# Patient Record
Sex: FEMALE | Race: OTHER
[De-identification: names, ages, dates, MRNs, and addresses within clinical notes are randomized per-mention and may not be internally consistent; named-entity substitution may affect disease eponyms.]

---

## 2022-04-29 ENCOUNTER — HOSPITAL ENCOUNTER (OUTPATIENT)
Dept: HOSPITAL 47 - EC | Age: 67
Setting detail: OBSERVATION
LOS: 4 days | Discharge: HOME | End: 2022-05-03
Attending: HOSPITALIST | Admitting: HOSPITALIST
Payer: MEDICARE

## 2022-04-29 DIAGNOSIS — G89.29: ICD-10-CM

## 2022-04-29 DIAGNOSIS — Z90.49: ICD-10-CM

## 2022-04-29 DIAGNOSIS — Z95.1: ICD-10-CM

## 2022-04-29 DIAGNOSIS — Z86.73: ICD-10-CM

## 2022-04-29 DIAGNOSIS — I11.9: ICD-10-CM

## 2022-04-29 DIAGNOSIS — I73.9: ICD-10-CM

## 2022-04-29 DIAGNOSIS — Z20.822: ICD-10-CM

## 2022-04-29 DIAGNOSIS — E78.00: ICD-10-CM

## 2022-04-29 DIAGNOSIS — F17.210: ICD-10-CM

## 2022-04-29 DIAGNOSIS — F32.A: ICD-10-CM

## 2022-04-29 DIAGNOSIS — Z83.3: ICD-10-CM

## 2022-04-29 DIAGNOSIS — I08.1: ICD-10-CM

## 2022-04-29 DIAGNOSIS — M19.91: ICD-10-CM

## 2022-04-29 DIAGNOSIS — I48.0: ICD-10-CM

## 2022-04-29 DIAGNOSIS — I25.2: ICD-10-CM

## 2022-04-29 DIAGNOSIS — Z82.49: ICD-10-CM

## 2022-04-29 DIAGNOSIS — E03.9: ICD-10-CM

## 2022-04-29 DIAGNOSIS — F41.9: ICD-10-CM

## 2022-04-29 DIAGNOSIS — G62.9: ICD-10-CM

## 2022-04-29 DIAGNOSIS — Z79.890: ICD-10-CM

## 2022-04-29 DIAGNOSIS — Z79.899: ICD-10-CM

## 2022-04-29 DIAGNOSIS — E78.5: ICD-10-CM

## 2022-04-29 DIAGNOSIS — Z88.5: ICD-10-CM

## 2022-04-29 DIAGNOSIS — M54.50: ICD-10-CM

## 2022-04-29 DIAGNOSIS — I25.82: ICD-10-CM

## 2022-04-29 DIAGNOSIS — I95.9: ICD-10-CM

## 2022-04-29 DIAGNOSIS — Z79.02: ICD-10-CM

## 2022-04-29 DIAGNOSIS — Z91.14: ICD-10-CM

## 2022-04-29 DIAGNOSIS — N39.3: ICD-10-CM

## 2022-04-29 DIAGNOSIS — I25.110: Primary | ICD-10-CM

## 2022-04-29 DIAGNOSIS — I25.5: ICD-10-CM

## 2022-04-29 DIAGNOSIS — Z95.5: ICD-10-CM

## 2022-04-29 LAB
APTT BLD: 21.8 SEC (ref 22–30)
BASOPHILS # BLD AUTO: 0.1 K/UL (ref 0–0.2)
BASOPHILS NFR BLD AUTO: 1 %
EOSINOPHIL # BLD AUTO: 0.2 K/UL (ref 0–0.7)
EOSINOPHIL NFR BLD AUTO: 2 %
ERYTHROCYTE [DISTWIDTH] IN BLOOD BY AUTOMATED COUNT: 4.47 M/UL (ref 3.8–5.4)
ERYTHROCYTE [DISTWIDTH] IN BLOOD: 13.6 % (ref 11.5–15.5)
HCT VFR BLD AUTO: 44.9 % (ref 34–46)
HGB BLD-MCNC: 13.7 GM/DL (ref 11.4–16)
INR PPP: 0.9 (ref ?–1.2)
LYMPHOCYTES # SPEC AUTO: 2 K/UL (ref 1–4.8)
LYMPHOCYTES NFR SPEC AUTO: 21 %
MCH RBC QN AUTO: 30.6 PG (ref 25–35)
MCHC RBC AUTO-ENTMCNC: 30.4 G/DL (ref 31–37)
MCV RBC AUTO: 100.4 FL (ref 80–100)
MONOCYTES # BLD AUTO: 0.5 K/UL (ref 0–1)
MONOCYTES NFR BLD AUTO: 5 %
NEUTROPHILS # BLD AUTO: 6.7 K/UL (ref 1.3–7.7)
NEUTROPHILS NFR BLD AUTO: 69 %
PLATELET # BLD AUTO: 199 K/UL (ref 150–450)
PT BLD: 9.7 SEC (ref 9–12)
WBC # BLD AUTO: 9.6 K/UL (ref 3.8–10.6)

## 2022-04-29 PROCEDURE — 71275 CT ANGIOGRAPHY CHEST: CPT

## 2022-04-29 PROCEDURE — 96376 TX/PRO/DX INJ SAME DRUG ADON: CPT

## 2022-04-29 PROCEDURE — 99285 EMERGENCY DEPT VISIT HI MDM: CPT

## 2022-04-29 PROCEDURE — 93017 CV STRESS TEST TRACING ONLY: CPT

## 2022-04-29 PROCEDURE — 85379 FIBRIN DEGRADATION QUANT: CPT

## 2022-04-29 PROCEDURE — 93005 ELECTROCARDIOGRAM TRACING: CPT

## 2022-04-29 PROCEDURE — 93306 TTE W/DOPPLER COMPLETE: CPT

## 2022-04-29 PROCEDURE — 94760 N-INVAS EAR/PLS OXIMETRY 1: CPT

## 2022-04-29 PROCEDURE — 78452 HT MUSCLE IMAGE SPECT MULT: CPT

## 2022-04-29 PROCEDURE — 96366 THER/PROPH/DIAG IV INF ADDON: CPT

## 2022-04-29 PROCEDURE — 96374 THER/PROPH/DIAG INJ IV PUSH: CPT

## 2022-04-29 PROCEDURE — 96365 THER/PROPH/DIAG IV INF INIT: CPT

## 2022-04-29 PROCEDURE — 80061 LIPID PANEL: CPT

## 2022-04-29 PROCEDURE — 36415 COLL VENOUS BLD VENIPUNCTURE: CPT

## 2022-04-29 PROCEDURE — 85730 THROMBOPLASTIN TIME PARTIAL: CPT

## 2022-04-29 PROCEDURE — 80053 COMPREHEN METABOLIC PANEL: CPT

## 2022-04-29 PROCEDURE — 71046 X-RAY EXAM CHEST 2 VIEWS: CPT

## 2022-04-29 PROCEDURE — 85025 COMPLETE CBC W/AUTO DIFF WBC: CPT

## 2022-04-29 PROCEDURE — 85610 PROTHROMBIN TIME: CPT

## 2022-04-29 PROCEDURE — 84484 ASSAY OF TROPONIN QUANT: CPT

## 2022-04-29 RX ADMIN — NICOTINE SCH: 14 PATCH, EXTENDED RELEASE TRANSDERMAL at 18:26

## 2022-04-29 RX ADMIN — METOPROLOL TARTRATE SCH MG: 25 TABLET, FILM COATED ORAL at 21:28

## 2022-04-29 RX ADMIN — CEFAZOLIN SCH MLS/HR: 330 INJECTION, POWDER, FOR SOLUTION INTRAMUSCULAR; INTRAVENOUS at 18:32

## 2022-04-29 RX ADMIN — HEPARIN SODIUM SCH MLS/HR: 10000 INJECTION, SOLUTION INTRAVENOUS at 15:02

## 2022-04-29 NOTE — ED
Chest Pain HPI





- General


Stated Complaint: Chest pain


Time Seen by Provider: 04/29/22 13:09


Source: patient, RN/MD, EMS, RN notes reviewed





- History of Present Illness


Initial Comments: 





67-year-old female transferred from [facility] for evaluation of chest 

pain.  Patient apparently has had chronic lower back pain and did have lab work 

that was performed by her family doctor she was actually at the office today he 

had results started developing chest pain and pressure.  She was transferred to 

[facility] for initial evaluation and treatment.  She was given aspirin 

and she was given nitroglycerin initially she did have a hypotensive episode 

once apparently emerged department also wants and route to this facility.  

Initial EKG shows some evidence of ischemic changes in the inferior lateral 

leads per report troponin was pending at the time of transfer the troponin was 

received here by fax and seems be within normal limits.  Patient's back pain was

better after IV Tylenol.  Patient does have a history of stents as well as 

cardiac bypass.  Discussion she is feeling improved.  No nausea no vomiting no 

fevers chills sweats cough or phlegm production.  No loss of function to her 

upper or lower extremities.  Please see the complete report in the paperwork 

from [facility].


MD Complaint: chest pain, other





- Related Data


                                Home Medications











 Medication  Instructions  Recorded  Confirmed


 


Atorvastatin Calcium [Lipitor] 80 mg PO DAILY 04/29/22 04/29/22


 


Clopidogrel Bisulfate [Plavix] 75 mg PO DAILY 04/29/22 04/29/22


 


Gabapentin 600 mg PO DAILY 04/29/22 04/29/22


 


Levothyroxine Sodium [Synthroid] 112 mcg PO DAILY 04/29/22 04/29/22


 


Losartan Potassium 50 mg PO DAILY 04/29/22 04/29/22


 


Metoprolol Tartrate [Lopressor] 25 mg PO DAILY 04/29/22 04/29/22


 


Sertraline HCl [Zoloft] 100 mg PO DAILY 04/29/22 04/29/22


 


clonazePAM [KlonoPIN] 1 mg PO HS 04/29/22 04/29/22











                                    Allergies











Allergy/AdvReac Type Severity Reaction Status Date / Time


 


codeine AdvReac  Unknown Verified 04/29/22 15:19














Review of Systems


ROS Statement: 


Those systems with pertinent positive or pertinent negative responses have been 

documented in the HPI.





ROS Other: All systems not noted in ROS Statement are negative.





EKG Findings





- EKG Results:


EKG: interpreted by ERIC, sinus rhythm (Sinus rhythm of 50 bpm WY interval 174 

QRS duration 91 QT//451 left exodeviation anteroseptal changes 

indeterminate age moderate T-wave abnormality.  This is consistent with EKGs 

presented from [facility].)





General Exam





- General Exam Comments


Initial Comments: 





This is a well-developed well-nourished awake alert oriented 3 female


General appearance: alert, in no apparent distress


Head exam: Present: atraumatic, normocephalic, normal inspection


Eye exam: Present: normal appearance, PERRL, EOMI.  Absent: scleral icterus, 

conjunctival injection, periorbital swelling


ENT exam: Present: normal exam, mucous membranes moist


Neck exam: Present: normal inspection, full ROM, other (No stridor JVD or 

bruits).  Absent: tenderness, meningismus, lymphadenopathy


Respiratory exam: Present: normal lung sounds bilaterally.  Absent: respiratory 

distress, wheezes, rales, rhonchi, stridor


Cardiovascular Exam: Present: regular rate, normal rhythm, normal heart sounds. 

 Absent: systolic murmur, diastolic murmur, rubs, gallop, clicks


GI/Abdominal exam: Present: soft, normal bowel sounds.  Absent: distended, 

tenderness, guarding, rebound, rigid, bruit, pulsatile mass


Extremities exam: Present: normal inspection, full ROM, normal capillary refill.

  Absent: tenderness, pedal edema, joint swelling, calf tenderness


Back exam: Present: normal inspection


Neurological exam: Present: alert, oriented X3, CN II-XII intact


Psychiatric exam: Present: normal affect, normal mood


Skin exam: Present: warm, dry, intact, normal color.  Absent: rash





Course


                                   Vital Signs











  04/29/22 04/29/22





  13:11 15:04


 


Temperature 97.7 F 


 


Pulse Rate 55 L 58 L


 


Respiratory 18 18





Rate  


 


Blood Pressure 132/58 124/55


 


O2 Sat by Pulse 95 99





Oximetry  














Chest Pain MDM





- MDM





Review the findings I did review the material from the sending facility.  

Patient is feeling improved the initial troponin was negative I did discuss the 

findings with her and with the admitting physician patient be admitted with 

cardiology consultation





Disposition


Clinical Impression: 


 Chest pain, Unstable angina pectoris





Disposition: ADMITTED AS IP TO THIS HOSP


Condition: Fair


Referrals: 


Bernabe Colon MD [Primary Care Provider] - 1-2 days


Decision Date: 04/29/22


Decision Time: 17:10

## 2022-04-29 NOTE — CT
EXAMINATION TYPE: CT angio chest

 

DATE OF EXAM: 4/29/2022 2:49 PM

 

COMPARISON: None

 

HISTORY: PE suspected.

 

CT DLP: 256.4 mGycm

Automated exposure control for dose reduction was used.

 

CONTRAST: 

CTA scan of the thorax is performed with IV Contrast, patient injected with 100 mL of Isovue 370, pul
monary embolism protocol. .  

 

FINDINGS:

 

LUNGS: The lungs are grossly clear, there is no concerning parenchymal mass or nodule identified.   T
here is no pleural effusion or pneumothorax seen.  The tracheobronchial tree is patent.

 

MEDIASTINUM: There is satisfactory enhancement of the pulmonary artery and its branches, there is no 
CT evidence for pulmonary embolism.  There are no greater than 1 cm hilar or mediastinal lymph nodes.
   No pericardial effusion is seen. 

 

In the thoracic aortic arch there is a focal outpouching consistent with a small saccular aneurysm.

 

There is a 18 mm filling defect left ventricular apex with rim-like calcification. 

 

 

IMPRESSION: 

 

 

1. NO EVIDENCE OF PULMONARY EMBOLISM.

2. NO ACUTE CARDIOPULMONARY DISEASE.

3. SACCULAR ANEURYSM OF THE THORACIC AORTIC ARCH. FURTHER EVALUATION IS WARRANTED.

4. ABNORMALITY OF THE LEFT VENTRICLE APEX. FURTHER EVALUATION IS WARRANTED.

## 2022-04-29 NOTE — XR
EXAMINATION TYPE: XR chest 2V

 

DATE OF EXAM: 4/29/2022

 

COMPARISON: NONE

 

HISTORY: Chest pain

 

TECHNIQUE:

 

FINDINGS: There is no heart failure nor confluent pneumonic infiltrate. There are sternal wires. Cost
ophrenic angles are clear. Heart size is fairly normal. Bony thorax is intact.

 

IMPRESSION: No active cardiopulmonary disease. No change.

## 2022-04-30 LAB
APTT BLD: 43.1 SEC (ref 22–30)
BASOPHILS # BLD AUTO: 0.1 K/UL (ref 0–0.2)
BASOPHILS NFR BLD AUTO: 1 %
CHOLEST SERPL-MCNC: 114 MG/DL (ref 0–200)
EOSINOPHIL # BLD AUTO: 0.2 K/UL (ref 0–0.7)
EOSINOPHIL NFR BLD AUTO: 3 %
ERYTHROCYTE [DISTWIDTH] IN BLOOD BY AUTOMATED COUNT: 4.23 M/UL (ref 3.8–5.4)
ERYTHROCYTE [DISTWIDTH] IN BLOOD: 13.6 % (ref 11.5–15.5)
HCT VFR BLD AUTO: 42.2 % (ref 34–46)
HDLC SERPL-MCNC: 31 MG/DL (ref 40–60)
HGB BLD-MCNC: 12.8 GM/DL (ref 11.4–16)
INR PPP: 0.9 (ref ?–1.2)
LDLC SERPL CALC-MCNC: 52.2 MG/DL (ref 0–131)
LYMPHOCYTES # SPEC AUTO: 2.5 K/UL (ref 1–4.8)
LYMPHOCYTES NFR SPEC AUTO: 31 %
MCH RBC QN AUTO: 30.2 PG (ref 25–35)
MCHC RBC AUTO-ENTMCNC: 30.3 G/DL (ref 31–37)
MCV RBC AUTO: 99.7 FL (ref 80–100)
MONOCYTES # BLD AUTO: 0.3 K/UL (ref 0–1)
MONOCYTES NFR BLD AUTO: 4 %
NEUTROPHILS # BLD AUTO: 5 K/UL (ref 1.3–7.7)
NEUTROPHILS NFR BLD AUTO: 61 %
PLATELET # BLD AUTO: 184 K/UL (ref 150–450)
PT BLD: 10.1 SEC (ref 9–12)
TRIGL SERPL-MCNC: 154 MG/DL (ref 0–149)
VLDLC SERPL CALC-MCNC: 30.8 MG/DL (ref 5–40)
WBC # BLD AUTO: 8.2 K/UL (ref 3.8–10.6)

## 2022-04-30 RX ADMIN — CLOPIDOGREL BISULFATE SCH MG: 75 TABLET ORAL at 08:09

## 2022-04-30 RX ADMIN — CEFAZOLIN SCH MLS/HR: 330 INJECTION, POWDER, FOR SOLUTION INTRAMUSCULAR; INTRAVENOUS at 04:47

## 2022-04-30 RX ADMIN — GABAPENTIN SCH MG: 300 CAPSULE ORAL at 08:09

## 2022-04-30 RX ADMIN — CEFAZOLIN SCH: 330 INJECTION, POWDER, FOR SOLUTION INTRAMUSCULAR; INTRAVENOUS at 14:21

## 2022-04-30 RX ADMIN — SERTRALINE HYDROCHLORIDE SCH MG: 100 TABLET ORAL at 08:10

## 2022-04-30 RX ADMIN — METOPROLOL TARTRATE SCH MG: 25 TABLET, FILM COATED ORAL at 08:09

## 2022-04-30 RX ADMIN — LEVOTHYROXINE SODIUM SCH MCG: 0.11 TABLET ORAL at 05:53

## 2022-04-30 RX ADMIN — METOPROLOL TARTRATE SCH MG: 25 TABLET, FILM COATED ORAL at 20:36

## 2022-04-30 RX ADMIN — ATORVASTATIN CALCIUM SCH MG: 80 TABLET, FILM COATED ORAL at 08:09

## 2022-04-30 RX ADMIN — ACETAMINOPHEN PRN MG: 325 TABLET, FILM COATED ORAL at 11:06

## 2022-04-30 RX ADMIN — LOSARTAN POTASSIUM SCH MG: 50 TABLET, FILM COATED ORAL at 08:10

## 2022-04-30 RX ADMIN — NICOTINE SCH: 14 PATCH, EXTENDED RELEASE TRANSDERMAL at 08:12

## 2022-04-30 RX ADMIN — ASPIRIN 325 MG ORAL TABLET SCH MG: 325 PILL ORAL at 08:09

## 2022-04-30 RX ADMIN — HEPARIN SODIUM SCH MLS/HR: 10000 INJECTION, SOLUTION INTRAVENOUS at 17:15

## 2022-04-30 RX ADMIN — NICOTINE SCH PATCH: 14 PATCH, EXTENDED RELEASE TRANSDERMAL at 08:10

## 2022-04-30 NOTE — P.HPIM
History of Present Illness


H&P Date: 22


Chief Complaint: Chest pain





This is a pleasant 67-year-old patient who follows with Dr. Bernabe Colon.  

Patient was transferred here from [facility].


Chronic stable medical conditions include urinary incontinence, hypothyroid, 

anxiety, hypothyroid, CAD with a bypass in , PAD, osteoarthritis.  Patient 

presents with 2 months of chest pain anteriorly and at the back breath.  Present

off and on.  Describes urge sometimes as if she received a shock versus 

squeezing sensation.  Sometimes radiates to the neck.  Some associated shortness

of breath and perspiration.  Symptoms have been progressively getting worse.  

Presented at rest.  Patient was transferred here for further workup.





Review of systems:


GEN.:  Tired


EYES: None


HEENT: None


NECK: None


RESPIRATORY: As above


CARDIOVASCULAR: As above


GASTROINTESTINAL: None


GENITOURINARY: Incontinence


MUSCULOSKELETAL: Joint pains


LYMPHATICS: None


HEMATOLOGICAL: None  


PSYCHIATRY: None


NEUROLOGICAL: Some trouble with balance





Past medical history to include:


Urinary incontinence, hypothyroid, anxiety, CAD with bypass in , PAD, 

osteoarthritis





Social history:


Lives with her .  Alcohol rarely.  Been smoking for about 40 years 

currently half a pack a day.  .





Family history:


Reviewed, noncontributory to presentation





Physical examination:


VITAL SIGNS: 97.7, 55, 18, 132/58, 95% room air


GENERAL: BMI 24.8, reclining in bed, awake, tired.


EYES: Pupils equal.  Conjunctiva normal.


HEENT: External appearance of nose and ears normal, oral cavity grossly normal.


NECK: JVD not raised; masses not palpable.


HEART: First and second heart sounds are normal;  no edema.  


LUNGS: Respiratory rate normal; decreased breath sound.  


ABDOMEN: Soft,  nontender, liver spleen not palpable, no masses palpable.  


PSYCH: Alert and oriented x3;  mood  and affect normal.  


MUSCULOSKELETAL:No Clubbing/cyanosis;muscles-grossly intact.  OA


NEUROLOGICAL: Cranial nerves grossly intact; no facial asymmetry,   power and 

sensation grossly intact. 


LYMPHATICS: No lymph nodes palpable in the axilla and neck





INVESTIGATIONS, reviewed in the clinical context:


EKG tracing personally reviewed by me-normal sinus rhythm.  Decreased T waves in

V3 to V6.  Some ST segment depression.


Chest x-ray film personally reviewed by me-cardiomegaly.  No venous prominence


Troponin less than 0.012


Labs from [facility]:


Sodium 140 potassium 4.6.  15 creatinine 0.6 liver function test: Negative 

phosphate 3.1 white count 10 hemoglobin 13.9 platelets 2:30


COVID 19/influenza A/influenza B: All not detected





Assessment and plan:





-Unstable angina in a patient with known coronary artery disease was continued 

to smoke up to recently.  With some EKG changes.  Troponin 0.014


Aspirin, Lopressor, IV heparin





-CAD with a prior history of coronary bypass in 


Aspirin, Lopressor





-Peripheral arterial disease


Aspirin,





-Primary osteoarthritis, bilateral


Tylenol as needed





-Hypothyroid


Synthroid 112 g a day





-Essential hypertension


Cozaar 50 mg day.  Lopressor 25 mg twice a day.





-Chronic urinary stress incontinence





-Peripheral neuropathy


Neurontin 600 mg a day





-Chronic nicotine dependence, cigarette smoker


Nicotine patch 14





-IV heparin monitoring


Follow-up PTT





IV heparin.  Aspirin.  Lopressor.  Resume home medications.  Discussed with the 

patient.  Cardiology consultation.  Nicotine patch.


Given the complexity and severity of patient's condition expect the patient to 

be in the hospital at least for 2 overnights





Past Medical History


Past Medical History: Myocardial Infarction (MI)


History of Any Multi-Drug Resistant Organisms: None Reported


Past Surgical History: Appendectomy,  Section, Cholecystectomy, Coronary

Bypass/CABG, Heart Catheterization, Heart Catheterization With Stent


Past Psychological History: No Psychological Hx Reported


Smoking Status: Never smoker


Past Alcohol Use History: None Reported


Past Drug Use History: None Reported





- Past Family History


  ** Father


History Unknown: Yes





  ** Mother


Family Medical History: Diabetes Mellitus, Hypertension





Medications and Allergies


                                Home Medications











 Medication  Instructions  Recorded  Confirmed  Type


 


Atorvastatin Calcium [Lipitor] 80 mg PO DAILY 22 History


 


Clopidogrel Bisulfate [Plavix] 75 mg PO DAILY 22 History


 


Gabapentin 600 mg PO DAILY 22 History


 


Levothyroxine Sodium [Synthroid] 112 mcg PO DAILY 22 History


 


Losartan Potassium 50 mg PO DAILY 22 History


 


Metoprolol Tartrate [Lopressor] 25 mg PO DAILY 22 History


 


Sertraline HCl [Zoloft] 100 mg PO DAILY 22 History


 


clonazePAM [KlonoPIN] 1 mg PO HS 22 History








                                    Allergies











Allergy/AdvReac Type Severity Reaction Status Date / Time


 


codeine AdvReac  Unknown Verified 22 15:19














Physical Exam


Vitals: 


                                   Vital Signs











  Temp Pulse Resp BP Pulse Ox


 


 22 15:04   58 L  18  124/55  99


 


 22 13:11  97.7 F  55 L  18  132/58  95








                                Intake and Output











 22





 06:59 14:59 22:59


 


Other:   


 


  Weight  55.792 kg 














Results


CBC & Chem 7: 


                                 22 03:40





Labs: 


                  Abnormal Lab Results - Last 24 Hours (Table)











  22 Range/Units





  13:24 15:42 


 


MCV   100.4 H  (80.0-100.0)  fL


 


MCHC   30.4 L  (31.0-37.0)  g/dL


 


APTT  21.8 L   (22.0-30.0)  sec


 


D-Dimer  1.10 H   (<0.60)  mg/L FEU

## 2022-04-30 NOTE — CA
Transthoracic Echo Report 

 Name: Jaime Holguin 

 MRN:    [id number] 

 Age:    67     Gender:     F 

 

 :    1955 

 Exam Date:     2022 07:07 

 Exam Location: [facility] 

 Ht (in):     51     Wt (lb):     127 

 Ordering Physician:        Jose Antonio Blackwood MD 

 Attending/Referring Phys: 

 Technician         Sarahi Barr RDCS 

 Procedure CPT: 

 Indications:       Chest Pain 

 

 Cardiac Hx:        Cardiac HX of bypass, and stents. 

 Technical Quality:      Technically difficult study 

 Contrast 1:    Lumason                     Total Dose (mL):      .25 

 Contrast 2:                                Total Dose (mL): 

 

 MEASUREMENTS  (Male / Female) Normal Values 

 2D ECHO 

 LV Diastolic Diameter PLAX        4.5 cm                4.2 - 5.9 / 3.9 - 5.3 cm 

 LV Systolic Diameter PLAX         3.4 cm                 

 IVS Diastolic Thickness           1.4 cm                0.6 - 1.0 / 0.6 - 0.9 cm 

 LVPW Diastolic Thickness          1.3 cm                0.6 - 1.0 / 0.6 - 0.9 cm 

 LV Relative Wall Thickness        0.6                    

 RV Internal Dim ED PLAX           2.8 cm                 

 LA Volume                         31.0 cm              18 - 58 / 22 - 52 cm 

 

 M-MODE 

 Aortic Root Diameter MM           2.8 cm                 

 LA Systolic Diameter MM           3.9 cm                 

 LA Ao Ratio MM                    1.4                    

 MV E Point Septal Separation      1.4 cm                 

 AV Cusp Separation MM             1.8 cm                 

 

 DOPPLER 

 AV Peak Velocity                  131.4 cm/s             

 AV Peak Gradient                  6.9 mmHg               

 AI Peak Velocity                  386.5 cm/s             

 AI Peak Gradient                  59.8 mmHg              

 AI Pressure Half Time             1199.1 ms              

 MV Area PHT                       3.3 cm                

 MR Peak Velocity                  419.5 cm/s             

 MR Peak Gradient                  70.4 mmHg              

 Mitral E Point Velocity           93.2 cm/s              

 Mitral A Point Velocity           90.1 cm/s              

 Mitral E to A Ratio               1.0                    

 MV Deceleration Time              232.9 ms               

 MV E' Velocity                    6.2 cm/s               

 Mitral E to MV E' Ratio           15.0                   

 TR Peak Velocity                  322.9 cm/s             

 TR Peak Gradient                  41.7 mmHg              

 Right Ventricular Systolic Press  46.5 mmHg              

 PV Peak Velocity                  89.6 cm/s              

 PV Peak Gradient                  3.2 mmHg               

 PI Peak Gradient                  18.9 mmHg              

 

 

 FINDINGS 

 Left Ventricle 

 Moderate LVH. Left ventricular ejection fraction is estimated at 40-45 %. Grade  

 1 diastolic dysfunction. Fixed thrombus in the left ventricle. 

 

 Right Ventricle 

 The right ventricle is normal in size and function. 

 

 Right Atrium 

 The right atrium is normal in size. 

 

 Left Atrium 

 The left atrium is normal in size. 

 

 Mitral Valve 

 Structurally normal mitral valve without significant stenosis or prolapse.   

 There is mild mitral regurgitation. 

 

 Aortic Valve 

 There is mild aortic regurgitation.  Trileaflet aortic valve. Focal thickening  

 of the aortic valve cusps. 

 

 Tricuspid Valve 

 Structurally normal tricuspid valve without significant stenosis.  Pulmonary  

 artery systolic pressure is normal.  Moderate-to-severe tricuspid  

 regurgitation. 

 

 Pulmonic Valve 

 Structurally normal pulmonic valve without significant stenosis.  There is a  

 trace of pulmonic regurgitation. 

 

 Pericardium 

 Normal pericardium without effusion. 

 

 Aorta 

 Normal aortic root dimension. 

 

 CONCLUSIONS 

 Moderate LV systolic dysfunction with an ejection fraction of 40% secondary to  

 prior apical myocardial infarction.  Mild mitral and moderate tricuspid  

 regurgitation.  RV systolic pressure is normal. 

 There is  apical thrombus noted 

 Previewed by:  

 Dr. Shaun Galeana MD 

 (Electronically Signed) 

 Final Date:      2022 12:42

## 2022-04-30 NOTE — P.CRDCN
History of Present Illness


Consult date: 22


History of present illness: 





Patient is a 67-year-old female with a known history of coronary artery disease 

status post CABG, bilateral carotid endarterectomy, hypertension, 

hyperlipidemia, hypothyroidism transferred here from [facility] for chest

pain.  Patient used to follow with Dr. Raul Noel [phone], will obtain 

records including an old EKG to compare to rule out ischemic changes.  Will also

obtain surgical reports.  Patient reports her chest pain radiates to the middle 

of her back and accompanied by shortness of breath.  Chest pain has been ongoing

for some time but is increasingly getting worse and more frequent occurring 

almost every other day.  Patient is sinus rhythm on the monitor troponins are 

negative 3.  Patient is on a heparin drip.  Her CTA was negative for pulmonary 

embolism and showed no acute cardiopulmonary disease, and the patient's thoracic

aortic arch there is a focal outpouching consistent with a small saccular 

aneurysm.  Will continue patient on heparin drip until we are able to rule out 

ischemic changes.  Will obtain an echocardiogram to evaluate LV function and 

measure a asending aorta.  Resume all current home cardiac medications





Review of Systems





REVIEW OF SYSTEMS


At the time of my exam:


CONSTITUTIONAL: Denies fever or chills.


EYES: Negative for vision changes


ENT: Negative for hearing loss


CARDIOVASCULAR: Denies chest pain, shortness of breath, diaphoresis, orthopnea, 

PND or palpitations.


VASCULAR: Denies edema


RESPIRATORY: Denies cough. 


GASTROINTESTINAL: Denies abdominal pain, diarrhea, constipation, nausea or 

vomiting.


MUSCULOSKELETAL: Denies myalgias.


NEUROLOGIC: Denies numbness, tingling, headache or weakness.


ENDOCRINE: Denies fatigue, weight change,  polydipsia or polyurina.


GENITOURINARY: Denies burning, hematuria or urgency with micturation.


HEMATOLOGIC: Denies history of anemia or bleeding. 


DERMATOLOGY: Denies rash or skin sores


PSYCH: Negative for depression or hallucinations. 





Past Medical History


Past Medical History: Myocardial Infarction (MI)


Last Myocardial Infarction Date:: 


History of Any Multi-Drug Resistant Organisms: None Reported


Past Surgical History: Appendectomy,  Section, Cholecystectomy, Coronary

Bypass/CABG, Heart Catheterization, Heart Catheterization With Stent


Additional Past Surgical History / Comment(s): bilat carotid


Past Anesthesia/Blood Transfusion Reactions: No Reported Reaction


Date of Last Stent Placement:: 


Past Psychological History: No Psychological Hx Reported


Smoking Status: Never smoker


Past Alcohol Use History: None Reported


Past Drug Use History: None Reported





- Past Family History


  ** Father


History Unknown: Yes





  ** Mother


Family Medical History: Diabetes Mellitus, Hypertension





Medications and Allergies


                                Home Medications











 Medication  Instructions  Recorded  Confirmed  Type


 


Atorvastatin Calcium [Lipitor] 80 mg PO DAILY 22 History


 


Clopidogrel Bisulfate [Plavix] 75 mg PO DAILY 22 History


 


Gabapentin 600 mg PO DAILY 22 History


 


Levothyroxine Sodium [Synthroid] 112 mcg PO DAILY 22 History


 


Losartan Potassium 50 mg PO DAILY 22 History


 


Metoprolol Tartrate [Lopressor] 25 mg PO DAILY 22 History


 


Sertraline HCl [Zoloft] 100 mg PO DAILY 22 History


 


clonazePAM [KlonoPIN] 1 mg PO HS 22 History








                                    Allergies











Allergy/AdvReac Type Severity Reaction Status Date / Time


 


codeine AdvReac  Unknown Verified 22 15:19














Physical Exam


Vitals: 


                                   Vital Signs











  Temp Pulse Pulse Resp BP BP Pulse Ox


 


 22 12:00  98.6 F   65  16   132/65  92 L


 


 22 08:00  98 F   61  16   105/58  88 L


 


 22 04:00  98.3 F   62  16   135/77 


 


 22 02:00    64  16   


 


 22 00:00  98.1 F   64  16   108/52  94 L


 


 22 20:00  97.9 F   61  18   151/84  93 L


 


 22 15:04   58 L   18  124/55   99


 


 22 13:11  97.7 F  55 L   18  132/58   95








                                Intake and Output











 22





 22:59 06:59 14:59


 


Intake Total 283.852 54.547 120


 


Balance 283.852 54.547 120


 


Intake:   


 


  Intake, IV Titration 43.852 54.547 





  Amount   


 


    Heparin Sod,Pork in 0.45% 43.852 54.547 





    NaCl 25,000 unit In 0.45   





    % NaCl 1 250ml.bag @ 12   





    UNITS/KG/HR 6.695 mls/hr   





    IV .Q24H [facility] Rx#:   





    [id number]   


 


  Oral 240  120


 


Other:   


 


  Voiding Method Toilet Toilet Toilet


 


  # Voids 1 3 


 


  Weight 55.792 kg 57.878 kg 




















General:  The patient is awake and alert, in no distress, and does not appear 

acutely ill. 


Skin:  Skin is warm and dry and no rashes or lesions are noted. 


Eye:  Pupils are equal, round and reactive to light, extra-ocular movements are 

intact; there is normal conjunctiva bilaterally.  


Ears, nose, mouth and throat:  There are moist mucous membranes and no oral 

lesions. 


Neck:  The neck is supple, there is no tenderness  or JVD.  


Cardiovascular:  There is irregular regular rate and rhythm. No murmur, rub or 

gallop is appreciated.


Respiratory:  Lungs are clear to auscultation, respirations are non-labored, 

breath sounds are equal.  


Gastrointestinal:  Soft, non-distended, non-tender abdomen without masses or 

organomegaly noted. There is no rebound or guarding present. Bowel sounds are 

unremarkable. 


Back:  There is no tenderness to palpation in the midline. There is no obvious 

deformity.


Musculoskeletal:  Normal ROM, no tenderness, There is no pedal edema. There is 

no calf tenderness or swelling.  


Extremities: no edema


Vascular: Femoral pulse is normal. Posterior tibial pulses are normal .Dorsalis 

pedis is palpable.


Neurological:  CN II-XII intact. There are no obvious motor or sensory deficits.

 Speech is normal.


Psychiatric:  Cooperative, appropriate mood & affect, normal judgment





Results





                                 22 03:40





                                 Cardiac Enzymes











  22 Range/Units





  13:24 17:41 20:55 


 


Troponin I  0.014  <0.012  0.019  (0.000-0.034)  ng/mL








                                   Coagulation











  22 Range/Units





  13:24 20:55 03:40 


 


PT  9.7   10.1  (9.0-12.0)  sec


 


APTT  21.8 L  41.5 H  43.1 H  (22.0-30.0)  sec














  22 Range/Units





  09:28 


 


PT   (9.0-12.0)  sec


 


APTT  51.7 H  (22.0-30.0)  sec








                                     Lipids











  22 Range/Units





  03:40 


 


Triglycerides  154.00 H  (0..00)  mg/dL


 


Cholesterol  114.00  (0..00)  mg/dL


 


HDL Cholesterol  31.00 L  (40.00-60.00)  mg/dL


 


Cholesterol/HDL Ratio  3.68  Ratio








                                       CBC











  22 Range/Units





  15:42 03:40 


 


WBC  9.6  8.2  (3.8-10.6)  k/uL


 


RBC  4.47  4.23  (3.80-5.40)  m/uL


 


Hgb  13.7  12.8  (11.4-16.0)  gm/dL


 


Hct  44.9  42.2  (34.0-46.0)  %


 


Plt Count  199  184  (150-450)  k/uL








                               Current Medications











Generic Name Dose Route Start Last Admin





  Trade Name Freq  PRN Reason Stop Dose Admin


 


Acetaminophen  650 mg  22 17:30  22 11:06





  Acetaminophen Tab 325 Mg Tab  PO   650 mg





  Q6HR PRN   Administration





  Mild Pain or Fever > 100.5  


 


Aspirin  325 mg  22 09:00  22 08:09





  Aspirin 325 Mg Tab  PO   325 mg





  DAILY KT   Administration


 


Atorvastatin Calcium  80 mg  22 09:00  22 08:09





  Atorvastatin 80 Mg Tab  PO   80 mg





  DAILY KT   Administration


 


Calcium Carbonate/Glycine  1,000 mg  22 17:30 





  Calcium Carbonate 500 Mg Chewable  PO  





  Q4HR PRN  





  Dyspepsia  


 


Clonazepam  1 mg  22 21:00  22 21:28





  Clonazepam 1 Mg Tab  PO   1 mg





  HS KT   Administration


 


Clopidogrel Bisulfate  75 mg  22 09:00  22 08:09





  Clopidogrel 75 Mg Tab  PO   75 mg





  DAILY KT   Administration


 


Gabapentin  600 mg  22 09:00  22 08:09





  Gabapentin 300 Mg Cap  PO   600 mg





  DAILY KT   Administration


 


Heparin Sodium (Porcine)  0 unit  22 13:30 





  Heparin Sodium 1,000 Un/Ml (10ml Vl)  IV  





  PER PROTOCOL PRN  





  Low PTT  





  Protocol  


 


Heparin Sodium/Sodium Chloride  250 mls @ 6.695 mls/hr  22 13:30  22

 04:34





  25,000 unit/ Sodium Chloride  IV   16 units/kg/hr





  .Q24H KT   8.927 mls/hr





    Titration





  Protocol  





  12 UNITS/KG/HR  


 


Sodium Chloride  1,000 mls @ 100 mls/hr  22 17:15  22 04:47





  Saline 0.9%  IV   100 mls/hr





  .Q10H KT   Administration


 


Lactulose  20 gm  22 17:30 





  Lactulose 20 Gm/30 Ml Cup  PO  





  DAILY PRN  





  Constipation  


 


Levothyroxine Sodium  112 mcg  22 06:30  22 05:53





  Levothyroxine 112 Mcg Tab  PO   112 mcg





  0630 KT   Administration


 


Lorazepam  0.5 mg  22 17:30  22 21:28





  Lorazepam 0.5 Mg Tab  PO   0.5 mg





  Q6HR PRN   Administration





  Anxiety  


 


Losartan Potassium  50 mg  22 09:00  22 08:10





  Losartan 50 Mg Tab  PO   50 mg





  DAILY KT   Administration


 


Metoprolol Tartrate  25 mg  22 21:00  22 08:09





  Metoprolol Tartrate 25 Mg Tab  PO   25 mg





  BID KT   Administration


 


Naloxone HCl  0.2 mg  22 17:30 





  Naloxone 0.4 Mg/Ml 1 Ml Vial  IV  





  Q2M PRN  





  Opioid Reversal  


 


Nicotine  1 patch  22 17:30  22 08:12





  Nicotine 14mg/24hr Patch  TRANSDERM   Not Given





  DAILY [facility]  


 


Nitroglycerin  0.4 mg  22 17:10 





  Nitroglycerin Sl Tabs 0.4 Mg Tab  SUBLINGUAL  





  Q5M PRN  





  Chest Pain  


 


Ondansetron HCl  4 mg  22 17:30 





  Ondansetron 4 Mg/2 Ml Vial  IVP  





  Q8HR PRN  





  Nausea And Vomiting  


 


Sertraline HCl  100 mg  22 09:00  22 08:10





  Sertraline 100 Mg Tab  PO   100 mg





  DAILY KT   Administration


 


Zolpidem Tartrate  5 mg  22 17:31 





  Zolpidem 5 Mg Tab  PO  





  HS PRN  





  Insomnia  








                                Intake and Output











 22





 22:59 06:59 14:59


 


Intake Total 283.852 54.547 120


 


Balance 283.852 54.547 120


 


Intake:   


 


  Intake, IV Titration 43.852 54.547 





  Amount   


 


    Heparin Sod,Pork in 0.45% 43.852 54.547 





    NaCl 25,000 unit In 0.45   





    % NaCl 1 250ml.bag @ 12   





    UNITS/KG/HR 6.695 mls/hr   





    IV .Q24H [facility] Rx#:   





    [id number]   


 


  Oral 240  120


 


Other:   


 


  Voiding Method Toilet Toilet Toilet


 


  # Voids 1 3 


 


  Weight 55.792 kg 57.878 kg 








                                        





                                 22 03:40 











Assessment and Plan


Assessment: 





Chest pain, troponins negative rule out ischemic changes


ascending aortic aneurysm


Known history of coronary artery disease status post CABG


Hypertension


Hyperlipidemia





Plan: 





Will obtain an echocardiogram.  


obtain a prior EKG and surgical reports to evaluate and rule out ischemic 

changes


Continue with heparin drip


Continue with all current home cardiac medications


Continue with telemetry monitoring


Further recommendations based on clinical course





The above impression and plan of care have been discussed and directed by the 

signing physician. Sydni Valdez, nurse practitioner, acting as scribe for 

signing physician.

## 2022-05-01 LAB
ALBUMIN SERPL-MCNC: 3.3 G/DL (ref 3.5–5)
ALP SERPL-CCNC: 153 U/L (ref 38–126)
ALT SERPL-CCNC: 38 U/L (ref 4–34)
ANION GAP SERPL CALC-SCNC: 5 MMOL/L
AST SERPL-CCNC: 45 U/L (ref 14–36)
BASOPHILS # BLD AUTO: 0.1 K/UL (ref 0–0.2)
BASOPHILS NFR BLD AUTO: 1 %
BUN SERPL-SCNC: 11 MG/DL (ref 7–17)
CALCIUM SPEC-MCNC: 8.8 MG/DL (ref 8.4–10.2)
CHLORIDE SERPL-SCNC: 109 MMOL/L (ref 98–107)
CO2 SERPL-SCNC: 26 MMOL/L (ref 22–30)
EOSINOPHIL # BLD AUTO: 0.2 K/UL (ref 0–0.7)
EOSINOPHIL NFR BLD AUTO: 2 %
ERYTHROCYTE [DISTWIDTH] IN BLOOD BY AUTOMATED COUNT: 4.58 M/UL (ref 3.8–5.4)
ERYTHROCYTE [DISTWIDTH] IN BLOOD: 13.6 % (ref 11.5–15.5)
GLUCOSE SERPL-MCNC: 109 MG/DL (ref 74–99)
HCT VFR BLD AUTO: 45.6 % (ref 34–46)
HGB BLD-MCNC: 14.2 GM/DL (ref 11.4–16)
LYMPHOCYTES # SPEC AUTO: 1.9 K/UL (ref 1–4.8)
LYMPHOCYTES NFR SPEC AUTO: 20 %
MCH RBC QN AUTO: 31 PG (ref 25–35)
MCHC RBC AUTO-ENTMCNC: 31.1 G/DL (ref 31–37)
MCV RBC AUTO: 99.5 FL (ref 80–100)
MONOCYTES # BLD AUTO: 0.4 K/UL (ref 0–1)
MONOCYTES NFR BLD AUTO: 5 %
NEUTROPHILS # BLD AUTO: 6.7 K/UL (ref 1.3–7.7)
NEUTROPHILS NFR BLD AUTO: 72 %
PLATELET # BLD AUTO: 196 K/UL (ref 150–450)
POTASSIUM SERPL-SCNC: 4.3 MMOL/L (ref 3.5–5.1)
PROT SERPL-MCNC: 5.9 G/DL (ref 6.3–8.2)
SODIUM SERPL-SCNC: 140 MMOL/L (ref 137–145)
WBC # BLD AUTO: 9.3 K/UL (ref 3.8–10.6)

## 2022-05-01 RX ADMIN — ATORVASTATIN CALCIUM SCH MG: 80 TABLET, FILM COATED ORAL at 08:25

## 2022-05-01 RX ADMIN — ASPIRIN 325 MG ORAL TABLET SCH MG: 325 PILL ORAL at 08:24

## 2022-05-01 RX ADMIN — METOPROLOL TARTRATE SCH MG: 25 TABLET, FILM COATED ORAL at 08:25

## 2022-05-01 RX ADMIN — ZOLPIDEM TARTRATE PRN MG: 5 TABLET ORAL at 22:14

## 2022-05-01 RX ADMIN — LEVOTHYROXINE SODIUM SCH MCG: 0.11 TABLET ORAL at 06:15

## 2022-05-01 RX ADMIN — NICOTINE SCH: 14 PATCH, EXTENDED RELEASE TRANSDERMAL at 08:25

## 2022-05-01 RX ADMIN — SERTRALINE HYDROCHLORIDE SCH MG: 100 TABLET ORAL at 08:25

## 2022-05-01 RX ADMIN — ACETAMINOPHEN PRN MG: 325 TABLET, FILM COATED ORAL at 11:21

## 2022-05-01 RX ADMIN — METOPROLOL TARTRATE SCH MG: 25 TABLET, FILM COATED ORAL at 19:40

## 2022-05-01 RX ADMIN — GABAPENTIN SCH MG: 300 CAPSULE ORAL at 08:25

## 2022-05-01 RX ADMIN — CLOPIDOGREL BISULFATE SCH MG: 75 TABLET ORAL at 08:24

## 2022-05-01 RX ADMIN — LOSARTAN POTASSIUM SCH MG: 50 TABLET, FILM COATED ORAL at 08:25

## 2022-05-01 RX ADMIN — HEPARIN SODIUM SCH MLS/HR: 10000 INJECTION, SOLUTION INTRAVENOUS at 17:50

## 2022-05-02 RX ADMIN — LOSARTAN POTASSIUM SCH MG: 50 TABLET, FILM COATED ORAL at 08:54

## 2022-05-02 RX ADMIN — ATORVASTATIN CALCIUM SCH MG: 80 TABLET, FILM COATED ORAL at 08:54

## 2022-05-02 RX ADMIN — HEPARIN SODIUM SCH MLS/HR: 10000 INJECTION, SOLUTION INTRAVENOUS at 19:40

## 2022-05-02 RX ADMIN — GABAPENTIN SCH MG: 300 CAPSULE ORAL at 08:55

## 2022-05-02 RX ADMIN — ZOLPIDEM TARTRATE PRN MG: 5 TABLET ORAL at 22:47

## 2022-05-02 RX ADMIN — ISOSORBIDE MONONITRATE SCH MG: 30 TABLET, EXTENDED RELEASE ORAL at 08:54

## 2022-05-02 RX ADMIN — METOPROLOL TARTRATE SCH MG: 25 TABLET, FILM COATED ORAL at 19:39

## 2022-05-02 RX ADMIN — ACETAMINOPHEN PRN MG: 325 TABLET, FILM COATED ORAL at 17:19

## 2022-05-02 RX ADMIN — ASPIRIN 81 MG CHEWABLE TABLET SCH MG: 81 TABLET CHEWABLE at 08:54

## 2022-05-02 RX ADMIN — CLOPIDOGREL BISULFATE SCH MG: 75 TABLET ORAL at 08:54

## 2022-05-02 RX ADMIN — NICOTINE SCH PATCH: 14 PATCH, EXTENDED RELEASE TRANSDERMAL at 08:54

## 2022-05-02 RX ADMIN — ACETAMINOPHEN PRN MG: 325 TABLET, FILM COATED ORAL at 13:03

## 2022-05-02 RX ADMIN — LEVOTHYROXINE SODIUM SCH MCG: 0.11 TABLET ORAL at 06:37

## 2022-05-02 RX ADMIN — SERTRALINE HYDROCHLORIDE SCH MG: 100 TABLET ORAL at 08:54

## 2022-05-02 RX ADMIN — HEPARIN SODIUM SCH MLS/HR: 10000 INJECTION, SOLUTION INTRAVENOUS at 13:00

## 2022-05-02 RX ADMIN — CLOPIDOGREL BISULFATE SCH MG: 75 TABLET ORAL at 08:56

## 2022-05-02 RX ADMIN — METOPROLOL TARTRATE SCH MG: 25 TABLET, FILM COATED ORAL at 08:54

## 2022-05-03 VITALS — DIASTOLIC BLOOD PRESSURE: 63 MMHG | HEART RATE: 56 BPM | TEMPERATURE: 98.3 F | SYSTOLIC BLOOD PRESSURE: 115 MMHG

## 2022-05-03 VITALS — RESPIRATION RATE: 20 BRPM

## 2022-05-03 LAB
APTT BLD: 23.6 SEC (ref 22–30)
BASOPHILS # BLD AUTO: 0 K/UL (ref 0–0.2)
BASOPHILS NFR BLD AUTO: 0 %
EOSINOPHIL # BLD AUTO: 0.2 K/UL (ref 0–0.7)
EOSINOPHIL NFR BLD AUTO: 2 %
ERYTHROCYTE [DISTWIDTH] IN BLOOD BY AUTOMATED COUNT: 4.75 M/UL (ref 3.8–5.4)
ERYTHROCYTE [DISTWIDTH] IN BLOOD: 13.5 % (ref 11.5–15.5)
HCT VFR BLD AUTO: 46.2 % (ref 34–46)
HGB BLD-MCNC: 14.4 GM/DL (ref 11.4–16)
INR PPP: 0.9 (ref ?–1.2)
LYMPHOCYTES # SPEC AUTO: 1.6 K/UL (ref 1–4.8)
LYMPHOCYTES NFR SPEC AUTO: 17 %
MCH RBC QN AUTO: 30.3 PG (ref 25–35)
MCHC RBC AUTO-ENTMCNC: 31.2 G/DL (ref 31–37)
MCV RBC AUTO: 97.1 FL (ref 80–100)
MONOCYTES # BLD AUTO: 0.4 K/UL (ref 0–1)
MONOCYTES NFR BLD AUTO: 4 %
NEUTROPHILS # BLD AUTO: 7.1 K/UL (ref 1.3–7.7)
NEUTROPHILS NFR BLD AUTO: 76 %
PLATELET # BLD AUTO: 229 K/UL (ref 150–450)
PT BLD: 9.7 SEC (ref 9–12)
WBC # BLD AUTO: 9.3 K/UL (ref 3.8–10.6)

## 2022-05-03 RX ADMIN — NICOTINE SCH: 14 PATCH, EXTENDED RELEASE TRANSDERMAL at 12:28

## 2022-05-03 RX ADMIN — GABAPENTIN SCH MG: 300 CAPSULE ORAL at 12:27

## 2022-05-03 RX ADMIN — ATORVASTATIN CALCIUM SCH MG: 80 TABLET, FILM COATED ORAL at 12:27

## 2022-05-03 RX ADMIN — LEVOTHYROXINE SODIUM SCH: 0.11 TABLET ORAL at 06:05

## 2022-05-03 RX ADMIN — SERTRALINE HYDROCHLORIDE SCH MG: 100 TABLET ORAL at 12:27

## 2022-05-03 RX ADMIN — ASPIRIN 81 MG CHEWABLE TABLET SCH MG: 81 TABLET CHEWABLE at 12:27

## 2022-05-03 RX ADMIN — LOSARTAN POTASSIUM SCH MG: 50 TABLET, FILM COATED ORAL at 12:27

## 2022-05-03 RX ADMIN — ISOSORBIDE MONONITRATE SCH MG: 30 TABLET, EXTENDED RELEASE ORAL at 12:27

## 2022-05-03 RX ADMIN — METOPROLOL TARTRATE SCH MG: 25 TABLET, FILM COATED ORAL at 12:27

## 2022-05-03 NOTE — NM
EXAMINATION TYPE: NM stress lexiscan cardiolite

 

DATE OF EXAM: 5/3/2022

 

COMPARISON: NONE

 

HISTORY: Chest

 

TECHNIQUE:  After the intravenous administration of 9.5 mCi Tc 99m Sestamibi - Cardiolite resting SPE
CT images acquired 45 minutes post injection. 

 

The patient received 0.4mg Lexiscan, 24.8 mCi Tc 99m Sestamibi - Stress images obtained 35 minutes po
st injection 

 

FINDINGS:  

 

Review of stress and rest SPECT images demonstrates area of fixed perfusion defects involving the ape
x of the myocardium. Tiny area of stress-induced reversibility cannot be entirely excluded. There is 
reduced wall motion activity in the region. There is an estimated left ventricular ejection fraction 
of 41 %.

 

 

 

IMPRESSION:

1. Predominantly fixed defects apex the myocardium suggestive of previous infarction. Question a tiny
 area of stress-induced reversible ischemia in the leopoldo-infarct region. Correlate clinically.

2. Ejection fraction 41%.

## 2022-05-03 NOTE — P.PN
Progress Note - Text


Progress Note Date: 04/30/22





Chief Complaint: Chest pain





This is a pleasant 67-year-old patient who follows with Dr. Bernabe Colon.  

Patient was transferred here from [facility].


Chronic stable medical conditions include urinary incontinence, hypothyroid, 

anxiety, hypothyroid, CAD with a bypass in 2010, PAD, osteoarthritis.  Patient 

presents with 2 months of chest pain anteriorly and at the back breath.  Present

off and on.  Describes urge sometimes as if she received a shock versus squee

zing sensation.  Sometimes radiates to the neck.  Some associated shortness of 

breath and perspiration.  Symptoms have been progressively getting worse.  

Presented at rest.  Patient was transferred here for further workup.


Admitted with unstable angina.  IV heparin.


April 30: Tired.  On IV heparin.   followed by cardiology.  Eating about 50%.  

Mild chest discomfort.





Active Medications





Acetaminophen (Acetaminophen Tab 325 Mg Tab)  650 mg PO Q6HR PRN


   PRN Reason: Mild Pain or Fever > 100.5


   Last Admin: 04/30/22 11:06 Dose:  650 mg


   Documented by: 


Aspirin (Aspirin 325 Mg Tab)  325 mg PO DAILY [facility]


   Last Admin: 04/30/22 08:09 Dose:  325 mg


   Documented by: 


Atorvastatin Calcium (Atorvastatin 80 Mg Tab)  80 mg PO DAILY [facility]


   Last Admin: 04/30/22 08:09 Dose:  80 mg


   Documented by: 


Calcium Carbonate/Glycine (Calcium Carbonate 500 Mg Chewable)  1,000 mg PO Q4HR 

PRN


   PRN Reason: Dyspepsia


Clonazepam (Clonazepam 1 Mg Tab)  1 mg PO HS [facility]


   Last Admin: 04/29/22 21:28 Dose:  1 mg


   Documented by: 


Clopidogrel Bisulfate (Clopidogrel 75 Mg Tab)  75 mg PO DAILY [facility]


   Last Admin: 04/30/22 08:09 Dose:  75 mg


   Documented by: 


Gabapentin (Gabapentin 300 Mg Cap)  600 mg PO DAILY [facility]


   Last Admin: 04/30/22 08:09 Dose:  600 mg


   Documented by: 


Heparin Sodium (Porcine) (Heparin Sodium 1,000 Un/Ml (10ml Vl))  0 unit IV PER 

PROTOCOL PRN; Protocol


   PRN Reason: Low PTT


Heparin Sodium/Sodium Chloride (25,000 unit/ Sodium Chloride)  250 mls @ 6.695 

mls/hr IV .Q24H [facility]; Protocol


   Last Titration: 04/30/22 04:34 Dose:  16 units/kg/hr, 8.927 mls/hr


   Documented by: 


Lactulose (Lactulose 20 Gm/30 Ml Cup)  20 gm PO DAILY PRN


   PRN Reason: Constipation


Levothyroxine Sodium (Levothyroxine 112 Mcg Tab)  112 mcg PO 0630 [facility]


   Last Admin: 04/30/22 05:53 Dose:  112 mcg


   Documented by: 


Lorazepam (Lorazepam 0.5 Mg Tab)  0.5 mg PO Q6HR PRN


   PRN Reason: Anxiety


   Last Admin: 04/29/22 21:28 Dose:  0.5 mg


   Documented by: 


Losartan Potassium (Losartan 50 Mg Tab)  50 mg PO DAILY [facility]


   Last Admin: 04/30/22 08:10 Dose:  50 mg


   Documented by: 


Metoprolol Tartrate (Metoprolol Tartrate 25 Mg Tab)  25 mg PO BID [facility]


   Last Admin: 04/30/22 08:09 Dose:  25 mg


   Documented by: 


Naloxone HCl (Naloxone 0.4 Mg/Ml 1 Ml Vial)  0.2 mg IV Q2M PRN


   PRN Reason: Opioid Reversal


Nicotine (Nicotine 14mg/24hr Patch)  1 patch TRANSDERM DAILY [facility]


   Last Admin: 04/30/22 08:12 Dose:  Not Given


   Documented by: 


Nitroglycerin (Nitroglycerin Sl Tabs 0.4 Mg Tab)  0.4 mg SUBLINGUAL Q5M PRN


   PRN Reason: Chest Pain


Ondansetron HCl (Ondansetron 4 Mg/2 Ml Vial)  4 mg IVP Q8HR PRN


   PRN Reason: Nausea And Vomiting


Sertraline HCl (Sertraline 100 Mg Tab)  100 mg PO DAILY [facility]


   Last Admin: 04/30/22 08:10 Dose:  100 mg


   Documented by: 


Zolpidem Tartrate (Zolpidem 5 Mg Tab)  5 mg PO HS PRN


   PRN Reason: Insomnia











Past medical history to include:


Urinary incontinence, hypothyroid, anxiety, CAD with bypass in 2010, PAD, 

osteoarthritis





Social history:


Lives with her .  Alcohol rarely.  Been smoking for about 40 years 

currently half a pack a day.  .





Family history:


Reviewed, noncontributory to presentation





Physical examination:


VITAL SIGNS: 98.6, 65, 16, 132/65, 92% room air


GENERAL:  reclining in bed, awake, tired.


EYES: Pupils equal.  Conjunctiva normal.


HEENT: External appearance of nose and ears normal, oral cavity grossly normal.


NECK: JVD not raised; masses not palpable.


HEART: First and second heart sounds are normal;  no edema.  


LUNGS: Respiratory rate normal; decreased breath sound.  


ABDOMEN: Soft,  nontender, liver spleen not palpable, no masses palpable.  


PSYCH: Alert and oriented x3;  mood  and affect normal.  


MUSCULOSKELETAL:No Clubbing/cyanosis;muscles-grossly intact.  OA








INVESTIGATIONS, reviewed in the clinical context:


April 30: White count 8.2 hemoglobin 12.8 LDL 52


EKG tracing personally reviewed by me-normal sinus rhythm.  Decreased T waves in

V3 to V6.  Some ST segment depression.


Chest x-ray film personally reviewed by me-cardiomegaly.  No venous prominence


Troponin less than 0.012, 0.019


Labs from [facility]:


Sodium 140 potassium 4.6.  15 creatinine 0.6 liver function test: Negative 

phosphate 3.1 white count 10 hemoglobin 13.9 platelets 2:30


COVID 19/influenza A/influenza B: All not detected





Assessment and plan:





-Unstable angina in a patient with known coronary artery disease was continued 

to smoke up to recently.  With some EKG changes.  Troponin 0.014


Aspirin, Lopressor, IV heparin





-CAD with a prior history of coronary bypass in 2010


Aspirin, Lopressor





-Peripheral arterial disease


Aspirin,





-Primary osteoarthritis, bilateral


Tylenol as needed





-Hypothyroid


Synthroid 112 g a day





-Essential hypertension


Cozaar 50 mg day.  Lopressor 25 mg twice a day.





-Chronic urinary stress incontinence





-Peripheral neuropathy


Neurontin 600 mg a day





-Chronic nicotine dependence, cigarette smoker


Nicotine patch 14





-IV heparin monitoring


Follow-up PTT





IV heparin.  Aspirin.  Lopressor.  .  Discussed with the patient.  Follow with 

cardiology
Progress Note - Text


Progress Note Date: 05/01/22





Chief Complaint: Chest pain





This is a pleasant 67-year-old patient who follows with Dr. Bernabe Colon.  

Patient was transferred here from [facility].


Chronic stable medical conditions include urinary incontinence, hypothyroid, 

anxiety, hypothyroid, CAD with a bypass in 2010, PAD, osteoarthritis.  Patient 

presents with 2 months of chest pain anteriorly and at the back breath.  Present

off and on.  Describes urge sometimes as if she received a shock versus squee

zing sensation.  Sometimes radiates to the neck.  Some associated shortness of 

breath and perspiration.  Symptoms have been progressively getting worse.  

Presented at rest.  Patient was transferred here for further workup.


Admitted with unstable angina.  IV heparin.


April 30: Tired.  On IV heparin.   followed by cardiology.  Eating about 50%.  

Mild chest discomfort.


May 1: No chest pain.  Laying in bed.  Tired.  IV heparin.  Follow with 

cardiology.





Active Medications





Acetaminophen (Acetaminophen Tab 325 Mg Tab)  650 mg PO Q6HR PRN


   PRN Reason: Mild Pain or Fever > 100.5


   Last Admin: 04/30/22 11:06 Dose:  650 mg


   Documented by: 


Aspirin (Aspirin 325 Mg Tab)  325 mg PO DAILY [facility]


   Last Admin: 05/01/22 08:24 Dose:  325 mg


   Documented by: 


Atorvastatin Calcium (Atorvastatin 80 Mg Tab)  80 mg PO DAILY [facility]


   Last Admin: 05/01/22 08:25 Dose:  80 mg


   Documented by: 


Calcium Carbonate/Glycine (Calcium Carbonate 500 Mg Chewable)  1,000 mg PO Q4HR 

PRN


   PRN Reason: Dyspepsia


Clonazepam (Clonazepam 1 Mg Tab)  1 mg PO HS [facility]


   Last Admin: 04/30/22 20:36 Dose:  1 mg


   Documented by: 


Clopidogrel Bisulfate (Clopidogrel 75 Mg Tab)  75 mg PO DAILY [facility]


   Last Admin: 05/01/22 08:24 Dose:  75 mg


   Documented by: 


Gabapentin (Gabapentin 300 Mg Cap)  600 mg PO DAILY [facility]


   Last Admin: 05/01/22 08:25 Dose:  600 mg


   Documented by: 


Heparin Sodium (Porcine) (Heparin Sodium 1,000 Un/Ml (10ml Vl))  0 unit IV PER 

PROTOCOL PRN; Protocol


   PRN Reason: Low PTT


Heparin Sodium/Sodium Chloride (25,000 unit/ Sodium Chloride)  250 mls @ 6.695 

mls/hr IV .Q24H [facility]; Protocol


   Last Admin: 04/30/22 17:15 Dose:  16 units/kg/hr, 8.927 mls/hr


   Documented by: 


Lactulose (Lactulose 20 Gm/30 Ml Cup)  20 gm PO DAILY PRN


   PRN Reason: Constipation


Levothyroxine Sodium (Levothyroxine 112 Mcg Tab)  112 mcg PO 0630 [facility]


   Last Admin: 05/01/22 06:15 Dose:  112 mcg


   Documented by: 


Lorazepam (Lorazepam 0.5 Mg Tab)  0.5 mg PO Q6HR PRN


   PRN Reason: Anxiety


   Last Admin: 04/29/22 21:28 Dose:  0.5 mg


   Documented by: 


Losartan Potassium (Losartan 50 Mg Tab)  50 mg PO DAILY [facility]


   Last Admin: 05/01/22 08:25 Dose:  50 mg


   Documented by: 


Metoprolol Tartrate (Metoprolol Tartrate 25 Mg Tab)  25 mg PO BID [facility]


   Last Admin: 05/01/22 08:25 Dose:  25 mg


   Documented by: 


Naloxone HCl (Naloxone 0.4 Mg/Ml 1 Ml Vial)  0.2 mg IV Q2M PRN


   PRN Reason: Opioid Reversal


Nicotine (Nicotine 14mg/24hr Patch)  1 patch TRANSDERM DAILY [facility]


   Last Admin: 05/01/22 08:25 Dose:  Not Given


   Documented by: 


Nitroglycerin (Nitroglycerin Sl Tabs 0.4 Mg Tab)  0.4 mg SUBLINGUAL Q5M PRN


   PRN Reason: Chest Pain


Ondansetron HCl (Ondansetron 4 Mg/2 Ml Vial)  4 mg IVP Q8HR PRN


   PRN Reason: Nausea And Vomiting


Sertraline HCl (Sertraline 100 Mg Tab)  100 mg PO DAILY [facility]


   Last Admin: 05/01/22 08:25 Dose:  100 mg


   Documented by: 


Zolpidem Tartrate (Zolpidem 5 Mg Tab)  5 mg PO HS PRN


   PRN Reason: Insomnia

















Past medical history to include:


Urinary incontinence, hypothyroid, anxiety, CAD with bypass in 2010, PAD, 

osteoarthritis





Social history:


Lives with her .  Alcohol rarely.  Been smoking for about 40 years 

currently half a pack a day.  .





Family history:


Reviewed, noncontributory to presentation





Physical examination:


VITAL SIGNS: 98.3, 69, 18, 115-64, 94% room air


GENERAL:  reclining in bed, awake, tired.


EYES: Pupils equal.  Conjunctiva normal.


HEENT: External appearance of nose and ears normal, oral cavity grossly normal.


NECK: JVD not raised; masses not palpable.


HEART: First and second heart sounds are normal;  no edema.  


LUNGS: Respiratory rate normal; decreased breath sound.  


ABDOMEN: Soft,  nontender, liver spleen not palpable, no masses palpable.  


PSYCH: Alert and oriented x3;  mood  and affect normal.  


MUSCULOSKELETAL:No Clubbing/cyanosis;muscles-grossly intact.  OA








INVESTIGATIONS, reviewed in the clinical context:


April 30: White count 8.2 hemoglobin 12.8 LDL 52


EKG tracing personally reviewed by me-normal sinus rhythm.  Decreased T waves in

V3 to V6.  Some ST segment depression.


Chest x-ray film personally reviewed by me-cardiomegaly.  No venous prominence


Troponin less than 0.012, 0.019


Labs from [facility]:


Sodium 140 potassium 4.6.  15 creatinine 0.6 liver function test: Negative 

phosphate 3.1 white count 10 hemoglobin 13.9 platelets 2:30


COVID 19/influenza A/influenza B: All not detected





Assessment and plan:





-Unstable angina in a patient with known coronary artery disease was continued 

to smoke up to recently.  With some EKG changes.  Troponin 0.014


Aspirin, Lopressor, IV heparin





-CAD with a prior history of coronary bypass in 2010


Aspirin, Lopressor





-Peripheral arterial disease


Aspirin,





-Primary osteoarthritis, bilateral


Tylenol as needed





-Hypothyroid


Synthroid 112 g a day





-Essential hypertension


Cozaar 50 mg day.  Lopressor 25 mg twice a day.





-Chronic urinary stress incontinence





-Peripheral neuropathy


Neurontin 600 mg a day





-Chronic nicotine dependence, cigarette smoker


Nicotine patch 14





-IV heparin monitoring


Follow-up PTT





IV heparin.  Aspirin.  Lopressor.  . Await further input from cardiology.  His 

cousin patient.
Progress Note - Text


Progress Note Date: 05/02/22





Chief Complaint: Chest pain





This is a pleasant 67-year-old patient who follows with Dr. Bernabe Colon.  

Patient was transferred here from [facility].


Chronic stable medical conditions include urinary incontinence, hypothyroid, 

anxiety, hypothyroid, CAD with a bypass in 2010, PAD, osteoarthritis.  Patient 

presents with 2 months of chest pain anteriorly and at the back breath.  Present

off and on.  Describes urge sometimes as if she received a shock versus squee

zing sensation.  Sometimes radiates to the neck.  Some associated shortness of 

breath and perspiration.  Symptoms have been progressively getting worse.  

Presented at rest.  Patient was transferred here for further workup.


Admitted with unstable angina.  IV heparin.


April 30: Tired.  On IV heparin.   followed by cardiology.  Eating about 50%.  

Mild chest discomfort.


May 1: No chest pain.  Laying in bed.  Tired.  IV heparin.  Follow with 

cardiology.


May 2: Occasional chest discomfort.  For stress test tomorrow.  On IV heparin.  

Discussed with patient.





Active Medications





Acetaminophen (Acetaminophen Tab 325 Mg Tab)  650 mg PO Q6HR PRN


   PRN Reason: Mild Pain or Fever > 100.5


   Last Admin: 05/01/22 11:21 Dose:  650 mg


   Documented by: 


Aminophylline (Aminophylline 500 Mg/20 Ml Vial)  100 mg IV ONCE PRN


   PRN Reason: Patient Response


   Stop: 05/03/22 14:49


Aspirin (Aspirin 81 Mg)  81 mg PO DAILY [facility]


   Last Admin: 05/02/22 08:54 Dose:  81 mg


   Documented by: 


Atorvastatin Calcium (Atorvastatin 80 Mg Tab)  80 mg PO DAILY [facility]


   Last Admin: 05/02/22 08:54 Dose:  80 mg


   Documented by: 


Caffeine Citrate (Caffeine Citrate 60 Mg/3 Ml Vial)  60 mg IV ONCE PRN


   PRN Reason: Patient Response


   Stop: 05/03/22 16:00


Calcium Carbonate/Glycine (Calcium Carbonate 500 Mg Chewable)  1,000 mg PO Q4HR 

PRN


   PRN Reason: Dyspepsia


Clonazepam (Clonazepam 1 Mg Tab)  1 mg PO HS [facility]


   Last Admin: 05/01/22 19:40 Dose:  1 mg


   Documented by: 


Clopidogrel Bisulfate (Clopidogrel 75 Mg Tab)  75 mg PO DAILY [facility]


   Last Admin: 05/02/22 08:56 Dose:  75 mg


   Documented by: 


Gabapentin (Gabapentin 300 Mg Cap)  600 mg PO DAILY [facility]


   Last Admin: 05/02/22 08:55 Dose:  600 mg


   Documented by: 


Heparin Sodium (Porcine) (Heparin Sodium 1,000 Un/Ml (10ml Vl))  0 unit IV PER 

PROTOCOL PRN; Protocol


   PRN Reason: Low PTT


Heparin Sodium/Sodium Chloride (25,000 unit/ Sodium Chloride)  250 mls @ 6.945 

mls/hr IV .Q24H [facility]; Protocol


Isosorbide Mononitrate (Isosorbide Mononitrate Er 30 Mg Tab.Er.24h)  30 mg PO DA

MARIA ELENA [facility]


   Last Admin: 05/02/22 08:54 Dose:  30 mg


   Documented by: 


Lactulose (Lactulose 20 Gm/30 Ml Cup)  20 gm PO DAILY PRN


   PRN Reason: Constipation


Levothyroxine Sodium (Levothyroxine 112 Mcg Tab)  112 mcg PO 0630 [facility]


   Last Admin: 05/02/22 06:37 Dose:  112 mcg


   Documented by: 


Lorazepam (Lorazepam 0.5 Mg Tab)  0.5 mg PO Q6HR PRN


   PRN Reason: Anxiety


   Last Admin: 04/29/22 21:28 Dose:  0.5 mg


   Documented by: 


Losartan Potassium (Losartan 50 Mg Tab)  50 mg PO DAILY [facility]


   Last Admin: 05/02/22 08:54 Dose:  50 mg


   Documented by: 


Metoprolol Tartrate (Metoprolol Tartrate 25 Mg Tab)  25 mg PO BID [facility]


   Last Admin: 05/02/22 08:54 Dose:  25 mg


   Documented by: 


Naloxone HCl (Naloxone 0.4 Mg/Ml 1 Ml Vial)  0.2 mg IV Q2M PRN


   PRN Reason: Opioid Reversal


Nicotine (Nicotine 14mg/24hr Patch)  1 patch TRANSDERM DAILY [facility]


   Last Admin: 05/02/22 08:54 Dose:  1 patch


   Documented by: 


Nitroglycerin (Nitroglycerin Sl Tabs 0.4 Mg Tab)  0.4 mg SUBLINGUAL Q5M PRN


   PRN Reason: Chest Pain


Ondansetron HCl (Ondansetron 4 Mg/2 Ml Vial)  4 mg IVP Q8HR PRN


   PRN Reason: Nausea And Vomiting


Regadenoson (Regadenoson 0.4 Mg/5 Ml Syringe)  0.4 mg IV ONCE PRN


   PRN Reason: Per Protocol


Sertraline HCl (Sertraline 100 Mg Tab)  100 mg PO DAILY [facility]


   Last Admin: 05/02/22 08:54 Dose:  100 mg


   Documented by: 


Zolpidem Tartrate (Zolpidem 5 Mg Tab)  5 mg PO HS PRN


   PRN Reason: Insomnia


   Last Admin: 05/01/22 22:14 Dose:  5 mg


   Documented by: 

















Past medical history to include:


Urinary incontinence, hypothyroid, anxiety, CAD with bypass in 2010, PAD, 

osteoarthritis





Social history:


Lives with her .  Alcohol rarely.  Been smoking for about 40 years 

currently half a pack a day.  .





Family history:


Reviewed, noncontributory to presentation





Physical examination:


VITAL SIGNS: 98.5, 60, 20, 107/58, 95% room air


GENERAL:  reclining in bed, awake


EYES: Pupils equal.  Conjunctiva normal.


HEENT: External appearance of nose and ears normal, oral cavity grossly normal.


NECK: JVD not raised; masses not palpable.


HEART: First and second heart sounds are normal;  no edema.  


LUNGS: Respiratory rate normal; decreased breath sound.  


ABDOMEN: Soft,  nontender, liver spleen not palpable, no masses palpable.  


PSYCH: Alert and oriented x3;  mood  and affect normal.  


MUSCULOSKELETAL:No Clubbing/cyanosis;muscles-grossly intact.  OA








INVESTIGATIONS, reviewed in the clinical context:


April 30: White count 8.2 hemoglobin 12.8 LDL 52


EKG tracing personally reviewed by me-normal sinus rhythm.  Decreased T waves in

V3 to V6.  Some ST segment depression.


Chest x-ray film personally reviewed by me-cardiomegaly.  No venous prominence


Troponin less than 0.012, 0.019


Labs from [facility]:


Sodium 140 potassium 4.6.  15 creatinine 0.6 liver function test: Negative 

phosphate 3.1 white count 10 hemoglobin 13.9 platelets 2:30


COVID 19/influenza A/influenza B: All not detected





Assessment and plan:





-Unstable angina in a patient with known coronary artery disease was continued 

to smoke up to recently.  With some EKG changes.  Troponin 0.014: Slow to 

respond


Aspirin, Lopressor, IV heparin.  Pending nuclear stress test tomorrow





-CAD with a prior history of coronary bypass in 2010


Aspirin, Lopressor





-Peripheral arterial disease


Aspirin,





-Primary osteoarthritis, bilateral


Tylenol as needed





-Hypothyroid


Synthroid 112 g a day





-Essential hypertension


Cozaar 50 mg day.  Lopressor 25 mg twice a day.





-Chronic urinary stress incontinence





-Peripheral neuropathy


Neurontin 600 mg a day





-Chronic nicotine dependence, cigarette smoker


Nicotine patch 14





-IV heparin monitoring


Follow-up PTT





IV heparin.  Aspirin.  Lopressor.  Discussed with patient.  Nuclear stress test 

tomorrow.
Subjective


Patient is a 67-year-old female with a known history of coronary artery disease 

status post 2 vessel CABG in 2010 and prior PCIs as well Most recent in ostial 

to proximal circumflex 02/2021, chronic total occluded of the RCA, ischemic 

cardiomyopathy with known EF 45% in Echo February 2018 per records, paroxysmal 

atrial fibrillation, bilateral carotid endarterectomy, bilateral claudication of

lower extremities, hypertension, hyperlipidemia, hypothyroidism, depression, 

history of right ventricular thrombus, noncompliance with medications, history 

of TIA.  Patient used to follow with Dr. Raul Noel. 





Transferred here from [facility] for chest pain and shortness of breath. 

Chest pain has been ongoing for some time but is increasingly getting worse and 

more frequent occurring almost every other day.  Patient is sinus rhythm on the 

monitor troponins are negative 3.  Her CTA was negative for pulmonary embolism 

and showed no acute cardiopulmonary disease, and the patient's thoracic aortic 

arch there is a focal outpouching consistent with a small saccular aneurysm.  





5/2/2022


Patient seen and examined at bedside, continues to have occasional chest 

discomfort.  Denies any shortness of breath. Vital signs are stable. 


Echocardiogram revealed an EF of 4045%, fixed thrombus in the left ventricle, 

apical thrombus noted, mild to moderate tricuspid regurgitation.





She is maintained on IV Heparin, aspirin 80 mg daily, atorvastatin 80 mg daily, 

Plavix 75 mg daily, losartan 50 mg daily, metoprolol tartrate 25 mg twice a day





Records obtained from her Cardiologist:


Cardiac catheterization 02/22/2021 revealing 80% in-stent restenosis and 80% 

ostial left circumflex stenosis, chronic total occlusion of RCA, Patient 

underwent PCI to the ostial proximal circumflex, balloon angioplasty of the in-

stent restenosis of the left circumflex. 


EKG in 2021 revealed sinus rhythm with T wave inversions in inferior leads, V4-

V6. Similar to EKG this admission 





5/3/2022


Patient seen and examined at bedside this morning.  She is now further chest 

pain.  Denies any shortness of breath.  Vital signs are stable.  Plan for 

Lexiscan stress test today.


She is currently maintained on aspirin 80 mg daily, atorvastatin 80 mg daily, 

Plavix 75 mg daily, Imdur 30 mg daily, losartan 50 mg daily, metoprolol tartrate

25 mg twice a day. 





VITALS: Blood pressure 113/54, heart 61, afebrile, oxygen saturations 94% on 

room air


GENERAL: Well-appearing, well-nourished and in no acute distress.


NECK: Supple without JVD or thyromegaly.


LUNGS: Breath sounds clear to auscultation bilaterally. Respiration equal and 

unlabored.  No wheezes, rales or rhonchi.


HEART: Regular rate and rhythm systolic murmu noted. No rubs or gallops. S1 and 

S2 heard.


EXTREMITIES: Normal range of motion, no edema.  No clubbing or cyanosis. 

Peripheral pulses intact.





ASSESSMENT


Chest pain, acute coronary syndrome has been ruled out 


Coronary artery disease status post 2 vessel CABG in 2010 and prior PCIs as well

Most recent in ostial to proximal circumflex 02/2021, chronic total occluded of 

the RCA


Ischemic cardiomyopathy with known EF 45% in Echo February 2018 per records


History of paroxysmal atrial fibrillation, currently not on anticoagulation 


History of right ventricular thrombus, treated with anticoagulation, currently 

not on anticoagulation 


Bilateral carotid endarterectomy


Bilateral claudication of lower extremities


Hypertension


Hyperlipidemia


Hypothyroidism


Depression


History of TIA





PLAN


Lexiscan stress test results reviewed.  Which revealed "predominately fixed 

defects involving the apex of the myocardium.  Tiny area of stress-induced 

reversibility cannot be entirely excluded."


Lexiscan reviewed by Dr. Garcia and stress induced reversibility is not very 

significant. We recommend patient increase her activity on the unit. If no 

further chest pain, from a cardiology perspective, patient can be discharged and

follow up outpatient with Dr. Galeana or Dr. Noel, her preference. 


Continue aspirin, statin, imdur beta blocker, Plavix, Losartan





Nurse Practitioner note has been reviewed, I agree with a documented findings 

and plan of care.  Patient was seen and examined.














Objective





- Vital Signs


Vital signs: 


                                   Vital Signs











Temp  99 F   05/03/22 11:43


 


Pulse  61   05/03/22 11:43


 


Resp  20   05/03/22 11:43


 


BP  113/54   05/03/22 11:43


 


Pulse Ox  94 L  05/03/22 11:43








                                 Intake & Output











 05/02/22 05/03/22 05/03/22





 18:59 06:59 18:59


 


Intake Total 467.16 300.074 0


 


Output Total  250 


 


Balance 467.16 50.074 0


 


Weight   57.88 kg


 


Intake:   


 


  Intake, IV Titration 107.16 60.074 





  Amount   


 


    Heparin Sod,Pork in 0.45% 107.16 60.074 





    NaCl 25,000 unit In 0.45   





    % NaCl 1 250ml.bag @ 12   





    UNITS/KG/HR 6.945 mls/hr   





    IV .Q24H [facility] Rx#:   





    [id number]   


 


  Oral 360 240 0


 


Output:   


 


  Urine  250 


 


Other:   


 


  Voiding Method  Toilet 


 


  # Voids 2 1 














- Labs


CBC & Chem 7: 


                                 05/01/22 07:28





                                 05/01/22 07:28


Labs: 


                  Abnormal Lab Results - Last 24 Hours (Table)











  05/02/22 05/03/22 Range/Units





  18:38 01:32 


 


APTT  40.0 H  57.1 H  (22.0-30.0)  sec
Subjective


Patient is a 67-year-old female with a known history of coronary artery disease 

status post 3 vessel CABG in 2010 and prior PCIs as well Most recent in ostial 

to proximal circumflex 02/2021, chronic total occluded of the RCA, ischemic 

cardiomyopathy with known EF 45% in Echo February 2018 per records, paroxysmal 

atrial fibrillation, bilateral carotid endarterectomy, bilateral claudication of

lower extremities, hypertension, hyperlipidemia, hypothyroidism, depression, 

history of right ventricular thrombus, noncompliance with medications, history 

of TIA.  Patient used to follow with Dr. Raul Noel. 





Transferred here from [facility] for chest pain and shortness of breath. 

Chest pain has been ongoing for some time but is increasingly getting worse and 

more frequent occurring almost every other day.  Patient is sinus rhythm on the 

monitor troponins are negative 3.  Her CTA was negative for pulmonary embolism 

and showed no acute cardiopulmonary disease, and the patient's thoracic aortic 

arch there is a focal outpouching consistent with a small saccular aneurysm.  





5/2/2022


Patient seen and examined at bedside, continues to have occasional chest 

discomfort.  Denies any shortness of breath. Vital signs are stable. 


Echocardiogram revealed an EF of 4045%, fixed thrombus in the left ventricle, 

apical thrombus noted, mild to moderate tricuspid regurgitation.





She is maintained on IV Heparin, aspirin 80 mg daily, atorvastatin 80 mg daily, 

Plavix 75 mg daily, losartan 50 mg daily, metoprolol tartrate 25 mg twice a day





Records obtained from her Cardiologist:


Cardiac catheterization 02/22/2021 revealing 80% in-stent restenosis and 80% 

ostial left circumflex stenosis, chronic total occlusion of RCA, Patient 

underwent PCI to the ostial proximal circumflex, balloon angioplasty of the in-

stent restenosis of the left circumflex. 


EKG in 2021 revealed sinus rhythm with T wave inversions in inferior leads, V4-

V6. Similar to EKG this admission 





VITALS reviewed 


GENERAL: Well-appearing, well-nourished and in no acute distress.


NECK: Supple without JVD or thyromegaly.


LUNGS: Breath sounds clear to auscultation bilaterally. Respiration equal and 

unlabored.  No wheezes, rales or rhonchi.


HEART: Regular rate and rhythm systolic murmu noted. No rubs or gallops. S1 and 

S2 heard.


EXTREMITIES: Normal range of motion, no edema.  No clubbing or cyanosis. 

Peripheral pulses intact.





ASSESSMENT


Chest pain, acute coronary syndrome has been ruled out 


Coronary artery disease status post 3 vessel CABG in 2010 and prior PCIs as well

Most recent in ostial to proximal circumflex 02/2021, chronic total occluded of 

the RCA


Ischemic cardiomyopathy with known EF 45% in Echo February 2018 per records


History of paroxysmal atrial fibrillation, currently not on anticoagulation 


History of right ventricular thrombus, treated with anticoagulation, currently 

not on anticoagulation 


Bilateral carotid endarterectomy


Bilateral claudication of lower extremities


Hypertension


Hyperlipidemia


Hypothyroidism


Depression


History of TIA





PLAN


Continue IV heparin drip 


Continue aspirin, statin, beta blocker, Plavix, Losartan


EKG reviewed rrom prior cardiologist with similar findings. Troponin negative x 

3. We will perform Lexiscan stress test tomorrow. 


Further recommendations based on clinical course





Nurse Practitioner note has been reviewed, I agree with a documented findings 

and plan of care.  Patient was seen and examined.














Objective





- Vital Signs


Vital signs: 


                                   Vital Signs











Temp  98.5 F   05/02/22 11:43


 


Pulse  60   05/02/22 11:43


 


Resp  20   05/02/22 11:43


 


BP  107/58   05/02/22 11:43


 


Pulse Ox  95   05/02/22 11:43








                                 Intake & Output











 05/01/22 05/02/22 05/02/22





 18:59 06:59 18:59


 


Intake Total 819.455 240 120


 


Balance 819.455 240 120


 


Intake:   


 


  Intake, IV Titration 219.455  





  Amount   


 


    Heparin Sod,Pork in 0.45% 219.455  





    NaCl 25,000 unit In 0.45   





    % NaCl 1 250ml.bag @ 12   





    UNITS/KG/HR 6.695 mls/hr   





    IV .Q24H [facility] Rx#:   





    [id number]   


 


  Oral 600 240 120


 


Other:   


 


  Voiding Method Toilet Toilet 





 Diaper  


 


  # Voids 1 1 


 


  # Bowel Movements 1  














- Labs


CBC & Chem 7: 


                                 05/01/22 07:28





                                 05/01/22 07:28


Labs: 


                  Abnormal Lab Results - Last 24 Hours (Table)











  05/02/22 Range/Units





  07:45 


 


APTT  47.1 H  (22.0-30.0)  sec
Subjective


Progress Note Date: 05/01/22





Patient seen today resting comfortably in bed.  She states she has not had any 

recurrence of chest pain or increased shortness of breath.  We are still trying 

to obtain records of patient's previous cardiac history from Alida Miller or 

her prior cardiologist.  This includes a recent EKG to compare current EKG with 

to rule out ischemic changes.  Patient had an echocardiogram which revealed 

moderate LV dysfunction with an ejection fraction of 40%, prior apical MI, 

apical thrombosis, and mild to moderate tricuspid regurgitation.  Patient 

remains on a heparin drip at this time.  Cholesterol is 144 triglycerides 154 

HDL 31 LDL 52





Objective





- Vital Signs


Vital signs: 


                                   Vital Signs











Temp  98.3 F   05/01/22 08:00


 


Pulse  69   05/01/22 08:00


 


Resp  18   05/01/22 08:00


 


BP  115/64   05/01/22 08:00


 


Pulse Ox  94 L  05/01/22 08:00








                                 Intake & Output











 04/30/22 05/01/22 05/01/22





 18:59 06:59 18:59


 


Intake Total 593.224 480 360


 


Balance 593.224 480 360


 


Intake:   


 


  Intake, IV Titration 113.224  





  Amount   


 


    Heparin Sod,Pork in 0.45% 113.224  





    NaCl 25,000 unit In 0.45   





    % NaCl 1 250ml.bag @ 12   





    UNITS/KG/HR 6.695 mls/hr   





    IV .Q24H [facility] Rx#:   





    [id number]   


 


  Oral 480 480 360


 


Other:   


 


  Voiding Method Toilet Toilet Toilet





   Diaper


 


  # Voids 1 1 1














- Exam





PHYSICAL EXAM: 


VITAL SIGNS: Reviewed.


GENERAL: Well-developed in no acute distress. 


HEENT: Head is normocephalic. Pupils are equal, round. Sclerae anicteric. Mucous

membranes of the mouth are moist. 


NECK: Supple. No JVD or thyromegaly


RESPIRATORY: Respirations even and unlabored. Lungs diminished to auscultation 

bilaterally.


CARDIO: Regular rate and rhythm.  S1 and S2 heard. No murmur or gallops.


EXTREMITIES: Normal range of motion.  No clubbing or cyanosis.  Peripheral 

pulses intact.  Negative for bilateral lower extremity edema


NEURO: Orientated to person, time, mood is appropriate





- Labs


CBC & Chem 7: 


                                 05/01/22 07:28





                                 05/01/22 07:28


Labs: 


                  Abnormal Lab Results - Last 24 Hours (Table)











  05/01/22 05/01/22 Range/Units





  07:28 07:28 


 


APTT  52.1 H   (22.0-30.0)  sec


 


Chloride   109 H  ()  mmol/L


 


Glucose   109 H  (74-99)  mg/dL


 


AST   45 H  (14-36)  U/L


 


ALT   38 H  (4-34)  U/L


 


Alkaline Phosphatase   153 H  ()  U/L


 


Total Protein   5.9 L  (6.3-8.2)  g/dL


 


Albumin   3.3 L  (3.5-5.0)  g/dL














Assessment and Plan


Assessment: 





Chest pain, troponins negative rule out ischemic changes


ascending aortic aneurysm


Known history of coronary artery disease status post CABG


Hypertension


Hyperlipidemia





Plan: 








obtain a prior EKG and surgical reports to evaluate and rule out ischemic 

changes


Continue with heparin drip


Continue with all current home cardiac medications


Continue with telemetry monitoring


Further recommendations based on clinical course





The above impression and plan of care have been discussed and directed by the 

signing physician. Sydni Valdez, nurse practitioner, acting as scribe for 

signing physician.
PROVIDER:[TOKEN:[[id number]]]

## 2022-05-03 NOTE — P.DS
Providers


Date of admission: 


05/02/22 08:25





Expected date of discharge: 05/03/22


Attending physician: 


Jose Antonio Blackwood





Consults: 





                                        





04/29/22 17:10


Consult Physician Urgent 


   Consulting Provider: Shaun Galeana


   Consult Reason/Comments: Chest pain history of CAD


   Do you want consulting provider notified?: Yes











Primary care physician: 


Bernabe Colon





[facility] Course: 





Chief Complaint: Chest pain





This is a pleasant 67-year-old patient who follows with Dr. Bernabe Colon.  

Patient was transferred here from [facility].


Chronic stable medical conditions include urinary incontinence, hypothyroid, 

anxiety, hypothyroid, CAD with a bypass in 2010, PAD, osteoarthritis.  Patient 

presents with 2 months of chest pain anteriorly and at the back breath.  Present

off and on.  Describes urge sometimes as if she received a shock versus 

squeezing sensation.  Sometimes radiates to the neck.  Some associated shortness

of breath and perspiration.  Symptoms have been progressively getting worse.  

Presented at rest.  Patient was transferred here for further workup.Coronary 

artery disease status post 2 vessel CABG in 2010 and prior PCIs as well Most 

recent in ostial to proximal circumflex 02/2021, chronic total occluded of the 

RCA


Ischemic cardiomyopathy with known EF 45% in Echo February 2018 per records


Admitted with unstable angina.  IV heparin.


May 3: Nuclear stress test showed fixed defect.  Small reversible area.  

Reviewed by cardiology.  Dose of Lopressor was increased Imdur added.  Patient 

up and about in the hallway.  No further cardiac symptoms.  Follow up with Dr. SILVIA Issa.  Spoke to Anugs from cardiology.  Had discussed about anticoagulation 

on this patient.  She had taken it before.  At this point no anticoagulation.


Discussion and discharge planning more than 35 minutes











Past medical history to include:


Urinary incontinence, hypothyroid, anxiety, CAD with bypass in 2010, PAD, 

osteoarthritis





Social history:


Lives with her .  Alcohol rarely.  Been smoking for about 40 years 

currently half a pack a day.  .





Family history:


Reviewed, noncontributory to presentation





Physical examination:


VITAL SIGNS: 99, 61, 20, 113/54, 94% room air


GENERAL:  reclining in bed, comfortable


EYES: Pupils equal.  Conjunctiva normal.


HEENT: External appearance of nose and ears normal, oral cavity grossly normal.


NECK: JVD not raised; masses not palpable.


HEART: First and second heart sounds are normal;  no edema.  


LUNGS: Respiratory rate normal; decreased breath sound.  


ABDOMEN: Soft,  nontender, liver spleen not palpable, no masses palpable.  


PSYCH: Alert and oriented x3;  mood  and affect normal.  


MUSCULOSKELETAL:No Clubbing/cyanosis;muscles-grossly intact.  OA








INVESTIGATIONS, reviewed in the clinical context:


May 3: White count 9.3 hemoglobin 14.4 platelets 229


Nuclear stress test: AVF of fixed d effects.  Small area of possible 

reversibility.


2-D echo: EF 40-45% moderate LVH.  Moderate to severe TR.  Apical thrombus noted


April 30: White count 8.2 hemoglobin 12.8 LDL 52


EKG tracing personally reviewed by me-normal sinus rhythm.  Decreased T waves in

V3 to V6.  Some ST segment depression.


Chest x-ray film personally reviewed by me-cardiomegaly.  No venous prominence


Troponin less than 0.012, 0.019


Labs from [facility]:


Sodium 140 potassium 4.6.  15 creatinine 0.6 liver function test: Negative 

phosphate 3.1 white count 10 hemoglobin 13.9 platelets 2:30


COVID 19/influenza A/influenza B: All not detected





Assessment and plan:





-Unstable angina in a patient with known coronary artery disease was continued 

to smoke up to recently.  With some EKG changes.  Troponin 0.014:


Aspirin, Lopressor, IV heparin.  Nuclear stress test showed predominantly of 

fixed defect.  Lopressor increased.  Imdur added.





-CAD with a prior history of coronary bypass in 2010, prior PCIs as well Most 

recent in ostial to proximal circumflex 02/2021, chronic total occluded of the 

RCA


Aspirin, Lopressor





-Left ventricle apical thrombus


Patient has taken anticoagulation the past.  addressed by cardiology.  Hold off 

any anticoagulation currently.





-Ischemic cardiomyopathy EF 40-45%.


Lopressor.  Add Aldactone 12.5 mg daily





-Moderate to severe TR.


Follow with cardiology





-Peripheral arterial disease


Aspirin, Lipitor.





-Primary osteoarthritis, bilateral


Tylenol as needed





-Hypothyroid


Synthroid 112 g a day





-Hypertensive heart disease





-Essential hypertension


Cozaar 50 mg day.  Lopressor 25 mg twice a day.





-Chronic urinary stress incontinence





-Peripheral neuropathy


Neurontin 600 mg a day





-Chronic nicotine dependence, cigarette smoker


Nicotine patch 14





-IV heparin monitoring: Discontinued


Follow-up PTT





Disposition:


Home

















Patient Condition at Discharge: Fair





Plan - Discharge Summary


Discharge Rx Participant: Yes


New Discharge Prescriptions: 


New


   Aspirin 81 mg PO DAILY 90 Days #90 tab


   Isosorbide Mononitrate ER [Imdur] 30 mg PO DAILY 90 Days #90 tab


   Nicotine 14Mg/24Hr Patch [Habitrol] 1 patch TRANSDERM DAILY #14 patch


   Nitroglycerin Sl Tabs [Nitrostat] 0.4 mg SUBLINGUAL Q5M PRN #30 tab


     PRN Reason: Chest Pain





Continue


   Losartan Potassium 50 mg PO DAILY


   clonazePAM [KlonoPIN] 1 mg PO HS


   Clopidogrel Bisulfate [Plavix] 75 mg PO DAILY


   Sertraline HCl [Zoloft] 100 mg PO DAILY


   Levothyroxine Sodium [Synthroid] 112 mcg PO DAILY


   Gabapentin 600 mg PO DAILY


   Atorvastatin Calcium [Lipitor] 80 mg PO DAILY





Changed


   Metoprolol Tartrate [Lopressor] 25 mg PO BID #60 tab


Discharge Medication List





Atorvastatin Calcium [Lipitor] 80 mg PO DAILY 04/29/22 [History]


Clopidogrel Bisulfate [Plavix] 75 mg PO DAILY 04/29/22 [History]


Gabapentin 600 mg PO DAILY 04/29/22 [History]


Levothyroxine Sodium [Synthroid] 112 mcg PO DAILY 04/29/22 [History]


Losartan Potassium 50 mg PO DAILY 04/29/22 [History]


Sertraline HCl [Zoloft] 100 mg PO DAILY 04/29/22 [History]


clonazePAM [KlonoPIN] 1 mg PO HS 04/29/22 [History]


Aspirin 81 mg PO DAILY 90 Days #90 tab 05/03/22 [Rx]


Isosorbide Mononitrate ER [Imdur] 30 mg PO DAILY 90 Days #90 tab 05/03/22 [Rx]


Metoprolol Tartrate [Lopressor] 25 mg PO BID #60 tab 05/03/22 [Rx]


Nicotine 14Mg/24Hr Patch [Habitrol] 1 patch TRANSDERM DAILY #14 patch 05/03/22 

[Rx]


Nitroglycerin Sl Tabs [Nitrostat] 0.4 mg SUBLINGUAL Q5M PRN #30 tab 05/03/22 

[Rx]








Follow up Appointment(s)/Referral(s): 


Bernabe Colon MD [Primary Care Provider] - 1-2 days


Shaun Galeana MD [STAFF PHYSICIAN] - 1 Week


Patient Instructions/Handouts:  Isosorbide Mononitrate (By mouth)


Activity/Diet/Wound Care/Special Instructions: 


Cardiology Instructions:


Please take Aspirin 81mg daily, you can get this medication over the counter


New medication: Isosorbide Mononitrate (Imdur) 30mg daily 


Continue all your current home medications as listed


Please follow up with a cardiologist in 1-2 weeks. You may follow up with Dr. Galeana in [place] OR your previous cardiologist Dr. Noel

## 2022-05-04 NOTE — CA
Lexiscan Nuclear Stress Test Report 

 

 Name:    Jaime Holguin 

 

 MRN:    [id number] 

 

 Exam Date: 2022 09:14 

 

 Exam Location:      [place]  

                     Stress 

 

 Ht (in):     59     Wt (lb):     127    BSA:    1.52 

 

 Ordering Phys:       Payton Brewer 

 

 Referring Phys:      Jaison, 

 

 Technologist:        Manish Denise 

 

 Age:    67    Gender:    F 

 

 :    1955 

 Procedure CPT: 

 

 Indications:              Reflex order-Stress test 

 

 ICD-10 Codes: 

 

 Patient History:          Hx of CP, RAINA, palpitations, numbness in  

                           face/neck, HTN, elevated cholesterol,  

                           Family Hx, Tobacco use 0.5 PPD x 45 years,  

                           cardiac cath with stenting, CABG x 6 

 

 Medications:              Losartan, Clonazepam, Sertraline HCL,  

                           Metoprolol, Levothyroxine, Gabapentin,  

                           Atorvastatin 

 

 Meds past 24 hrs: 

 

 Pretest Chest Pain: 

 

 STRESS TEST      Lexiscan 

 

 Protocol 

 

 

 

 

 Exercise Duration (min:sec):         02:00 

 Max ST Depressions (mm): 

 Angina Score: 

 Leavitt Score: 

 Resting HR (bpm):      60 

 

 Peak HR (bpm):         84 

 

 Resting BP (mmHg):       181    /   83 

 

 Peak BP (mmHg):       181   /   83 

 

 MPHR:    153     Target HR:      130 

 

 % MPHR:     55 

 METS:  1.0 

 

 Total Dose: 

 Peak Dose: 

 Atropine: 

 Double Product:       28119 

 

 BP Response: 

 

 Stress Termination: 

 

 Stress Symptoms: 

 Diarrhea, Extreme fatigue, Verbal lethargy, felt better after  

 100mg  aminophylline admin. 

 

 Stress Summary: 

 

 

  ECG ANALYSIS 

 

 Resting ECG: 

 

 Stress ECG: 

 

 CONCLUSIONS 

 Baseline heart rate 56 beats a minute, Baseline blood pressure  

 121/83 mmHg 

 Baseline 12-lead EKG shows sinus rhythm normal OR narrow QRS but  

 with deep T-wave inversions, symmetric in V4 V5 and V6 and in  

 the inferior leads with a slight coved appearance of the ST  

 segments 

 Patient received Lexiscan infusion without any significant  

 change in heart rate.  There was a reduction in blood pressure  

 101 of 50 mmHg 

 No symptoms are noted 

 There were no new EKG changes no arrhythmias noted 

 New proportional be reported separately 

 

 Dr. Rupert Shabazz MD 

 (Electronically Signed) 

 Final Date:      04 May 2022 10:58

## 2022-09-09 ENCOUNTER — HOSPITAL ENCOUNTER (INPATIENT)
Dept: HOSPITAL 47 - EC | Age: 67
LOS: 15 days | Discharge: HOME HEALTH | DRG: 280 | End: 2022-09-24
Attending: HOSPITALIST | Admitting: HOSPITALIST
Payer: MEDICARE

## 2022-09-09 VITALS — BODY MASS INDEX: 22.6 KG/M2

## 2022-09-09 DIAGNOSIS — T40.2X5A: ICD-10-CM

## 2022-09-09 DIAGNOSIS — I25.110: ICD-10-CM

## 2022-09-09 DIAGNOSIS — N83.209: ICD-10-CM

## 2022-09-09 DIAGNOSIS — R94.01: ICD-10-CM

## 2022-09-09 DIAGNOSIS — I25.2: ICD-10-CM

## 2022-09-09 DIAGNOSIS — K57.32: ICD-10-CM

## 2022-09-09 DIAGNOSIS — N39.3: ICD-10-CM

## 2022-09-09 DIAGNOSIS — K52.9: ICD-10-CM

## 2022-09-09 DIAGNOSIS — Z28.21: ICD-10-CM

## 2022-09-09 DIAGNOSIS — M19.91: ICD-10-CM

## 2022-09-09 DIAGNOSIS — Z82.49: ICD-10-CM

## 2022-09-09 DIAGNOSIS — I50.22: ICD-10-CM

## 2022-09-09 DIAGNOSIS — Z88.5: ICD-10-CM

## 2022-09-09 DIAGNOSIS — Z79.899: ICD-10-CM

## 2022-09-09 DIAGNOSIS — Z83.3: ICD-10-CM

## 2022-09-09 DIAGNOSIS — J44.9: ICD-10-CM

## 2022-09-09 DIAGNOSIS — R41.0: ICD-10-CM

## 2022-09-09 DIAGNOSIS — Z95.5: ICD-10-CM

## 2022-09-09 DIAGNOSIS — I69.312: ICD-10-CM

## 2022-09-09 DIAGNOSIS — F41.9: ICD-10-CM

## 2022-09-09 DIAGNOSIS — I07.1: ICD-10-CM

## 2022-09-09 DIAGNOSIS — I67.83: ICD-10-CM

## 2022-09-09 DIAGNOSIS — K56.7: ICD-10-CM

## 2022-09-09 DIAGNOSIS — I16.1: ICD-10-CM

## 2022-09-09 DIAGNOSIS — G93.89: ICD-10-CM

## 2022-09-09 DIAGNOSIS — G89.29: ICD-10-CM

## 2022-09-09 DIAGNOSIS — F03.90: ICD-10-CM

## 2022-09-09 DIAGNOSIS — Z71.3: ICD-10-CM

## 2022-09-09 DIAGNOSIS — Z79.890: ICD-10-CM

## 2022-09-09 DIAGNOSIS — I47.1: ICD-10-CM

## 2022-09-09 DIAGNOSIS — R62.7: ICD-10-CM

## 2022-09-09 DIAGNOSIS — E78.5: ICD-10-CM

## 2022-09-09 DIAGNOSIS — K64.4: ICD-10-CM

## 2022-09-09 DIAGNOSIS — I71.9: ICD-10-CM

## 2022-09-09 DIAGNOSIS — Z78.1: ICD-10-CM

## 2022-09-09 DIAGNOSIS — E86.0: ICD-10-CM

## 2022-09-09 DIAGNOSIS — G93.41: ICD-10-CM

## 2022-09-09 DIAGNOSIS — Z79.82: ICD-10-CM

## 2022-09-09 DIAGNOSIS — Z79.02: ICD-10-CM

## 2022-09-09 DIAGNOSIS — R64: ICD-10-CM

## 2022-09-09 DIAGNOSIS — I70.8: ICD-10-CM

## 2022-09-09 DIAGNOSIS — F17.210: ICD-10-CM

## 2022-09-09 DIAGNOSIS — I11.0: ICD-10-CM

## 2022-09-09 DIAGNOSIS — I95.9: ICD-10-CM

## 2022-09-09 DIAGNOSIS — F32.A: ICD-10-CM

## 2022-09-09 DIAGNOSIS — Z95.1: ICD-10-CM

## 2022-09-09 DIAGNOSIS — I44.7: ICD-10-CM

## 2022-09-09 DIAGNOSIS — E83.59: ICD-10-CM

## 2022-09-09 DIAGNOSIS — E53.8: ICD-10-CM

## 2022-09-09 DIAGNOSIS — Z28.310: ICD-10-CM

## 2022-09-09 DIAGNOSIS — H53.462: ICD-10-CM

## 2022-09-09 DIAGNOSIS — I65.29: ICD-10-CM

## 2022-09-09 DIAGNOSIS — I23.7: ICD-10-CM

## 2022-09-09 DIAGNOSIS — I70.1: ICD-10-CM

## 2022-09-09 DIAGNOSIS — I25.5: ICD-10-CM

## 2022-09-09 DIAGNOSIS — E03.9: ICD-10-CM

## 2022-09-09 DIAGNOSIS — R29.719: ICD-10-CM

## 2022-09-09 DIAGNOSIS — I73.9: ICD-10-CM

## 2022-09-09 DIAGNOSIS — G62.9: ICD-10-CM

## 2022-09-09 DIAGNOSIS — I21.4: Primary | ICD-10-CM

## 2022-09-09 LAB
ALBUMIN SERPL-MCNC: 4.1 G/DL (ref 3.5–5)
ALP SERPL-CCNC: 141 U/L (ref 38–126)
ALT SERPL-CCNC: 14 U/L (ref 4–34)
ANION GAP SERPL CALC-SCNC: 8 MMOL/L
APTT BLD: 22.5 SEC (ref 22–30)
AST SERPL-CCNC: 23 U/L (ref 14–36)
BASOPHILS # BLD AUTO: 0 K/UL (ref 0–0.2)
BASOPHILS NFR BLD AUTO: 0 %
BUN SERPL-SCNC: 17 MG/DL (ref 7–17)
CALCIUM SPEC-MCNC: 9.3 MG/DL (ref 8.4–10.2)
CHLORIDE SERPL-SCNC: 105 MMOL/L (ref 98–107)
CO2 SERPL-SCNC: 26 MMOL/L (ref 22–30)
EOSINOPHIL # BLD AUTO: 0.2 K/UL (ref 0–0.7)
EOSINOPHIL NFR BLD AUTO: 3 %
ERYTHROCYTE [DISTWIDTH] IN BLOOD BY AUTOMATED COUNT: 4.63 M/UL (ref 3.8–5.4)
ERYTHROCYTE [DISTWIDTH] IN BLOOD: 13.9 % (ref 11.5–15.5)
GLUCOSE SERPL-MCNC: 109 MG/DL (ref 74–99)
HCT VFR BLD AUTO: 44.7 % (ref 34–46)
HGB BLD-MCNC: 14 GM/DL (ref 11.4–16)
INR PPP: 0.9 (ref ?–1.2)
LYMPHOCYTES # SPEC AUTO: 1.8 K/UL (ref 1–4.8)
LYMPHOCYTES NFR SPEC AUTO: 22 %
MAGNESIUM SPEC-SCNC: 1.7 MG/DL (ref 1.6–2.3)
MCH RBC QN AUTO: 30.2 PG (ref 25–35)
MCHC RBC AUTO-ENTMCNC: 31.3 G/DL (ref 31–37)
MCV RBC AUTO: 96.6 FL (ref 80–100)
MONOCYTES # BLD AUTO: 0.3 K/UL (ref 0–1)
MONOCYTES NFR BLD AUTO: 4 %
NEUTROPHILS # BLD AUTO: 5.7 K/UL (ref 1.3–7.7)
NEUTROPHILS NFR BLD AUTO: 70 %
PH UR: 6 [PH] (ref 5–8)
PLATELET # BLD AUTO: 214 K/UL (ref 150–450)
POTASSIUM SERPL-SCNC: 4.4 MMOL/L (ref 3.5–5.1)
PROT SERPL-MCNC: 6.4 G/DL (ref 6.3–8.2)
PROT UR QL: (no result)
PT BLD: 9.8 SEC (ref 9–12)
RBC UR QL: 2 /HPF (ref 0–5)
SODIUM SERPL-SCNC: 139 MMOL/L (ref 137–145)
SP GR UR: 1.05 (ref 1–1.03)
SQUAMOUS UR QL AUTO: 2 /HPF (ref 0–4)
T4 FREE SERPL-MCNC: 1.67 NG/DL (ref 0.78–2.19)
UROBILINOGEN UR QL STRIP: <2 MG/DL (ref ?–2)
WBC # BLD AUTO: 8.2 K/UL (ref 3.8–10.6)
WBC # UR AUTO: 1 /HPF (ref 0–5)

## 2022-09-09 PROCEDURE — 70496 CT ANGIOGRAPHY HEAD: CPT

## 2022-09-09 PROCEDURE — 84443 ASSAY THYROID STIM HORMONE: CPT

## 2022-09-09 PROCEDURE — 84425 ASSAY OF VITAMIN B-1: CPT

## 2022-09-09 PROCEDURE — 87040 BLOOD CULTURE FOR BACTERIA: CPT

## 2022-09-09 PROCEDURE — 82378 CARCINOEMBRYONIC ANTIGEN: CPT

## 2022-09-09 PROCEDURE — 82607 VITAMIN B-12: CPT

## 2022-09-09 PROCEDURE — 74177 CT ABD & PELVIS W/CONTRAST: CPT

## 2022-09-09 PROCEDURE — 82105 ALPHA-FETOPROTEIN SERUM: CPT

## 2022-09-09 PROCEDURE — 83605 ASSAY OF LACTIC ACID: CPT

## 2022-09-09 PROCEDURE — 83735 ASSAY OF MAGNESIUM: CPT

## 2022-09-09 PROCEDURE — 86304 IMMUNOASSAY TUMOR CA 125: CPT

## 2022-09-09 PROCEDURE — 81503 ONCO (OVAR) FIVE PROTEINS: CPT

## 2022-09-09 PROCEDURE — 95816 EEG AWAKE AND DROWSY: CPT

## 2022-09-09 PROCEDURE — 80061 LIPID PANEL: CPT

## 2022-09-09 PROCEDURE — 99285 EMERGENCY DEPT VISIT HI MDM: CPT

## 2022-09-09 PROCEDURE — 93005 ELECTROCARDIOGRAM TRACING: CPT

## 2022-09-09 PROCEDURE — 86255 FLUORESCENT ANTIBODY SCREEN: CPT

## 2022-09-09 PROCEDURE — 94760 N-INVAS EAR/PLS OXIMETRY 1: CPT

## 2022-09-09 PROCEDURE — 80053 COMPREHEN METABOLIC PANEL: CPT

## 2022-09-09 PROCEDURE — 70498 CT ANGIOGRAPHY NECK: CPT

## 2022-09-09 PROCEDURE — 85025 COMPLETE CBC W/AUTO DIFF WBC: CPT

## 2022-09-09 PROCEDURE — 70553 MRI BRAIN STEM W/O & W/DYE: CPT

## 2022-09-09 PROCEDURE — 70450 CT HEAD/BRAIN W/O DYE: CPT

## 2022-09-09 PROCEDURE — 85610 PROTHROMBIN TIME: CPT

## 2022-09-09 PROCEDURE — 82272 OCCULT BLD FECES 1-3 TESTS: CPT

## 2022-09-09 PROCEDURE — 85379 FIBRIN DEGRADATION QUANT: CPT

## 2022-09-09 PROCEDURE — 82746 ASSAY OF FOLIC ACID SERUM: CPT

## 2022-09-09 PROCEDURE — 81001 URINALYSIS AUTO W/SCOPE: CPT

## 2022-09-09 PROCEDURE — 84439 ASSAY OF FREE THYROXINE: CPT

## 2022-09-09 PROCEDURE — 84484 ASSAY OF TROPONIN QUANT: CPT

## 2022-09-09 PROCEDURE — 71275 CT ANGIOGRAPHY CHEST: CPT

## 2022-09-09 PROCEDURE — 95713 VEEG 2-12 HR CONT MNTR: CPT

## 2022-09-09 PROCEDURE — 93306 TTE W/DOPPLER COMPLETE: CPT

## 2022-09-09 PROCEDURE — 85730 THROMBOPLASTIN TIME PARTIAL: CPT

## 2022-09-09 PROCEDURE — 71046 X-RAY EXAM CHEST 2 VIEWS: CPT

## 2022-09-09 PROCEDURE — 82140 ASSAY OF AMMONIA: CPT

## 2022-09-09 PROCEDURE — 80048 BASIC METABOLIC PNL TOTAL CA: CPT

## 2022-09-09 PROCEDURE — 36415 COLL VENOUS BLD VENIPUNCTURE: CPT

## 2022-09-09 PROCEDURE — 71045 X-RAY EXAM CHEST 1 VIEW: CPT

## 2022-09-09 RX ADMIN — CEFAZOLIN SCH MLS/HR: 330 INJECTION, POWDER, FOR SOLUTION INTRAMUSCULAR; INTRAVENOUS at 21:13

## 2022-09-09 RX ADMIN — HYDROMORPHONE HYDROCHLORIDE PRN MG: 1 INJECTION, SOLUTION INTRAMUSCULAR; INTRAVENOUS; SUBCUTANEOUS at 21:13

## 2022-09-09 NOTE — XR
EXAMINATION TYPE: XR chest 2V

 

DATE OF EXAM: 9/9/2022

 

COMPARISON: 4/29/2022

 

HISTORY: Shortness of breath

 

TECHNIQUE:  Frontal and lateral views of the chest are obtained.

 

FINDINGS:

 

Scattered senescent parenchymal changes noted. Hyperinflation compatible with COPD. 

 

No evidence for infiltrate. No evidence for atelectasis.

 

Heart size is stable.

 

Mediastinal structures are stable and grossly unremarkable.

 

No evidence for hilar prominence.

 

Degenerative changes dorsal spine. 

 

IMPRESSION:

1. No evidence for acute pulmonary disease.

## 2022-09-09 NOTE — CT
EXAMINATION TYPE: CT abdomen pelvis w con

 

DATE OF EXAM: 9/9/2022

 

COMPARISON:

 

HISTORY: Lower abdominal pain

 

CT DLP: 513.9 mGycm

Automated exposure control for dose reduction was used.

 

CONTRAST: 

CT scan of the abdomen pelvis is performed with IV Contrast, patient injected with 100 ml mL of Isovu
e 300.

 

FINDINGS- 

LUNG BASES-severe cardiomegaly. Subsegmental changes involving the lung bases. Calcification along th
e apex of the heart. 

 

LIVER/GB-postcholecystectomy changes with mild intrahepatic biliary ductal dilation..

 

PANCREAS-  No gross abnormality is seen. 

SPLEEN-  No gross abnormality is seen. 

 

ADRENALS-  No gross abnormality is seen.

 

KIDNEYS/BLADDER- no hydronephrosis nephrolithiasis or renal mass.

   

BOWEL-there is mild colonic wall thickening diffusely which could be associated with a mild colitis. 
A few prominent small bowel loops is seen proximally which could be on the basis of ileus.. Diverticu
losis of the colon.

  

LYMPH NODES-  No greater than 1cm abdominal or pelvic lymph nodes areappreciated. 

 

OSSEOUS STRUCTURES-  No significant abnormality is seen. 

 

OTHER-  atherosclerotic change of the aorta and iliac vasculature. Severe stenosis proximal right jeremiah
ac artery. There is a cystic lesion in the left adnexa measuring 3.2 cm and there are pelvic varices 
noted on the left. IVC filter noted. There is an epicardial lead with postsurgical changes involving 
the mediastinum.

 

IMPRESSION- 

1. There is mild wall thickening of the colon diffusely correlate for mild colitis. There is also jordana
dence of diverticulosis of the sigmoid colon. Wall thickening is noted which can be associated with a
 mucosal lesion correlate with direct visualization as clinically warranted.

2. There is a 3.2 cm left adnexal cystic mass likely tubo-ovarian. Recommend correlation with serum t
umor markers and cystic neoplasms of the ovary would be in the differential diagnosis within this dem
ographic.

3. Severe right iliac artery stenosis

4. There are few prominent small bowel loops. An ileus is favored over an enteritis or partial obstru
ction correlate clinically.

5. Calcification along the apex of the heart can be associated myocardial calcinosis and previous inf
arction. Myocardial aneurysm in the differential diagnosis. Correlate clinically.

## 2022-09-09 NOTE — ED
General Adult HPI





- General


Chief complaint: Weakness


Stated complaint: stomach problems


Time Seen by Provider: 22 12:14


Source: patient, RN notes reviewed, old records reviewed


Mode of arrival: ambulatory


Limitations: no limitations





- History of Present Illness


Initial comments: 





67-year-old female with chief complaint weakness and abdominal pain.  Patient 

was sent in by primary care physician Dr. Colon for evaluation.  Patient has 

had decreased appetite, has had significant weight loss.  Blood pressure was low

in the office and heart rate was elevated.  She's had a one-month history of 

lower abdominal pain associated with her fatigue and weight loss.  She also has 

history of thyroid disorder and was seen a primary care physician for thyroid 

studies.  Patient denies fever.  Denies focal numbness or weakness.  Her pain is

in her lower abdomen and this is been constant.





- Related Data


                                Home Medications











 Medication  Instructions  Recorded  Confirmed


 


Atorvastatin Calcium [Lipitor] 80 mg PO DAILY 22


 


Clopidogrel Bisulfate [Plavix] 75 mg PO DAILY 22


 


Gabapentin 600 mg PO DAILY PRN 22


 


Levothyroxine Sodium [Synthroid] 112 mcg PO DAILY 22


 


Losartan Potassium 50 mg PO DAILY 22


 


Sertraline HCl [Zoloft] 100 mg PO DAILY 22


 


clonazePAM [KlonoPIN] 1 mg PO HS 22


 


Nitroglycerin Sl Tabs [Nitrostat] 0.4 mg SL Q5M PRN 22








                                  Previous Rx's











 Medication  Instructions  Recorded


 


Aspirin 81 mg PO DAILY 90 Days #90 tab 22


 


Isosorbide Mononitrate ER [Imdur] 30 mg PO DAILY 90 Days #90 tab 22


 


Metoprolol Tartrate [Lopressor] 25 mg PO BID #60 tab 22











                                    Allergies











Allergy/AdvReac Type Severity Reaction Status Date / Time


 


codeine AdvReac  Unknown Verified 22 14:32














Review of Systems


ROS Statement: 


Those systems with pertinent positive or pertinent negative responses have been 

documented in the HPI.





ROS Other: All systems not noted in ROS Statement are negative.





Past Medical History


Past Medical History: Myocardial Infarction (MI), Thyroid Disorder


Last Myocardial Infarction Date:: 


History of Any Multi-Drug Resistant Organisms: None Reported


Past Surgical History: Appendectomy,  Section, Cholecystectomy, Coronary

 Bypass/CABG, Heart Catheterization, Heart Catheterization With Stent


Additional Past Surgical History / Comment(s): bilat carotid


Past Anesthesia/Blood Transfusion Reactions: No Reported Reaction


Date of Last Stent Placement:: 


Past Psychological History: No Psychological Hx Reported


Smoking Status: Never smoker


Past Alcohol Use History: None Reported


Past Drug Use History: None Reported





- Past Family History


  ** Father


History Unknown: Yes





  ** Mother


Family Medical History: Diabetes Mellitus, Hypertension





General Exam


Limitations: no limitations


General appearance: alert, in no apparent distress


Head exam: Present: atraumatic, normocephalic


Eye exam: Present: normal appearance, PERRL


ENT exam: Present: mucous membranes dry


Neck exam: Present: normal inspection.  Absent: tenderness, meningismus


Respiratory exam: Present: normal lung sounds bilaterally.  Absent: respiratory 

distress


Cardiovascular Exam: Present: regular rate, normal rhythm


GI/Abdominal exam: Present: soft, distended, tenderness.  Absent: guarding, 

rebound


Extremities exam: Present: normal inspection, normal capillary refill


Neurological exam: Present: alert, CN II-XII intact.  Absent: motor sensory 

deficit


Psychiatric exam: Present: normal affect, normal mood


Skin exam: Present: warm, dry, intact.  Absent: cyanosis, diaphoretic





Course


                                   Vital Signs











  22





  10:52 14:47


 


Temperature 98.1 F 97.8 F


 


Pulse Rate 58 L 52 L


 


Respiratory 20 16





Rate  


 


Blood Pressure 89/43 109/50


 


O2 Sat by Pulse 97 97





Oximetry  














- Reevaluation(s)


Reevaluation #1: 





22 14:52


I did discuss case with Dr. Colon is second time regarding the patient's 

workup and imaging findings, requested the patient be evaluated on an inpatient 

basis due to poor compliance as an outpatient.





EKG Findings





- EKG Comments:


EKG Findings:: EKG: Sinus rhythm incomplete right bundle-branch block rate 79, 

CA interval 170, QRS duration 95, , ST segment depression and T-wave 

inversion in aVL, T-wave inversion in the lateral precordial leads.  No ST 

segment elevation.  Similar T-wave pattern compared to prior in April of this 

year.





Medical Decision Making





- Medical Decision Making





67-year-old female with generalized weakness, fatigue, lower abdominal pain 

symptoms have been present for about one month.  She's had a significant weight 

loss over this time.  She is very tender on exam in the lower abdomen.  Workup 

is initiated including CBC, CMP, thyroid studies, EKG and cardiac enzymes.  I 

did perform CT imaging abdomen which shows diffuse colitis and enteritis as well

 as an ovarian mass on the left measuring 3.2 cm.  Patient has normal CBC, 

normal CMP, and normal T4.  Urinalysis pending.  Patient has marginal blood 

pressure does appear dehydrated and somewhat cachectic.  She'll be admitted for 

IV fluids, pain control, consultation with gynecology.  Case discussed with Dr. Turk who will admit.





- Lab Data


Result diagrams: 


                                 22 12:36





                                 22 12:36


                                   Lab Results











  22 Range/Units





  12:36 12:36 12:36 


 


WBC  8.2    (3.8-10.6)  k/uL


 


RBC  4.63    (3.80-5.40)  m/uL


 


Hgb  14.0    (11.4-16.0)  gm/dL


 


Hct  44.7    (34.0-46.0)  %


 


MCV  96.6    (80.0-100.0)  fL


 


MCH  30.2    (25.0-35.0)  pg


 


MCHC  31.3    (31.0-37.0)  g/dL


 


RDW  13.9    (11.5-15.5)  %


 


Plt Count  214    (150-450)  k/uL


 


MPV  8.2    


 


Neutrophils %  70    %


 


Lymphocytes %  22    %


 


Monocytes %  4    %


 


Eosinophils %  3    %


 


Basophils %  0    %


 


Neutrophils #  5.7    (1.3-7.7)  k/uL


 


Lymphocytes #  1.8    (1.0-4.8)  k/uL


 


Monocytes #  0.3    (0-1.0)  k/uL


 


Eosinophils #  0.2    (0-0.7)  k/uL


 


Basophils #  0.0    (0-0.2)  k/uL


 


PT   9.8   (9.0-12.0)  sec


 


INR   0.9   (<1.2)  


 


APTT   22.5   (22.0-30.0)  sec


 


Sodium    139  (137-145)  mmol/L


 


Potassium    4.4  (3.5-5.1)  mmol/L


 


Chloride    105  ()  mmol/L


 


Carbon Dioxide    26  (22-30)  mmol/L


 


Anion Gap    8  mmol/L


 


BUN    17  (7-17)  mg/dL


 


Creatinine    0.74  (0.52-1.04)  mg/dL


 


Est GFR (CKD-EPI)AfAm    >90  (>60 ml/min/1.73 sqM)  


 


Est GFR (CKD-EPI)NonAf    85  (>60 ml/min/1.73 sqM)  


 


Glucose    109 H  (74-99)  mg/dL


 


Plasma Lactic Acid Willy     (0.7-2.0)  mmol/L


 


Calcium    9.3  (8.4-10.2)  mg/dL


 


Magnesium    1.7  (1.6-2.3)  mg/dL


 


Total Bilirubin    0.5  (0.2-1.3)  mg/dL


 


AST    23  (14-36)  U/L


 


ALT    14  (4-34)  U/L


 


Alkaline Phosphatase    141 H  ()  U/L


 


Troponin I     (0.000-0.034)  ng/mL


 


Total Protein    6.4  (6.3-8.2)  g/dL


 


Albumin    4.1  (3.5-5.0)  g/dL


 


TSH    0.039 L  (0.465-4.680)  mIU/L


 


Free T4    1.67  (0.78-2.19)  ng/dL














  22 Range/Units





  12:36 12:36 


 


WBC    (3.8-10.6)  k/uL


 


RBC    (3.80-5.40)  m/uL


 


Hgb    (11.4-16.0)  gm/dL


 


Hct    (34.0-46.0)  %


 


MCV    (80.0-100.0)  fL


 


MCH    (25.0-35.0)  pg


 


MCHC    (31.0-37.0)  g/dL


 


RDW    (11.5-15.5)  %


 


Plt Count    (150-450)  k/uL


 


MPV    


 


Neutrophils %    %


 


Lymphocytes %    %


 


Monocytes %    %


 


Eosinophils %    %


 


Basophils %    %


 


Neutrophils #    (1.3-7.7)  k/uL


 


Lymphocytes #    (1.0-4.8)  k/uL


 


Monocytes #    (0-1.0)  k/uL


 


Eosinophils #    (0-0.7)  k/uL


 


Basophils #    (0-0.2)  k/uL


 


PT    (9.0-12.0)  sec


 


INR    (<1.2)  


 


APTT    (22.0-30.0)  sec


 


Sodium    (137-145)  mmol/L


 


Potassium    (3.5-5.1)  mmol/L


 


Chloride    ()  mmol/L


 


Carbon Dioxide    (22-30)  mmol/L


 


Anion Gap    mmol/L


 


BUN    (7-17)  mg/dL


 


Creatinine    (0.52-1.04)  mg/dL


 


Est GFR (CKD-EPI)AfAm    (>60 ml/min/1.73 sqM)  


 


Est GFR (CKD-EPI)NonAf    (>60 ml/min/1.73 sqM)  


 


Glucose    (74-99)  mg/dL


 


Plasma Lactic Acid Willy  0.7   (0.7-2.0)  mmol/L


 


Calcium    (8.4-10.2)  mg/dL


 


Magnesium    (1.6-2.3)  mg/dL


 


Total Bilirubin    (0.2-1.3)  mg/dL


 


AST    (14-36)  U/L


 


ALT    (4-34)  U/L


 


Alkaline Phosphatase    ()  U/L


 


Troponin I   0.012  (0.000-0.034)  ng/mL


 


Total Protein    (6.3-8.2)  g/dL


 


Albumin    (3.5-5.0)  g/dL


 


TSH    (0.465-4.680)  mIU/L


 


Free T4    (0.78-2.19)  ng/dL














Disposition


Clinical Impression: 


 Ovarian mass, Dehydration, Weight loss





Disposition: ADMITTED AS IP TO THIS HOSP


Condition: Stable


Is patient prescribed a controlled substance at d/c from ED?: No


Referrals: 


Bernabe Colon MD [Primary Care Provider] - 1-2 days


Time of Disposition: 14:55

## 2022-09-10 LAB
AFP-TM SERPL-MCNC: 2.2 NG/ML (ref 0–7.9)
ANION GAP SERPL CALC-SCNC: 7.8 MMOL/L (ref 10–18)
APTT BLD: 27.1 SEC (ref 22–30)
BASOPHILS # BLD AUTO: 0.1 K/UL (ref 0–0.2)
BASOPHILS NFR BLD AUTO: 1 %
BUN SERPL-SCNC: 13.3 MG/DL (ref 9–27)
BUN/CREAT SERPL: 21.14 RATIO (ref 12–20)
CALCIUM SPEC-MCNC: 8.9 MG/DL (ref 8.7–10.3)
CEA SERPL-MCNC: 6.5 NG/ML (ref 0–4.9)
CHLORIDE SERPL-SCNC: 106 MMOL/L (ref 96–109)
CO2 SERPL-SCNC: 28.4 MMOL/L (ref 20–27.5)
EOSINOPHIL # BLD AUTO: 0.1 K/UL (ref 0–0.7)
EOSINOPHIL NFR BLD AUTO: 1 %
ERYTHROCYTE [DISTWIDTH] IN BLOOD BY AUTOMATED COUNT: 4.17 M/UL (ref 3.8–5.4)
ERYTHROCYTE [DISTWIDTH] IN BLOOD: 13.8 % (ref 11.5–15.5)
GLUCOSE BLD-MCNC: 86 MG/DL (ref 70–110)
GLUCOSE SERPL-MCNC: 89 MG/DL (ref 70–110)
HCT VFR BLD AUTO: 41.1 % (ref 34–46)
HGB BLD-MCNC: 12.4 GM/DL (ref 11.4–16)
INR PPP: 0.9 (ref ?–1.2)
LYMPHOCYTES # SPEC AUTO: 2.1 K/UL (ref 1–4.8)
LYMPHOCYTES NFR SPEC AUTO: 28 %
MCH RBC QN AUTO: 29.7 PG (ref 25–35)
MCHC RBC AUTO-ENTMCNC: 30.2 G/DL (ref 31–37)
MCV RBC AUTO: 98.6 FL (ref 80–100)
MONOCYTES # BLD AUTO: 0.4 K/UL (ref 0–1)
MONOCYTES NFR BLD AUTO: 5 %
NEUTROPHILS # BLD AUTO: 4.7 K/UL (ref 1.3–7.7)
NEUTROPHILS NFR BLD AUTO: 64 %
PLATELET # BLD AUTO: 155 K/UL (ref 150–450)
POTASSIUM SERPL-SCNC: 4.3 MMOL/L (ref 3.5–5.5)
PT BLD: 10.2 SEC (ref 9–12)
SODIUM SERPL-SCNC: 142 MMOL/L (ref 135–145)
WBC # BLD AUTO: 7.4 K/UL (ref 3.8–10.6)

## 2022-09-10 RX ADMIN — ATORVASTATIN CALCIUM SCH: 80 TABLET, FILM COATED ORAL at 12:22

## 2022-09-10 RX ADMIN — METRONIDAZOLE SCH MLS/HR: 500 INJECTION, SOLUTION INTRAVENOUS at 15:38

## 2022-09-10 RX ADMIN — METRONIDAZOLE SCH MLS/HR: 500 INJECTION, SOLUTION INTRAVENOUS at 23:18

## 2022-09-10 RX ADMIN — SERTRALINE HYDROCHLORIDE SCH MG: 100 TABLET ORAL at 09:17

## 2022-09-10 RX ADMIN — LEVOTHYROXINE SODIUM SCH MCG: 0.11 TABLET ORAL at 06:07

## 2022-09-10 RX ADMIN — ATORVASTATIN CALCIUM SCH MG: 80 TABLET, FILM COATED ORAL at 09:17

## 2022-09-10 RX ADMIN — METOPROLOL TARTRATE SCH MG: 25 TABLET, FILM COATED ORAL at 20:11

## 2022-09-10 RX ADMIN — HYDROMORPHONE HYDROCHLORIDE PRN MG: 1 INJECTION, SOLUTION INTRAMUSCULAR; INTRAVENOUS; SUBCUTANEOUS at 00:42

## 2022-09-10 RX ADMIN — MAGNESIUM SULFATE IN DEXTROSE SCH MLS/HR: 10 INJECTION, SOLUTION INTRAVENOUS at 00:34

## 2022-09-10 RX ADMIN — HYDROMORPHONE HYDROCHLORIDE PRN MG: 1 INJECTION, SOLUTION INTRAMUSCULAR; INTRAVENOUS; SUBCUTANEOUS at 09:19

## 2022-09-10 RX ADMIN — GABAPENTIN PRN MG: 300 CAPSULE ORAL at 20:08

## 2022-09-10 RX ADMIN — LOSARTAN POTASSIUM SCH MG: 50 TABLET, FILM COATED ORAL at 09:17

## 2022-09-10 RX ADMIN — CEFAZOLIN SCH: 330 INJECTION, POWDER, FOR SOLUTION INTRAMUSCULAR; INTRAVENOUS at 04:49

## 2022-09-10 RX ADMIN — MAGNESIUM SULFATE IN DEXTROSE SCH MLS/HR: 10 INJECTION, SOLUTION INTRAVENOUS at 01:46

## 2022-09-10 RX ADMIN — ASPIRIN 81 MG CHEWABLE TABLET SCH MG: 81 TABLET CHEWABLE at 23:09

## 2022-09-10 RX ADMIN — HYDROMORPHONE HYDROCHLORIDE PRN MG: 1 INJECTION, SOLUTION INTRAMUSCULAR; INTRAVENOUS; SUBCUTANEOUS at 20:09

## 2022-09-10 RX ADMIN — CEFAZOLIN SCH MLS/HR: 330 INJECTION, POWDER, FOR SOLUTION INTRAMUSCULAR; INTRAVENOUS at 13:10

## 2022-09-10 RX ADMIN — LOSARTAN POTASSIUM SCH: 50 TABLET, FILM COATED ORAL at 12:23

## 2022-09-10 RX ADMIN — HYDROMORPHONE HYDROCHLORIDE PRN MG: 1 INJECTION, SOLUTION INTRAMUSCULAR; INTRAVENOUS; SUBCUTANEOUS at 06:20

## 2022-09-10 RX ADMIN — METOPROLOL TARTRATE SCH: 25 TABLET, FILM COATED ORAL at 12:23

## 2022-09-10 RX ADMIN — ISOSORBIDE MONONITRATE SCH: 30 TABLET, EXTENDED RELEASE ORAL at 12:22

## 2022-09-10 RX ADMIN — FAMOTIDINE SCH MG: 20 TABLET, FILM COATED ORAL at 23:09

## 2022-09-10 RX ADMIN — HEPARIN SODIUM SCH UNIT: 5000 INJECTION INTRAVENOUS; SUBCUTANEOUS at 09:17

## 2022-09-10 RX ADMIN — HEPARIN SODIUM SCH UNIT: 5000 INJECTION INTRAVENOUS; SUBCUTANEOUS at 20:11

## 2022-09-10 RX ADMIN — ISOSORBIDE MONONITRATE SCH MG: 30 TABLET, EXTENDED RELEASE ORAL at 09:17

## 2022-09-10 RX ADMIN — METOPROLOL TARTRATE SCH MG: 25 TABLET, FILM COATED ORAL at 09:17

## 2022-09-10 RX ADMIN — CEFAZOLIN SCH MLS/HR: 330 INJECTION, POWDER, FOR SOLUTION INTRAMUSCULAR; INTRAVENOUS at 09:16

## 2022-09-10 NOTE — P.OBCN
History of Present Illness


Consult date: 09/10/22


Reason for consult: ovarian cyst


Chief complaint: Increasing abdominal pain, weight loss, weakness for the past 

2-3 months.


History of present illness: 





This is a 67-year-old female  4 para 4002 last menstrual period 20 years 

ago who presented through the emergency room per her primary care physician with

a history of increasing weight loss, abdominal pain, and nausea over the past 2-

3 months.  She has lost 7-8 pounds, and on admission was said to not be able to 

keep food down.  Computed tomography scan of the pelvis and abdomen were done, 

revealing a 3.2 cm left adnexal cyst.  There is no evidence of ascites.  There 

is a colonic wall thickening, evidence of diverticulosis and mild ileus.  GYN 

consultation was requested.





Past medical history is significant for long-standing cardiac disease, 

myocardial infarction, thyroid disease, osteoarthritis.





Past surgical history cardiac stents, CABG, bilateral carotid surgery, 

cholecystectomy, appendectomy, previous C-sections.





Family history significant for diabetes and hypertension.  Patient denies a 

family history of breast, ovarian, uterus, or colon cancer.





Current medications include Lipitor, clonidine, into her, thyroid meds, Cozaar, 

Zoloft.





ALLERGIES include codeine, reaction uncertain.





Social history patient is , she makes her home and [place], she does

not work outside the home.  She has a long-standing history of tobacco use.





On exam patient is 4 foot 11 inches, 106 pounds, blood pressure 100/62, pulse 

96, afebrile with a temperature of 98.4.  HEENT exam reveals poor dentition, no 

obvious thyromegaly, no cervical lymphadenopathy.  Breasts are bilaterally 

atrophic to inspection, no obvious masses skin changes or nipple discharge.  

Chest is clear in all fields anteriorly and posteriorly.  Cardiac exam reveals 

regular rate and rhythm with no murmur.  Abdomen is soft, mildly distended, with

mild rebound in the right lower quadrant and guarding.  Left lower quadrant 

negative.  No CVA tenderness.  Extremities reveal no edema, reasonably good and 

equal peripheral pulses.





On pelvic exam the vagina is age appropriate, there is no vaginal bleeding 

discharge or odor.  Cervix is small to palpation.  Uterus is small and mobile, 

nontender.  There is tenderness in the right lower quadrant with again mild 

rebound and guarding noted.  The left adnexa is -2 examination.  Rectal exam 

reveals good rectal tone, external hemorrhoids, soft brown stool.  Stool 

sampling is sent to the lab for testing.





Labs include white count 8.2, platelets 214,000, hemoglobin 14.0, ALTs 49, AST 

23, thyroid function studies within normal limits, blood sugar 109, urine 

analysis is negative.





Impression: 67-year-old female with increasing abdominal pain and imaging 

evidence of diverticulosis, ileus, and colitis.  Incidental finding of 3.2 cm 

left adnexal cyst.





Plan: Again I believe this is an incidental finding, is asymptomatic.  OVA-1

testing has been requested.  Will follow patient as appropriate.  Thank you for 

the consultation.





Review of Systems


Constitutional: Reports as per HPI





Past Medical History


Past Medical History: Myocardial Infarction (MI), Thyroid Disorder


Last Myocardial Infarction Date:: 


History of Any Multi-Drug Resistant Organisms: None Reported


Past Surgical History: Appendectomy,  Section, Cholecystectomy, Coronary

Bypass/CABG, Heart Catheterization, Heart Catheterization With Stent


Additional Past Surgical History / Comment(s): bilat carotid


Past Anesthesia/Blood Transfusion Reactions: No Reported Reaction


Date of Last Stent Placement:: 


Past Psychological History: No Psychological Hx Reported


Smoking Status: Never smoker


Past Alcohol Use History: None Reported


Past Drug Use History: None Reported





- Past Family History


  ** Father


History Unknown: Yes





  ** Mother


Family Medical History: Diabetes Mellitus, Hypertension





Medications and Allergies


                                Home Medications











 Medication  Instructions  Recorded  Confirmed  Type


 


Atorvastatin Calcium [Lipitor] 80 mg PO DAILY 22 History


 


Clopidogrel Bisulfate [Plavix] 75 mg PO DAILY 22 History


 


Gabapentin 600 mg PO DAILY PRN 22 History


 


Levothyroxine Sodium [Synthroid] 112 mcg PO DAILY 22 History


 


Losartan Potassium 50 mg PO DAILY 22 History


 


Sertraline HCl [Zoloft] 100 mg PO DAILY 22 History


 


clonazePAM [KlonoPIN] 1 mg PO HS 22 History


 


Aspirin 81 mg PO DAILY 90 Days #90 tab 22 Rx


 


Isosorbide Mononitrate ER [Imdur] 30 mg PO DAILY 90 Days #90 tab 22 0

22 Rx


 


Metoprolol Tartrate [Lopressor] 25 mg PO BID #60 tab 22 Rx


 


Nitroglycerin Sl Tabs [Nitrostat] 0.4 mg SL Q5M PRN 22 History








                                    Allergies











Allergy/AdvReac Type Severity Reaction Status Date / Time


 


codeine AdvReac  Unknown Verified 22 14:32














Exam


                                   Vital Signs











  Temp Pulse Pulse Resp BP BP Pulse Ox


 


 09/10/22 06:09       100/62 


 


 09/10/22 04:13  98.4 F   96  16   94/47  97


 


 22 20:21  98.1 F   69  14   148/70  93 L


 


 22 20:00  97 F L  60   20  116/62   96


 


 22 19:00  97.8 F  54 L   20  116/62   96


 


 22 18:00  97.8 F  54 L   20  116/62   95


 


 22 16:00  97.8 F  56 L   18  115/58   95


 


 22 14:47  97.8 F  52 L   16  109/50   97


 


 22 10:52  98.1 F  58 L   20  89/43   97








                                Intake and Output











 09/09/22 09/10/22 09/10/22





 22:59 06:59 14:59


 


Other:   


 


  Voiding Method Toilet  


 


  # Voids 1  


 


  Weight 48.081 kg  














See dictation under HPI please





Results


Result Diagrams: 


                                 22 12:36





                                 22 12:36


                  Abnormal Lab Results - Last 24 Hours (Table)











  22 Range/Units





  12:36 13:47 


 


Glucose  109 H   (74-99)  mg/dL


 


Alkaline Phosphatase  141 H   ()  U/L


 


TSH  0.039 L   (0.465-4.680)  mIU/L


 


Ur Specific Gravity   1.049 H  (1.001-1.035)  


 


Urine Protein   1+ H  (Negative)  


 


Urine Mucus   Occasional H  (None)  /hpf














Assessment and Plan


Assessment: 





67-year-old female with weight loss, increasing abdominal tenderness and pain.  

CT evidence of colitis, diverticulosis, ileus.  3.2 cm adnexal cyst noted on 

imaging.  No family history of ovarian cancer.  Left adnexa negative on 

examination.


Plan: 





Over 1 testing has been ordered.  FIT testing of the stool has also been sent to

 the lab.  I suspect that the 3.2 cm left adnexal cyst is simply an incidental 

finding.  We'll follow as appropriate.  Thank you for the consultation


Time with Patient: Less than 30

## 2022-09-10 NOTE — P.HPIM
History of Present Illness


H&P Date: 22


Chief Complaint: Abdominal pain





Patient is a 67-year-old female with a known history of coronary artery disease 

status post CABG, history of MI, history of stent placement and bilateral 

carotid surgery and hypothyroidism


Was sent to ER by her primary care physician.  Patient has been having abdominal

pain mainly in the lower abdomen associate with nausea for the past 3 to 4 

months.  Patient could not keep down food and also weight loss about 7 non-B 

during the last couple 1 to 2 months.  Patient is also having generalized 

weakness and could not keep her balance.  Denied any diarrhea.  No complaints of

chest pain or shortness of breath.  Patient has been feeling very weak.


Patient was discharged from the hospital on 5/3/2022.,  Admitted due to unstable

angina and underwent nuclear stress test showed predominantly a fixed defect.  

Imdur was added at that time.


Chest x-ray showed no evidence for acute pulmonary disease.





CT of the abdomen pelvis showed there is mild wall thickening of the colon 

diffusely correlate for mild colitis.  There is also evidence of diverticulosis 

of the sigmoid colon.  Wall thickening is noted which can be associated with a 

mucosal lesion correlate with direct visualization as clinically warranted.


There is a 3.2 cm left adnexal cystic mass likely tubo-ovarian.


Severe right iliac artery stenosis.


There is prominent small bowel loops ileus is favored.


Calcification around the apex of the heart can be associated with myocardial c

alcified calcinosis and previous infarction.





Laboratory data showed WBC 8.2, hemoglobin 14.0 and platelets 214


Sodium 139 potassium 4.4 chloride 105 bicarb is 26 BUN 17 and creatinine 0.74 

and blood sugar is 109 AST 23 alk phos 141 and ALT 49 bilirubin level is 0.5 

magnesium 1.7


TSH 0.039 and free T4 levels 1.67.  Albumin 4.1


Urinalysis negative for infection.








Review of Systems





Constitutional: Patient denies any fever or chills . no Generalized weakness.


Abdomen: Patient denied any nausea or vomiting . +abd. pain


Cardiovascular: Patient denies any chest pain or short of breath no 

palpitations.


Respiratory: patient denied any cough is from production.  No shortness of 

breath


Neurologic: Patient denied any numbness or tingling headache.


Musculoskeletal: Patient denies any complaints of joint swelling or deformity.


Skin: Negative


Psychiatric: Negative


Endocrine: No heat or cold intolerance.  No recent weight gain.


Genitourinary: No dysuria or hematuria.


All other 14 point ROS negative except the above





Past Medical History


Past Medical History: Myocardial Infarction (MI), Thyroid Disorder


Last Myocardial Infarction Date:: 


History of Any Multi-Drug Resistant Organisms: None Reported


Past Surgical History: Appendectomy,  Section, Cholecystectomy, Coronary

Bypass/CABG, Heart Catheterization, Heart Catheterization With Stent


Additional Past Surgical History / Comment(s): bilat carotid


Past Anesthesia/Blood Transfusion Reactions: No Reported Reaction


Date of Last Stent Placement:: 


Past Psychological History: No Psychological Hx Reported


Smoking Status: Never smoker


Past Alcohol Use History: None Reported


Past Drug Use History: None Reported





- Past Family History


  ** Father


History Unknown: Yes





  ** Mother


Family Medical History: Diabetes Mellitus, Hypertension





Medications and Allergies


                                Home Medications











 Medication  Instructions  Recorded  Confirmed  Type


 


Atorvastatin Calcium [Lipitor] 80 mg PO DAILY 22 History


 


Clopidogrel Bisulfate [Plavix] 75 mg PO DAILY 22 History


 


Gabapentin 600 mg PO DAILY PRN 22 History


 


Levothyroxine Sodium [Synthroid] 112 mcg PO DAILY 22 History


 


Losartan Potassium 50 mg PO DAILY 22 History


 


Sertraline HCl [Zoloft] 100 mg PO DAILY 22 History


 


clonazePAM [KlonoPIN] 1 mg PO HS 22 History


 


Aspirin 81 mg PO DAILY 90 Days #90 tab 22 Rx


 


Isosorbide Mononitrate ER [Imdur] 30 mg PO DAILY 90 Days #90 tab 22 Rx


 


Metoprolol Tartrate [Lopressor] 25 mg PO BID #60 tab 22 Rx


 


Nitroglycerin Sl Tabs [Nitrostat] 0.4 mg SL Q5M PRN 22 History








                                    Allergies











Allergy/AdvReac Type Severity Reaction Status Date / Time


 


codeine AdvReac  Unknown Verified 22 14:32














Physical Exam


Vitals: 


                                   Vital Signs











  Temp Pulse Pulse Resp BP BP Pulse Ox


 


 22 20:21  98.1 F   69  14   148/70  93 L


 


 22 14:47  97.8 F  52 L   16  109/50   97


 


 22 10:52  98.1 F  58 L   20  89/43   97








                                Intake and Output











 09/09/22 09/09/22 09/10/22





 14:59 22:59 06:59


 


Other:   


 


  Voiding Method  Toilet 


 


  # Voids  1 


 


  Weight 48.081 kg 48.081 kg 

















PHYSICAL EXAMINATION: 


Patient is lying in the bed comfortably, no acute distress, awake alert and 

oriented.. 


HEENT: Normocephalic. Neck is supple. Pupils reactive. Nostrils clear. Oral 

cavity is moist. 


Neck reveals no JVD, carotid bruits, or thyromegaly. 


CHEST EXAMINATION: Trachea is central. Symmetrical expansion. Lung fields clear 

to auscultation and percussion. 


CARDIAC: Normal S1, S2 with no gallops. No murmurs 


ABDOMEN: Soft. Bowel sounds present.  Nontender.  No organomegaly. No abdominal 

bruits. 


Extremities: reveal no edema.  No clubbing or cyanosis


Neurologically awake, alert, oriented x3 with well-coordinated movements.  No fo

flako deficits noted


Skin: No rash or skin lesions. 


Psychiatric: Coperative.  Nonsuicidal,


Musculoskeletal: No joint swelling or deformity.  Normal range of motion.





Results


CBC & Chem 7: 


                                 09/10/22 22:50





                                 09/10/22 06:15


Labs: 


                  Abnormal Lab Results - Last 24 Hours (Table)











  22 Range/Units





  12:36 13:47 


 


Glucose  109 H   (74-99)  mg/dL


 


Alkaline Phosphatase  141 H   ()  U/L


 


TSH  0.039 L   (0.465-4.680)  mIU/L


 


Ur Specific Gravity   1.049 H  (1.001-1.035)  


 


Urine Protein   1+ H  (Negative)  


 


Urine Mucus   Occasional H  (None)  /hpf














Thrombosis Risk Factor Assmnt





- DVT/VTE Prophylaxis


DVT/VTE Prophylaxis: Pharmacologic Prophylaxis ordered





- Choose All That Apply


Any of the Below Risk Factors Present?: No


Other Risk Factors: Yes


Each Risk Factor Represents 2 Points: Age 61-74 years


Other congenital or acquired thrombophilia - If yes, enter type in comment: No


Thrombosis Risk Factor Assessment Total Risk Factor Score: 2


Thrombosis Risk Factor Assessment Level: Low Risk





Assessment and Plan


Assessment: 








Lower abdominal pain, generalized weakness and weight loss along with left 

adnexal cystic mass likely tubo-ovarian.


Mild wall thickening of the colon diffusely correlate for mild colitis.


Severe right iliac artery stenosis


Coronary artery disease history of CABG in  and prior PCI's most recent in 

2021 and chronic total occluded RCA.


Ischemic cardiomyopathy ejection fraction 40 to 45%


History of left ventricle apical thrombus was on anticoagulation previously.


Moderate to severe TR


Peripheral vascular disease


Osteoarthritis primary bilateral


Hypothyroidism


Hypertensive heart disease


Chronic urinary stress incontinence


Peripheral neuropathy


Chronic nicotine addiction currently everyday smoker


DVT prophylaxis with heparin subcu





Plan:


Patient will be continued on IV hydration and symptomatic management for nausea.

  OB/GYN service was consulted for evaluation of possible tubo-ovarian mass.  

Ordered  and CEA.  Continue with symptomatic management and continue with

 home medications.  Patient may need IR guided biopsy and OB/GYN evaluation.  

Follow-up closely.





Time with Patient: Greater than 30

## 2022-09-11 LAB
APTT BLD: 60.2 SEC (ref 22–30)
BASOPHILS # BLD AUTO: 0 K/UL (ref 0–0.2)
BASOPHILS NFR BLD AUTO: 0 %
EOSINOPHIL # BLD AUTO: 0.1 K/UL (ref 0–0.7)
EOSINOPHIL NFR BLD AUTO: 1 %
ERYTHROCYTE [DISTWIDTH] IN BLOOD BY AUTOMATED COUNT: 4.16 M/UL (ref 3.8–5.4)
ERYTHROCYTE [DISTWIDTH] IN BLOOD: 13.7 % (ref 11.5–15.5)
HCT VFR BLD AUTO: 41 % (ref 34–46)
HGB BLD-MCNC: 12.6 GM/DL (ref 11.4–16)
INR PPP: 0.9 (ref ?–1.2)
LYMPHOCYTES # SPEC AUTO: 1.7 K/UL (ref 1–4.8)
LYMPHOCYTES NFR SPEC AUTO: 21 %
MCH RBC QN AUTO: 30.4 PG (ref 25–35)
MCHC RBC AUTO-ENTMCNC: 30.8 G/DL (ref 31–37)
MCV RBC AUTO: 98.6 FL (ref 80–100)
MONOCYTES # BLD AUTO: 0.4 K/UL (ref 0–1)
MONOCYTES NFR BLD AUTO: 5 %
NEUTROPHILS # BLD AUTO: 5.8 K/UL (ref 1.3–7.7)
NEUTROPHILS NFR BLD AUTO: 71 %
PLATELET # BLD AUTO: 147 K/UL (ref 150–450)
PT BLD: 10.3 SEC (ref 9–12)
WBC # BLD AUTO: 8.1 K/UL (ref 3.8–10.6)

## 2022-09-11 RX ADMIN — SERTRALINE HYDROCHLORIDE SCH MG: 100 TABLET ORAL at 10:13

## 2022-09-11 RX ADMIN — METOPROLOL TARTRATE SCH MG: 25 TABLET, FILM COATED ORAL at 21:02

## 2022-09-11 RX ADMIN — METRONIDAZOLE SCH MLS/HR: 500 INJECTION, SOLUTION INTRAVENOUS at 23:51

## 2022-09-11 RX ADMIN — METRONIDAZOLE SCH MLS/HR: 500 INJECTION, SOLUTION INTRAVENOUS at 16:39

## 2022-09-11 RX ADMIN — FAMOTIDINE SCH MG: 20 TABLET, FILM COATED ORAL at 10:13

## 2022-09-11 RX ADMIN — CEFAZOLIN SCH MLS/HR: 330 INJECTION, POWDER, FOR SOLUTION INTRAMUSCULAR; INTRAVENOUS at 21:01

## 2022-09-11 RX ADMIN — METOPROLOL TARTRATE SCH MG: 25 TABLET, FILM COATED ORAL at 10:13

## 2022-09-11 RX ADMIN — METRONIDAZOLE SCH MLS/HR: 500 INJECTION, SOLUTION INTRAVENOUS at 10:13

## 2022-09-11 RX ADMIN — HYDROMORPHONE HYDROCHLORIDE PRN MG: 1 INJECTION, SOLUTION INTRAMUSCULAR; INTRAVENOUS; SUBCUTANEOUS at 21:04

## 2022-09-11 RX ADMIN — CLOPIDOGREL BISULFATE SCH MG: 75 TABLET ORAL at 10:13

## 2022-09-11 RX ADMIN — LEVOTHYROXINE SODIUM SCH MCG: 0.11 TABLET ORAL at 06:27

## 2022-09-11 RX ADMIN — HEPARIN SODIUM SCH: 10000 INJECTION, SOLUTION INTRAVENOUS at 21:02

## 2022-09-11 RX ADMIN — HYDROMORPHONE HYDROCHLORIDE PRN MG: 1 INJECTION, SOLUTION INTRAMUSCULAR; INTRAVENOUS; SUBCUTANEOUS at 03:30

## 2022-09-11 RX ADMIN — HEPARIN SODIUM SCH MLS/HR: 10000 INJECTION, SOLUTION INTRAVENOUS at 00:26

## 2022-09-11 RX ADMIN — CEFAZOLIN SCH MLS/HR: 330 INJECTION, POWDER, FOR SOLUTION INTRAMUSCULAR; INTRAVENOUS at 03:36

## 2022-09-11 RX ADMIN — ASPIRIN 81 MG CHEWABLE TABLET SCH MG: 81 TABLET CHEWABLE at 10:13

## 2022-09-11 RX ADMIN — ATORVASTATIN CALCIUM SCH MG: 80 TABLET, FILM COATED ORAL at 10:13

## 2022-09-11 RX ADMIN — HYDROMORPHONE HYDROCHLORIDE PRN MG: 1 INJECTION, SOLUTION INTRAMUSCULAR; INTRAVENOUS; SUBCUTANEOUS at 12:03

## 2022-09-11 RX ADMIN — FAMOTIDINE SCH MG: 20 TABLET, FILM COATED ORAL at 21:02

## 2022-09-11 NOTE — P.PN
Subjective


Progress Note Date: 09/10/22





Patient is a 67-year-old female with a known history of coronary artery disease 

status post CABG, history of MI, history of stent placement and bilateral 

carotid surgery and hypothyroidism


Was sent to ER by her primary care physician.  Patient has been having abdominal

pain mainly in the lower abdomen associate with nausea for the past 3 to 4 

months.  Patient could not keep down food and also weight loss about 7 non-B 

during the last couple 1 to 2 months.  Patient is also having generalized 

weakness and could not keep her balance.  Denied any diarrhea.  No complaints of

chest pain or shortness of breath.  Patient has been feeling very weak.


Patient was discharged from the hospital on 5/3/2022.,  Admitted due to unstable

angina and underwent nuclear stress test showed predominantly a fixed defect.  

Imdur was added at that time.


Chest x-ray showed no evidence for acute pulmonary disease.





CT of the abdomen pelvis showed there is mild wall thickening of the colon 

diffusely correlate for mild colitis.  There is also evidence of diverticulosis 

of the sigmoid colon.  Wall thickening is noted which can be associated with a 

mucosal lesion correlate with direct visualization as clinically warranted.


There is a 3.2 cm left adnexal cystic mass likely tubo-ovarian.


Severe right iliac artery stenosis.


There is prominent small bowel loops ileus is favored.


Calcification around the apex of the heart can be associated with myocardial 

calcified calcinosis and previous infarction.





Laboratory data showed WBC 8.2, hemoglobin 14.0 and platelets 214


Sodium 139 potassium 4.4 chloride 105 bicarb is 26 BUN 17 and creatinine 0.74 

and blood sugar is 109 AST 23 alk phos 141 and ALT 49 bilirubin level is 0.5 

magnesium 1.7


TSH 0.039 and free T4 levels 1.67.  Albumin 4.1


Urinalysis negative for infection.





9/10/2022


Patient is currently lying in bed.  Still feels very weak.  No complaints of 

chest pain.  No worsening shortness of breath.  Still nauseated.  Decreased oral

intake.  No cough or sputum production.


No headache or dizziness or lightheadedness.


Lab data this morning showed sodium 142 potassium 4.3 chloride 106 bicarb is 

28.4 BUN 13.3, creatinine 0.6.


 6.8, AFP 2.2 and CEA 6.5.


Urine is negative for infection.  Stool for occult blood is negative.





Patient was hypotensive this morning required fluid bolus and blood pressure did

improve.


Patient was also started on antibiotics for possible colitis.








Objective





- Vital Signs


Vital signs: 


                                   Vital Signs











Temp  100.0 F H  09/10/22 11:51


 


Pulse  81   09/10/22 12:11


 


Resp  15   09/10/22 11:54


 


BP  111/55   09/10/22 12:11


 


Pulse Ox  94 L  09/10/22 11:59


 


FiO2      








                                 Intake & Output











 09/10/22 09/10/22 09/11/22





 06:59 18:59 06:59


 


Intake Total  2024 100


 


Balance  2024 100


 


Weight 48.081 kg 48.081 kg 


 


Intake:   


 


  Intake, IV Titration  2024 





  Amount   


 


    Sodium Chloride 0.9% 1,  825 





    000 ml @ 75 mls/hr IV .   





    [id number] [facility] Rx#:[id number]   


 


    Sodium Chloride 0.9% 1,  999 





    000 ml @ 999 mls/hr IV .   





    Q1H1M [facility] Rx#:[id number]   


 


    metroNIDAZOLE-NS   200 





    mg In Saline 1 100ml.bag   





    @ 100 mls/hr IVPB Q8HR   





    [facility] Rx#:[id number]   


 


  Oral   100


 


Other:   


 


  Voiding Method Toilet  


 


  # Voids 1  














- Exam





PHYSICAL EXAMINATION: 


Patient is lying in the bed comfortably, no acute distress, awake alert and 

oriented..  Lethargic and weak.


HEENT: Normocephalic. Neck is supple. Pupils reactive. Nostrils clear. Oral 

cavity is moist. 


Neck reveals no JVD, carotid bruits, or thyromegaly. 


CHEST EXAMINATION: Trachea is central. Symmetrical expansion. Lung fields clear 

to auscultation and percussion.  Bibasilar diminished sounds.


CARDIAC: Normal S1, S2 with no gallops. No murmurs 


ABDOMEN: Soft. Bowel sounds present.  Nontender.  No organomegaly. No abdominal 

bruits. 


Extremities: reveal no edema.  No clubbing or cyanosis


Neurologically awake, alert, oriented x3 with well-coordinated movements.  No 

focal deficits noted


Skin: No rash or skin lesions. 


Psychiatric: Coperative.  Nonsuicidal,


Musculoskeletal: No joint swelling or deformity.  Normal range of motion.








- Labs


CBC & Chem 7: 


                                 09/10/22 22:50





                                 09/10/22 06:15


Labs: 


                  Abnormal Lab Results - Last 24 Hours (Table)











  09/10/22 09/10/22 09/10/22 Range/Units





  06:15 06:15 19:17 


 


Carbon Dioxide  28.4 H    (20.0-27.5)  mmol/L


 


Anion Gap  7.80 L    (10.00-18.00)  mmol/L


 


BUN/Creatinine Ratio  21.14 H    (12.00-20.00)  Ratio


 


Troponin I    0.526 H*  (0.000-0.034)  ng/mL


 


Carcinoembryonic Ag   6.5 H   (0.0-4.9)  ng/mL














Assessment and Plan


Assessment: 








Lower abdominal pain, generalized weakness and weight loss along with left 

adnexal cystic mass likely tubo-ovarian.Likely adnexal cyst, was seen by OB/GYN


Mild wall thickening of the colon diffusely correlate for mild colitis.


Severe right iliac artery stenosis


Coronary artery disease history of CABG in 2010 and prior PCI's most recent in 

02/2021 and chronic total occluded RCA.


Ischemic cardiomyopathy ejection fraction 40 to 45%


History of left ventricle apical thrombus was on anticoagulation previously.


Moderate to severe TR


Peripheral vascular disease


Osteoarthritis primary bilateral


Hypothyroidism


Hypertensive heart disease


Chronic urinary stress incontinence


Peripheral neuropathy


Chronic nicotine addiction currently everyday smoker


DVT prophylaxis with heparin subcu





Plan:


Patient will be continued on IV hydration and symptomatic management for nausea.

 Patient will be started on antibiotics for colitis.


Patient was seen by OB/GYN and thought 3.2 cm left adnexal cyst is an incidental

finding.  .  Ordered  and CEA.  Continue with symptomatic management and 

continue with home medications. Prognosis is guarded.  Follow-up closely.





Time with Patient: Greater than 30

## 2022-09-11 NOTE — P.CRDCN
History of Present Illness


History of present illness: 





HISTORY OF PRESENTING ILLNESS


Patient is pleasant 67-year-old female with history of carotid artery stenosis 

status post bilateral carotid endarterectomy, hypothyroidism, CAD status post 

CABG as well as PCI, hypertension, hyperlipidemia, hypothyroidism, recent 50 

pound weight loss in the last 3 months.  Patient has not had much of an appetite

and has not been eating and drinking that much.  Additionally has been states 

she is very weak and if it were not for him "she would sleep 24 hours ".  

Apparently this has been going on for the last few weeks and even few months.  

Denies any fevers, chills.  Multiple symptoms including abdominal pain, back 

pain, chest pain, lightheadedness, weak.  No recent change in medications.  She 

did have a CT abdomen and pelvis which showed mild thickening of the colon, 3.2 

cm left adnexal mass, severe right iliac artery stenosis, calcification or on 

the apex of the heart.  Patient had previously followed Dr. Noel however has 

not in a while.  Currently she is lethargic and answers some questions and then 

falls back asleep.





Patient had presented in May 2022 with findings of a Lexiscan stress test with 

predominantly fixed apical defect consistent with prior myocardial infarction 

with minimal questionable leopoldo-infarct ischemia and an EF 41%.  Echocardiogram 

had showed EF 40-45% with a fixed thrombus in the left ventricle, moderate to 

severe tricuspid regurgitation and apical scarring.  EKG on presentation shows 

sinus rhythm, left axis deviation, incomplete left bundle branch block, ST 

depressions in the inferior and lateral leads.  This does appear somewhat 

similar to prior EKG from May 2022.  White blood cell count 8.2, hemoglobin 14.

0, BUN 17, creatinine 0.7, troponin 0.012, 0.5, 0.3.   6.8, 

carcinoembryonic antigen 6.5, AFP 2.2.





REVIEW OF SYSTEMS


At the time of my exam:


CONSTITUTIONAL: Denies fever or chills.


CARDIOVASCULAR: +chest pain, +chronic shortness of breath, no orthopnea, PND or 

palpitations.


RESPIRATORY: Denies cough. 


GASTROINTESTINAL: Denies abdominal pain, diarrhea, constipation, nausea or 

vomiting.


MUSCULOSKELETAL: Denies myalgias.


NEUROLOGIC: Denies numbness, tingling or weakness.


ENDOCRINE: Denies fatigue, weight change,  polydipsia or polyurina.


GENITOURINARY: Denies burning, hematuria or urgency with micturation.


HEMATOLOGIC: Denies history of anemia or bleeding. 





PHYSICAL EXAMINATION


Vital signs reviewed.


CONSTITUTIONAL: No apparent distress, ill appearing, lethargic


HEENT: Head is normocephalic. Pupils are equal, round. Sclerae anicteric. Mucous

membranes of the mouth are moist.  No JVD. No carotid bruit.


CHEST EXAMINATION: Lungs are clear to auscultation. No chest wall tenderness is 

noted on palpation or with deep breathing. 


HEART EXAMINATION: Regular rate and rhythm. S1, S2 heard. No murmurs, gallops or

rub.


ABDOMEN: Soft, nontender. Positive bowel sounds.


EXTREMITIES: 2+ peripheral pulses, no lower extremity edema and no calf 

tenderness.


NEUROLOGIC EXAMINATION: Patient is somnolent but arousable





ASSESSMENT


1.  Non-STEMI


2.  CAD with history of CABG and bypass


3.  Recent 50 pound weight loss


4.  Ovarian mass, rule out cancer


5.  Chest pain may be cardiac in nature


6.  Abdominal pain, back pain, multiple other symptoms


7.  Somnolence, failure to thrive of unclear etiology


8.  Decreased appetite


9.  Chronic systolic heart failure EF 40-45%


10.  Ischemic cardiomyopathy with prior apical scar


11.  Chronic apical thrombus appears calcified on CT


12.  PAD





PLAN


Patient with multiple complaints including failure to thrive and decreased 

energy and recent weight loss.  Rule out underlying malignancy with ovarian 

mass.  Additionally however she has been complaining of chest pain and has 

elevated troponins.  Previous stress test showed no inducible ischemia however 

appears symptoms may have changed.  Continue further workup of weight loss and 

rule out cancer.  The patient having more consistent chest pain may need more 

urgent heart catheterization.  Patient likely will need heart catheterization 

however pending further workup and patient's progress.  Repeat echo to evaluate 

left ventricular function.











Past Medical History


Past Medical History: Myocardial Infarction (MI), Thyroid Disorder


Last Myocardial Infarction Date:: 


History of Any Multi-Drug Resistant Organisms: None Reported


Past Surgical History: Appendectomy,  Section, Cholecystectomy, Coronary

Bypass/CABG, Heart Catheterization, Heart Catheterization With Stent


Additional Past Surgical History / Comment(s): bilat carotid


Past Anesthesia/Blood Transfusion Reactions: No Reported Reaction


Date of Last Stent Placement:: 


Past Psychological History: No Psychological Hx Reported


Smoking Status: Never smoker


Past Alcohol Use History: None Reported


Past Drug Use History: None Reported





- Past Family History


  ** Father


History Unknown: Yes





  ** Mother


Family Medical History: Diabetes Mellitus, Hypertension





Medications and Allergies


                                Home Medications











 Medication  Instructions  Recorded  Confirmed  Type


 


Atorvastatin Calcium [Lipitor] 80 mg PO DAILY 22 History


 


Clopidogrel Bisulfate [Plavix] 75 mg PO DAILY 22 History


 


Gabapentin 600 mg PO DAILY PRN 22 History


 


Levothyroxine Sodium [Synthroid] 112 mcg PO DAILY 22 History


 


Losartan Potassium 50 mg PO DAILY 22 History


 


Sertraline HCl [Zoloft] 100 mg PO DAILY 22 History


 


clonazePAM [KlonoPIN] 1 mg PO HS 22 History


 


Aspirin 81 mg PO DAILY 90 Days #90 tab 22 Rx


 


Isosorbide Mononitrate ER [Imdur] 30 mg PO DAILY 90 Days #90 tab 22 Rx


 


Metoprolol Tartrate [Lopressor] 25 mg PO BID #60 tab 22 Rx


 


Nitroglycerin Sl Tabs [Nitrostat] 0.4 mg SL Q5M PRN 22 History








                                    Allergies











Allergy/AdvReac Type Severity Reaction Status Date / Time


 


codeine AdvReac  Unknown Verified 22 14:32














Physical Exam


Vitals: 


                                   Vital Signs











  Temp Pulse Resp BP BP Pulse Ox


 


 22 12:00  98.4 F  79  18  151/67   94 L


 


 22 08:00  98.6 F  79  18   155/77  98


 


 22 03:45  98.9 F  69  14   98/45  98


 


 09/10/22 23:10  98.4 F  72  14   148/69  100


 


 09/10/22 19:40  98.9 F  77  15   131/68  98








                                Intake and Output











 22





 06:59 14:59 22:59


 


Intake Total 925  


 


Output Total 0 400 


 


Balance 925 -400 


 


Intake:   


 


  Intake, IV Titration 925  





  Amount   


 


    Sodium Chloride 0.9% 1, 825  





    000 ml @ 75 mls/hr IV .   





    [id number] [facility] Rx#:[id number]   


 


    Sodium Chloride 0.9% 1, 0  





    000 ml @ 999 mls/hr IV .   





    Q1H1M [facility] Rx#:[id number]   


 


    metroNIDAZOLE-NS  100  





    mg In Saline 1 100ml.bag   





    @ 100 mls/hr IVPB Q8HR   





    [facility] Rx#:[id number]   


 


  Oral 0  


 


Output:   


 


  Urine 0 400 


 


Other:   


 


  Voiding Method Toilet Toilet 














Results





                                 22 07:50





                                 09/10/22 06:15


                                 Cardiac Enzymes











  09/10/22 09/11/22 Range/Units





  19:17 11:25 


 


Troponin I  0.526 H*  0.311 H*  (0.000-0.034)  ng/mL








                                   Coagulation











  09/10/22 09/11/22 Range/Units





  22:50 07:50 


 


PT  10.2  10.3  (9.0-12.0)  sec


 


APTT  27.1  60.2 H  (22.0-30.0)  sec








                                       CBC











  09/10/22 09/11/22 Range/Units





  22:50 07:50 


 


WBC  7.4  8.1  (3.8-10.6)  k/uL


 


RBC  4.17  4.16  (3.80-5.40)  m/uL


 


Hgb  12.4  12.6  (11.4-16.0)  gm/dL


 


Hct  41.1  41.0  (34.0-46.0)  %


 


Plt Count  155  147 L  (150-450)  k/uL








                               Current Medications











Generic Name Dose Route Start Last Admin





  Trade Name Freq  PRN Reason Stop Dose Admin


 


Aspirin  81 mg  09/10/22 22:00  22 10:13





  Aspirin 81 Mg  PO   81 mg





  DAILY KT   Administration


 


Atorvastatin Calcium  80 mg  09/10/22 09:00  22 10:13





  Atorvastatin 80 Mg Tab  PO   80 mg





  DAILY KT   Administration


 


Clonazepam  1 mg  09/10/22 21:00  09/10/22 20:11





  Clonazepam 1 Mg Tab  PO   1 mg





  HS KT   Administration


 


Clopidogrel Bisulfate  75 mg  22 09:00  22 10:13





  Clopidogrel 75 Mg Tab  PO   75 mg





  DAILY KT   Administration


 


Famotidine  20 mg  09/10/22 22:00  22 10:13





  Famotidine 20 Mg Tab  PO   20 mg





  BID KT   Administration


 


Gabapentin  600 mg  09/10/22 00:11  09/10/22 20:08





  Gabapentin 300 Mg Cap  PO   600 mg





  DAILY PRN   Administration





  Pain  


 


Heparin Sodium (Porcine)  0 unit  09/10/22 21:59 





  Heparin Sodium 1,000 Un/Ml (10ml Vl)  IV  





  PER PROTOCOL PRN  





  Low PTT  





  Protocol  


 


Hydromorphone HCl  0.5 mg  22 14:48  22 12:03





  Hydromorphone 0.5 Mg/0.5 Ml Syringe  IVP   0.5 mg





  Q3HR PRN   Administration





  Moderate Pain (Scale 4 to 6)  


 


Sodium Chloride  1,000 mls @ 75 mls/hr  22 14:45  22 03:36





  Saline 0.9%  IV   75 mls/hr





  .[id number] KT   Administration


 


Ceftriaxone Sodium 1 gm/  50 mls @ 100 mls/hr  09/10/22 12:45  22 12:03





  Sodium Chloride  IVPB   100 mls/hr





  Q24HR KT   Administration





  Protocol  


 


Metronidazole 500 mg/ IV  100 mls @ 100 mls/hr  09/10/22 16:00  22 10:13





  Solution  IVPB   100 mls/hr





  Q8HR KT   Administration





  Protocol  


 


Heparin Sodium/Sodium Chloride  250 mls @ 5.77 mls/hr  09/10/22 22:00  22 

00:26





  25,000 unit/ Sodium Chloride  IV   12 units/kg/hr





  .Q24H KT   5.77 mls/hr





    Administration





  Protocol  





  12 UNITS/KG/HR  


 


Levothyroxine Sodium  112 mcg  09/10/22 06:30  22 06:27





  Levothyroxine 112 Mcg Tab  PO   112 mcg





  DAILY@0630 KT   Administration


 


Metoprolol Tartrate  12.5 mg  09/10/22 21:00  22 10:13





  Metoprolol Tartrate 12.5 Mg Tab  PO   12.5 mg





  BID KT   Administration


 


Naloxone HCl  0.2 mg  22 14:48 





  Naloxone 0.4 Mg/Ml 1 Ml Vial  IV  





  Q2M PRN  





  Opioid Reversal  


 


Nitroglycerin  0.4 mg  09/10/22 18:37  09/10/22 18:43





  Nitroglycerin Sl Tabs 0.4 Mg Tab  SUBLINGUAL   0.4 mg





  Q5M PRN   Administration





  Chest Pain  


 


Ondansetron HCl  4 mg  22 14:52 





  Ondansetron 4 Mg Tab  PO  





  Q8HR PRN  





  Nausea And Vomiting  


 


Sertraline HCl  100 mg  09/10/22 09:00  22 10:13





  Sertraline 100 Mg Tab  PO   100 mg





  DAILY KT   Administration








                                Intake and Output











 22





 06:59 14:59 22:59


 


Intake Total 925  


 


Output Total 0 400 


 


Balance 925 -400 


 


Intake:   


 


  Intake, IV Titration 925  





  Amount   


 


    Sodium Chloride 0.9% 1, 825  





    000 ml @ 75 mls/hr IV .   





    [id number] [facility] Rx#:[id number]   


 


    Sodium Chloride 0.9% 1, 0  





    000 ml @ 999 mls/hr IV .   





    Q1H1M [facility] Rx#:[id number]   


 


    metroNIDAZOLE-NS  100  





    mg In Saline 1 100ml.bag   





    @ 100 mls/hr IVPB Q8HR   





    [facility] Rx#:[id number]   


 


  Oral 0  


 


Output:   


 


  Urine 0 400 


 


Other:   


 


  Voiding Method Toilet Toilet 








                                        





                                 22 07:50 





                                 09/10/22 06:15 REASON FOR CONSULTATION/CC:    Chief Complaint   Patient presents with    Sleep Apnea     bpap        Consult at request of   EDNA Ramos CNP for shortness of breath     PCP: EDNA Ramos CNP    HISTORY OF PRESENT ILLNESS: Chago Huntley is a 48y.o. year old female with a history of smoking  who presents          History  Of sleep apnea with PSG completed September 2020 with AHI of 12.9 with nocturnal hypoxemia to 79%, central apnea 2.0. Titration was completed December 2020 with adequate control with BiPAP of 16/9 with hypoxemia controlled without oxygen. BiPAP of 16/10 recommended based on PSG. Tobacco abuse. Bipolar. Sleep  She has lost her insurance. She was post bring in her device to adjust while in the room. She left this device at home titration demonstrated need for BiPAP. Only has access to a CPAP. Social History     Tobacco Use   Smoking Status Current Every Day Smoker    Packs/day: 0.50    Years: 31.00    Pack years: 15.50    Types: Cigarettes   Smokeless Tobacco Never Used   Tobacco Comment    trying to cut back           Objective:   PHYSICAL EXAM:  Blood pressure 130/80, pulse 80, temperature 96.9 °F (36.1 °C), temperature source Infrared, resp.  rate 14, height 5' 3\" (1.6 m), weight 178 lb (80.7 kg), SpO2 95 %, not currently breastfeeding.'         Current Outpatient Medications   Medication Sig Dispense Refill    famotidine (PEPCID) 20 MG tablet Take 1 tablet by mouth 2 times daily 180 tablet 1    omeprazole (PRILOSEC) 20 MG delayed release capsule Take 1 capsule by mouth Daily 90 capsule 1    amLODIPine (NORVASC) 10 MG tablet Take 1 tablet by mouth daily 90 tablet 1    potassium chloride (KLOR-CON M) 20 MEQ extended release tablet Take 1 tablet by mouth daily 90 tablet 1    atorvastatin (LIPITOR) 40 MG tablet Take 1 tablet by mouth nightly 90 tablet 1  albuterol sulfate  (90 Base) MCG/ACT inhaler INHALE 2 PUFFS BY MOUTH 4 TIMES DAILY AS NEEDED FOR WHEEZING 18 g 3    aspirin 81 MG chewable tablet Take 1 tablet by mouth daily 30 tablet 2    hydroCHLOROthiazide (HYDRODIURIL) 25 MG tablet Take 1 tablet by mouth once daily 90 tablet 1    atenolol (TENORMIN) 25 MG tablet Take 1 tablet by mouth daily 30 tablet 3    umeclidinium-vilanterol (ANORO ELLIPTA) 62.5-25 MCG/INH AEPB inhaler Inhale 1 puff into the lungs daily 14 each 0    losartan (COZAAR) 100 MG tablet Take 1 tablet by mouth daily 90 tablet 1    albuterol (PROVENTIL) (2.5 MG/3ML) 0.083% nebulizer solution Take 3 mLs by nebulization every 6 hours as needed for Wheezing 120 each 2     No current facility-administered medications for this visit. Data Reviewed:         Assessment:     ·  Tobacco abuse  · RB ILD  · Emphysema  · Possible Asthma overlap      Plan:      Problem List Items Addressed This Visit     BRADEN (obstructive sleep apnea)     Patient was supposed to bring her CPAP in for adjustment. Unfortunately, she did not bring this. Therefore, a point will be rescheduled for 2 days from now. Assessing download, she was able to get reasonable control on CPAP 8 and at CPAP 9. Therefore, if the machine is able to only do CPAP, will place on CPAP 9. Otherwise, will place on auto CPAP 8-13. This note was transcribed using [id number] Work[employer]. Please disregard any translational errors.     [facility] [phone]

## 2022-09-12 LAB
ANION GAP SERPL CALC-SCNC: 6 MMOL/L
BUN SERPL-SCNC: 9 MG/DL (ref 7–17)
CALCIUM SPEC-MCNC: 8.4 MG/DL (ref 8.4–10.2)
CHLORIDE SERPL-SCNC: 108 MMOL/L (ref 98–107)
CO2 SERPL-SCNC: 23 MMOL/L (ref 22–30)
GLUCOSE BLD-MCNC: 112 MG/DL (ref 70–110)
GLUCOSE SERPL-MCNC: 82 MG/DL (ref 74–99)
POTASSIUM SERPL-SCNC: 4 MMOL/L (ref 3.5–5.1)
SODIUM SERPL-SCNC: 137 MMOL/L (ref 137–145)

## 2022-09-12 RX ADMIN — HEPARIN SODIUM SCH MLS/HR: 10000 INJECTION, SOLUTION INTRAVENOUS at 13:14

## 2022-09-12 RX ADMIN — ATORVASTATIN CALCIUM SCH MG: 80 TABLET, FILM COATED ORAL at 09:25

## 2022-09-12 RX ADMIN — LEVOTHYROXINE SODIUM SCH MCG: 0.11 TABLET ORAL at 05:38

## 2022-09-12 RX ADMIN — METRONIDAZOLE SCH MLS/HR: 500 INJECTION, SOLUTION INTRAVENOUS at 09:25

## 2022-09-12 RX ADMIN — GABAPENTIN PRN MG: 300 CAPSULE ORAL at 23:26

## 2022-09-12 RX ADMIN — CEFAZOLIN SCH MLS/HR: 330 INJECTION, POWDER, FOR SOLUTION INTRAMUSCULAR; INTRAVENOUS at 13:10

## 2022-09-12 RX ADMIN — HEPARIN SODIUM SCH MLS/HR: 10000 INJECTION, SOLUTION INTRAVENOUS at 13:11

## 2022-09-12 RX ADMIN — HYDROMORPHONE HYDROCHLORIDE PRN MG: 1 INJECTION, SOLUTION INTRAMUSCULAR; INTRAVENOUS; SUBCUTANEOUS at 09:25

## 2022-09-12 RX ADMIN — METRONIDAZOLE SCH MLS/HR: 500 INJECTION, SOLUTION INTRAVENOUS at 23:21

## 2022-09-12 RX ADMIN — METOPROLOL TARTRATE SCH MG: 25 TABLET, FILM COATED ORAL at 09:25

## 2022-09-12 RX ADMIN — SERTRALINE HYDROCHLORIDE SCH MG: 100 TABLET ORAL at 09:25

## 2022-09-12 RX ADMIN — METRONIDAZOLE SCH MLS/HR: 500 INJECTION, SOLUTION INTRAVENOUS at 17:28

## 2022-09-12 RX ADMIN — ASPIRIN 81 MG CHEWABLE TABLET SCH MG: 81 TABLET CHEWABLE at 09:24

## 2022-09-12 RX ADMIN — FAMOTIDINE SCH MG: 20 TABLET, FILM COATED ORAL at 19:51

## 2022-09-12 RX ADMIN — FAMOTIDINE SCH MG: 20 TABLET, FILM COATED ORAL at 09:24

## 2022-09-12 RX ADMIN — CLOPIDOGREL BISULFATE SCH MG: 75 TABLET ORAL at 09:25

## 2022-09-12 RX ADMIN — METOPROLOL TARTRATE SCH MG: 25 TABLET, FILM COATED ORAL at 19:51

## 2022-09-12 NOTE — P.PN
Subjective


Patient is pleasant 67-year-old female with history of carotid artery stenosis 

status post bilateral carotid endarterectomy, hypothyroidism, CAD status post 

CABG as well as PCI, hypertension, hyperlipidemia, hypothyroidism, recent 50 

pound weight loss in the last 3 months. Patient used to follow with Dr. Raul Noel [phone], however has not followed in a while. Patient presents with 

multiple symptoms. Patient has not had much of an appetite and has not been 

eating and drinking that much.  Additionally has been states she is very weak 

and if it were not for him "she would sleep 24 hours ". Apparently this has been

going on for the last few weeks and even few months. Multiple symptoms including

abdominal pain, back pain, chest pain, lightheadedness, weak.





Patient had presented in May 2022 with findings of a Lexiscan stress test with 

predominantly fixed apical defect consistent with prior myocardial infarction 

with minimal questionable leopoldo-infarct ischemia and an EF 41%.  Echocardiogram 

had showed EF 40-45% with a fixed thrombus in the left ventricle, moderate to 

severe tricuspid regurgitation and apical scarring.  EKG on presentation shows 

sinus rhythm, left axis deviation, incomplete left bundle branch block, ST 

depressions in the inferior and lateral leads.  This does appear somewhat 

similar to prior EKG from May 2022. 








 CT abdomen and pelvis which showed mild thickening of the colon, 3.2 cm left 

adnexal mass, severe right iliac artery stenosis, calcification or on the apex 

of the heart. 





9/12/2022


Patient seen and examined at bedside, no acute distress. She endorses chest 

discomfort with taking a deep breath, Denies any shortness of breath.  Blood 

pressure 150/79, heart 74, temp 99.  100% on 4 L nasal cannula.





She's currently maintained on IV heparin drip, aspirin 81 mg daily, atorvastatin

80 mg daily, Plavix 70 mg daily, metoprolol tartrate 12.5mg BID





PHYSICAL EXAMINATION


Vital signs reviewed.


CONSTITUTIONAL: No apparent distress, ill appearing, lethargic


HEENT: Head is normocephalic. Pupils are equal, round. Sclerae anicteric. Mucous

membranes of the mouth are moist.  No JVD. No carotid bruit.


CHEST EXAMINATION: Lungs are clear to auscultation. No chest wall tenderness is 

noted on palpation or with deep breathing. 


HEART EXAMINATION: Regular rate and rhythm. S1, S2 heard. No murmurs, gallops or

rub.


ABDOMEN: Soft, nontender. Positive bowel sounds.


EXTREMITIES: 2+ peripheral pulses, no lower extremity edema and no calf 

tenderness.


NEUROLOGIC EXAMINATION: Patient is somnolent but arousable





ASSESSMENT


Elevated troponin, unclear significance at this time


Chest pain, on exam appears pleuritic in nature 


CAD with history of CABG and bypass


Recent 50 pound weight loss


Ovarian mass, rule out cancer


Abdominal pain, back pain, multiple other symptoms


Somnolence, failure to thrive of unclear etiology


Decreased appetite


Chronic systolic heart failure EF 40-45%


Ischemic cardiomyopathy with prior apical scar


Chronic apical thrombus appears calcified on CT


PAD





PLAN


Patient with multiple complaints including failure to thrive and decreased 

energy and recent weight loss.  Rule out underlying malignancy with ovarian 

mass.  


Previous stress test showed no inducible ischemia. 


Continue further workup of weight loss and rule out cancer.


Increase metoprolol tartrate 25mg BID


Continue IV heparin, aspirin, statin


Check D-dimer and if significantly elevated will check CTA to rule out PE


No plan for cardiac catheterization at this time


Further recommendations based on clinical course





Nurse practitioner note has been reviewed by physician. Signing provider agrees 

with the documented findings, assessment, and plan of care. 














Objective





- Vital Signs


Vital signs: 


                                   Vital Signs











Temp  99.0 F   09/12/22 03:50


 


Pulse  74   09/12/22 03:50


 


Resp  18   09/12/22 03:50


 


BP  150/79   09/12/22 03:50


 


Pulse Ox  98   09/12/22 04:10


 


FiO2      








                                 Intake & Output











 09/11/22 09/12/22 09/12/22





 18:59 06:59 18:59


 


Intake Total 1005 510 


 


Output Total 400  


 


Balance 605 510 


 


Intake:   


 


  Intake, IV Titration 825 510 





  Amount   


 


    Heparin Sod,Pork in 0.45%  35 





    NaCl 25,000 unit In 0.45   





    % NaCl 1 250ml.bag @ 12   





    UNITS/KG/HR 5.77 mls/hr   





    IV .Q24H [facility] Rx#:   





    [id number]   


 


    Sodium Chloride 0.9% 1, 675 375 





    000 ml @ 75 mls/hr IV .   





    [id number] [facility] Rx#:[id number]   


 


    cefTRIAXone 1 gm In 50  





    Sodium Chloride 0.9% 50   





    ml @ 100 mls/hr IVPB   





    Q24HR [facility] Rx#:[id number]   


 


    metroNIDAZOLE-NS  100 100 





    mg In Saline 1 100ml.bag   





    @ 100 mls/hr IVPB Q8HR   





    [facility]#:[id number]   


 


  Oral 180 0 


 


Output:   


 


  Urine 400  


 


Other:   


 


  Voiding Method Toilet  


 


  # Voids 3 1 














- Labs


CBC & Chem 7: 


                                 09/11/22 07:50





                                 09/12/22 06:27


Labs: 


                  Abnormal Lab Results - Last 24 Hours (Table)











  09/11/22 09/11/22 09/11/22 Range/Units





  07:50 07:50 11:25 


 


MCHC  30.8 L    (31.0-37.0)  g/dL


 


Plt Count  147 L    (150-450)  k/uL


 


APTT   60.2 H   (22.0-30.0)  sec


 


Troponin I    0.311 H*  (0.000-0.034)  ng/mL














  09/12/22 Range/Units





  06:27 


 


MCHC   (31.0-37.0)  g/dL


 


Plt Count   (150-450)  k/uL


 


APTT  41.1 H  (22.0-30.0)  sec


 


Troponin I   (0.000-0.034)  ng/mL

## 2022-09-12 NOTE — CT
EXAMINATION TYPE: CT angio chest

CT DLP: 293.3 mGycm, Automated exposure control for dose reduction was used.

 

DATE OF EXAM: 9/12/2022 12:55 PM

 

COMPARISON: CT chest 4/29/2022.

 

CLINICAL INDICATION:Female, 67 years old with history of Rule out Pulmonary embolism, elevated d-dime
r; r/o PE

 

TECHNIQUE/CONTRAST: 

CTA scan of the thorax is performed with IV Contrast, patient injected with 70 mL of Isovue 370, pulm
onary embolism protocol.  MIP images are created and reviewed.  

 

FINDINGS: 

 

Pulmonary Artery: There is no evidence for a filling defect within the pulmonary vasculature to sugge
st acute pulmonary embolism.  The pulmonary artery is of normal size. 

Vasculature: There is a penetrating atherosclerotic ulcer of the aortic arch extending to the left la
teral aspect of the distal aortic arch just past the left subclavian artery. Grossly similar in morph
ology morphology to prior imaging on 4/29/2022. Reflux of contrast seen into the IVC.

Lungs/Pleura: Low lung volumes are present. No evidence of focal consolidation or pneumothorax. There
 is thickening of the interlobular septa. There is trace bilateral pleural effusions. Scattered strea
ky atelectasis or scarring is seen throughout the lung bases.

Airway: Large airways are patent.

Heart: The heart is mildly enlarged for size. There is moderate coronary artery atherosclerosis.

Mediastinum: No gross evidence of adenopathy.

Musculoskeletal: No acute osseous abnormalities, sternotomy wires are present. There is multilevel di
sc degeneration changes seen throughout the visualized spine.

Soft Tissues: Unremarkable.

Lower neck: No significant findings.

Upper Abdomen: The gallbladder surgically absent. IVC filter is partially visualized.

 

IMPRESSION:

1. No evidence of pulmonary embolism. IVC filter in place. 

2. Cardiomegaly, pulmonary vascular congestion and trace bilateral pleural effusions consistent with 
congestive heart failure changes.

3. Penetrating atherosclerotic ulcer of the distal aortic arch with a similar morphology to prior on 
4/29/2022.

## 2022-09-12 NOTE — P.PN
Subjective


Progress Note Date: 09/11/22





Patient is a 67-year-old female with a known history of coronary artery disease 

status post CABG, history of MI, history of stent placement and bilateral 

carotid surgery and hypothyroidism


Was sent to ER by her primary care physician.  Patient has been having abdominal

pain mainly in the lower abdomen associate with nausea for the past 3 to 4 

months.  Patient could not keep down food and also weight loss about 7 non-B 

during the last couple 1 to 2 months.  Patient is also having generalized 

weakness and could not keep her balance.  Denied any diarrhea.  No complaints of

chest pain or shortness of breath.  Patient has been feeling very weak.


Patient was discharged from the hospital on 5/3/2022.,  Admitted due to unstable

angina and underwent nuclear stress test showed predominantly a fixed defect.  

Imdur was added at that time.


Chest x-ray showed no evidence for acute pulmonary disease.





CT of the abdomen pelvis showed there is mild wall thickening of the colon 

diffusely correlate for mild colitis.  There is also evidence of diverticulosis 

of the sigmoid colon.  Wall thickening is noted which can be associated with a 

mucosal lesion correlate with direct visualization as clinically warranted.


There is a 3.2 cm left adnexal cystic mass likely tubo-ovarian.


Severe right iliac artery stenosis.


There is prominent small bowel loops ileus is favored.


Calcification around the apex of the heart can be associated with myocardial 

calcified calcinosis and previous infarction.





Laboratory data showed WBC 8.2, hemoglobin 14.0 and platelets 214


Sodium 139 potassium 4.4 chloride 105 bicarb is 26 BUN 17 and creatinine 0.74 

and blood sugar is 109 AST 23 alk phos 141 and ALT 49 bilirubin level is 0.5 

magnesium 1.7


TSH 0.039 and free T4 levels 1.67.  Albumin 4.1


Urinalysis negative for infection.





9/10/2022


Patient is currently lying in bed.  Still feels very weak.  No complaints of 

chest pain.  No worsening shortness of breath.  Still nauseated.  Decreased oral

intake.  No cough or sputum production.


No headache or dizziness or lightheadedness.


Lab data this morning showed sodium 142 potassium 4.3 chloride 106 bicarb is 

28.4 BUN 13.3, creatinine 0.6.


 6.8, AFP 2.2 and CEA 6.5.


Urine is negative for infection.  Stool for occult blood is negative.





Patient was hypotensive this morning required fluid bolus and blood pressure did

improve.


Patient was also started on antibiotics for possible colitis.





09/11/2022


Patient is currently resting in bed.  Awake alert and oriented.  Denied any 

complaints of chest pain or worsening shortness of breath.  No complaints of 

abdominal pain today.  Patient says that she has feels very weak.


Yesterday evening patient had chest pain associated with shortness of breath.  

And is also complaining of epigastric pain.  Troponin level is elevated at 0.526

and 0.311.  Patient was started on heparin drip and was transferred to telemetry

unit.


Patient has been afebrile.  No diarrhea.  Does have nausea.  No episodes of 

vomiting.  No cough or sputum production


Laboratory data showed WBC 8.1 hemoglobin 12.6 and platelets 147





Current medications reviewed.





Objective





- Vital Signs


Vital signs: 


                                   Vital Signs











Temp  98.7 F   09/11/22 16:00


 


Pulse  85   09/11/22 16:00


 


Resp  18   09/11/22 16:00


 


BP  124/58   09/11/22 16:00


 


Pulse Ox  98   09/11/22 16:00


 


FiO2      








                                 Intake & Output











 09/11/22 09/11/22 09/12/22





 06:59 18:59 06:59


 


Intake Total 1025 1005 


 


Output Total 0 400 


 


Balance 1025 605 


 


Intake:   


 


  Intake, IV Titration 925 825 





  Amount   


 


    Sodium Chloride 0.9% 1, 825 675 





    000 ml @ 75 mls/hr IV .   





    [id number] [facility] Rx#:[id number]   


 


    Sodium Chloride 0.9% 1, 0  





    000 ml @ 999 mls/hr IV .   





    Q1H1M ONE Rx#:[id number]   


 


    cefTRIAXone 1 gm In  50 





    Sodium Chloride 0.9% 50   





    ml @ 100 mls/hr IVPB   





    Q24HR [facility] Rx#:[id number]   


 


    metroNIDAZOLE-NS  100 100 





    mg In Saline 1 100ml.bag   





    @ 100 mls/hr IVPB Q8HR   





    [facility] Rx#:[id number]   


 


  Oral 100 180 


 


Output:   


 


  Urine 0 400 


 


Other:   


 


  Voiding Method Toilet Toilet 


 


  # Voids  3 














- Exam





PHYSICAL EXAMINATION: 


Patient is lying in the bed comfortably, no acute distress, awake alert and 

oriented..  Lethargic and weak.


HEENT: Normocephalic. Neck is supple. Pupils reactive. Nostrils clear. Oral 

cavity is moist. 


Neck reveals no JVD, carotid bruits, or thyromegaly. 


CHEST EXAMINATION: Trachea is central. Symmetrical expansion. Lung fields clear 

to auscultation and percussion.  Bibasilar diminished sounds.


CARDIAC: Normal S1, S2 with no gallops. No murmurs 


ABDOMEN: Soft. Bowel sounds present.  Nontender.  No organomegaly. No abdominal 

bruits. 


Extremities: reveal no edema.  No clubbing or cyanosis


Neurologically awake, alert, oriented x3 with well-coordinated movements.  No 

focal deficits noted


Skin: No rash or skin lesions. 


Psychiatric: Coperative.  Nonsuicidal,


Musculoskeletal: No joint swelling or deformity.  Normal range of motion.








- Labs


CBC & Chem 7: 


                                 09/11/22 07:50





                                 09/12/22 06:27


Labs: 


                  Abnormal Lab Results - Last 24 Hours (Table)











  09/10/22 09/11/22 09/11/22 Range/Units





  22:50 07:50 07:50 


 


MCHC  30.2 L  30.8 L   (31.0-37.0)  g/dL


 


Plt Count   147 L   (150-450)  k/uL


 


APTT    60.2 H  (22.0-30.0)  sec


 


Troponin I     (0.000-0.034)  ng/mL














  09/11/22 Range/Units





  11:25 


 


MCHC   (31.0-37.0)  g/dL


 


Plt Count   (150-450)  k/uL


 


APTT   (22.0-30.0)  sec


 


Troponin I  0.311 H*  (0.000-0.034)  ng/mL














Assessment and Plan


Assessment: 





Acute non-ST elevated MI


Lower abdominal pain, generalized weakness and weight loss along with left 

adnexal cystic mass likely tubo-ovarian.Likely adnexal cyst, was seen by OB/GYN


Mild wall thickening of the colon diffusely correlate for mild colitis.


Severe right iliac artery stenosis


Coronary artery disease history of CABG in 2010 and prior PCI's most recent in 

02/2021 and chronic total occluded RCA.


Ischemic cardiomyopathy ejection fraction 40 to 45%


History of left ventricle apical thrombus was on anticoagulation previously.


Moderate to severe TR


Peripheral vascular disease


Osteoarthritis primary bilateral


Hypothyroidism


Hypertensive heart disease


Chronic urinary stress incontinence


Peripheral neuropathy


Chronic nicotine addiction currently everyday smoker


DVT prophylaxis with heparin subcu





Plan:


Patient was Started on heparin drip.  Transferred to telemetry unit.  Continue 

with aspirin, Plavix and statins.  Cardiology is on board.TTE.  Cardiac 

catheterization pending further workup of malignancy and patient's progress.


Patient will be continued on IV hydration and symptomatic management for nausea.

 Patient will be started on antibiotics for colitis.


Patient was seen by OB/GYN and thought 3.2 cm left adnexal cyst is an incidental

finding.  .  Ordered  and CEA.  


Patient was started on Continue with symptomatic management and continue with 

home medications. Prognosis is guarded.  Follow-up closely.





Time with Patient: Greater than 30

## 2022-09-12 NOTE — P.PN
Progress Note - Text


Progress Note Date: 09/12/22


Hospital course:


Patient is a 67-year-old female with a known history of coronary artery disease 

status post CABG, history of MI, history of stent placement and bilateral 

carotid surgery and hypothyroidism


Was sent to ER by her primary care physician.  Patient has been having abdominal

pain mainly in the lower abdomen associate with nausea for the past 3 to 4 

months.  Patient could not keep down food and also weight loss about 7 non-B 

during the last couple 1 to 2 months.  Patient is also having generalized 

weakness and could not keep her balance.  Denied any diarrhea.  No complaints of

chest pain or shortness of breath.  Patient has been feeling very weak.


Patient was discharged from the hospital on 5/3/2022.,  Admitted due to unstable

angina and underwent nuclear stress test showed predominantly a fixed defect.  

Imdur was added at that time.


Chest x-ray showed no evidence for acute pulmonary disease.





CT of the abdomen pelvis showed there is mild wall thickening of the colon 

diffusely correlate for mild colitis.  There is also evidence of diverticulosis 

of the sigmoid colon.  Wall thickening is noted which can be associated with a 

mucosal lesion correlate with direct visualization as clinically warranted.


There is a 3.2 cm left adnexal cystic mass likely tubo-ovarian.


Severe right iliac artery stenosis.


There is prominent small bowel loops ileus is favored.


Calcification around the apex of the heart can be associated with myocardial 

calcified calcinosis and previous infarction.





Laboratory data showed WBC 8.2, hemoglobin 14.0 and platelets 214


Sodium 139 potassium 4.4 chloride 105 bicarb is 26 BUN 17 and creatinine 0.74 

and blood sugar is 109 AST 23 alk phos 141 and ALT 49 bilirubin level is 0.5 

magnesium 1.7


TSH 0.039 and free T4 levels 1.67.  Albumin 4.1


Urinalysis negative for infection.





9/10/2022


Patient is currently lying in bed.  Still feels very weak.  No complaints of 

chest pain.  No worsening shortness of breath.  Still nauseated.  Decreased oral

intake.  No cough or sputum production.


No headache or dizziness or lightheadedness.


Lab data this morning showed sodium 142 potassium 4.3 chloride 106 bicarb is 

28.4 BUN 13.3, creatinine 0.6.


 6.8, AFP 2.2 and CEA 6.5.


Urine is negative for infection.  Stool for occult blood is negative.





Patient was hypotensive this morning required fluid bolus and blood pressure did

improve.


Patient was also started on antibiotics for possible colitis.





09/11/2022


Patient is currently resting in bed.  Awake alert and oriented.  Denied any 

complaints of chest pain or worsening shortness of breath.  No complaints of 

abdominal pain today.  Patient says that she has feels very weak.


Yesterday evening patient had chest pain associated with shortness of breath.  

And is also complaining of epigastric pain.  Troponin level is elevated at 0.526

and 0.311.  Patient was started on heparin drip and was transferred to telemetry

unit.


Patient has been afebrile.  No diarrhea.  Does have nausea.  No episodes of 

vomiting.  No cough or sputum production


Laboratory data showed WBC 8.1 hemoglobin 12.6 and platelets 147





September 12: I assumed care of patient today


Still having abdominal pain.  Decreased oral intake.  Had a BM yesterday.  

Rather diet.  Changing the diet to full liquids.  On IV Flagyl and ceftriaxone.





Active Medications





Aspirin (Aspirin 81 Mg)  81 mg PO DAILY [facility]


   Last Admin: 09/12/22 09:24 Dose:  81 mg


   


Atorvastatin Calcium (Atorvastatin 80 Mg Tab)  80 mg PO DAILY [facility]


   Last Admin: 09/12/22 09:25 Dose:  80 mg


   


Clonazepam (Clonazepam 1 Mg Tab)  1 mg PO HS [facility]


   Last Admin: 09/11/22 21:02 Dose:  1 mg


   


Clopidogrel Bisulfate (Clopidogrel 75 Mg Tab)  75 mg PO DAILY [facility]


   Last Admin: 09/12/22 09:25 Dose:  75 mg


   


Famotidine (Famotidine 20 Mg Tab)  20 mg PO BID [facility]


   Last Admin: 09/12/22 09:24 Dose:  20 mg


   


Gabapentin (Gabapentin 300 Mg Cap)  600 mg PO DAILY PRN


   PRN Reason: Pain


   Last Admin: 09/10/22 20:08 Dose:  600 mg


   


Heparin Sodium (Porcine) (Heparin Sodium 1,000 Un/Ml (10ml Vl))  0 unit IV PER 

PROTOCOL PRN; Protocol


   PRN Reason: Low PTT


Hydromorphone HCl (Hydromorphone 0.5 Mg/0.5 Ml Syringe)  0.5 mg IVP Q3HR PRN


   PRN Reason: Moderate Pain (Scale 4 to 6)


   Last Admin: 09/12/22 09:25 Dose:  0.5 mg


   


Ceftriaxone Sodium 1 gm/ (Sodium Chloride)  50 mls @ 100 mls/hr IVPB Q24HR [facility]; 

Protocol


   Last Admin: 09/12/22 09:25 Dose:  100 mls/hr


   


Metronidazole 500 mg/ IV (Solution)  100 mls @ 100 mls/hr IVPB Q8HR [facility]; 

Protocol


   Last Admin: 09/12/22 09:25 Dose:  100 mls/hr


   


Heparin Sodium/Sodium Chloride (25,000 unit/ Sodium Chloride)  250 mls @ 5.77 

mls/hr IV .Q24H [facility]; Protocol


   Last Admin: 09/12/22 13:14 Dose:  14 units/kg/hr, 6.731 mls/hr


   


Levothyroxine Sodium (Levothyroxine 112 Mcg Tab)  112 mcg PO DAILY@0630 [facility]


   Last Admin: 09/12/22 05:38 Dose:  112 mcg


   


Metoprolol Tartrate (Metoprolol Tartrate 25 Mg Tab)  25 mg PO BID [facility]


   Last Admin: 09/12/22 09:25 Dose:  25 mg


   


Miscellaneous Information (Rx Info: Iv Contrast Was Given 1 Each Misc)  1 each 

MISCELLANE DAILY PRN


   PRN Reason: Per Protocol


   Stop: 09/14/22 12:00


Naloxone HCl (Naloxone 0.4 Mg/Ml 1 Ml Vial)  0.2 mg IV Q2M PRN


   PRN Reason: Opioid Reversal


Nitroglycerin (Nitroglycerin Sl Tabs 0.4 Mg Tab)  0.4 mg SUBLINGUAL Q5M PRN


   PRN Reason: Chest Pain


   Last Admin: 09/10/22 18:43 Dose:  0.4 mg


   


Ondansetron HCl (Ondansetron 4 Mg Tab)  4 mg PO Q8HR PRN


   PRN Reason: Nausea And Vomiting


Sertraline HCl (Sertraline 100 Mg Tab)  100 mg PO DAILY [facility]


   Last Admin: 09/12/22 09:25 Dose:  100 mg


   





On examination:


VITAL SIGNS: [98.4, 90, 18, 105-64, 95% on 2 L]


GENERAL APPEARANCE: Laying in bed, awake, tired


HEENT: Normal external appearance of nose and ear.  Oral cavity normal


EYES: Pupils equal. Conjunctiva normal. 


NECK: JVD not raised. Mass not palpable. 


RESPIRATORY: Respiratory effort normal. Lungs decreased breath sounds


CARDIOVASCULAR: First and second sounds normal. No edema. 


ABDOMEN: Soft.  Tender, no guarding rigidity Liver and spleen not palpable.  No 

mass palpable. 


PSYCHIATRY: Alert and oriented x3. Mood and affect anxious





INVESTIGATIONS, reviewed in the clinical context:


Chest CTA: Negative PE.  IVC filter in place.  Cardiomegaly.  Trace bilateral 

pleural effusion.  


White count 8.1 hemoglobin 12.6 platelets 147 potassium 4 BUN 9 creatinine 0.56


Troponin I 0.012, 0.5-6, 0.311


CT abdomen and pelvis with contrast: Severe cardiomegaly.  Colonic 

diverticulosis.  3.2 cm left adnexal cyst mass likely tubo-ovarian.  Severe 

right iliac artery stenosis.











Assessment and plan: 





-Non-ST elevation myocardial infarction.


Aspirin beta blocker Plavix.  Being followed by cardiology.





-Acute Colitis.  Slow to respond


IV ceftriaxone, IV Flagyl.  Cutback to full liquids





- left adnexal cystic mass likely tubo-ovarian.Likely adnexal cyst, 


was seen by OB/GYN: [place] to be incidental finding.  Asymptomatic.  Ova testing 

requested.





-Severe right iliac artery stenosis


Aspirin, Plavix





-Coronary artery disease history of CABG in 2010 and prior PCI's most recent in 

02/2021 and chronic total occluded RCA.


Aspirin, Lipitor, Plavix, Lopressor





-Chronic CHF, Ischemic cardiomyopathy ejection fraction 40 to 45%


Add Aldactone 12.5 mg daily.  Lopressor.  Add small dose of ACE inhibitor.





-Chronic left ventricle apical thrombus was on anticoagulation previously.  

Calcified





-Moderate to severe TR





-Peripheral arterial disease, 


Aspirin, Plavix.  Add xarelto





-Osteoarthritis primary bilateral


Use pain medications as needed





-Hypothyroidism


Synthroid 112 g a





-Chronic urinary stress incontinence





-Peripheral neuropathy











Add Aldactone 12.5 mg daily.  Also Prinivil 2.5 mg daily at bedtime.  Cutback to

full liquid diet.  Still having abdominal pain.  Continue IV ceftriaxone and 

Flagyl.  Also consult vascular.  Add xarelto to 2.5 mg twice a day for PND Total

time spent today about 40 minutes with over 25 minutes of discussion.

## 2022-09-13 RX ADMIN — SERTRALINE HYDROCHLORIDE SCH MG: 100 TABLET ORAL at 20:45

## 2022-09-13 RX ADMIN — ONDANSETRON HYDROCHLORIDE PRN MG: 4 TABLET, FILM COATED ORAL at 21:07

## 2022-09-13 RX ADMIN — LEVOTHYROXINE SODIUM SCH MCG: 0.11 TABLET ORAL at 05:58

## 2022-09-13 RX ADMIN — FAMOTIDINE SCH MG: 20 TABLET, FILM COATED ORAL at 09:33

## 2022-09-13 RX ADMIN — NICOTINE SCH PATCH: 14 PATCH, EXTENDED RELEASE TRANSDERMAL at 17:23

## 2022-09-13 RX ADMIN — METOPROLOL TARTRATE SCH MG: 25 TABLET, FILM COATED ORAL at 09:33

## 2022-09-13 RX ADMIN — METRONIDAZOLE SCH MLS/HR: 500 INJECTION, SOLUTION INTRAVENOUS at 23:09

## 2022-09-13 RX ADMIN — METOPROLOL TARTRATE SCH MG: 25 TABLET, FILM COATED ORAL at 20:45

## 2022-09-13 RX ADMIN — ATORVASTATIN CALCIUM SCH MG: 80 TABLET, FILM COATED ORAL at 09:33

## 2022-09-13 RX ADMIN — ASPIRIN 81 MG CHEWABLE TABLET SCH MG: 81 TABLET CHEWABLE at 09:33

## 2022-09-13 RX ADMIN — FAMOTIDINE SCH MG: 20 TABLET, FILM COATED ORAL at 20:45

## 2022-09-13 RX ADMIN — METRONIDAZOLE SCH MLS/HR: 500 INJECTION, SOLUTION INTRAVENOUS at 17:22

## 2022-09-13 RX ADMIN — METRONIDAZOLE SCH MLS/HR: 500 INJECTION, SOLUTION INTRAVENOUS at 09:35

## 2022-09-13 RX ADMIN — CLOPIDOGREL BISULFATE SCH MG: 75 TABLET ORAL at 09:33

## 2022-09-13 NOTE — P.GSCN
History of Present Illness


Consult date: 22


Reason for Consult: 





Peripheral arterial disease and right iliac artery stenosis


Requesting physician: Jose Antonio Blackwood


History of present illness: 





This a pleasant 67-year-old female who presented to the emergency department 

with complaints of abdominal pain, weakness, and she's appetite.  She has a past

medical history of thyroid disorder, coronary artery disease status post heart 

catheterization with stent.  Upon admission she had a CT of the abdomen that 

showed alcohol thickening of the colon correlate for colitis, diverticulosis.  

3.2 cm left adnexal cystic mass, right iliac artery stenosis, calcification 

along the apex of the heart.  Multiple consultants were added for ovarian cyst, 

cardiology for history of coronary artery disease and gastroenterology for 

possible colitis.  Vascular surgery was consulted for findings of right iliac 

artery stenosis.  Patient denies any history of peripheral arterial disease.  

She denies any pain in her lower extremities.  She states that prior to her 

becoming recently L she was able to walk without pain, she may not walk far due 

to weakness and shortness of breath but not due to her lower extremity.  She 

currently denies any abdominal pain, states her appetite is increasing and ate a

little bit today.  Again she currently denies any pain to her lower extremities,

denies any chest pain or shortness of breath.  And denies abdominal pain at this

time.





Review of Systems





A 14 point review systems was completed all pertinent positives and negatives as

stated in the HPI.





Past Medical History


Past Medical History: Myocardial Infarction (MI), Thyroid Disorder


Last Myocardial Infarction Date:: 


History of Any Multi-Drug Resistant Organisms: None Reported


Past Surgical History: Appendectomy,  Section, Cholecystectomy, Coronary

Bypass/CABG, Heart Catheterization, Heart Catheterization With Stent


Additional Past Surgical History / Comment(s): bilat carotid


Past Anesthesia/Blood Transfusion Reactions: No Reported Reaction


Date of Last Stent Placement:: 


Past Psychological History: No Psychological Hx Reported


Smoking Status: Never smoker


Past Alcohol Use History: None Reported


Past Drug Use History: None Reported





- Past Family History


  ** Father


History Unknown: Yes





  ** Mother


Family Medical History: Diabetes Mellitus, Hypertension





Medications and Allergies


                                Home Medications











 Medication  Instructions  Recorded  Confirmed  Type


 


Atorvastatin Calcium [Lipitor] 80 mg PO DAILY 22 History


 


Clopidogrel Bisulfate [Plavix] 75 mg PO DAILY 22 History


 


Gabapentin 600 mg PO DAILY PRN 22 History


 


Levothyroxine Sodium [Synthroid] 112 mcg PO DAILY 22 History


 


Losartan Potassium 50 mg PO DAILY 22 History


 


Sertraline HCl [Zoloft] 100 mg PO DAILY 22 History


 


clonazePAM [KlonoPIN] 1 mg PO HS 22 History


 


Aspirin 81 mg PO DAILY 90 Days #90 tab 22 Rx


 


Isosorbide Mononitrate ER [Imdur] 30 mg PO DAILY 90 Days #90 tab 22 Rx


 


Metoprolol Tartrate [Lopressor] 25 mg PO BID #60 tab 22 Rx


 


Nitroglycerin Sl Tabs [Nitrostat] 0.4 mg SL Q5M PRN 22 History








                                    Allergies











Allergy/AdvReac Type Severity Reaction Status Date / Time


 


codeine AdvReac  Unknown Verified 22 14:32














Surgical - Exam


                                   Vital Signs











Temp Pulse Resp BP Pulse Ox


 


 98.1 F   58 L  20   89/43   97 


 


 22 10:52  22 10:52  22 10:52  22 10:52  22 10:52














General appearance: The patient is alert, oriented, appears in no acute 

distress.


HET: Head is normocephalic and atraumatic.  Pupils are equal and reactive.  


Neck: Supple without lymphadenopathy.  Trachea midline.  


Heart: S1 S2.  Regular rate and rhythm.


Lungs: Clear to auscultation bilaterally.


Abdomen: Soft, nontender, nondistended.


Extremities: Normal skin color and turgor.  No cyanosis, rash, ulceration, 

clubbing, or edema.  Patient with bilateral palpable PT pulses, and bilateral 

bounding dorsalis pedis pulses.


Neurological: Alert, appears to have no focal deficits.





Results





- Labs





                                 22 07:50





                                 22 06:27


                  Abnormal Lab Results - Last 24 Hours (Table)











  22 Range/Units





  06:27 11:29 19:19 


 


APTT    39.4 H  (22.0-30.0)  sec


 


D-Dimer  0.72 H    (<0.60)  mg/L FEU


 


POC Glucose (mg/dL)   112 H   ()  mg/dL














- Imaging


CT scan - abdomen: report reviewed


CT scan - chest: report reviewed





Assessment and Plan


Assessment: 





1.  Weakness


2.  Right renal artery stenosis without any evidence of decreased blood flow to 

bilateral lower extremities


3.  Abdominal pain


4.  History of coronary artery disease 


Plan: 





Patient has bounding palpable dorsalis pedis pulses bilaterally.  CT abdomen and

 pelvis images reviewed by Dr. Corey, patient definitely has some stenosis in 

the right renal artery as well as calcification of aorta however this is not 

affecting the blood flow to the lower extremities.  There is no surgical int

ervention recommended at this time.  Would recommend outpatient follow-up with 

vascular surgery within the next 4-6 weeks.  Continue medical management.  

Patient is cleared for discharge from vascular surgery.  Thank you for this 

consultation, we will sign off at this time.





The impression and plan of care has been dictated as directed.








I performed a history and examination of this patient,  discussed the same with 

the dictator.  I agree with the dictator's note ,documented as a scribe.  Any 

additional findings or plans will be noted.

## 2022-09-13 NOTE — P.CONS
History of Present Illness





- Reason for Consult


Consult date: 22


colitis


Requesting physician: Jose Antonio Blackwood





- Chief Complaint


Abdominal pain





- History of Present Illness





This a pleasant 67-year-old female who presented to the emergency department 

with complaints of abdominal pain, weakness, and she's appetite.  She has a past

medical history of thyroid disorder, coronary artery disease status post heart 

catheterization with stent.  Upon admission she had a CT of the abdomen that 

showed mild wall thickening of the colon correlate for colitis, diverticulosis. 

3.2 cm left adnexal cystic mass, right iliac artery stenosis, calcification 

along the apex of the heart.  Multiple consultants were added for ovarian cyst, 

cardiology for history of coronary artery disease and elevated troponin, and 

vascular surgery for right iliac artery stenosis.  Gastroenterology was 

consulted for possible colitis.  Patient denies any history of peripheral 

arterial disease.  She denies any pain in her lower extremities.  She states 

that prior to her becoming recently L she was able to walk without pain, she may

not walk far due to weakness and shortness of breath but not due to her lower 

extremity.  She currently denies any abdominal pain, states her appetite is 

increasing and ate a little bit today.  Again she currently denies any pain to h

er lower extremities, denies any chest pain or shortness of breath.  And denies 

abdominal pain at this time.  She does state that she has chronic diarrhea at 

least going on for the last 1 year duration and occurs most often after eating. 

She she may go 2-3 times a day, denies any blood in her stool.  She denies any 

associated abdominal cramping, nausea or vomiting.  No previous history of 

colonoscopy.  States she was on antibiotics approximately one month ago.





WBC 8.1 hemoglobin 12.6 hematocrit 41 platelet count 147,000 INR 0.9 sodium 137 

potassium 4.0 BUN 9 creatinine 0.56 troponin 0.311








Review of Systems





REVIEW OF SYSTEMS:


CARDIOPULMONARY: No chest pain or shortness of breath.  


Gastrointestinal: No abdominal pain.  No nausea or vomiting.  No hematemesis, c

offee-ground emesis.  No rectal bleeding, or melena.  Chronic diarrhea 

associated with eating.  Decreased appetite.


GENITOURINARY:  No dysuria or hematuria. 


MUSCULOSKELETAL: Reports normal range of motion.,  Joint pain.


SKIN: No rashes.  No jaundice.


ENDOCRINE: No chills, fevers.  No excessive weight gain or loss.  No polydipsia 

or polyuria.


PSYCHIATRIC: Unremarkable. 


NEUROLOGY: No change in mental status.  Denies dizziness, headache.


ENT: Vision unremarkable. 


CONSTITUTIONAL: Reportedly tender 12 pounds weight loss.  No fever, chills, 

night sweats. 





Past Medical History


Past Medical History: Myocardial Infarction (MI), Thyroid Disorder


Last Myocardial Infarction Date:: 


History of Any Multi-Drug Resistant Organisms: None Reported


Past Surgical History: Appendectomy,  Section, Cholecystectomy, Coronary

Bypass/CABG, Heart Catheterization, Heart Catheterization With Stent


Additional Past Surgical History / Comment(s): bilat carotid


Past Anesthesia/Blood Transfusion Reactions: No Reported Reaction


Date of Last Stent Placement:: 


Past Psychological History: No Psychological Hx Reported


Smoking Status: Never smoker


Past Alcohol Use History: None Reported


Past Drug Use History: None Reported





- Past Family History


  ** Father


History Unknown: Yes





  ** Mother


Family Medical History: Diabetes Mellitus, Hypertension





Medications and Allergies


                                Home Medications











 Medication  Instructions  Recorded  Confirmed  Type


 


Atorvastatin Calcium [Lipitor] 80 mg PO DAILY 22 History


 


Clopidogrel Bisulfate [Plavix] 75 mg PO DAILY 22 History


 


Gabapentin 600 mg PO DAILY PRN 22 History


 


Levothyroxine Sodium [Synthroid] 112 mcg PO DAILY 22 History


 


Losartan Potassium 50 mg PO DAILY 22 History


 


Sertraline HCl [Zoloft] 100 mg PO DAILY 22 History


 


clonazePAM [KlonoPIN] 1 mg PO HS 22 History


 


Aspirin 81 mg PO DAILY 90 Days #90 tab 22 Rx


 


Isosorbide Mononitrate ER [Imdur] 30 mg PO DAILY 90 Days #90 tab 22 Rx


 


Metoprolol Tartrate [Lopressor] 25 mg PO BID #60 tab 22 Rx


 


Nitroglycerin Sl Tabs [Nitrostat] 0.4 mg SL Q5M PRN 22 History








                                    Allergies











Allergy/AdvReac Type Severity Reaction Status Date / Time


 


codeine AdvReac  Unknown Verified 22 14:32














Physical Exam


Vitals: 


                                   Vital Signs











  Temp Pulse Resp BP Pulse Ox


 


 22 12:14      93 L


 


 22 12:00   67   121/57  81 L


 


 22 08:00  98.2 F  70  16  173/76  100


 


 22 03:45  98 F  67  16  150/72  98


 


 22 02:00   78  18  


 


 22 00:00  98.2 F  78  18  119/56  96


 


 22 20:00   66  18  


 


 22 19:30  98 F  66  18  161/66  98


 


 22 16:30   74   151/79  98


 


 22 16:11      92 L


 


 22 16:00      85 L








                                Intake and Output











 22





 06:59 14:59 22:59


 


Intake Total 68.207 200 180


 


Balance 68.207 200 180


 


Intake:   


 


  Intake, IV Titration 68.207 20 





  Amount   


 


    Heparin Sod,Pork in 0.45% 68.207  





    NaCl 25,000 unit In 0.45   





    % NaCl 1 250ml.bag @ 12   





    UNITS/KG/HR 5.77 mls/hr   





    IV .Q24H KT Rx#:   





    [id number]   


 


    Sodium Chloride 0.9% 1,  20 





    000 ml @ 75 mls/hr IV .   





    W84H30H KT Rx#:[id number]   


 


  Oral  180 180


 


Other:   


 


  Voiding Method Toilet Toilet 


 


  # Voids 1  


 


  # Bowel Movements 1  


 


  Weight  48.081 kg 














General appearance: The patient is alert, oriented, appears in no acute dist

ress.


HET: Head is normocephalic and atraumatic.  Conjunctiva pink.  Sclera anicteric.


Neck: Supple without lymphadenopathy.  Trachea midline.


Heart: S1 S2.  Regular rate and rhythm.


Lungs: Clear to auscultation.


Abdomen: Soft, nontender, nondistended with  bowel sounds.  No guarding or 

rigidity.


Skin:  No rashes. No jaundice.


Extremities: Normal skin color and turgor.  No pedal edema.


Neurological: No focal deficits.  





Results


CBC & Chem 7: 


                                 22 07:50





                                 22 06:27


Labs: 


                  Abnormal Lab Results - Last 24 Hours (Table)











  22 Range/Units





  19:19 


 


APTT  39.4 H  (22.0-30.0)  sec











Comments: 





CT abdomen and pelvis with contrast: Mild wall thickening of the colon diffusely

 correlate for mild colitis.  Also evidence of diverticulosis of the sigmoid 

colon.  Wall thickening noted which can be associated with a mucosal lesion 

correlate with direct visualization as clinically warranted.  There is a 3.2 cm 

left adnexal cystic mass likely tubo-ovarian.  Recommend correlation with serum 

markers and cystic neoplasms of the ovary would be in the differential diagnosis

 within the demographic.  Severe right iliac artery stenosis.  Few prominent 

small bowel loops.  An ileus is favored over an enteritis or partial obstruction

 correlate clinically.  Calcification along the apex of the heart can't be 

associated myocardial calcinosis and previous infarction.  Myocardial aneurysm 

in the differential diagnosis.  Correlate clinically.





CT angiogram chest no evidence of pulmonary embolism.  IVC filter in place.  

Cardiomegaly, pulmonary vascular congestion and trace bilateral pleural effus

ions consistent with congestive heart failure changes.  Penetrating 

arthrosclerotic ulcer of the distal aortic arch with a similar morphology to 

prior on 2022.


CT scan - abdomen: report reviewed





Assessment and Plan


(1) Diarrhea


Narrative/Plan: 


C7-year-old female who presented to emergency department with weakness, 

decreased appetite and weight loss and reportedly abdominal pain.  She had a CT 

of the abdomen and pelvis that showed diffuse mild colonic wall thickening 

correlate for mild colitis.  Evidence of diverticulosis.  Gastroenterology was 

consulted for the above.  Patient's also had a decreased appetite however she 

states improving.  She states she's had chronic diarrhea for over one year 

duration at least, associated mostly with eating.  Anywhere from 1-3 times a 

day.  No previous colonoscopy.  Denies any blood in her stool or black stool.  

Denies any abdominal pain or cramping.  No nausea or vomiting.  Mom recent 

antibiotic use within the last 1 month's duration.  Will order stool studies and

 C. diff.  If no improvement may need to consider colonoscopy which can 

certainly be done as an outpatient.


Current Visit: Yes   Status: Acute   Code(s): R19.7 - DIARRHEA, UNSPECIFIED   

SNOMED Code(s): 42276894


   





(2) Decreased appetite


Current Visit: Yes   Status: Acute   Code(s): R63.0 - ANOREXIA   SNOMED Code(s):

 64791141


   





(3) Weight loss


Current Visit: Yes   Status: Acute   Code(s): R63.4 - ABNORMAL WEIGHT LOSS   

SNOMED Code(s): 87579016


   


Plan: 





1.  Continue symptomatic supportive care


2.  Continue medical management


3.  Stool studies ordered


4.  If no improvement would recommend colonoscopy, this however can be done in 

the outpatient setting


5.  Diet as tolerated


Thank you for this consultation, we will continue to follow.





Dr. BARRY Galeana


I agree with the dictator's note, documented as a scribe by Esperanza CHINO.

## 2022-09-13 NOTE — P.PN
Subjective


Patient is pleasant 67-year-old female with history of carotid artery stenosis 

status post bilateral carotid endarterectomy, hypothyroidism, CAD status post 

CABG as well as PCI, hypertension, hyperlipidemia, hypothyroidism, recent 50 

pound weight loss in the last 3 months. Patient used to follow with Dr. Raul Noel [phone], however has not followed in a while. Patient presents with 

multiple symptoms. Patient has not had much of an appetite and has not been 

eating and drinking that much.  Additionally has been states she is very weak 

and if it were not for him "she would sleep 24 hours ". Apparently this has been

going on for the last few weeks and even few months. Multiple symptoms including

abdominal pain, back pain, chest pain, lightheadedness, weak.





Patient had presented in May 2022 with findings of a Lexiscan stress test with 

predominantly fixed apical defect consistent with prior myocardial infarction 

with minimal questionable leopoldo-infarct ischemia and an EF 41%.  Echocardiogram 

had showed EF 40-45% with a fixed thrombus in the left ventricle, moderate to 

severe tricuspid regurgitation and apical scarring.  EKG on presentation shows 

sinus rhythm, left axis deviation, incomplete left bundle branch block, ST 

depressions in the inferior and lateral leads.  This does appear somewhat 

similar to prior EKG from May 2022. 








 CT abdomen and pelvis which showed mild thickening of the colon, 3.2 cm left 

adnexal mass, severe right iliac artery stenosis, calcification or on the apex 

of the heart. 





9/13/2022


Patient seen and examined at bedside, no acute distress. She is confused, 

overnight patient hypoxic on room air and had increased confusion. She is alert 

and not oriented to place or time.  Denies any chest pain or shortness of 

breath.  CTA negative for pulmonary embolism. Vital signs are stable. 





She's currently maintained on IV heparin drip, aspirin 81 mg daily, atorvastatin

80 mg daily, Plavix 70 mg daily, metoprolol tartrate 25 mg BID





PHYSICAL EXAMINATION


Vital signs reviewed.


CONSTITUTIONAL: No apparent distress, ill appearing, lethargic


HEENT: Head is normocephalic. Pupils are equal, round. Sclerae anicteric. Mucous

membranes of the mouth are moist.  No JVD. 


CHEST EXAMINATION: Lungs are clear to auscultation. No chest wall tenderness is 

noted on palpation or with deep breathing. 


HEART EXAMINATION: Regular rate and rhythm. S1, S2 heard. No murmurs, gallops or

rub.


ABDOMEN: Soft, nontender. Positive bowel sounds.


EXTREMITIES: 2+ peripheral pulses, no lower extremity edema and no calf tenderne

ss.


NEUROLOGIC EXAMINATION: Patient is alert, oriented to person. 





ASSESSMENT


Elevated troponin, unclear significance at this time


Chest pain, on exam appears pleuritic in nature 


CAD with history of CABG and bypass


Recent 50 pound weight loss


Ovarian mass, rule out cancer


Abdominal pain, back pain, multiple other symptoms


Somnolence, failure to thrive of unclear etiology


Decreased appetite


Chronic systolic heart failure EF 40-45%


Ischemic cardiomyopathy with prior apical scar


Chronic apical thrombus appears calcified on CT


PAD





PLAN


Patient with multiple complaints including failure to thrive and decreased 

energy and recent weight loss.  Rule out underlying malignancy with ovarian 

mass.  Patient with confusion overnight, patient not a candidate for cardiac 

catheterization at this time. 


Previous stress test showed no inducible ischemia. 


Continue further workup of weight loss and rule out cancer.


IV heparin has been on for over 48 hours, will discontinue


Continue metoprolol tartrate 25mg BID


Continue aspirin, statin


Further recommendations based on clinical course





Nurse practitioner note has been reviewed by physician. Signing provider agrees 

with the documented findings, assessment, and plan of care. 














Objective





- Vital Signs


Vital signs: 


                                   Vital Signs











Temp  98 F   09/13/22 03:45


 


Pulse  67   09/13/22 03:45


 


Resp  16   09/13/22 03:45


 


BP  150/72   09/13/22 03:45


 


Pulse Ox  98   09/13/22 03:45


 


FiO2      








                                 Intake & Output











 09/12/22 09/13/22 09/13/22





 18:59 06:59 18:59


 


Intake Total 212.337 68.207 


 


Output Total 350  


 


Balance -137.663 68.207 


 


Intake:   


 


  Intake, IV Titration 212.337 68.207 





  Amount   


 


    Heparin Sod,Pork in 0.45% 212.337 68.207 





    NaCl 25,000 unit In 0.45   





    % NaCl 1 250ml.bag @ 12   





    UNITS/KG/HR 5.77 mls/hr   





    IV .Q24H KT Rx#:   





    [id number]   


 


  Oral 0  


 


Output:   


 


  Urine 350  


 


Other:   


 


  Voiding Method Toilet Toilet 


 


  # Voids  1 


 


  # Bowel Movements  1 














- Labs


CBC & Chem 7: 


                                 09/11/22 07:50





                                 09/12/22 06:27


Labs: 


                  Abnormal Lab Results - Last 24 Hours (Table)











  09/12/22 09/12/22 Range/Units





  11:29 19:19 


 


APTT   39.4 H  (22.0-30.0)  sec


 


POC Glucose (mg/dL)  112 H   ()  mg/dL

## 2022-09-13 NOTE — XR
EXAMINATION TYPE: XR chest 2V

 

DATE OF EXAM: 9/13/2022

 

COMPARISON: CTA chest from 1 day earlier

 

HISTORY: Hypoxia.

 

TECHNIQUE:  Frontal and lateral views of the chest are obtained.

 

FINDINGS:  There are small small bilateral pleural effusions redemonstrated. Persistent mild cardiome
litzy. Overlying sternal wires again seen. There is mild interstitial edema with left lateral lung per
iphery redemonstrated. Overlying epicardial pace wires redemonstrated. Osseous structures are intact.


 

IMPRESSION:  Continued CHF exacerbation as there is mild cardiomegaly with small bilateral pleural ef
fusions and mild interstitial edema again seen.

## 2022-09-13 NOTE — P.PN
Progress Note - Text


Progress Note Date: 09/13/22





Hospital course:


Patient is a 67-year-old female with a known history of coronary artery disease 

status post CABG, history of MI, history of stent placement and bilateral 

carotid surgery and hypothyroidism


Was sent to ER by her primary care physician.  Patient has been having abdominal

pain mainly in the lower abdomen associate with nausea for the past 3 to 4 

months.  Patient could not keep down food and also weight loss about 7 non-B 

during the last couple 1 to 2 months.  Patient is also having generalized 

weakness and could not keep her balance.  Denied any diarrhea.  No complaints of

chest pain or shortness of breath.  Patient has been feeling very weak.


Patient was discharged from the hospital on 5/3/2022.,  Admitted due to unstable

angina and underwent nuclear stress test showed predominantly a fixed defect.  

Imdur was added at that time.


Chest x-ray showed no evidence for acute pulmonary disease.





CT of the abdomen pelvis showed there is mild wall thickening of the colon 

diffusely correlate for mild colitis.  There is also evidence of diverticulosis 

of the sigmoid colon.  Wall thickening is noted which can be associated with a 

mucosal lesion correlate with direct visualization as clinically warranted.


There is a 3.2 cm left adnexal cystic mass likely tubo-ovarian.


Severe right iliac artery stenosis.


There is prominent small bowel loops ileus is favored.


Calcification around the apex of the heart can be associated with myocardial 

calcified calcinosis and previous infarction.





Laboratory data showed WBC 8.2, hemoglobin 14.0 and platelets 214


Sodium 139 potassium 4.4 chloride 105 bicarb is 26 BUN 17 and creatinine 0.74 

and blood sugar is 109 AST 23 alk phos 141 and ALT 49 bilirubin level is 0.5 

magnesium 1.7


TSH 0.039 and free T4 levels 1.67.  Albumin 4.1


Urinalysis negative for infection.





9/10/2022


Patient is currently lying in bed.  Still feels very weak.  No complaints of 

chest pain.  No worsening shortness of breath.  Still nauseated.  Decreased oral

intake.  No cough or sputum production.


No headache or dizziness or lightheadedness.


Lab data this morning showed sodium 142 potassium 4.3 chloride 106 bicarb is 

28.4 BUN 13.3, creatinine 0.6.


 6.8, AFP 2.2 and CEA 6.5.


Urine is negative for infection.  Stool for occult blood is negative.





Patient was hypotensive this morning required fluid bolus and blood pressure did

improve.


Patient was also started on antibiotics for possible colitis.





09/11/2022


Patient is currently resting in bed.  Awake alert and oriented.  Denied any 

complaints of chest pain or worsening shortness of breath.  No complaints of 

abdominal pain today.  Patient says that she has feels very weak.


Yesterday evening patient had chest pain associated with shortness of breath.  

And is also complaining of epigastric pain.  Troponin level is elevated at 0.526

and 0.311.  Patient was started on heparin drip and was transferred to telemetry

unit.


Patient has been afebrile.  No diarrhea.  Does have nausea.  No episodes of 

vomiting.  No cough or sputum production


Laboratory data showed WBC 8.1 hemoglobin 12.6 and platelets 147





September 12: I assumed care of patient today


Still having abdominal pain.  Decreased oral intake.  Had a BM yesterday.  

Rather diet.  Changing the diet to full liquids.  On IV Flagyl and ceftriaxone.


September 13: Patient was delirious last night likely after Dilaudid.-for pain. 

Doing much better this morning.  No abdominal pain is better.  Had liquid BM 

today.  Encourage oral intake.  Continue his Flagyl and ceftriaxone.  We'll get 

a GI consultation.





Active Medications





Aspirin (Aspirin 81 Mg)  81 mg PO DAILY [facility]


   Last Admin: 09/13/22 09:33 Dose:  81 mg


   


Atorvastatin Calcium (Atorvastatin 80 Mg Tab)  80 mg PO DAILY [facility]


   Last Admin: 09/13/22 09:33 Dose:  80 mg


   


Clonazepam (Clonazepam 1 Mg Tab)  1 mg PO HS [facility]


   Last Admin: 09/12/22 19:51 Dose:  1 mg


   


Clopidogrel Bisulfate (Clopidogrel 75 Mg Tab)  75 mg PO DAILY [facility]


   Last Admin: 09/13/22 09:33 Dose:  75 mg


   


Famotidine (Famotidine 20 Mg Tab)  20 mg PO BID [facility]


   Last Admin: 09/13/22 09:33 Dose:  20 mg


   


Gabapentin (Gabapentin 300 Mg Cap)  600 mg PO DAILY PRN


   PRN Reason: Pain


   Last Admin: 09/12/22 23:26 Dose:  600 mg


   


Ceftriaxone Sodium 1 gm/ (Sodium Chloride)  50 mls @ 100 mls/hr IVPB Q24HR [facility]; 

Protocol


   Last Admin: 09/13/22 09:35 Dose:  100 mls/hr


   


Metronidazole 500 mg/ IV (Solution)  100 mls @ 100 mls/hr IVPB Q8HR [facility]; 

Protocol


   Last Admin: 09/13/22 09:35 Dose:  100 mls/hr


   


Levothyroxine Sodium (Levothyroxine 112 Mcg Tab)  112 mcg PO DAILY@0630 [facility]


   Last Admin: 09/13/22 05:58 Dose:  112 mcg


   


Lisinopril (Lisinopril 2.5 Mg Tab)  2.5 mg PO [facility]


   Last Admin: 09/12/22 19:51 Dose:  2.5 mg


   


Metoprolol Tartrate (Metoprolol Tartrate 25 Mg Tab)  25 mg PO BID [facility]


   Last Admin: 09/13/22 09:33 Dose:  25 mg


   


Miscellaneous Information (Rx Info: Iv Contrast Was Given 1 Each Misc)  1 each 

MISCELLANE DAILY PRN


   PRN Reason: Per Protocol


   Stop: 09/14/22 12:00


Naloxone HCl (Naloxone 0.4 Mg/Ml 1 Ml Vial)  0.2 mg IV Q2M PRN


   PRN Reason: Opioid Reversal


Nitroglycerin (Nitroglycerin Sl Tabs 0.4 Mg Tab)  0.4 mg SUBLINGUAL Q5M PRN


   PRN Reason: Chest Pain


   Last Admin: 09/10/22 18:43 Dose:  0.4 mg


   


Ondansetron HCl (Ondansetron 4 Mg Tab)  4 mg PO Q8HR PRN


   PRN Reason: Nausea And Vomiting


Sertraline HCl (Sertraline 100 Mg Tab)  100 mg PO [facility]








   





On examination:


VITAL SIGNS: 98.2, 70, 16, 121/57, 93% on 3 L


GENERAL APPEARANCE: Laying in bed, awake, tired


HEENT: Normal external appearance of nose and ear.  Oral cavity normal


EYES: Pupils equal. Conjunctiva normal. 


NECK: JVD not raised. Mass not palpable. 


RESPIRATORY: Respiratory effort normal. Lungs decreased breath sounds


CARDIOVASCULAR: First and second sounds normal. No edema. 


ABDOMEN: Soft.  Tender, no guarding rigidity Liver and spleen not palpable.  No 

mass palpable. 


PSYCHIATRY: Alert and oriented x3. Mood and affect anxious





INVESTIGATIONS, reviewed in the clinical context:


Chest CTA: Negative PE.  IVC filter in place.  Cardiomegaly.  Trace bilateral 

pleural effusion.  


White count 8.1 hemoglobin 12.6 platelets 147 potassium 4 BUN 9 creatinine 0.56


Troponin I 0.012, 0.5-6, 0.311


CT abdomen and pelvis with contrast: Severe cardiomegaly.  Colonic 

diverticulosis.  3.2 cm left adnexal cyst mass likely tubo-ovarian.  Severe 

right iliac artery stenosis.











Assessment and plan: 





-Non-ST elevation myocardial infarction.


Aspirin beta blocker Plavix.  Being followed by cardiology.





-Acute Colitis.  Better


IV ceftriaxone, IV Flagyl.   full liquids





- left adnexal cystic mass likely tubo-ovarian.Likely adnexal cyst, 


was seen by OB/GYN: [place] to be incidental finding.  Asymptomatic.  Ova testing 

requested.





-Severe right iliac artery stenosis


Aspirin, Plavix





-Coronary artery disease history of CABG in 2010 and prior PCI's most recent in 

02/2021 and chronic total occluded RCA.


Aspirin, Lipitor, Plavix, Lopressor





-Chronic CHF, Ischemic cardiomyopathy ejection fraction 40 to 45%


 Aldactone 12.5 mg daily.  Lopressor.  Add small dose of ACE inhibitor.





-Chronic left ventricle apical thrombus was on anticoagulation previously.  

Calcified





-Moderate to severe TR





-Peripheral arterial disease, 


Aspirin, Plavix.  Add xarelto





-Osteoarthritis primary bilateral


Use pain medications as needed





-Hypothyroidism


Synthroid 112 g a





-Chronic urinary stress incontinence





-Peripheral neuropathy











Patient was in acute delirium from Dilaudid.  Discontinued.  Improvement in 

abdominal pain.  Full liquids to continue.  Up in a chair.  Incentive 

spirometry.  Repeat chest x-ray

## 2022-09-14 LAB
ANION GAP SERPL CALC-SCNC: 6 MMOL/L
BASOPHILS # BLD AUTO: 0 K/UL (ref 0–0.2)
BASOPHILS NFR BLD AUTO: 0 %
BUN SERPL-SCNC: 6 MG/DL (ref 7–17)
CALCIUM SPEC-MCNC: 8.9 MG/DL (ref 8.4–10.2)
CHLORIDE SERPL-SCNC: 100 MMOL/L (ref 98–107)
CO2 SERPL-SCNC: 34 MMOL/L (ref 22–30)
EOSINOPHIL # BLD AUTO: 0.3 K/UL (ref 0–0.7)
EOSINOPHIL NFR BLD AUTO: 3 %
ERYTHROCYTE [DISTWIDTH] IN BLOOD BY AUTOMATED COUNT: 4.49 M/UL (ref 3.8–5.4)
ERYTHROCYTE [DISTWIDTH] IN BLOOD: 13.7 % (ref 11.5–15.5)
GLUCOSE SERPL-MCNC: 96 MG/DL (ref 74–99)
HCT VFR BLD AUTO: 42.5 % (ref 34–46)
HGB BLD-MCNC: 13.5 GM/DL (ref 11.4–16)
LYMPHOCYTES # SPEC AUTO: 0.8 K/UL (ref 1–4.8)
LYMPHOCYTES NFR SPEC AUTO: 10 %
MCH RBC QN AUTO: 30.2 PG (ref 25–35)
MCHC RBC AUTO-ENTMCNC: 31.9 G/DL (ref 31–37)
MCV RBC AUTO: 94.7 FL (ref 80–100)
MONOCYTES # BLD AUTO: 0.5 K/UL (ref 0–1)
MONOCYTES NFR BLD AUTO: 6 %
NEUTROPHILS # BLD AUTO: 6.9 K/UL (ref 1.3–7.7)
NEUTROPHILS NFR BLD AUTO: 80 %
PLATELET # BLD AUTO: 170 K/UL (ref 150–450)
POTASSIUM SERPL-SCNC: 3.4 MMOL/L (ref 3.5–5.1)
SODIUM SERPL-SCNC: 140 MMOL/L (ref 137–145)
WBC # BLD AUTO: 8.6 K/UL (ref 3.8–10.6)

## 2022-09-14 RX ADMIN — ACETAMINOPHEN PRN MG: 325 TABLET, FILM COATED ORAL at 20:32

## 2022-09-14 RX ADMIN — METRONIDAZOLE SCH MLS/HR: 500 INJECTION, SOLUTION INTRAVENOUS at 17:16

## 2022-09-14 RX ADMIN — METOPROLOL TARTRATE SCH MG: 25 TABLET, FILM COATED ORAL at 20:33

## 2022-09-14 RX ADMIN — SERTRALINE HYDROCHLORIDE SCH MG: 100 TABLET ORAL at 20:32

## 2022-09-14 RX ADMIN — ATORVASTATIN CALCIUM SCH MG: 80 TABLET, FILM COATED ORAL at 09:50

## 2022-09-14 RX ADMIN — LEVOTHYROXINE SODIUM SCH MCG: 0.11 TABLET ORAL at 06:14

## 2022-09-14 RX ADMIN — AMOXICILLIN AND CLAVULANATE POTASSIUM SCH EACH: 875; 125 TABLET, FILM COATED ORAL at 20:32

## 2022-09-14 RX ADMIN — NITROGLYCERIN SCH INCH: 20 OINTMENT TOPICAL at 21:55

## 2022-09-14 RX ADMIN — METRONIDAZOLE SCH MLS/HR: 500 INJECTION, SOLUTION INTRAVENOUS at 09:50

## 2022-09-14 RX ADMIN — FAMOTIDINE SCH MG: 20 TABLET, FILM COATED ORAL at 20:33

## 2022-09-14 RX ADMIN — CLOPIDOGREL BISULFATE SCH MG: 75 TABLET ORAL at 09:50

## 2022-09-14 RX ADMIN — METOPROLOL TARTRATE SCH MG: 25 TABLET, FILM COATED ORAL at 09:50

## 2022-09-14 RX ADMIN — NICOTINE SCH PATCH: 14 PATCH, EXTENDED RELEASE TRANSDERMAL at 09:51

## 2022-09-14 RX ADMIN — ACETAMINOPHEN PRN MG: 325 TABLET, FILM COATED ORAL at 11:57

## 2022-09-14 RX ADMIN — FAMOTIDINE SCH MG: 20 TABLET, FILM COATED ORAL at 09:50

## 2022-09-14 RX ADMIN — ASPIRIN 81 MG CHEWABLE TABLET SCH MG: 81 TABLET CHEWABLE at 09:50

## 2022-09-14 NOTE — P.PN
Subjective


Patient is pleasant 67-year-old female with history of carotid artery stenosis 

status post bilateral carotid endarterectomy, hypothyroidism, CAD status post 

CABG as well as PCI, hypertension, hyperlipidemia, hypothyroidism, recent 50 

pound weight loss in the last 3 months. Patient used to follow with Dr. Raul Noel [phone], however has not followed in a while. Patient presents with 

multiple symptoms. Patient has not had much of an appetite and has not been 

eating and drinking that much.  Additionally has been states she is very weak 

and if it were not for him "she would sleep 24 hours ". Apparently this has been

going on for the last few weeks and even few months. Multiple symptoms including

abdominal pain, back pain, chest pain, lightheadedness, weak.





Patient had presented in May 2022 with findings of a Lexiscan stress test with 

predominantly fixed apical defect consistent with prior myocardial infarction 

with minimal questionable leopoldo-infarct ischemia and an EF 41%.  Echocardiogram 

had showed EF 40-45% with a fixed thrombus in the left ventricle, moderate to 

severe tricuspid regurgitation and apical scarring.  EKG on presentation shows 

sinus rhythm, left axis deviation, incomplete left bundle branch block, ST 

depressions in the inferior and lateral leads.  This does appear somewhat 

similar to prior EKG from May 2022. 








 CT abdomen and pelvis which showed mild thickening of the colon, 3.2 cm left 

adnexal mass, severe right iliac artery stenosis, calcification or on the apex 

of the heart. 





9/14/2022


Patient seen and examined at bedside, no acute distress. She is lying in bed 

comfortably. No complaints. She continues to be slightly confusedd. Denies any 

chest pain or shortness of breath.  CTA negative for pulmonary embolism. Vital 

signs are stable. She is hypoxic on room air at 89% and requiring 2L NC 





She's currently maintained on aspirin 81 mg daily, atorvastatin 80 mg daily, 

Plavix 75 mg daily, metoprolol tartrate 25 mg BID





PHYSICAL EXAMINATION


Vital signs reviewed.


CONSTITUTIONAL: No apparent distress, ill appearing, lethargic


HEENT: Head is normocephalic. Pupils are equal, round. Sclerae anicteric. Mucous

membranes of the mouth are moist.  No JVD. 


CHEST EXAMINATION: Lungs are clear to auscultation. No chest wall tenderness is 

noted on palpation or with deep breathing. 


HEART EXAMINATION: Regular rate and rhythm. S1, S2 heard. No murmurs, gallops or

rub.


ABDOMEN: Soft, nontender. Positive bowel sounds.


EXTREMITIES: 2+ peripheral pulses, no lower extremity edema and no calf 

tenderness.


NEUROLOGIC EXAMINATION: Patient is alert, oriented to person. 





ASSESSMENT


Elevated troponin, unclear significance at this time


Chest pain, on exam appears pleuritic in nature 


CAD with history of CABG and bypass


Recent 50 pound weight loss


Ovarian mass, rule out cancer


Abdominal pain, back pain, multiple other symptoms


Somnolence, failure to thrive of unclear etiology


Decreased appetite


Chronic systolic heart failure EF 40-45%


Ischemic cardiomyopathy with prior apical scar


Chronic apical thrombus appears calcified on CT


PAD





PLAN


Patient with multiple complaints including failure to thrive and decreased 

energy and recent weight loss.  Rule out underlying malignancy with ovarian 

mass.  Patient with some confusion, and patient not a candidate for cardiac 

catheterization at this time. 


Recent stress test showed no inducible ischemia. 


Continue further workup of weight loss and rule out cancer.


Continue dual antiplatelet therapy with aspirin and plavix 


Continue metoprolol tartrate 25mg BID


Continue aspirin, statin


From a cardiology perspective, no further inpatient workup at this time. Rec

ommend  medical therapy. Follow up outpatient with her cardiologist Dr. Noel or

Dr. Galeana outpatient based on patient's preference


We will follow the patient as needed.  Please reconsult if needed.





Nurse practitioner note has been reviewed by physician. Signing provider agrees 

with the documented findings, assessment, and plan of care. 














Objective





- Vital Signs


Vital signs: 


                                   Vital Signs











Temp  98.4 F   09/14/22 09:20


 


Pulse  72   09/14/22 09:20


 


Resp  18   09/14/22 09:20


 


BP  138/65   09/14/22 09:20


 


Pulse Ox  97   09/14/22 09:20


 


FiO2      








                                 Intake & Output











 09/13/22 09/14/22 09/14/22





 18:59 06:59 18:59


 


Intake Total 698  


 


Balance 698  


 


Weight 48.081 kg  


 


Intake:   


 


  Intake, IV Titration 220  





  Amount   


 


    Sodium Chloride 0.9% 1, 20  





    000 ml @ 75 mls/hr IV .   





    [id number] [facility] Rx#:[id number]   


 


    cefTRIAXone 1 gm In 100  





    Sodium Chloride 0.9% 50   





    ml @ 100 mls/hr IVPB   





    Q24HR [facility] Rx#:[id number]   


 


    metroNIDAZOLE-NS  100  





    mg In Saline 1 100ml.bag   





    @ 100 mls/hr IVPB Q8HR   





    [facility] Rx#:[id number]   


 


  Oral 478  


 


Other:   


 


  Voiding Method Toilet Toilet Toilet


 


  # Voids 1 3 


 


  # Bowel Movements  1 














- Labs


CBC & Chem 7: 


                                 09/14/22 07:49





                                 09/14/22 07:49


Labs: 


                  Abnormal Lab Results - Last 24 Hours (Table)











  09/14/22 09/14/22 Range/Units





  07:49 07:49 


 


Lymphocytes #  0.8 L   (1.0-4.8)  k/uL


 


Potassium   3.4 L  (3.5-5.1)  mmol/L


 


Carbon Dioxide   34 H  (22-30)  mmol/L


 


BUN   6 L  (7-17)  mg/dL


 


Creatinine   0.44 L  (0.52-1.04)  mg/dL

## 2022-09-14 NOTE — P.PN
Subjective


Progress Note Date: 09/14/22


Principal diagnosis: 





Colitis








This a pleasant 67-year-old female who presented to the emergency department 

with complaints of abdominal pain, weakness, and she's appetite.  She has a past

medical history of thyroid disorder, coronary artery disease status post heart 

catheterization with stent.  Upon admission she had a CT of the abdomen that 

showed mild wall thickening of the colon correlate for colitis, diverticulosis. 

3.2 cm left adnexal cystic mass, right iliac artery stenosis, calcification 

along the apex of the heart.  Multiple consultants were added for ovarian cyst, 

cardiology for history of coronary artery disease and elevated troponin, and 

vascular surgery for right iliac artery stenosis.  Gastroenterology was 

consulted for possible colitis.  Patient denies any history of peripheral 

arterial disease.  She denies any pain in her lower extremities.  She states 

that prior to her becoming recently L she was able to walk without pain, she may

not walk far due to weakness and shortness of breath but not due to her lower 

extremity.  She currently denies any abdominal pain, states her appetite is 

increasing and ate a little bit today.  Again she currently denies any pain to 

her lower extremities, denies any chest pain or shortness of breath.  And denies

abdominal pain at this time.  She does state that she has chronic diarrhea at 

least going on for the last 1 year duration and occurs most often after eating. 

She she may go 2-3 times a day, denies any blood in her stool.  She denies any 

associated abdominal cramping, nausea or vomiting.  No previous history of 

colonoscopy.  States she was on antibiotics approximately one month ago.





09/14/2022: Patient is seen and examined today as a follow-up.  She is denying 

any abdominal pain, nausea or vomiting.  She has not had any further diarrhea 

yesterday or today.  She states that she is just very tired.  Nursing states 

that she is pretty much refusing to eat.  She'll take a few bites and that is 

it.  Patient states she is just not hungry.





Objective





- Vital Signs


Vital signs: 


                                   Vital Signs











Temp  98.2 F   09/13/22 23:26


 


Pulse  67   09/14/22 04:00


 


Resp  16   09/14/22 04:00


 


BP  160/70   09/14/22 04:00


 


Pulse Ox  93 L  09/14/22 07:23


 


FiO2      








                                 Intake & Output











 09/13/22 09/14/22 09/14/22





 18:59 06:59 18:59


 


Intake Total 698  


 


Balance 698  


 


Weight 48.081 kg  


 


Intake:   


 


  Intake, IV Titration 220  





  Amount   


 


    Sodium Chloride 0.9% 1, 20  





    000 ml @ 75 mls/hr IV .   





    [id number] [facility] Rx#:[id number]   


 


    cefTRIAXone 1 gm In 100  





    Sodium Chloride 0.9% 50   





    ml @ 100 mls/hr IVPB   





    Q24HR [facility] Rx#:[id number]   


 


    metroNIDAZOLE-NS  100  





    mg In Saline 1 100ml.bag   





    @ 100 mls/hr IVPB Q8HR   





    [facility] Rx#:[id number]   


 


  Oral 478  


 


Other:   


 


  Voiding Method Toilet Toilet 


 


  # Voids 1 3 


 


  # Bowel Movements  1 














- Exam





General appearance: The patient is alert, oriented, appears in no acute 

distress.


HET: Head is normocephalic and atraumatic.  Conjunctiva pink.  Sclera anicteric.


Neck: Supple without lymphadenopathy.  


Abdomen: Soft,  mild lower abdominal tenderness, nondistended with  bowel 

sounds.  No guarding or rigidity.


Extremities: Normal skin color and turgor.  No pedal edema


Skin: No rashes, no jaundice


Neurological: No focal deficits.  Alert and oriented -3.





- Labs


CBC & Chem 7: 


                                 09/14/22 07:49





                                 09/14/22 07:49


Labs: 


                  Abnormal Lab Results - Last 24 Hours (Table)











  09/14/22 09/14/22 Range/Units





  07:49 07:49 


 


Lymphocytes #  0.8 L   (1.0-4.8)  k/uL


 


Potassium   3.4 L  (3.5-5.1)  mmol/L


 


Carbon Dioxide   34 H  (22-30)  mmol/L


 


BUN   6 L  (7-17)  mg/dL


 


Creatinine   0.44 L  (0.52-1.04)  mg/dL














Assessment and Plan


(1) Diarrhea


Narrative/Plan: 


C7-year-old female who presented to emergency department with weakness, 

decreased appetite and weight loss and reportedly abdominal pain.  She had a CT 

of the abdomen and pelvis that showed diffuse mild colonic wall thickening 

correlate for mild colitis.  Evidence of diverticulosis.  Gastroenterology was 

consulted for the above.  Patient's also had a decreased appetite however she 

states improving.  She states she's had chronic diarrhea for over one year 

duration at least, associated mostly with eating.  Anywhere from 1-3 times a 

day.  No previous colonoscopy.  Denies any blood in her stool or black stool.  

Denies any abdominal pain or cramping.  No nausea or vomiting.  Mom recent 

antibiotic use within the last 1 month's duration.  Will order stool studies and

C. diff.  If no improvement may need to consider colonoscopy which can certainly

be done as an outpatient.





Diarrhea improved


Current Visit: Yes   Status: Acute   Code(s): R19.7 - DIARRHEA, UNSPECIFIED   

SNOMED Code(s): 87927721


   





(2) Decreased appetite


Narrative/Plan: 


Encourage feedings.  Ensure ordered.


Current Visit: Yes   Status: Acute   Code(s): R63.0 - ANOREXIA   SNOMED Code(s):

54941679


   





(3) Weight loss


Current Visit: Yes   Status: Acute   Code(s): R63.4 - ABNORMAL WEIGHT LOSS   

SNOMED Code(s): 82376268


   


Plan: 





1.  Continue symptomatic supportive care


2.  Continue medical management


3.  Stool studies ordered


4.  Diarrhea improved, recommend outpatient colonoscopy as needed.


5.  Encourage feedings.  Encourage Ensure.


6.  Encourage ambulation


Thank you for this consultation, we will continue to follow.





Dr. BARRY Galeana


I agree with the dictator's note, documented as a scribe by Esperanza CHINO.

## 2022-09-14 NOTE — CDI
Documentation Clarification Form



Date: 09/14/2022 11:56:26 AM

From: Lisha Montgomery CCS, CCDS

Phone: [phone]

MRN: [id number]

Admit Date: 09/09/2022 02:48:00 PM

Patient Name: Jaime Holguin

Visit Number: [id number]

Discharge Date:  





ATTENTION: The Clinical Documentation Specialists (CDI) and [facility] Coding Staff 
appreciate your assistance in clarifying documentation. Please respond to the 
clarification below the line at the bottom and electronically sign. The CDI & 
[facility] Coding staff will review the response and follow-up if needed. Please note: 
Queries are made part of the Legal Health Record. If you have any questions, 
please contact the author of this message via ITS.



Dr. Jose Antonio Blackwood:



Acute Non-STEMI is documented in the 9/11, 9/12 & 9/13 Attending Physician 
Progress Notes and also in the 9/11 Cardiology Consult and may lack sufficient 
clinical evidence/support in the medical record.

Per the 9/11 Attending Progress Note: Yesterday evening patient had chest pain 
associated with SOB & epigastric pain, troponin elevated, started Heparin drip, 
transferred to telemetry unit. 

Per the 9/12, 9/13 and 9/14 Cardiology Progress Notes: Elevated troponin, 
unclear significance at this time, chest pain appears pleuritic in nature. 



Additional clarification is requested.



History/Risk Factors per the 9/9 H/P: CAD status post CABG, MI with history of 
stent placement and bilateral carotid surgery, Hypothyroidism.



Clinical Indicators: Presented to the ED on 9/9 with weakness, sent by PCP for 
evaluation of weakness and abdominal pain, decreased appetite and significant 
weight loss, elevated BP in the office. 

Admit with Ovarian mass, Dehydration, Weight loss



9/9 VS: T 98.1, P 58, R 20, BP 89/43, PO 97 RA, BMI: 21.4

9/9 LAB: Glucose 109, Alkaline Phos 141, TSH 0.039.

Troponins: 9/9: 0.012.  9/10: 0.526.  9/11: 0.311. 

9/9 UA: Clear, Specific gravity 1.049, Protein 1+

9/9 CXR: No evidence for acute pulmonary disease. 

9/9 CT Abdomen/Pelvis: Mild wall thickening of the colon, correlate for mild 
colitis, also diverticulosis of the sigmoid colon. Mucosal lesion. Left adnexal 
cystic mass likely tubo-ovarian. Severe right iliac artery stenosis. Ileus is 
favored over enteritis or partial obstruction. Calcification along the apex of 
the heart can be associated myocardial calcinosis & previous infarction. 
Myocardial aneurysm in the differential. 

9/9 EKG: R 79 sinus rhythm, Left axis deviation, pulmonary disease, Inc RBBB, 
Anteroseptal MI, probably recent. 

9/10 EKG: R 79 nsr, Possible left atrial enlargement, left axis deviation, 
Inferior infarct, age undetermined, Anterolateral infarct, age undetermined. 



Treatment 9/9: Consult to GYN, O2 prn, IV Fentanyl, IV Na Chl 500 mls @ 999 
mls/hr q31M, IV Na Chl 1,000 mls @ 75 mls/hr q13H, IV Dilaudid 0.5 mg q3H/prn.

9/10: IV Mag Sulfate/Dextrose 100 mls @ 100 mls/hr q1H, Heparin 5,000 sq, IV Na 
Chl 1,000 mls @ 999 mls/hr q1H, IV Rocephin 50 mls @ 100 mls/hr q24H, IV Flagyl 
100 mls @ 100 mls/hr q8H, Nitro 0.4 mg SL q5M/prn, IV Heparin drip.



Please clarify if Non-STEMI is a valid diagnosis?



[  ]  Yes, Non-STEMI is present as evidence by (additional clinical support): 
____________



[  ]  No, Non-STEMI is ruled out



[  ]  Other (please specify diagnosis) ______________________



[  ]  Unable to determine



(Template Last Revised: March 2021)



Yes, non-ST elevation MI present.

___________________________________________________________________________

MTDD

## 2022-09-14 NOTE — P.CONS
History of Present Illness





- Chief Complaint


Medical debility





- History of Present Illness





I had the opportunity to see patient for inpatient rehab consultation with 

regard to medical debility.  Patient admitted to [facility]  with 

decreased appetite and weight loss, elevated heart rate and blood pressure, 

lower abdominal pain, of one-month duration.  There is a Dr. Turk.  Seen by 

Dr. Foster for left adnexal mass.  2 by cardiology who notes non-STEMI.  Seen by 

Dr. Maya for right renal artery stenosis appeared seen by Dr. BARRY Galeana for 

colitis.  Diagnostic tests chest x-rays followed for CHF, cardiomegaly and 

pleural effusions.  Chest CTA negative for PE, IVC filter in place, 

cardiomegaly, effusions and aortic arch ulcer.  CT of abdomen and pelvis 

demonstrates mild colitis, diverticulitis and sigmoid, 3.2 cm mass left adnexal,

severe right iliac stenosis, enteritis and myocardial calcinosis.  His started 

therapies.  PT reports minimal assistance bed mobility, transfer, gait 6 feet wi

th roller walker.  Decreased endurance and moves slow.  OT reports independent 

with feeding, supervision for upper dressing, minimal assistance for grooming, 

bathing and functional debility/transfers and moderate assistance for lower 

dressing and toileting.





Previous functional history as elicited from patient and corroborated by 

: 67-year-old right-handed female who is  lives in one form with 

.   generally does the driving.  They share the cooking.  Patient 

generally does the laundry.  She is on disability related to heart issue.  She 

describes independent basis self-care including tub bath and gait without 

device.  PCP Dr. Colon.  Smokes half pack per day and denies alcohol.





Review of Systems





Review of systems:


ENT: Denies sneezes or discharge.


Eyes: Denies discharge or photophobia.


Cardiac: Denies chest pain or palpitation.


Pulmonary: At least mild shortness of breath.


Breast: Denies discharge or lumps.


Gastrointestinal: Some abdominal discomfort.


Genitourinary: Denies discharge or frequency.


Musculoskeletal: Denies muscle or bone aches.


Neurologic: Generalized weakness.


Endocrine: Denies shakes or sweats.


Oncology: Denies cancers.


Dermatologic: Denies rash, itching, pruritus.


ALLERGY/immunology: Denies sneezes, rashes.








Past Medical History


Past Medical History: Myocardial Infarction (MI), Thyroid Disorder


Last Myocardial Infarction Date:: 


History of Any Multi-Drug Resistant Organisms: None Reported


Past Surgical History: Appendectomy,  Section, Cholecystectomy, Coronary

Bypass/CABG, Heart Catheterization, Heart Catheterization With Stent


Additional Past Surgical History / Comment(s): bilat carotid


Past Anesthesia/Blood Transfusion Reactions: No Reported Reaction


Date of Last Stent Placement:: 


Past Psychological History: No Psychological Hx Reported


Smoking Status: Never smoker


Past Alcohol Use History: None Reported


Past Drug Use History: None Reported





- Past Family History


  ** Father


History Unknown: Yes





  ** Mother


Family Medical History: Diabetes Mellitus, Hypertension





Medications and Allergies


                                Home Medications











 Medication  Instructions  Recorded  Confirmed  Type


 


Atorvastatin Calcium [Lipitor] 80 mg PO DAILY 22 History


 


Clopidogrel Bisulfate [Plavix] 75 mg PO DAILY 22 History


 


Gabapentin 600 mg PO DAILY PRN 22 History


 


Levothyroxine Sodium [Synthroid] 112 mcg PO DAILY 22 History


 


Losartan Potassium 50 mg PO DAILY 22 History


 


Sertraline HCl [Zoloft] 100 mg PO HS 22 History


 


clonazePAM [KlonoPIN] 1 mg PO HS 22 History


 


Aspirin 81 mg PO DAILY 90 Days #90 tab 22 Rx


 


Isosorbide Mononitrate ER [Imdur] 30 mg PO DAILY 90 Days #90 tab 22 Rx


 


Metoprolol Tartrate [Lopressor] 25 mg PO BID #60 tab 22 Rx


 


Nitroglycerin Sl Tabs [Nitrostat] 0.4 mg SL Q5M PRN 22 History








                                    Allergies











Allergy/AdvReac Type Severity Reaction Status Date / Time


 


codeine AdvReac  Unknown Verified 22 14:32














Physical Exam


Vitals: 


                                   Vital Signs











  Temp Pulse Resp BP BP Pulse Ox


 


 22 11:30   58 L  18  147/63   94 L


 


 22 09:20  98.4 F  72  18   138/65  97


 


 22 07:23       93 L


 


 22 04:00   67  16  160/70   94 L


 


 22 02:00   64  18   


 


 22 23:26  98.2 F  64  18  156/69   95


 


 22 20:00  98 F  72  18  168/75   96


 


 22 19:26       99








                                Intake and Output











 22





 06:59 14:59 22:59


 


Intake Total  25 


 


Balance  25 


 


Intake:   


 


  Oral  25 


 


Other:   


 


  Voiding Method Toilet Toilet 


 


  # Voids 3 1 


 


  # Bowel Movements 1 1 














Skin: Mildly atrophic, intact.


General: Medium build and comfortable appearance.


Head: Normocephalic, atraumatic.


Eyes: Symmetric.  Pupils equal round.


Ears: Symmetric.  Hearing within normal limits.


Mouth: Clear.


Neck: Supple.  Carotid without bruit.


Cardiac: Regular rate and rhythm.


Lungs: Clear anteriorly and posteriorly.


Abdomen: Soft active nontender.


Extremities: Normal tone.


Neurological: Mental status: Alert, cooperative, pleasant.


Cranial nerves: Symmetric facial tone and trapezius.


Motor: Active movement all 4 limbs and greater than antigravity.


Sensation: Intact throughout.


DTRs: Symmetric and equal throughout.


Mobility: Patient reports that she has gotten out of bed and goes a bathroom on 

own.





Results


CBC & Chem 7: 


                                 22 07:49





                                 22 07:49


Labs: 


                  Abnormal Lab Results - Last 24 Hours (Table)











  22 Range/Units





  07:49 07:49 


 


Lymphocytes #  0.8 L   (1.0-4.8)  k/uL


 


Potassium   3.4 L  (3.5-5.1)  mmol/L


 


Carbon Dioxide   34 H  (22-30)  mmol/L


 


BUN   6 L  (7-17)  mg/dL


 


Creatinine   0.44 L  (0.52-1.04)  mg/dL














Assessment and Plan


(1) Decreased appetite


Current Visit: Yes   Status: Acute   Code(s): R63.0 - ANOREXIA   SNOMED Code(s):

 12562326


   





(2) Dehydration


Current Visit: Yes   Status: Acute   Code(s): E86.0 - DEHYDRATION   SNOMED Code(

s): 75065751


   





(3) Diarrhea


Current Visit: Yes   Status: Acute   Code(s): R19.7 - DIARRHEA, UNSPECIFIED   

SNOMED Code(s): 96734563


   





(4) NSTEMI (non-ST elevated myocardial infarction)


Current Visit: Yes   Status: Acute   Code(s): I21.4 - NON-ST ELEVATION (NSTEMI) 

MYOCARDIAL INFARCTION   SNOMED Code(s): 40148882


   





(5) Ovarian mass


Current Visit: Yes   Status: Acute   Code(s): N83.8 - OTH NONINFLAMMATORY DISORD

 OF OVARY, FALLOP AND BROAD LIGMT   SNOMED Code(s): 201349121


   


Plan: 





Comments and plan: At this time safety concerns are noted.  Patient demonstrated

 ability tolerate and benefit from therapies.  Have discussed possible inpatient

 rehab with patient and she seems agreeable as does her .  Await 

completion of medical/surgical workup and treatment.  Only problem is patient 

doesn't have classic IPR diagnosis pending patient's insurance.

## 2022-09-14 NOTE — P.PN
Progress Note - Text


Progress Note Date: 09/14/22





Hospital course:


Patient is a 67-year-old female with a known history of coronary artery disease 

status post CABG, history of MI, history of stent placement and bilateral 

carotid surgery and hypothyroidism


Was sent to ER by her primary care physician.  Patient has been having abdominal

pain mainly in the lower abdomen associate with nausea for the past 3 to 4 

months.  Patient could not keep down food and also weight loss about 7 non-B 

during the last couple 1 to 2 months.  Patient is also having generalized 

weakness and could not keep her balance.  Denied any diarrhea.  No complaints of

chest pain or shortness of breath.  Patient has been feeling very weak.


Patient was discharged from the hospital on 5/3/2022.,  Admitted due to unstable

angina and underwent nuclear stress test showed predominantly a fixed defect.  

Imdur was added at that time.


Chest x-ray showed no evidence for acute pulmonary disease.





CT of the abdomen pelvis showed there is mild wall thickening of the colon 

diffusely correlate for mild colitis.  There is also evidence of diverticulosis 

of the sigmoid colon.  Wall thickening is noted which can be associated with a 

mucosal lesion correlate with direct visualization as clinically warranted.


There is a 3.2 cm left adnexal cystic mass likely tubo-ovarian.


Severe right iliac artery stenosis.


There is prominent small bowel loops ileus is favored.


Calcification around the apex of the heart can be associated with myocardial 

calcified calcinosis and previous infarction.





Laboratory data showed WBC 8.2, hemoglobin 14.0 and platelets 214


Sodium 139 potassium 4.4 chloride 105 bicarb is 26 BUN 17 and creatinine 0.74 

and blood sugar is 109 AST 23 alk phos 141 and ALT 49 bilirubin level is 0.5 

magnesium 1.7


TSH 0.039 and free T4 levels 1.67.  Albumin 4.1


Urinalysis negative for infection.





9/10/2022


Patient is currently lying in bed.  Still feels very weak.  No complaints of 

chest pain.  No worsening shortness of breath.  Still nauseated.  Decreased oral

intake.  No cough or sputum production.


No headache or dizziness or lightheadedness.


Lab data this morning showed sodium 142 potassium 4.3 chloride 106 bicarb is 

28.4 BUN 13.3, creatinine 0.6.


 6.8, AFP 2.2 and CEA 6.5.


Urine is negative for infection.  Stool for occult blood is negative.





Patient was hypotensive this morning required fluid bolus and blood pressure did

improve.


Patient was also started on antibiotics for possible colitis.





09/11/2022


Patient is currently resting in bed.  Awake alert and oriented.  Denied any 

complaints of chest pain or worsening shortness of breath.  No complaints of 

abdominal pain today.  Patient says that she has feels very weak.


Yesterday evening patient had chest pain associated with shortness of breath.  

And is also complaining of epigastric pain.  Troponin level is elevated at 0.526

and 0.311.  Patient was started on heparin drip and was transferred to telemetry

unit.


Patient has been afebrile.  No diarrhea.  Does have nausea.  No episodes of 

vomiting.  No cough or sputum production


Laboratory data showed WBC 8.1 hemoglobin 12.6 and platelets 147





September 12: I assumed care of patient today


Still having abdominal pain.  Decreased oral intake.  Had a BM yesterday.  

Rather diet.  Changing the diet to full liquids.  On IV Flagyl and ceftriaxone.


September 13: Patient was delirious last night likely after Dilaudid.-for pain. 

Doing much better this morning.  No abdominal pain is better.  Had liquid BM 

today.  Encourage oral intake.  Continue his Flagyl and ceftriaxone.  We'll get 

a GI consultation.


September 14: Tired.  Does not like the hospital food contents does not want to 

eat.  Patient has passed her daughter to bring in some food.  Has chronic back 

pain.  Abdominal pain is much better.  Outpatient colonoscopy per GI.  Patient 

rather weak but PTOT.  Myself and the  talk to the patient about 

possible rehab.  Patient is keen to go home.   will be to the 

 the patient this afternoon.





Active Medications





Acetaminophen (Acetaminophen Tab 325 Mg Tab)  650 mg PO Q6HR PRN


   PRN Reason: Fever and/ or Pain


   Last Admin: 09/14/22 11:57 Dose:  650 mg


   


Aspirin (Aspirin 81 Mg)  81 mg PO DAILY [facility]


   Last Admin: 09/14/22 09:50 Dose:  81 mg


   


Atorvastatin Calcium (Atorvastatin 80 Mg Tab)  80 mg PO DAILY [facility]


   Last Admin: 09/14/22 09:50 Dose:  80 mg


   


Clonazepam (Clonazepam 1 Mg Tab)  1 mg PO HS [facility]


   Last Admin: 09/13/22 20:46 Dose:  Not Given


   


Clopidogrel Bisulfate (Clopidogrel 75 Mg Tab)  75 mg PO DAILY [facility]


   Last Admin: 09/14/22 09:50 Dose:  75 mg


   


Famotidine (Famotidine 20 Mg Tab)  20 mg PO BID [facility]


   Last Admin: 09/14/22 09:50 Dose:  20 mg


   


Gabapentin (Gabapentin 300 Mg Cap)  600 mg PO DAILY PRN


   PRN Reason: Pain


   Last Admin: 09/12/22 23:26 Dose:  600 mg


   


Ceftriaxone Sodium 1 gm/ (Sodium Chloride)  50 mls @ 100 mls/hr IVPB Q24HR [facility]; 

Protocol


   Last Admin: 09/14/22 11:58 Dose:  100 mls/hr


   


Metronidazole 500 mg/ IV (Solution)  100 mls @ 100 mls/hr IVPB Q8HR [facility]; 

Protocol


   Last Admin: 09/14/22 17:16 Dose:  100 mls/hr


   


Levothyroxine Sodium (Levothyroxine 112 Mcg Tab)  112 mcg PO DAILY@0630 [facility]


   Last Admin: 09/14/22 06:14 Dose:  112 mcg


   


Lisinopril (Lisinopril 2.5 Mg Tab)  2.5 mg PO [facility]


   Last Admin: 09/13/22 20:45 Dose:  2.5 mg


   


Metoprolol Tartrate (Metoprolol Tartrate 25 Mg Tab)  25 mg PO BID [facility]


   Last Admin: 09/14/22 09:50 Dose:  25 mg


   


Naloxone HCl (Naloxone 0.4 Mg/Ml 1 Ml Vial)  0.2 mg IV Q2M PRN


   PRN Reason: Opioid Reversal


Nicotine (Nicotine 14mg/24hr Patch)  1 patch TRANSDERM DAILY [facility]


   Last Admin: 09/14/22 09:51 Dose:  1 patch


   


Nitroglycerin (Nitroglycerin Sl Tabs 0.4 Mg Tab)  0.4 mg SUBLINGUAL Q5M PRN


   PRN Reason: Chest Pain


   Last Admin: 09/10/22 18:43 Dose:  0.4 mg


   


Ondansetron HCl (Ondansetron 4 Mg Tab)  4 mg PO Q8HR PRN


   PRN Reason: Nausea And Vomiting


   Last Admin: 09/13/22 21:07 Dose:  4 mg


   


Sertraline HCl (Sertraline 100 Mg Tab)  100 mg PO [facility]


   Last Admin: 09/13/22 20:45 Dose:  100 mg


   











   





On examination:


VITAL SIGNS: 98.4, 72, 18, 1 38 x 65, 97% on 2 L


GENERAL APPEARANCE: Laying in bed, awake, tired


HEENT: Normal external appearance of nose and ear.  Oral cavity normal


EYES: Pupils equal. Conjunctiva normal. 


NECK: JVD not raised. Mass not palpable. 


RESPIRATORY: Respiratory effort normal. Lungs decreased breath sounds


CARDIOVASCULAR: First and second sounds normal. No edema. 


ABDOMEN: Soft.  Tender, no guarding rigidity Liver and spleen not palpable.  No 

mass palpable. 


PSYCHIATRY: Alert and oriented x3. Mood and affect anxious





INVESTIGATIONS, reviewed in the clinical context:


September 14: White count 8.6 and regular 13.5 potassium 3.4 creatinine 0.44


Chest CTA: Negative PE.  IVC filter in place.  Cardiomegaly.  Trace bilateral 

pleural effusion.  


White count 8.1 hemoglobin 12.6 platelets 147 potassium 4 BUN 9 creatinine 0.56


Troponin I 0.012, 0.5-6, 0.311


CT abdomen and pelvis with contrast: Severe cardiomegaly.  Colonic 

diverticulosis.  3.2 cm left adnexal cyst mass likely tubo-ovarian.  Severe 

right iliac artery stenosis.











Assessment and plan: 





-Non-ST elevation myocardial infarction.


Aspirin beta blocker Plavix.  Being followed by cardiology.  To be managed 

medically.





-Acute Colitis.  Better


IV ceftriaxone, IV Flagyl.   full liquids-advanced to soft diet.  Change to by 

mouth Augmentin.





- left adnexal cystic mass likely tubo-ovarian.Likely adnexal cyst, 


was seen by OB/GYN: Paisley to be incidental finding.  Asymptomatic.  Ova testing 

requested.





-Severe right iliac artery stenosis


Aspirin, Plavix.  Seen by Dr. Corey.  Will follow up outpatient.  4-6 weeks.





-Coronary artery disease history of CABG in 2010 and prior PCI's most recent in 

02/2021 and chronic total occluded RCA.


Aspirin, Lipitor, Plavix, Lopressor





-Chronic CHF, Ischemic cardiomyopathy ejection fraction 40 to 45%


 Aldactone 12.5 mg daily.  Lopressor.  Zestril





-Chronic left ventricle apical thrombus was on anticoagulation previously.  

Calcified





-Moderate to severe TR





-Peripheral arterial disease, 


Aspirin, Plavix.  Add xarelto





-Osteoarthritis primary bilateral


Use pain medications as needed





-Hypothyroidism


Synthroid 112 g a





-Chronic urinary stress incontinence





-Peripheral neuropathy








Discontinue IV ceftriaxone and Flagyl.  Start Augmentin.  Increase estrogen to 5

mg daily at bedtime.  Advance diet to soft diet. daughter. will be bringing some

food from home.  PTOT.  Plan for discharge tomorrow based on decision by the 

patient and the .  Discussed with the patient and .  

Total time spent today about 40 minutes with over 25 minutes of discussion.

## 2022-09-15 RX ADMIN — NITROGLYCERIN SCH: 20 OINTMENT TOPICAL at 02:37

## 2022-09-15 RX ADMIN — SERTRALINE HYDROCHLORIDE SCH MG: 100 TABLET ORAL at 21:27

## 2022-09-15 RX ADMIN — RANOLAZINE SCH MG: 500 TABLET, FILM COATED, EXTENDED RELEASE ORAL at 09:16

## 2022-09-15 RX ADMIN — LEVOTHYROXINE SODIUM SCH MCG: 0.11 TABLET ORAL at 06:11

## 2022-09-15 RX ADMIN — NICOTINE SCH PATCH: 14 PATCH, EXTENDED RELEASE TRANSDERMAL at 08:48

## 2022-09-15 RX ADMIN — AMOXICILLIN AND CLAVULANATE POTASSIUM SCH EACH: 875; 125 TABLET, FILM COATED ORAL at 08:51

## 2022-09-15 RX ADMIN — FAMOTIDINE SCH MG: 20 TABLET, FILM COATED ORAL at 21:27

## 2022-09-15 RX ADMIN — CLOPIDOGREL BISULFATE SCH MG: 75 TABLET ORAL at 09:15

## 2022-09-15 RX ADMIN — RANOLAZINE SCH MG: 500 TABLET, FILM COATED, EXTENDED RELEASE ORAL at 21:27

## 2022-09-15 RX ADMIN — ATORVASTATIN CALCIUM SCH MG: 80 TABLET, FILM COATED ORAL at 08:48

## 2022-09-15 RX ADMIN — GABAPENTIN PRN MG: 300 CAPSULE ORAL at 09:16

## 2022-09-15 RX ADMIN — AMOXICILLIN AND CLAVULANATE POTASSIUM SCH EACH: 875; 125 TABLET, FILM COATED ORAL at 21:27

## 2022-09-15 RX ADMIN — METOPROLOL TARTRATE SCH MG: 25 TABLET, FILM COATED ORAL at 21:27

## 2022-09-15 RX ADMIN — NITROGLYCERIN SCH MLS/HR: 20 INJECTION INTRAVENOUS at 10:18

## 2022-09-15 RX ADMIN — ASPIRIN 81 MG CHEWABLE TABLET SCH MG: 81 TABLET CHEWABLE at 08:50

## 2022-09-15 RX ADMIN — METOPROLOL TARTRATE SCH MG: 25 TABLET, FILM COATED ORAL at 08:51

## 2022-09-15 RX ADMIN — FAMOTIDINE SCH MG: 20 TABLET, FILM COATED ORAL at 08:50

## 2022-09-15 NOTE — P.PN
Subjective


Progress Note Date: 09/15/22


Principal diagnosis: 





Colitis








This a pleasant 67-year-old female who presented to the emergency department 

with complaints of abdominal pain, weakness, and she's appetite.  She has a past

medical history of thyroid disorder, coronary artery disease status post heart 

catheterization with stent.  Upon admission she had a CT of the abdomen that 

showed mild wall thickening of the colon correlate for colitis, diverticulosis. 

3.2 cm left adnexal cystic mass, right iliac artery stenosis, calcification 

along the apex of the heart.  Multiple consultants were added for ovarian cyst, 

cardiology for history of coronary artery disease and elevated troponin, and 

vascular surgery for right iliac artery stenosis.  Gastroenterology was 

consulted for possible colitis.  Patient denies any history of peripheral 

arterial disease.  She denies any pain in her lower extremities.  She states 

that prior to her becoming recently L she was able to walk without pain, she may

not walk far due to weakness and shortness of breath but not due to her lower 

extremity.  She currently denies any abdominal pain, states her appetite is 

increasing and ate a little bit today.  Again she currently denies any pain to 

her lower extremities, denies any chest pain or shortness of breath.  And denies

abdominal pain at this time.  She does state that she has chronic diarrhea at 

least going on for the last 1 year duration and occurs most often after eating. 

She she may go 2-3 times a day, denies any blood in her stool.  She denies any 

associated abdominal cramping, nausea or vomiting.  No previous history of 

colonoscopy.  States she was on antibiotics approximately one month ago.





09/14/2022: Patient is seen and examined today as a follow-up.  She is denying 

any abdominal pain, nausea or vomiting.  She has not had any further diarrhea 

yesterday or today.  She states that she is just very tired.  Nursing states 

that she is pretty much refusing to eat.  She'll take a few bites and that is 

it.  Patient states she is just not hungry.





09/15/2022: Patient seen and evaluated sitting up at the bedside.  Patient is 

being followed by cardiology, she had 2 elevated troponins.  The patient still 

having some decreased oral intake due to not wanting to eat.  No reported blood 

in her stool.  Hemoglobin has been stable last at 13.5.  Patient does not report

any acute changes.





Objective





- Vital Signs


Vital signs: 


                                   Vital Signs











Temp  98.3 F   09/15/22 08:23


 


Pulse  67   09/15/22 08:23


 


Resp  16   09/15/22 08:23


 


BP  184/83   09/15/22 08:23


 


Pulse Ox  91 L  09/15/22 08:23


 


FiO2      








                                 Intake & Output











 09/14/22 09/15/22 09/15/22





 18:59 06:59 18:59


 


Intake Total 25 240 


 


Balance 25 240 


 


Intake:   


 


  Oral 25 240 


 


Other:   


 


  Voiding Method Toilet Toilet Toilet


 


  # Voids 1 1 1


 


  # Bowel Movements 1  














- Exam





General appearance: The patient is alert, oriented, appears in no acute 

distress.


HET: Head is normocephalic and atraumatic.  Conjunctiva pink.  Sclera anicteric.


Neck: Supple without lymphadenopathy.  


Abdomen: Soft,  mild lower abdominal tenderness, nondistended with  bowel 

sounds.  No guarding or rigidity.


Extremities: Normal skin color and turgor.  No pedal edema


Skin: No rashes, no jaundice


Neurological: No focal deficits.  Alert and oriented -3.





- Labs


CBC & Chem 7: 


                                 09/14/22 07:49





                                 09/14/22 07:49


Labs: 


                  Abnormal Lab Results - Last 24 Hours (Table)











  09/14/22 Range/Units





  21:39 


 


Troponin I  0.083 H*  (0.000-0.034)  ng/mL














Assessment and Plan


(1) Diarrhea


Narrative/Plan: 


C7-year-old female who presented to emergency department with weakness, 

decreased appetite and weight loss and reportedly abdominal pain.  She had a CT 

of the abdomen and pelvis that showed diffuse mild colonic wall thickening 

correlate for mild colitis.  Evidence of diverticulosis.  Gastroenterology was 

consulted for the above.  Patient's also had a decreased appetite however she 

states improving.  She states she's had chronic diarrhea for over one year 

duration at least, associated mostly with eating.  Anywhere from 1-3 times a 

day.  No previous colonoscopy.  Denies any blood in her stool or black stool.  

Denies any abdominal pain or cramping.  No nausea or vomiting.  Mom recent 

antibiotic use within the last 1 month's duration.  Will order stool studies and

C. diff.  If no improvement may need to consider colonoscopy which can certainly

be done as an outpatient.





Diarrhea improved


Current Visit: Yes   Status: Acute   Code(s): R19.7 - DIARRHEA, UNSPECIFIED   

SNOMED Code(s): 52686917


   





(2) Decreased appetite


Narrative/Plan: 


Encourage feedings.  Ensure ordered.


Current Visit: Yes   Status: Acute   Code(s): R63.0 - ANOREXIA   SNOMED Code(s):

93984548


   





(3) Weight loss


Current Visit: Yes   Status: Acute   Code(s): R63.4 - ABNORMAL WEIGHT LOSS   

SNOMED Code(s): 77474961


   


Plan: 





1.  Continue symptomatic supportive care


2.  Continue medical management


3.  Stool studies ordered


4.  Diarrhea improved, recommend outpatient colonoscopy as needed.


5.  Encourage eating.  Encourage Ensure.


6.  Encourage ambulation


7.  Continue with recommendations from cardiology


Thank you for allowing us to participate in the care of the patient, the GI 

service will sign off, gastroenterology will not be available at the hospital 

this weekend and through next week.  If further evaluation by gastroenterology 

is required the patient will need transfer as per the primary team's discretion.










Dr. BARRY Galeana


I agree with the dictator's note, documented as a scribe by Esperanza CHINO.

## 2022-09-15 NOTE — P.PN
Progress Note - Text


Progress Note Date: 09/15/22





Hospital course:


Patient is a 67-year-old female with a known history of coronary artery disease 

status post CABG, history of MI, history of stent placement and bilateral 

carotid surgery and hypothyroidism


Was sent to ER by her primary care physician.  Patient has been having abdominal

pain mainly in the lower abdomen associate with nausea for the past 3 to 4 

months.  Patient could not keep down food and also weight loss about 7 non-B 

during the last couple 1 to 2 months.  Patient is also having generalized 

weakness and could not keep her balance.  Denied any diarrhea.  No complaints of

chest pain or shortness of breath.  Patient has been feeling very weak.


Patient was discharged from the hospital on 5/3/2022.,  Admitted due to unstable

angina and underwent nuclear stress test showed predominantly a fixed defect.  

Imdur was added at that time.


Chest x-ray showed no evidence for acute pulmonary disease.





CT of the abdomen pelvis showed there is mild wall thickening of the colon 

diffusely correlate for mild colitis.  There is also evidence of diverticulosis 

of the sigmoid colon.  Wall thickening is noted which can be associated with a 

mucosal lesion correlate with direct visualization as clinically warranted.


There is a 3.2 cm left adnexal cystic mass likely tubo-ovarian.


Severe right iliac artery stenosis.


There is prominent small bowel loops ileus is favored.


Calcification around the apex of the heart can be associated with myocardial 

calcified calcinosis and previous infarction.





Laboratory data showed WBC 8.2, hemoglobin 14.0 and platelets 214


Sodium 139 potassium 4.4 chloride 105 bicarb is 26 BUN 17 and creatinine 0.74 

and blood sugar is 109 AST 23 alk phos 141 and ALT 49 bilirubin level is 0.5 

magnesium 1.7


TSH 0.039 and free T4 levels 1.67.  Albumin 4.1


Urinalysis negative for infection.





9/10/2022


Patient is currently lying in bed.  Still feels very weak.  No complaints of 

chest pain.  No worsening shortness of breath.  Still nauseated.  Decreased oral

intake.  No cough or sputum production.


No headache or dizziness or lightheadedness.


Lab data this morning showed sodium 142 potassium 4.3 chloride 106 bicarb is 

28.4 BUN 13.3, creatinine 0.6.


 6.8, AFP 2.2 and CEA 6.5.


Urine is negative for infection.  Stool for occult blood is negative.





Patient was hypotensive this morning required fluid bolus and blood pressure did

improve.


Patient was also started on antibiotics for possible colitis.





09/11/2022


Patient is currently resting in bed.  Awake alert and oriented.  Denied any 

complaints of chest pain or worsening shortness of breath.  No complaints of 

abdominal pain today.  Patient says that she has feels very weak.


Yesterday evening patient had chest pain associated with shortness of breath.  

And is also complaining of epigastric pain.  Troponin level is elevated at 0.526

and 0.311.  Patient was started on heparin drip and was transferred to telemetry

unit.


Patient has been afebrile.  No diarrhea.  Does have nausea.  No episodes of 

vomiting.  No cough or sputum production


Laboratory data showed WBC 8.1 hemoglobin 12.6 and platelets 147





September 12: I assumed care of patient today


Still having abdominal pain.  Decreased oral intake.  Had a BM yesterday.  

Rather diet.  Changing the diet to full liquids.  On IV Flagyl and ceftriaxone.


September 13: Patient was delirious last night likely after Dilaudid.-for pain. 

Doing much better this morning.  No abdominal pain is better.  Had liquid BM 

today.  Encourage oral intake.  Continue his Flagyl and ceftriaxone.  We'll get 

a GI consultation.


September 14: Tired.  Does not like the hospital food contents does not want to 

eat.  Patient has passed her daughter to bring in some food.  Has chronic back 

pain.  Abdominal pain is much better.  Outpatient colonoscopy per GI.  Patient 

rather weak but PTOT.  Myself and the  talk to the patient about 

possible rehab.  Patient is keen to go home.   will be to the 

 the patient this afternoon.


September 15: Patient had chest pain overnight.  Did tolerate some diet brought 

in by her daughter.  Put on IV nitroglycerin.  Seen by cardiology today.  

Planning cardiac catheterization tomorrow.  Reclining chair.





Active Medications





Acetaminophen (Acetaminophen Tab 325 Mg Tab)  650 mg PO Q6HR PRN


   PRN Reason: Fever and/ or Pain


   Last Admin: 09/14/22 20:32 Dose:  650 mg


   


Amoxicillin/Clavulanate Potassium (Amoxic-Pot Clav 875-125mg 1 Each Tab)  1 each

PO Q12HR KT; Protocol


   Last Admin: 09/15/22 08:51 Dose:  1 each


   


Aspirin (Aspirin 81 Mg)  81 mg PO DAILY [facility]


   Last Admin: 09/15/22 08:50 Dose:  81 mg


   


Atorvastatin Calcium (Atorvastatin 80 Mg Tab)  80 mg PO DAILY [facility]


   Last Admin: 09/15/22 08:48 Dose:  80 mg


   


Clonazepam (Clonazepam 1 Mg Tab)  1 mg PO [facility]


   Last Admin: 09/14/22 20:33 Dose:  1 mg


   


Clopidogrel Bisulfate (Clopidogrel 75 Mg Tab)  75 mg PO DAILY [facility]


   Last Admin: 09/15/22 09:15 Dose:  75 mg


   


Famotidine (Famotidine 20 Mg Tab)  20 mg PO BID [facility]


   Last Admin: 09/15/22 08:50 Dose:  20 mg


   


Gabapentin (Gabapentin 300 Mg Cap)  600 mg PO DAILY PRN


   PRN Reason: Pain


   Last Admin: 09/15/22 09:16 Dose:  600 mg


   


Nitroglycerin/Dextrose 50 mg/ (IV Solution)  250 mls @ 1.5 mls/hr IV .Q24H [facility]; 

Protocol


   Last Admin: 09/15/22 10:18 Dose:  5 mcg/min, 1.5 mls/hr


   


Levothyroxine Sodium (Levothyroxine 112 Mcg Tab)  112 mcg PO DAILY@0630 [facility]


   Last Admin: 09/15/22 06:11 Dose:  112 mcg


   


Lisinopril (Lisinopril 5 Mg Tab)  5 mg PO [facility]


   Last Admin: 09/14/22 20:33 Dose:  5 mg


   


Metoprolol Tartrate (Metoprolol Tartrate 25 Mg Tab)  25 mg PO BID [facility]


   Last Admin: 09/15/22 08:51 Dose:  25 mg


   


Naloxone HCl (Naloxone 0.4 Mg/Ml 1 Ml Vial)  0.2 mg IV Q2M PRN


   PRN Reason: Opioid Reversal


Nicotine (Nicotine 14mg/24hr Patch)  1 patch TRANSDERM DAILY [facility]


   Last Admin: 09/15/22 08:48 Dose:  1 patch


   


Ondansetron HCl (Ondansetron 4 Mg Tab)  4 mg PO Q8HR PRN


   PRN Reason: Nausea And Vomiting


   Last Admin: 09/13/22 21:07 Dose:  4 mg


   


Ranolazine (Ranolazine 500 Mg Tab.Er.12h)  500 mg PO Q12HR [facility]


   Last Admin: 09/15/22 09:16 Dose:  500 mg


   


Sertraline HCl (Sertraline 100 Mg Tab)  100 mg PO [facility]


   Last Admin: 09/14/22 20:32 Dose:  100 mg


   











   





On examination:


VITAL SIGNS: 98.3, 61, 16, 160/76, 93% room air


GENERAL APPEARANCE: Sitting up in a chair, awake, tired


HEENT: Normal external appearance of nose and ear.  Oral cavity normal


EYES: Pupils equal. Conjunctiva normal. 


NECK: JVD not raised. Mass not palpable. 


RESPIRATORY: Respiratory effort normal. Lungs decreased breath sounds


CARDIOVASCULAR: First and second sounds normal. No edema. 


ABDOMEN: Soft.  Tender, no guarding rigidity Liver and spleen not palpable.  No 

mass palpable. 


PSYCHIATRY: Alert and oriented x3. Mood and affect anxious





INVESTIGATIONS, reviewed in the clinical context:


September 15: Troponin 0.083, 0.074


September 14: White count 8.6 and regular 13.5 potassium 3.4 creatinine 0.44


Chest CTA: Negative PE.  IVC filter in place.  Cardiomegaly.  Trace bilateral 

pleural effusion.  


White count 8.1 hemoglobin 12.6 platelets 147 potassium 4 BUN 9 creatinine 0.56


Troponin I 0.012, 0.5-6, 0.311


CT abdomen and pelvis with contrast: Severe cardiomegaly.  Colonic 

diverticulosis.  3.2 cm left adnexal cyst mass likely tubo-ovarian.  Severe 

right iliac artery stenosis.











Assessment and plan: 





-Post infarct angina: New diagnosis


IV nitroglycerin drip.  For cardiac catheterization tomorrow.





-Non-ST elevation myocardial infarction.


Aspirin beta blocker Plavix.  Being followed by cardiology.  To be managed 

medically.





-Acute Colitis.  Better


IV ceftriaxone, IV Flagyl.   full liquids-advanced to soft diet.  Change to by 

mouth Augmentin.





- left adnexal cystic mass likely tubo-ovarian.Likely adnexal cyst, 


was seen by OB/GYN: [place] to be incidental finding.  Asymptomatic.  Ova testing 

requested.





-Severe right iliac artery stenosis


Aspirin, Plavix.  Seen by Dr. Corey.  Will follow up outpatient.  4-6 weeks.





-Coronary artery disease history of CABG in 2010 and prior PCI's most recent in 

02/2021 and chronic total occluded RCA.


Aspirin, Lipitor, Plavix, Lopressor





-Chronic CHF, Ischemic cardiomyopathy ejection fraction 40 to 45%


 Aldactone 12.5 mg daily.  Lopressor.  Zestril





-Chronic left ventricle apical thrombus was on anticoagulation previously.  

Calcified





-Moderate to severe TR





-Peripheral arterial disease, 


Aspirin, Plavix.  Add xarelto





-Osteoarthritis primary bilateral


Use pain medications as needed





-Hypothyroidism


Synthroid 112 g a





-Chronic urinary stress incontinence





-Peripheral neuropathy








IV nitroglycerin drip.  Other medications to continue including aspirin, Plavix,

Lopressor.  Discussed with patient.  For cardiac catheterization tomorrow.

## 2022-09-15 NOTE — P.PN
Subjective


Patient is pleasant 67-year-old female with history of carotid artery stenosis 

status post bilateral carotid endarterectomy, hypothyroidism, CAD status post 

CABG as well as PCI, hypertension, hyperlipidemia, hypothyroidism, recent 50 

pound weight loss in the last 3 months. Patient used to follow with Dr. Raul Noel [phone], however has not followed in a while. Patient presents with 

multiple symptoms. Patient has not had much of an appetite and has not been 

eating and drinking that much.  Additionally has been states she is very weak 

and if it were not for him "she would sleep 24 hours ". Apparently this has been

going on for the last few weeks and even few months. Multiple symptoms including

abdominal pain, back pain, chest pain, lightheadedness, weak.





Patient had presented in May 2022 with findings of a Lexiscan stress test with 

predominantly fixed apical defect consistent with prior myocardial infarction 

with minimal questionable leopoldo-infarct ischemia and an EF 41%.  Echocardiogram 

had showed EF 40-45% with a fixed thrombus in the left ventricle, moderate to 

severe tricuspid regurgitation and apical scarring.  EKG on presentation shows 

sinus rhythm, left axis deviation, incomplete left bundle branch block, ST 

depressions in the inferior and lateral leads.  This does appear somewhat 

similar to prior EKG from May 2022. 








 CT abdomen and pelvis which showed mild thickening of the colon, 3.2 cm left 

adnexal mass, severe right iliac artery stenosis, calcification or on the apex 

of the heart. 





9/15/2022


Patient seen and examined at bedside, no acute distress. She is endorsin some 

midsternal chest discomfort, difficult to describe. It is non-radiating, non-

exertional. She denies shortness of breath. Her mentation has improved. Her BP 

is significantly elevated with SBP 160s-180s. 





She's currently maintained on aspirin 81 mg daily, atorvastatin 80 mg daily, 

Plavix 75 mg daily, metoprolol tartrate 25 mg BID





PHYSICAL EXAMINATION


Vital signs reviewed.


CONSTITUTIONAL: No apparent distress, ill appearing, lethargic


HEENT: Neck Supple.  No JVD. 


CHEST EXAMINATION: Lungs are clear to auscultation. No chest wall tenderness is 

noted on palpation or with deep breathing. 


HEART EXAMINATION: Regular rate and rhythm. S1, S2 heard. No murmurs, gallops or

rub.


ABDOMEN: Soft, nontender. Positive bowel sounds.


EXTREMITIES: 2+ peripheral pulses, no lower extremity edema and no calf 

tenderness.


NEUROLOGIC EXAMINATION: Patient is alert, oriented to person and place  





ASSESSMENT


Elevated troponin, unclear significance at this time


Chest pain, on exam appears pleuritic in nature 


CAD with history of CABG and bypass


Recent 50 pound weight loss


Ovarian mass, rule out cancer


Abdominal pain, back pain, multiple other symptoms


Somnolence, failure to thrive of unclear etiology


Decreased appetite


Chronic systolic heart failure EF 40-45%


Ischemic cardiomyopathy with prior apical scar


Chronic apical thrombus appears calcified on CT


PAD





PLAN


Patient with re-occurring chest discomfort overnight. Her EKG does show ischemic

changes but similar to admission. We discussed cardiac catheterization with the 

patient this morning, she is hesitant to have this done. 


We will maximize medical therapy


Start IV Nitro drip


Start Ranexa 500mg BID 


Continue Lisinopril that was added yesterday 


Recent stress test 05/2022 showed no inducible ischemia. 


Continue dual antiplatelet therapy with aspirin and plavix 


Continue metoprolol tartrate 25mg BID


Continue aspirin, statin


Patient is hesitant about cardiac cath, will make NPO at midnight and further 

discuss with patient, control blood pressure, Nitro drip and monitor patient's 

symptoms


Further recommendations based on clinical course





Nurse practitioner note has been reviewed by physician. Signing provider agrees 

with the documented findings, assessment, and plan of care. 














Objective





- Vital Signs


Vital signs: 


                                   Vital Signs











Temp  98.3 F   09/15/22 08:23


 


Pulse  67   09/15/22 08:23


 


Resp  16   09/15/22 08:23


 


BP  152/73   09/15/22 10:24


 


Pulse Ox  91 L  09/15/22 08:23


 


FiO2      








                                 Intake & Output











 09/14/22 09/15/22 09/15/22





 18:59 06:59 18:59


 


Intake Total 25 240 


 


Balance 25 240 


 


Weight   48.081 kg


 


Intake:   


 


  Oral 25 240 


 


Other:   


 


  Voiding Method Toilet Toilet Toilet


 


  # Voids 1 1 1


 


  # Bowel Movements 1  














- Labs


CBC & Chem 7: 


                                 09/14/22 07:49





                                 09/14/22 07:49


Labs: 


                  Abnormal Lab Results - Last 24 Hours (Table)











  09/14/22 09/15/22 Range/Units





  21:39 07:32 


 


Troponin I  0.083 H*  0.074 H*  (0.000-0.034)  ng/mL

## 2022-09-16 LAB
ALBUMIN SERPL-MCNC: 3.6 G/DL (ref 3.5–5)
ALP SERPL-CCNC: 137 U/L (ref 38–126)
ALT SERPL-CCNC: 20 U/L (ref 4–34)
ANION GAP SERPL CALC-SCNC: 10 MMOL/L
APTT BLD: 22.8 SEC (ref 22–30)
AST SERPL-CCNC: 28 U/L (ref 14–36)
BASOPHILS # BLD AUTO: 0.1 K/UL (ref 0–0.2)
BASOPHILS NFR BLD AUTO: 0 %
BUN SERPL-SCNC: 11 MG/DL (ref 7–17)
CALCIUM SPEC-MCNC: 9.2 MG/DL (ref 8.4–10.2)
CHLORIDE SERPL-SCNC: 100 MMOL/L (ref 98–107)
CHOLEST SERPL-MCNC: 151 MG/DL (ref 0–200)
CO2 SERPL-SCNC: 30 MMOL/L (ref 22–30)
EOSINOPHIL # BLD AUTO: 0.2 K/UL (ref 0–0.7)
EOSINOPHIL NFR BLD AUTO: 1 %
ERYTHROCYTE [DISTWIDTH] IN BLOOD BY AUTOMATED COUNT: 4.81 M/UL (ref 3.8–5.4)
ERYTHROCYTE [DISTWIDTH] IN BLOOD: 14.1 % (ref 11.5–15.5)
GLUCOSE BLD-MCNC: 138 MG/DL (ref 70–110)
GLUCOSE BLD-MCNC: 212 MG/DL (ref 70–110)
GLUCOSE SERPL-MCNC: 191 MG/DL (ref 74–99)
HCT VFR BLD AUTO: 47.3 % (ref 34–46)
HDLC SERPL-MCNC: 35.8 MG/DL (ref 40–60)
HGB BLD-MCNC: 15.1 GM/DL (ref 11.4–16)
INR PPP: 1 (ref ?–1.2)
LDLC SERPL CALC-MCNC: 66 MG/DL (ref 0–131)
LYMPHOCYTES # SPEC AUTO: 1.6 K/UL (ref 1–4.8)
LYMPHOCYTES NFR SPEC AUTO: 9 %
MCH RBC QN AUTO: 31.3 PG (ref 25–35)
MCHC RBC AUTO-ENTMCNC: 31.8 G/DL (ref 31–37)
MCV RBC AUTO: 98.4 FL (ref 80–100)
MONOCYTES # BLD AUTO: 0.6 K/UL (ref 0–1)
MONOCYTES NFR BLD AUTO: 4 %
NEUTROPHILS # BLD AUTO: 15.2 K/UL (ref 1.3–7.7)
NEUTROPHILS NFR BLD AUTO: 85 %
PH UR: 7.5 [PH] (ref 5–8)
PLATELET # BLD AUTO: 297 K/UL (ref 150–450)
POTASSIUM SERPL-SCNC: 4.4 MMOL/L (ref 3.5–5.1)
PROT SERPL-MCNC: 5.7 G/DL (ref 6.3–8.2)
PROT UR QL: (no result)
PT BLD: 11.1 SEC (ref 9–12)
RBC UR QL: 4 /HPF (ref 0–5)
SODIUM SERPL-SCNC: 140 MMOL/L (ref 137–145)
SP GR UR: 1.04 (ref 1–1.03)
SQUAMOUS UR QL AUTO: 3 /HPF (ref 0–4)
TRIGL SERPL-MCNC: 246 MG/DL (ref 0–149)
UROBILINOGEN UR QL STRIP: <2 MG/DL (ref ?–2)
VLDLC SERPL CALC-MCNC: 49.2 MG/DL (ref 5–40)
WBC # BLD AUTO: 17.8 K/UL (ref 3.8–10.6)
WBC # UR AUTO: 3 /HPF (ref 0–5)

## 2022-09-16 PROCEDURE — 0DH673Z INSERTION OF INFUSION DEVICE INTO STOMACH, VIA NATURAL OR ARTIFICIAL OPENING: ICD-10-PCS

## 2022-09-16 RX ADMIN — CLOPIDOGREL BISULFATE SCH: 75 TABLET ORAL at 09:31

## 2022-09-16 RX ADMIN — SERTRALINE HYDROCHLORIDE SCH MG: 100 TABLET ORAL at 22:20

## 2022-09-16 RX ADMIN — ATORVASTATIN CALCIUM SCH: 80 TABLET, FILM COATED ORAL at 09:31

## 2022-09-16 RX ADMIN — RANOLAZINE SCH MG: 500 TABLET, FILM COATED, EXTENDED RELEASE ORAL at 22:20

## 2022-09-16 RX ADMIN — LEVOTHYROXINE SODIUM SCH: 0.11 TABLET ORAL at 06:40

## 2022-09-16 RX ADMIN — NICOTINE SCH PATCH: 14 PATCH, EXTENDED RELEASE TRANSDERMAL at 09:14

## 2022-09-16 RX ADMIN — NITROGLYCERIN SCH: 20 INJECTION INTRAVENOUS at 09:30

## 2022-09-16 RX ADMIN — METOPROLOL TARTRATE SCH MG: 25 TABLET, FILM COATED ORAL at 21:56

## 2022-09-16 RX ADMIN — ASPIRIN 81 MG CHEWABLE TABLET SCH: 81 TABLET CHEWABLE at 09:31

## 2022-09-16 RX ADMIN — FAMOTIDINE SCH: 20 TABLET, FILM COATED ORAL at 09:31

## 2022-09-16 RX ADMIN — RANOLAZINE SCH: 500 TABLET, FILM COATED, EXTENDED RELEASE ORAL at 09:31

## 2022-09-16 RX ADMIN — LACOSAMIDE SCH MLS/HR: 10 INJECTION INTRAVENOUS at 20:53

## 2022-09-16 RX ADMIN — FAMOTIDINE SCH MG: 20 TABLET, FILM COATED ORAL at 21:56

## 2022-09-16 RX ADMIN — DILTIAZEM HYDROCHLORIDE SCH MLS/HR: 5 INJECTION INTRAVENOUS at 07:43

## 2022-09-16 RX ADMIN — METOPROLOL TARTRATE SCH: 25 TABLET, FILM COATED ORAL at 09:31

## 2022-09-16 NOTE — P.CNNES
History of Present Illness


Consult date: 22


Requesting physician: Jose Antonio Blackwood


Reason for Consult: Code stroke


History of Present Illness: 


This is a 67-year-old woman with a medical history of stroke (occipital) with 

residual peripheral visual deficit, coronary artery disease status post stent 

with CABG, hypothyroidism, tobacco use who presented emergency department on 

2022 because of abdominal pain.  History was obtained from patient's 

family members (daughter and patient's  who are at bedside) and nurse.  

Per the family members as she came in because it abdominal pain and then later 

she started having chest pain.  She was found to have non-ST segment the 

myocardial infarction and was scheduled for cardiac cath for activity 

(2022).  Patient was on nitroglycerin drip.  According to the nurse at 

5:00 in the morning today on the floor she was not responsive and was obtunded. 

On examination was felt that the patient had left-sided weakness by nurse but is

documented on medical record that appears some right-sided weakness..  As a r

esult stroke code was activated and her NIH stroke scale was 19.  She was of 

very poorly responsive.


As a result CT of the head was ordered and is a reported as old right occipital 

lobe infarct.  No acute intracranial abnormality.  CT angiography of the head 

and neck was reported as negative CT angiography of the brain.  Negative CT 

angiography of the neck were dominant left vertebral and diminutive right 

vertebral.  Large pulmonary artery suggestive of pulmonary hypertension.  Mild 

aneurysm of the aortic arch.


No IV TPA since unknown last normal state and the risk outweighed the benefit.


Per the daughter the patient does not have any history of seizure.  She does 

smoke cigarettes.  No illicit drug use.  Denies alcohol use.





Some other workup during this hospitalization consisted of:


On initial presentation her white blood cells 8.2 and the most recent one is 

17.8.  With history since of glucose is 191.


TSH is 0.039 and free T4 is 1.67.








Review of Systems


Review of system is limited by the per positive and negative as per HPI.





Past Medical History


Past Medical History: Myocardial Infarction (MI), Thyroid Disorder


Last Myocardial Infarction Date:: 


History of Any Multi-Drug Resistant Organisms: None Reported


Past Surgical History: Appendectomy,  Section, Cholecystectomy, Coronary

Bypass/CABG, Heart Catheterization, Heart Catheterization With Stent


Additional Past Surgical History / Comment(s): bilat carotid


Past Anesthesia/Blood Transfusion Reactions: No Reported Reaction


Date of Last Stent Placement:: 


Past Psychological History: No Psychological Hx Reported


Smoking Status: Never smoker


Past Alcohol Use History: None Reported


Past Drug Use History: None Reported





- Past Family History


  ** Father


History Unknown: Yes





  ** Mother


Family Medical History: Diabetes Mellitus, Hypertension





Medications and Allergies


                                Home Medications











 Medication  Instructions  Recorded  Confirmed  Type


 


Atorvastatin Calcium [Lipitor] 80 mg PO DAILY 22 History


 


Clopidogrel Bisulfate [Plavix] 75 mg PO DAILY 22 History


 


Gabapentin 600 mg PO DAILY PRN 22 History


 


Levothyroxine Sodium [Synthroid] 112 mcg PO DAILY 22 History


 


Losartan Potassium 50 mg PO DAILY 22 History


 


Sertraline HCl [Zoloft] 100 mg PO HS 22 History


 


clonazePAM [KlonoPIN] 1 mg PO HS 22 History


 


Aspirin 81 mg PO DAILY 90 Days #90 tab 22 Rx


 


Isosorbide Mononitrate ER [Imdur] 30 mg PO DAILY 90 Days #90 tab 22 Rx


 


Metoprolol Tartrate [Lopressor] 25 mg PO BID #60 tab 22 Rx


 


Nitroglycerin Sl Tabs [Nitrostat] 0.4 mg SL Q5M PRN 22 History








                                    Allergies











Allergy/AdvReac Type Severity Reaction Status Date / Time


 


codeine AdvReac  Unknown Verified 22 14:32














Physical Examination





- Vital Signs


Vital Signs: 


                                   Vital Signs











  Temp Pulse Pulse Pulse Resp BP BP


 


 22 07:15   133 H    12  143/76 


 


 22 07:10  97.8 F  132 H    12  143/76 


 


 22 07:00       


 


 22 06:55   135 H    14  152/86 


 


 22 06:40   138 H    12  152/85 


 


 22 06:36     141 H    163/91


 


 22 06:25   140 H    14  158/85 


 


 22 06:10   140 H    13  159/95 


 


 22 05:55   141 H    12  163/97 


 


 22 05:40   155 H    11 L  160/95 


 


 22 05:35  97.8 F  150 H    11 L  160/95 


 


 22 05:25  97.7 F  165 H    12  165/105 


 


 22 05:20  97.9 F    149 H  14   167/100


 


 22 04:35  97.9 F    157 H  14   203/132


 


 22 04:00  98.2 F    98  16  


 


 22 01:02  97.8 F    78  18   172/75


 


 09/15/22 20:35  99.1 F   78   18   178/79


 


 09/15/22 19:26  98.5 F    77  16   182/88


 


 09/15/22 17:05  97.9 F    75  18   130/68


 


 09/15/22 14:21      16  


 


 09/15/22 12:19      16  


 


 09/15/22 12:17  98.3 F    61  16   160/76


 


 09/15/22 10:24        152/73














  BP Pulse Ox


 


 22 07:15   94 L


 


 22 07:10   94 L


 


 22 07:00   92 L


 


 22 06:55   94 L


 


 22 06:40   94 L


 


 22 06:36   94 L


 


 22 06:25   93 L


 


 22 06:10   92 L


 


 22 05:55   94 L


 


 22 05:40   94 L


 


 22 05:35   94 L


 


 22 05:25   93 L


 


 22 05:20   93 L


 


 22 04:35   93 L


 


 22 04:00  134/76  98


 


 22 01:02   96


 


 09/15/22 20:35   94 L


 


 09/15/22 19:26   93 L


 


 09/15/22 17:05   93 L


 


 09/15/22 14:21  


 


 09/15/22 12:19  


 


 09/15/22 12:17   93 L


 


 09/15/22 10:24  








                                Intake and Output











 09/15/22 09/16/22 09/16/22





 22:59 06:59 14:59


 


Other:   


 


  Voiding Method Bedside Commode Bedside Commode 


 


  # Voids 1  











GENERAL: The patient is lying in bed and does not appear in acute distress.


CHEST: The heart rate is regular rate rhythm.   No murmurs to auscultation.  


LUNG: Clear to auscultation bilaterally no wheezing noted throughout.  Not 

labored breathing.


ABDOMEN/GI: Bowel sounds present in all 4 quadrants. No tenderness to palpation 

throughout.





NEUROLOGICAL:


Limited because of her cooperation.


Higher mental function: The patient is stupor.  She would briefly open her eyes 

to voice.  Is not verbally responsive or following commands.  


Cranial nerves: The pupils are round, equal and reactive to light.  She has 

horizontal roving of eye but no fixed gaze deviation.  Slight flattening of the 

left (but daughter not new).   Otherwise could not assess rest cranial nerves.  

 


Motor: The strength is hard to assess but is withdrawling to painful stimuli 

throughout.  Normal tone and bulk.  


Cerebellum: Could not assess.


Sensation: Sensation is unable to assess light touch but intact to painful s

timuli.  


Reflexes (right/left): 2+ uppers while lowers are 1+.


Plantars are mute bilaterally. 








Results





- Laboratory Findings


CBC and BMP: 


                                 22 05:49





                                 22 05:49


Abnormal Lab Findings: 


                                  Abnormal Labs











  09/09/22 09/09/22 09/10/22





  12:36 13:47 06:15


 


WBC   


 


Hct   


 


MCHC   


 


Plt Count   


 


Neutrophils #   


 


Lymphocytes #   


 


APTT   


 


D-Dimer   


 


Potassium   


 


Chloride   


 


Carbon Dioxide    28.4 H


 


Anion Gap    7.80 L


 


BUN   


 


Creatinine   


 


BUN/Creatinine Ratio    21.14 H


 


Glucose  109 H  


 


POC Glucose (mg/dL)   


 


Alkaline Phosphatase  141 H  


 


Troponin I   


 


Total Protein   


 


Carcinoembryonic Ag   


 


TSH  0.039 L  


 


Ur Specific Gravity   1.049 H 


 


Urine Protein   1+ H 


 


Urine Mucus   Occasional H 














  09/10/22 09/10/22 09/10/22





  06:15 19:17 22:50


 


WBC   


 


Hct   


 


MCHC    30.2 L


 


Plt Count   


 


Neutrophils #   


 


Lymphocytes #   


 


APTT   


 


D-Dimer   


 


Potassium   


 


Chloride   


 


Carbon Dioxide   


 


Anion Gap   


 


BUN   


 


Creatinine   


 


BUN/Creatinine Ratio   


 


Glucose   


 


POC Glucose (mg/dL)   


 


Alkaline Phosphatase   


 


Troponin I   0.526 H* 


 


Total Protein   


 


Carcinoembryonic Ag  6.5 H  


 


TSH   


 


Ur Specific Gravity   


 


Urine Protein   


 


Urine Mucus   














  22





  07:50 07:50 11:25


 


WBC   


 


Hct   


 


MCHC  30.8 L  


 


Plt Count  147 L  


 


Neutrophils #   


 


Lymphocytes #   


 


APTT   60.2 H 


 


D-Dimer   


 


Potassium   


 


Chloride   


 


Carbon Dioxide   


 


Anion Gap   


 


BUN   


 


Creatinine   


 


BUN/Creatinine Ratio   


 


Glucose   


 


POC Glucose (mg/dL)   


 


Alkaline Phosphatase   


 


Troponin I    0.311 H*


 


Total Protein   


 


Carcinoembryonic Ag   


 


TSH   


 


Ur Specific Gravity   


 


Urine Protein   


 


Urine Mucus   














  22





  06:27 06:27 06:27


 


WBC   


 


Hct   


 


MCHC   


 


Plt Count   


 


Neutrophils #   


 


Lymphocytes #   


 


APTT  41.1 H  


 


D-Dimer    0.72 H


 


Potassium   


 


Chloride   108 H 


 


Carbon Dioxide   


 


Anion Gap   


 


BUN   


 


Creatinine   


 


BUN/Creatinine Ratio   


 


Glucose   


 


POC Glucose (mg/dL)   


 


Alkaline Phosphatase   


 


Troponin I   


 


Total Protein   


 


Carcinoembryonic Ag   


 


TSH   


 


Ur Specific Gravity   


 


Urine Protein   


 


Urine Mucus   














  22





  11:29 19:19 07:49


 


WBC   


 


Hct   


 


MCHC   


 


Plt Count   


 


Neutrophils #   


 


Lymphocytes #    0.8 L


 


APTT   39.4 H 


 


D-Dimer   


 


Potassium   


 


Chloride   


 


Carbon Dioxide   


 


Anion Gap   


 


BUN   


 


Creatinine   


 


BUN/Creatinine Ratio   


 


Glucose   


 


POC Glucose (mg/dL)  112 H  


 


Alkaline Phosphatase   


 


Troponin I   


 


Total Protein   


 


Carcinoembryonic Ag   


 


TSH   


 


Ur Specific Gravity   


 


Urine Protein   


 


Urine Mucus   














  09/14/22 09/14/22 09/15/22





  07:49 21:39 07:32


 


WBC   


 


Hct   


 


MCHC   


 


Plt Count   


 


Neutrophils #   


 


Lymphocytes #   


 


APTT   


 


D-Dimer   


 


Potassium  3.4 L  


 


Chloride   


 


Carbon Dioxide  34 H  


 


Anion Gap   


 


BUN  6 L  


 


Creatinine  0.44 L  


 


BUN/Creatinine Ratio   


 


Glucose   


 


POC Glucose (mg/dL)   


 


Alkaline Phosphatase   


 


Troponin I   0.083 H*  0.074 H*


 


Total Protein   


 


Carcinoembryonic Ag   


 


TSH   


 


Ur Specific Gravity   


 


Urine Protein   


 


Urine Mucus   














  22





  04:44 05:49 05:49


 


WBC    17.8 H


 


Hct    47.3 H


 


MCHC   


 


Plt Count   


 


Neutrophils #    15.2 H


 


Lymphocytes #   


 


APTT   


 


D-Dimer   


 


Potassium   


 


Chloride   


 


Carbon Dioxide   


 


Anion Gap   


 


BUN   


 


Creatinine   


 


BUN/Creatinine Ratio   


 


Glucose   191 H 


 


POC Glucose (mg/dL)  138 H  


 


Alkaline Phosphatase   137 H 


 


Troponin I   


 


Total Protein   5.7 L 


 


Carcinoembryonic Ag   


 


TSH   


 


Ur Specific Gravity   


 


Urine Protein   


 


Urine Mucus   














  22





  05:49 05:49 06:04


 


WBC   


 


Hct   


 


MCHC   


 


Plt Count   


 


Neutrophils #   


 


Lymphocytes #   


 


APTT   


 


D-Dimer   1.68 H 


 


Potassium   


 


Chloride   


 


Carbon Dioxide   


 


Anion Gap   


 


BUN   


 


Creatinine   


 


BUN/Creatinine Ratio   


 


Glucose   


 


POC Glucose (mg/dL)    212 H


 


Alkaline Phosphatase   


 


Troponin I  0.046 H*  


 


Total Protein   


 


Carcinoembryonic Ag   


 


TSH   


 


Ur Specific Gravity   


 


Urine Protein   


 


Urine Mucus   














Assessment and Plan


Assessment: 





Encephalopathy of unknown etiology: Rule out seizure.  Also rule out stroke 

since reported left or right sided weakness per ICU nurse (but on my examination

 withdrawing symmetrically)


Non-STEMI


Tubo-ovarian mass on CT.


History of old right occipital stroke


History of CAD s/p stent and CABG


History of hypothyroidism


Tobacco use





Plan: 


I ordered MRI of the brain w/ and w/o and routine EEG.


Currently the patient is on aspirin 81, Plavix 75 and the Lipitor 80 mg daily.


Ordered ammonia level and lipid panel


The echo was ordered and is pending


PT, OT are consulted


Cardiology team is on board


General surgery team is on board for possible diverticulitis/colitis


Patient has 3.2 cm adnexal cystic mass likely tubo-ovarian and there is consult 

for Dr. Foster.


We'll defer the rest of the medical management to the primary denies CT





Plan was discussed with the patient's family members as well as her nurse.


Thank you for the consultation.





UPDATE:


Preliminary EEG: felt the patient has performed discharges over the frontal 

temporal region.  There is no seizure.  The back of slowing suggestive of 

moderate to severe encephalopathy.


As a result I ordered Ativan 1 mg, loaded the patient with Keppra 1500 mg once 

and started the patient on Keppra 750 bid.





I revaluated the patient around 4:30pm and per the patient's daughter's she 

stated that she somewhat better today compared to earlier in the morning.  She 

is opening her eyes more and moving from side to side.  She continues not to 

verbalize or follow commands.


Daughter notified me that patient has severe depression as well as anxiety.


As a result.  Keppra because of side effect of behavioral changes and start the 

patient on Vimpat 100 mg twice a day





Dale Montero M.D.


Neuro-hospitalist








Time with Patient: Greater than 30

## 2022-09-16 NOTE — P.PN
Progress Note - Text


Progress Note Date: 09/16/22





Hospital course:


Patient is a 67-year-old female with a known history of coronary artery disease 

status post CABG, history of MI, history of stent placement and bilateral 

carotid surgery and hypothyroidism


Was sent to ER by her primary care physician.  Patient has been having abdominal

pain mainly in the lower abdomen associate with nausea for the past 3 to 4 

months.  Patient could not keep down food and also weight loss about 7 non-B 

during the last couple 1 to 2 months.  Patient is also having generalized 

weakness and could not keep her balance.  Denied any diarrhea.  No complaints of

chest pain or shortness of breath.  Patient has been feeling very weak.


Patient was discharged from the hospital on 5/3/2022.,  Admitted due to unstable

angina and underwent nuclear stress test showed predominantly a fixed defect.  

Imdur was added at that time.


Chest x-ray showed no evidence for acute pulmonary disease.





CT of the abdomen pelvis showed there is mild wall thickening of the colon 

diffusely correlate for mild colitis.  There is also evidence of diverticulosis 

of the sigmoid colon.  Wall thickening is noted which can be associated with a 

mucosal lesion correlate with direct visualization as clinically warranted.


There is a 3.2 cm left adnexal cystic mass likely tubo-ovarian.


Severe right iliac artery stenosis.


There is prominent small bowel loops ileus is favored.


Calcification around the apex of the heart can be associated with myocardial 

calcified calcinosis and previous infarction.





Laboratory data showed WBC 8.2, hemoglobin 14.0 and platelets 214


Sodium 139 potassium 4.4 chloride 105 bicarb is 26 BUN 17 and creatinine 0.74 

and blood sugar is 109 AST 23 alk phos 141 and ALT 49 bilirubin level is 0.5 

magnesium 1.7


TSH 0.039 and free T4 levels 1.67.  Albumin 4.1


Urinalysis negative for infection.





9/10/2022


Patient is currently lying in bed.  Still feels very weak.  No complaints of 

chest pain.  No worsening shortness of breath.  Still nauseated.  Decreased oral

intake.  No cough or sputum production.


No headache or dizziness or lightheadedness.


Lab data this morning showed sodium 142 potassium 4.3 chloride 106 bicarb is 

28.4 BUN 13.3, creatinine 0.6.


 6.8, AFP 2.2 and CEA 6.5.


Urine is negative for infection.  Stool for occult blood is negative.





Patient was hypotensive this morning required fluid bolus and blood pressure did

improve.


Patient was also started on antibiotics for possible colitis.





09/11/2022


Patient is currently resting in bed.  Awake alert and oriented.  Denied any 

complaints of chest pain or worsening shortness of breath.  No complaints of 

abdominal pain today.  Patient says that she has feels very weak.


Yesterday evening patient had chest pain associated with shortness of breath.  

And is also complaining of epigastric pain.  Troponin level is elevated at 0.526

and 0.311.  Patient was started on heparin drip and was transferred to telemetry

unit.


Patient has been afebrile.  No diarrhea.  Does have nausea.  No episodes of 

vomiting.  No cough or sputum production


Laboratory data showed WBC 8.1 hemoglobin 12.6 and platelets 147





September 12: I assumed care of patient today


Still having abdominal pain.  Decreased oral intake.  Had a BM yesterday.  

Rather diet.  Changing the diet to full liquids.  On IV Flagyl and ceftriaxone.


September 13: Patient was delirious last night likely after Dilaudid.-for pain. 

Doing much better this morning.  No abdominal pain is better.  Had liquid BM 

today.  Encourage oral intake.  Continue his Flagyl and ceftriaxone.  We'll get 

a GI consultation.


September 14: Tired.  Does not like the hospital food contents does not want to 

eat.  Patient has passed her daughter to bring in some food.  Has chronic back 

pain.  Abdominal pain is much better.  Outpatient colonoscopy per GI.  Patient 

rather weak but PTOT.  Myself and the  talk to the patient about 

possible rehab.  Patient is keen to go home.   will be to the 

 the patient this afternoon.


September 15: Patient had chest pain overnight.  Did tolerate some diet brought 

in by her daughter.  Put on IV nitroglycerin.  Seen by cardiology today.  

Planning cardiac catheterization tomorrow.  Reclining chair.


September 16: Overnight patient was found unresponsive.  A team was called.  His

question about some left arm weakness.  Patient smoked with ICU.  Neurology was 

consulted.  Patient still lethargic.  Pupils slightly dilated.  No focal 

weakness.  Spoke to the patient's daughter and  at the bedside.  

Telemetry was showing atrial tachycardia versus sinus tachycardia.  Nitro drip 

was discontinued.  Started on Cardizem drip.  MRI of the brain done.   

and Dr. Glez to patient have the NG tube for medications.





Active Medications





Acetaminophen (Acetaminophen Tab 325 Mg Tab)  650 mg PO Q6HR PRN


   PRN Reason: Fever and/ or Pain


   Last Admin: 09/14/22 20:32 Dose:  650 mg


   


Aspirin (Aspirin 81 Mg)  81 mg PO DAILY [facility]


   Last Admin: 09/16/22 09:31 Dose:  Not Given


   


Atorvastatin Calcium (Atorvastatin 80 Mg Tab)  80 mg PO DAILY [facility]


   Last Admin: 09/16/22 09:31 Dose:  Not Given


   


Clonazepam (Clonazepam 1 Mg Tab)  1 mg PO HS [facility]


   Last Admin: 09/14/22 20:33 Dose:  1 mg


   


Clopidogrel Bisulfate (Clopidogrel 75 Mg Tab)  75 mg PO DAILY [facility]


   Last Admin: 09/16/22 09:31 Dose:  Not Given


   


Famotidine (Famotidine 20 Mg Tab)  20 mg PO BID [facility]


   Last Admin: 09/16/22 09:31 Dose:  Not Given


   


Nitroglycerin/Dextrose 50 mg/ (IV Solution)  250 mls @ 1.5 mls/hr IV .Q24H [facility]; 

Protocol


   Last Admin: 09/16/22 09:30 Dose:  Not Given


   


Diltiazem HCl 125 mg/ Sodium (Chloride)  125 mls @ 5 mls/hr IV .Q24H [facility]


   Last Admin: 09/16/22 07:43 Dose:  5 mg/hr, 5 mls/hr


   


Lacosamide 100 mg/ Sodium (Chloride)  60 mls @ 100 mls/hr IVPB BID [facility]


   Last Admin: 09/16/22 20:53 Dose:  100 mls/hr


   


Levothyroxine Sodium (Levothyroxine 112 Mcg Tab)  112 mcg PO DAILY@0630 [facility]


   Last Admin: 09/16/22 06:40 Dose:  Not Given


   


Lisinopril (Lisinopril 5 Mg Tab)  5 mg PO HS [facility]


   Last Admin: 09/15/22 21:27 Dose:  5 mg


   


Metoprolol Tartrate (Metoprolol Tartrate 25 Mg Tab)  25 mg PO BID [facility]


   Last Admin: 09/16/22 09:31 Dose:  Not Given


   


Metoprolol Tartrate (Metoprolol Tartrate 5 Mg/5 Ml Vial)  5 mg IVP Q6HR PRN


   PRN Reason: Blood Pressure - High


Naloxone HCl (Naloxone 0.4 Mg/Ml 1 Ml Vial)  0.2 mg IV Q2M PRN


   PRN Reason: Opioid Reversal


Nicotine (Nicotine 14mg/24hr Patch)  1 patch TRANSDERM DAILY [facility]


   Last Admin: 09/16/22 09:14 Dose:  1 patch


   


Ondansetron HCl (Ondansetron 4 Mg Tab)  4 mg PO Q8HR PRN


   PRN Reason: Nausea And Vomiting


   Last Admin: 09/13/22 21:07 Dose:  4 mg


   


Ranolazine (Ranolazine 500 Mg Tab.Er.12h)  500 mg PO Q12HR [facility]


   Last Admin: 09/16/22 09:31 Dose:  Not Given


   


Sertraline HCl (Sertraline 100 Mg Tab)  100 mg PO HS [facility]


   Last Admin: 09/15/22 21:27 Dose:  100 mg


   








   





On examination:


VITAL SIGNS: 97.8, 122, 12, 143 with 73, 94%


GENERAL APPEARANCE: Laying in bed, lethargic


HEENT: Normal external appearance of nose and ear.  Oral cavity normal


EYES: Pupils slightly enlarged.  Right eye some medial convergence. Conjunctiva 

normal. 


NECK: JVD not raised. Mass not palpable. 


RESPIRATORY: Respiratory effort normal. Lungs decreased breath sounds


CARDIOVASCULAR: First and second sounds normal. No edema. 


ABDOMEN: Soft.  Tender, no guarding rigidity Liver and spleen not palpable.  No 

mass palpable. 


PSYCHIATRY: Unable to assess


NEUROLOGICAL: No obvious focal weakness.  Eyes as above





INVESTIGATIONS, reviewed in the clinical context:


MRI brain: May be some leopoldo-infarct ischemic changes right occipital lobe.  

Chronic appearing subcortical white matter changes to the bilateral occipital 

lobes.  And periventricular white matter within the brainstem.


September 16: WBC 17.8 hemoglobin 15.1 potassium 4.4 creatinine 0.64 troponin 

0.046 LDL 66


September 15: Troponin 0.083, 0.074


September 14: White count 8.6 and regular 13.5 potassium 3.4 creatinine 0.44


Chest CTA: Negative PE.  IVC filter in place.  Cardiomegaly.  Trace bilateral 

pleural effusion.  


White count 8.1 hemoglobin 12.6 platelets 147 potassium 4 BUN 9 creatinine 0.56


Troponin I 0.012, 0.5-6, 0.311


CT abdomen and pelvis with contrast: Severe cardiomegaly.  Colonic 

diverticulosis.  3.2 cm left adnexal cyst mass likely tubo-ovarian.  Severe 

right iliac artery stenosis.











Assessment and plan: 





-Acute change in mental status with patient becoming unresponsive.  

Differentials stroke versus seizure activity.


Patient moved to the ICU.  Neurology consulted.  EEG.  MRI brain.





-Post infarct angina:


Patient initially declined cardiac catheterization.   and daughter 

convinced the patient to get the same done.





-Non-ST elevation myocardial infarction.


Aspirin beta blocker Plavix.  Being followed by cardiology.  





-Acute Colitis.  Better


IV ceftriaxone, IV Flagyl.   full liquids-advanced to soft diet.  Change to by 

mouth Augmentin.





- left adnexal cystic mass likely tubo-ovarian.Likely adnexal cyst, 


was seen by OB/GYN: La Russell to be incidental finding.  Asymptomatic.  Ova testing 

requested.





-Severe right iliac artery stenosis


Aspirin, Plavix.  Seen by Dr. Corey.  Will follow up outpatient.  4-6 weeks.





-Coronary artery disease history of CABG in 2010 and prior PCI's most recent in 

02/2021 and chronic total occluded RCA.


Aspirin, Lipitor, Plavix, Lopressor





-Chronic CHF, Ischemic cardiomyopathy ejection fraction 40 to 45%


 Aldactone 12.5 mg daily.  Lopressor.  Zestril





-Chronic left ventricle apical thrombus was on anticoagulation previously.  

Calcified





-Moderate to severe TR





-Peripheral arterial disease, 


Aspirin, Plavix.  





-Osteoarthritis primary bilateral


Use pain medications as needed





-Hypothyroidism


Synthroid 112 g a





-Chronic urinary stress incontinence





-Peripheral neuropathy





-Atrial tachycardia versus sinusitis cardiac.


IV Cardizem drip








IV nitroglycerin drip been discontinued.  IV Cardizem drip.  EEG MRI done.  

Follow with neurology.  NG tube for medications.  Prognosis guarded.  Total time

spent about 40 minutes to the work 25 minutes of discussion.

## 2022-09-16 NOTE — CT
EXAMINATION TYPE: CODE STROKE: CTA head neck

 

DATE OF EXAM: 9/16/2022

 

COMPARISON: None

 

HISTORY: AMS, RT SIDED WEAKNESS

 

CT DLP: 398.5 mGycm

Automated exposure control for dose reduction was used.

 

CONTRAST: 

Performed with IV Contrast, patient injected with 65 mL of Isovue 370.

 

Images obtained from the aortic arch to the vertex of the brain with the IV contrast. There are Three
-D postprocessed images.

 

There are bilateral pleural effusions. There is interstitial pulmonary edema. There is large pulmonar
y arteries. There is aneurysm of the arch of the aorta on the left lateral aspect. Diameter measures 
up to 3.9 cm.

 

There is normal branching pattern of the great vessels of the aortic arch. There is arterial flow in 
both subclavian arteries. There is arterial flow in the common internal and external carotid arteries
 bilaterally.

 

There is arterial flow in both vertebral arteries. There is arterial flow in the vertebral basilar ar
julien system. Right vertebral artery is smaller than the left.

 

There is no evidence of carotid or vertebral artery aneurysm or dissection.

 

There is arterial flow in the anterior middle and posterior cerebral arteries bilaterally. There is n
o mass effect. No evidence of intracranial aneurysm or neovascularity. No evidence of dynamic arteria
l stenosis. There is normal enhancement of the venous sinuses.

 

IMPRESSION:

Negative CT angiogram of the brain.

 

Negative CT angiogram of the neck were dominant left vertebral artery and diminutive right vertebral 
artery.

 

Enlarged pulmonary arteries suggestive of pulmonary hypertension. Mild aneurysm of the aortic arch.

## 2022-09-16 NOTE — P.CNPUL
History of Present Illness


Consult date: 22


Requesting physician: Jose Antonio Blackwood


Reason for consult: other


Chief complaint: Non-ST segment elevation myocardial infarction, CVA.


History of present illness: 





Pulmonary consult dated 2022.





This is a 67-year-old female who was initially admitted back on , 

with abdominal pain and chest pain.  The patient was found to have a non-ST 

segment elevation myocardial infarction, and the plan was for heart 

catheterization today, .  She ended up coming to the ICU on , because she was found to have an elevated blood pressure, increased 

heart rate, and she became poorly responsive, and obtunded.  A code stroke was 

called, and her NIH score was 19.  She was very poorly responsive.  She appeared

to have some right-sided weakness.  I'm currently seeing the patient in the 

intensive care unit.  The patient did have a CT of the brain which showed an old

right occipital infarct.  CT angiogram was negative.  Currently, she is on 2 L 

of nasal cannula.  She's getting saline at 10 mL an hour.  She is on a 

nitroglycerin drip at 10 mcg/m.  She is poorly responsive but she does arouse.  

She is able to protect her airway at this time.  White count 17.8, hemoglobin 

15.1, hematocrit 47.8, and platelet count 297,000.  PT INR and PTT are all 

normal.  D-dimer is 1.68.  Sodium 140, potassium 4.4, chlorides 100, CO2 30, BUN

11, creatinine 0.64.  The patient's troponins were 0.083, 0.074 and 0.046.  

Chest x-ray from  showed changes of CHF.





Review of Systems








REVIEW OF SYSTEMS:  


CONSTITUTIONAL:  [Negative.]


NEUROLOGIC: Acute mental status changes, consultation.  In addition, the patient

 appears to have right-sided weakness.


HEENT:  Roaming eye movements.


CARDIAC:  Tachycardia, and elevated blood pressure.


PULMONARY:  [Negative.]


GI:  [Negative.]


:  [Negative.]


RHEUMATOLOGIC: [ Negative.]


IMMUNOLOGIC: [ Negative.]


ENDOCRINE:  [Negative.  ] 


DERMATOLOGIC: [Negative.]








Past Medical History


Past Medical History: Myocardial Infarction (MI), Thyroid Disorder


Last Myocardial Infarction Date:: 


History of Any Multi-Drug Resistant Organisms: None Reported


Past Surgical History: Appendectomy,  Section, Cholecystectomy, Coronary

 Bypass/CABG, Heart Catheterization, Heart Catheterization With Stent


Additional Past Surgical History / Comment(s): bilat carotid


Past Anesthesia/Blood Transfusion Reactions: No Reported Reaction


Date of Last Stent Placement:: 


Past Psychological History: No Psychological Hx Reported


Smoking Status: Never smoker


Past Alcohol Use History: None Reported


Past Drug Use History: None Reported





- Past Family History


  ** Father


History Unknown: Yes





  ** Mother


Family Medical History: Diabetes Mellitus, Hypertension





Medications and Allergies


                                Home Medications











 Medication  Instructions  Recorded  Confirmed  Type


 


Atorvastatin Calcium [Lipitor] 80 mg PO DAILY 22 History


 


Clopidogrel Bisulfate [Plavix] 75 mg PO DAILY 22 History


 


Gabapentin 600 mg PO DAILY PRN 22 History


 


Levothyroxine Sodium [Synthroid] 112 mcg PO DAILY 22 History


 


Losartan Potassium 50 mg PO DAILY 22 History


 


Sertraline HCl [Zoloft] 100 mg PO HS 22 History


 


clonazePAM [KlonoPIN] 1 mg PO HS 22 History


 


Aspirin 81 mg PO DAILY 90 Days #90 tab 22 Rx


 


Isosorbide Mononitrate ER [Imdur] 30 mg PO DAILY 90 Days #90 tab 22 Rx


 


Metoprolol Tartrate [Lopressor] 25 mg PO BID #60 tab 22 Rx


 


Nitroglycerin Sl Tabs [Nitrostat] 0.4 mg SL Q5M PRN 22 History








                                    Allergies











Allergy/AdvReac Type Severity Reaction Status Date / Time


 


codeine AdvReac  Unknown Verified 22 14:32














Physical Exam


Osteopathic Statement: *.  No significant issues noted on an osteopathic 

structural exam other than those noted in the History and Physical/Consult.


Vitals: 


                                   Vital Signs











  Temp Pulse Pulse Pulse Resp BP BP


 


 22 07:15   133 H    12  143/76 


 


 22 07:10  97.8 F  132 H    12  143/76 


 


 22 07:00       


 


 22 06:55   135 H    14  152/86 


 


 09/16/22 06:40   138 H    12  152/85 


 


 22 06:36     141 H    163/91


 


 22 06:25   140 H    14  158/85 


 


 22 06:10   140 H    13  159/95 


 


 22 05:55   141 H    12  163/97 


 


 22 05:40   155 H    11 L  160/95 


 


 22 05:35  97.8 F  150 H    11 L  160/95 


 


 22 05:25  97.7 F  165 H    12  165/105 


 


 22 05:20  97.9 F    149 H  14   167/100


 


 22 04:35  97.9 F    157 H  14   203/132


 


 22 04:00  98.2 F    98  16  


 


 22 01:02  97.8 F    78  18   172/75


 


 09/15/22 20:35  99.1 F   78   18   178/79


 


 09/15/22 19:26  98.5 F    77  16   182/88


 


 09/15/22 17:05  97.9 F    75  18   130/68


 


 09/15/22 14:21      16  


 


 09/15/22 12:19      16  


 


 09/15/22 12:17  98.3 F    61  16   160/76


 


 09/15/22 10:24        152/73


 


 09/15/22 08:23  98.3 F    67  16  


 


 09/15/22 08:00      16  














  BP Pulse Ox


 


 22 07:15   94 L


 


 22 07:10   94 L


 


 22 07:00   92 L


 


 22 06:55   94 L


 


 22 06:40   94 L


 


 22 06:36   94 L


 


 22 06:25   93 L


 


 22 06:10   92 L


 


 22 05:55   94 L


 


 22 05:40   94 L


 


 22 05:35   94 L


 


 22 05:25   93 L


 


 22 05:20   93 L


 


 22 04:35   93 L


 


 22 04:00  134/76  98


 


 22 01:02   96


 


 09/15/22 20:35   94 L


 


 09/15/22 19:26   93 L


 


 09/15/22 17:05   93 L


 


 09/15/22 14:21  


 


 09/15/22 12:19  


 


 09/15/22 12:17   93 L


 


 09/15/22 10:24  


 


 09/15/22 08:23  184/83  91 L


 


 09/15/22 08:00  








                                Intake and Output











 09/15/22 09/16/22 09/16/22





 22:59 06:59 14:59


 


Other:   


 


  Voiding Method Bedside Commode Bedside Commode 


 


  # Voids 1  














No acute distress, very lethargic/somnolent.  Does open eyes.  Blank stare.





HEENT examination is grossly unremarkable.  





Neck supple.  Full range of motion.  No adenopathy thyromegaly or neck vein 

distention.





Cardiovascular examination reveals regular rhythm rate.  S1-S2 normal.  No S3 or

 S4.  No discernible murmur noted.  Heart rate 125 bpm.





Lungs reveal mostly clear breath sounds.  Mild scattered rhonchi.  No wheezes or

 crackles.  Breath sounds equal bilaterally.  Saturations 94% on 2 L.





Abdomen soft bowel sounds are heard.  No masses or tenderness.





Extremities are intact.  No cyanosis clubbing or edema.





Skin is without rash or lesion.





Neurologic examination reveals right-sided weakness.  Patient very lethargic and

 somnolent.





Results





- Laboratory Findings


CBC and BMP: 


                                 22 05:49





                                 22 05:49


PT/INR, D-dimer











PT  11.1 sec (9.0-12.0)   22  05:49    


 


INR  1.0  (<1.2)   22  05:49    


 


D-Dimer  1.68 mg/L FEU (<0.60)  H  22  05:49    








Abnormal lab findings: 


                                  Abnormal Labs











  09/09/22 09/09/22 09/10/22





  12:36 13:47 06:15


 


WBC   


 


Hct   


 


MCHC   


 


Plt Count   


 


Neutrophils #   


 


Lymphocytes #   


 


APTT   


 


D-Dimer   


 


Potassium   


 


Chloride   


 


Carbon Dioxide    28.4 H


 


Anion Gap    7.80 L


 


BUN   


 


Creatinine   


 


BUN/Creatinine Ratio    21.14 H


 


Glucose  109 H  


 


POC Glucose (mg/dL)   


 


Alkaline Phosphatase  141 H  


 


Troponin I   


 


Total Protein   


 


Carcinoembryonic Ag   


 


TSH  0.039 L  


 


Ur Specific Gravity   1.049 H 


 


Urine Protein   1+ H 


 


Urine Mucus   Occasional H 














  09/10/22 09/10/22 09/10/22





  06:15 19:17 22:50


 


WBC   


 


Hct   


 


MCHC    30.2 L


 


Plt Count   


 


Neutrophils #   


 


Lymphocytes #   


 


APTT   


 


D-Dimer   


 


Potassium   


 


Chloride   


 


Carbon Dioxide   


 


Anion Gap   


 


BUN   


 


Creatinine   


 


BUN/Creatinine Ratio   


 


Glucose   


 


POC Glucose (mg/dL)   


 


Alkaline Phosphatase   


 


Troponin I   0.526 H* 


 


Total Protein   


 


Carcinoembryonic Ag  6.5 H  


 


TSH   


 


Ur Specific Gravity   


 


Urine Protein   


 


Urine Mucus   














  22





  07:50 07:50 11:25


 


WBC   


 


Hct   


 


MCHC  30.8 L  


 


Plt Count  147 L  


 


Neutrophils #   


 


Lymphocytes #   


 


APTT   60.2 H 


 


D-Dimer   


 


Potassium   


 


Chloride   


 


Carbon Dioxide   


 


Anion Gap   


 


BUN   


 


Creatinine   


 


BUN/Creatinine Ratio   


 


Glucose   


 


POC Glucose (mg/dL)   


 


Alkaline Phosphatase   


 


Troponin I    0.311 H*


 


Total Protein   


 


Carcinoembryonic Ag   


 


TSH   


 


Ur Specific Gravity   


 


Urine Protein   


 


Urine Mucus   














  22





  06:27 06:27 06:27


 


WBC   


 


Hct   


 


MCHC   


 


Plt Count   


 


Neutrophils #   


 


Lymphocytes #   


 


APTT  41.1 H  


 


D-Dimer    0.72 H


 


Potassium   


 


Chloride   108 H 


 


Carbon Dioxide   


 


Anion Gap   


 


BUN   


 


Creatinine   


 


BUN/Creatinine Ratio   


 


Glucose   


 


POC Glucose (mg/dL)   


 


Alkaline Phosphatase   


 


Troponin I   


 


Total Protein   


 


Carcinoembryonic Ag   


 


TSH   


 


Ur Specific Gravity   


 


Urine Protein   


 


Urine Mucus   














  22





  11:29 19:19 07:49


 


WBC   


 


Hct   


 


MCHC   


 


Plt Count   


 


Neutrophils #   


 


Lymphocytes #    0.8 L


 


APTT   39.4 H 


 


D-Dimer   


 


Potassium   


 


Chloride   


 


Carbon Dioxide   


 


Anion Gap   


 


BUN   


 


Creatinine   


 


BUN/Creatinine Ratio   


 


Glucose   


 


POC Glucose (mg/dL)  112 H  


 


Alkaline Phosphatase   


 


Troponin I   


 


Total Protein   


 


Carcinoembryonic Ag   


 


TSH   


 


Ur Specific Gravity   


 


Urine Protein   


 


Urine Mucus   














  09/14/22 09/14/22 09/15/22





  07:49 21:39 07:32


 


WBC   


 


Hct   


 


MCHC   


 


Plt Count   


 


Neutrophils #   


 


Lymphocytes #   


 


APTT   


 


D-Dimer   


 


Potassium  3.4 L  


 


Chloride   


 


Carbon Dioxide  34 H  


 


Anion Gap   


 


BUN  6 L  


 


Creatinine  0.44 L  


 


BUN/Creatinine Ratio   


 


Glucose   


 


POC Glucose (mg/dL)   


 


Alkaline Phosphatase   


 


Troponin I   0.083 H*  0.074 H*


 


Total Protein   


 


Carcinoembryonic Ag   


 


TSH   


 


Ur Specific Gravity   


 


Urine Protein   


 


Urine Mucus   














  22





  04:44 05:49 05:49


 


WBC    17.8 H


 


Hct    47.3 H


 


MCHC   


 


Plt Count   


 


Neutrophils #    15.2 H


 


Lymphocytes #   


 


APTT   


 


D-Dimer   


 


Potassium   


 


Chloride   


 


Carbon Dioxide   


 


Anion Gap   


 


BUN   


 


Creatinine   


 


BUN/Creatinine Ratio   


 


Glucose   191 H 


 


POC Glucose (mg/dL)  138 H  


 


Alkaline Phosphatase   137 H 


 


Troponin I   


 


Total Protein   5.7 L 


 


Carcinoembryonic Ag   


 


TSH   


 


Ur Specific Gravity   


 


Urine Protein   


 


Urine Mucus   














  22





  05:49 05:49 06:04


 


WBC   


 


Hct   


 


MCHC   


 


Plt Count   


 


Neutrophils #   


 


Lymphocytes #   


 


APTT   


 


D-Dimer   1.68 H 


 


Potassium   


 


Chloride   


 


Carbon Dioxide   


 


Anion Gap   


 


BUN   


 


Creatinine   


 


BUN/Creatinine Ratio   


 


Glucose   


 


POC Glucose (mg/dL)    212 H


 


Alkaline Phosphatase   


 


Troponin I  0.046 H*  


 


Total Protein   


 


Carcinoembryonic Ag   


 


TSH   


 


Ur Specific Gravity   


 


Urine Protein   


 


Urine Mucus   














- Diagnostic Findings


Chest x-ray: image reviewed





Assessment and Plan


Assessment: 





Rule out acute cerebrovascular accident.





Non-ST segment elevation myocardial infarction.





History of myocardial infarction.





History of hypertension.





History of hyperlipidemia.





History of hypothyroidism.





Prior history of bypass grafting.





CAD with prior stent placement.





Old right occipital infarct on computed tomography scan of the brain.


Plan: 





Plan dated 2022.





The patient was transferred to the intensive care unit, room 252.  She is 

evaluated there.  Currently, she is on 2 L of oxygen.  Labs, x-rays, and 

medications are all reviewed.  The patient's also getting saline at 10 mL an 

hour, and nitroglycerin at 10 mcg/m for blood pressure control.  Medications are

 reviewed.  Prognosis is guarded.  Neurology is consulted.  Additional 

recommendations and suggestions are forthcoming.


Time with Patient: Greater than 30

## 2022-09-16 NOTE — MR
EXAMINATION TYPE: MR brain wo/w con

 

DATE OF EXAM: 9/16/2022

 

COMPARISON: CT brain 9/16/2022

 

HISTORY: altered mental status.  R/O mets. Altered mental status neural deficits

 

CONTRAST:  Performed utilizing 5 mL intravenous Gadavist gadolinium contrast.  

 

TECHNIQUE: Multiplanar, multiecho imaging on a 3.0 Kaitlin magnet is performed through the brain.  Stud
y is performed within 24 hours of arrival to the hospital.

 

The craniovertebral junction is normal.  The pituitary is normal.  

 

Diffusion-weighted imaging is performed.  No abnormal hyperintensity is present to suggest an acute i
ntracranial infarct or acute ischemic change.

 

There is increased signal within the inferior right medial occipital lobe compatible with an old infa
rct. Some very minimal hyperintensity on diffusion-weighted sequences through the cortex adjacent to 
the old infarct present. Some leopoldo-infarct ischemic change could be present which may be acute.

 

Subcortical signal is present through the bilateral occipital lobes, greater on the right. Periventri
cular and centrum semiovale white matter changes are present. Some white matter changes are noted wit
hin the brainstem. Findings can be related to microvascular ischemic change.

 

 

 

Ex vacuo effect adjacent to the infarct is present. Ventricles and sulci are otherwise appropriate fo
r the patient age.

 

 

IMPRESSIONS:

1. There may be some mild leopoldo-infarct ischemic changes right occipital lobe.

2. Chronic-appearing subcortical white matter changes to the bilateral occipital lobes, periventricul
ar white matter, within the brainstem.

3. Suspicious changes to suggest metastasis is not identified.

## 2022-09-16 NOTE — CDI
Documentation Clarification Form



Date: 09/16/2022 08:41:43 AM

From: Lisha Montgomery CCS, CCDS

Phone: [phone]

MRN: [id number]

Admit Date: 09/09/2022 02:48:00 PM

Patient Name: Jaime Holguin

Visit Number: [id number]

Discharge Date:  





ATTENTION: The Clinical Documentation Specialists (CDI) and [facility] Coding Staff 
appreciate your assistance in clarifying documentation. Please respond to the 
clarification below the line at the bottom and electronically sign. The CDI & 
[facility] Coding staff will review the response and follow-up if needed. Please note: 
Queries are made part of the Legal Health Record. If you have any questions, 
please contact the author of this message via ITS.



Dr. Jose Antonio Blackwood:



On 9/15 the A Team was called because the patient developed a change in mental 
status, EKG showed tachycardia with a differential diagnosis of atrial 
tachycardia vs sinus tachycardia and was hypertensive, lethargic and not 
responding (per the 9/16 Cardiology Progress Note). 



Additional clarification regarding the patient's altered mental status. 



History/Risk Factors per the 9/9 H/P: CAD status post CABG, MI with history of 
stent placement and bilateral carotid surgery, Hypothyroidism, Diverticulosis.



Clinical Indicators: Presented to the ED on 9/9 with Weakness and Abdominal 
Pain, decreased appetite, significant weight loss, elevated blood pressure, 
fatigue, appeared dehydrated and somewhat cachectic.

Admit with Ovarian mass, Dehydration and Weight Loss. 



9/9 VS: T 98.1, P 58, R 20, BP 89/43, PO 97 RA, BMI: 21.4

9/16 VS: T 97.8, P 132, R 12, /86, 143/76; PO 92 2Lnc



9/9 LAB: Glucose 109, Alkaline Phosphatase 141, TSH 0.039

9/16 LAB: WBC 17.8, Hct 47.3, Neutrophils 15.2; D Dimer 1.68, Glucose 191, Alk 
Phos 137, Troponin (9/14: 0.083), (9/15: 0/074), 0.046; Total Protein 5.7



9/9 UA: Clear, Specific Gravity 1.049, 1+ Protein

9/9 CXR: No evidence of acute pulmonary disease. 

9/9 CT Abdomen/Pelvis: Correlate for colitis. Diverticulosis of the sigmoid 
colon. Left adnexal cystic mass likely tubo-ovarian. Severe right iliac artery 
stenosis. Ileus is favored over enteritis or partial obstruction. Calcification 
along the apex of the heart can be associated myocardial calcinosis and previous
infarction. 

9/16 CT Brain: Old right occipital lobe infarct. No acute intracranial 
abnormality. 

9/16 CT Angiogram Neck: Suggestive of pulmonary hypertension. Mild aneurysm of 
the aortic arch. 

9/9 EKG: R 79 sinus rhythm, Left axis deviation, S1-S2-S3 pattern consistent 
with pulmonary disease, Incomplete RBBB, Anteroseptal MI, probably recent. 



Treatment 9/9: IV Fentanyl 50 mcg x1, IV Na Chl 500 mls @ 999 mls/hr q31M, IV Na
Chl 1,000 mls @ 75 mls/hr q13H, IV Dilaudid 0.5 mg q3H, IV mag Sulfate/Dextrose 
100 mls @ 100 mls/hr q1H.

9/15: Transferred to ICU, O2 2Lnc, Neuro Assessment, IV Nitro drip subsequently 
changed to IV Cardizem drip per Cardiology, ECHO ordered, D Dimer.

Patient offered Heart Catheterization but is refusing.  



Please clarify the patient's change in mental status, if known:



[  ]  Hypertensive Encephalopathy

[  ]  Metabolic Encephalopathy

[  ]  Toxic Encephalopathy

[  ]  Other, please specify____________

[  ]  Unable to determine



(Template Last Revised: March 2021)



Acute metabolic encephalopathy

___________________________________________________________________________

MTDD

## 2022-09-16 NOTE — P.PN
Subjective


Progress Note Date: 09/16/22


Principal diagnosis: 





Coronary artery disease





This is a 67-year-old female patient with sees a cardiologist out of the town 

with a past medical history significant for coronary artery disease with prior 

revascularization in terms of CABG and stenting with unknown details as well as 

mild cardiomyopathy was EF between 40-45% as well as history of carotid 

atherosclerosis and status post bilateral carotid endarterectomy as well as 

hypertension and dyslipidemia was admitted to the hospital with epigastric 

discomfort/chest discomfort.  She was diagnosed with hypertension emergency and 

she was treated medically and yesterday she was started on nitro drip.





September 16 of 2022


Last night the A team was called to see the patient because she developed change

in mental status.  Code stroke was called as well.  Computed tomography scan of 

the brain was performed and showed no evidence off acute infarct but old 

occipital infarct.  CTA of the neck as well was performed and showed no 

abnormalities.  An EKG was performed and showed tachycardia with a differential 

diagnosis of atrial tachycardia versus sinus tachycardia.  Hemodynamically she 

is stable and as a matter of fact she is hypertensive.  Currently she is on 

nitro drip.  Beside that she continues to have change in mental status and she 

is lethargic and not responding.  I am going to DC the nitro IV and start the 

patient on Cardizem IV for the heart rate and blood pressure.  I will obtain a 

d-dimer to rule out PE.  Beside that going to obtain an echocardiogram.  

Neurology is on the case.  Yesterday because the patient was having chest 

discomfort we discussed with her to proceed with heart catheterization but she 

refused.





Objective





- Vital Signs


Vital signs: 


                                   Vital Signs











Temp  97.8 F   09/16/22 07:10


 


Pulse  133 H  09/16/22 07:15


 


Resp  12   09/16/22 07:15


 


BP  143/76   09/16/22 07:15


 


Pulse Ox  94 L  09/16/22 07:15


 


FiO2      








                                 Intake & Output











 09/15/22 09/16/22 09/16/22





 18:59 06:59 18:59


 


Weight 48.081 kg  


 


Other:   


 


  Voiding Method Toilet Bedside Commode 


 


  # Voids 1  














- Constitutional


General appearance: Present: no acute distress





- Respiratory


Respiratory: bilateral: diminished





- Cardiovascular


Rhythm: regular





- Labs


CBC & Chem 7: 


                                 09/16/22 05:49





                                 09/16/22 05:49


Labs: 


                  Abnormal Lab Results - Last 24 Hours (Table)











  09/15/22 09/16/22 09/16/22 Range/Units





  07:32 04:44 05:49 


 


WBC     (3.8-10.6)  k/uL


 


Hct     (34.0-46.0)  %


 


Neutrophils #     (1.3-7.7)  k/uL


 


D-Dimer     (<0.60)  mg/L FEU


 


Glucose    191 H  (74-99)  mg/dL


 


POC Glucose (mg/dL)   138 H   ()  mg/dL


 


Alkaline Phosphatase    137 H  ()  U/L


 


Troponin I  0.074 H*    (0.000-0.034)  ng/mL


 


Total Protein    5.7 L  (6.3-8.2)  g/dL














  09/16/22 09/16/22 09/16/22 Range/Units





  05:49 05:49 05:49 


 


WBC  17.8 H    (3.8-10.6)  k/uL


 


Hct  47.3 H    (34.0-46.0)  %


 


Neutrophils #  15.2 H    (1.3-7.7)  k/uL


 


D-Dimer    1.68 H  (<0.60)  mg/L FEU


 


Glucose     (74-99)  mg/dL


 


POC Glucose (mg/dL)     ()  mg/dL


 


Alkaline Phosphatase     ()  U/L


 


Troponin I   0.046 H*   (0.000-0.034)  ng/mL


 


Total Protein     (6.3-8.2)  g/dL














  09/16/22 Range/Units





  06:04 


 


WBC   (3.8-10.6)  k/uL


 


Hct   (34.0-46.0)  %


 


Neutrophils #   (1.3-7.7)  k/uL


 


D-Dimer   (<0.60)  mg/L FEU


 


Glucose   (74-99)  mg/dL


 


POC Glucose (mg/dL)  212 H  ()  mg/dL


 


Alkaline Phosphatase   ()  U/L


 


Troponin I   (0.000-0.034)  ng/mL


 


Total Protein   (6.3-8.2)  g/dL














Assessment and Plan


Assessment: 





Assessment


Chest discomfort which appeared to be pleuritic


Hypertension emergency


CAD with prior revascularization


Carotid atherosclerosis was bilateral revascularization


Abdominal discomfort


Decrease appetite and weight loss


Mild cardiomyopathy


Change in mental status





Plan


DC nitroglycerin and start the patient on Cardizem IV


Neurology is on the case for further evaluation and possible repeat CT


Obtain an echocardiogram


Obtain d-dimer


Follow-up with the patient

## 2022-09-16 NOTE — EEG
ELECTROENCEPHALOGRAM REPORT



CLINICAL HISTORY:

This is a 67-year-old woman with altered mental status.  The video EEG is 
obtained to

evaluate for seizure and epileptiform discharges.



RELEVANT MEDICATION:

Klonopin.



EEG TYPE:

A routine 21-channel EEG is performed with video using the 10/20 electrode 
placement

system.



DESCRIPTION:

The background consists of low-to-moderate voltage of 2.5 to 3.5 Hz nonrhythmic 
delta

activity intermixed at times with theta activity.  There is no physiological 
stage 2

sleep architecture.



There is no focal slowing.



There is mild-to-moderate amount of myogenic artifact over the left frontal 
hemisphere.



INTERICTAL AND ICTAL:

Moderate amount of sharp and slow waves over the left frontotemporal region 
without any

evolution to seizure.  There is no seizures noted during this study.



ACTIVATION PROCEDURES:

Photic stimulation and hyperventilation are not performed.



CLINICAL INTERPRETATION:

This is an abnormal routine EEG.  The background slowing is suggestive of 
moderate-to-

severe encephalopathy.  The epileptiform discharges over the left frontotemporal
region

increases risk for seizure.  There is no seizure noted during the study.  
Clinical

correlation is recommended.





MMODL / IJN: [id number] / Job#: [id number]

TERI

## 2022-09-16 NOTE — CT
EXAMINATION TYPE: CT brain wo con for TPA

 

DATE OF EXAM: 9/16/2022

 

COMPARISON: None

 

HISTORY: AMS,

 

CT DLP: 1121.4 mGycm

Automated exposure control for dose reduction was used.

 

Images obtained of the brain with no contrast.

 

There is 4 x 3 cm area of hypodensity in the medial right occipital lobe related to old cortical infa
rct. There is no mass effect nor midline shift. No sign of intracranial hemorrhage. The calvarium is 
intact. Skull base is intact.

 

IMPRESSION:

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 No acute intracranial abnormality.

## 2022-09-16 NOTE — XR
EXAMINATION TYPE: XR chest 1V portable

 

DATE OF EXAM: 9/16/2022

 

COMPARISON: 9/13/2022

 

HISTORY: Check tube placement

 

TECHNIQUE: Single view

 

FINDINGS: There is nasogastric tube and the tip is over the body of the stomach. There is no heart fa
ilure nor confluent pneumonic infiltrate. There are sternal wires. Bony thorax is intact

 

IMPRESSION: Mild cardiomegaly. NG tube is in the stomach. Pulmonary congestion improved compared to p
revious exam

## 2022-09-16 NOTE — CA
Transthoracic Echo Report 

 Name: Jaime Holguin 

 MRN:    [id number] 

 Age:    67     Gender:     F 

 

 :    1955 

 Exam Date:     2022 08:17 

 Exam Location: [facility] 

 Ht (in):     62     Wt (lb):     106 

 Ordering Physician:        Rogesr Garcia MD  

                            ([id number]) 

 Attending/Referring Phys: 

 Technician         Charmaine Oreilly RDCS 

 Procedure CPT: 

 Indications:       PE 

 

 Cardiac Hx: 

 Technical Quality:      Good 

 Contrast 1:                                Total Dose (mL): 

 Contrast 2:                                Total Dose (mL): 

 

 MEASUREMENTS  (Male / Female) Normal Values 

 2D ECHO 

 LV Diastolic Diameter PLAX        4.9 cm                4.2 - 5.9 / 3.9 - 5.3 cm 

 LV Systolic Diameter PLAX         3.9 cm                 

 IVS Diastolic Thickness           1.0 cm                0.6 - 1.0 / 0.6 - 0.9 cm 

 LVPW Diastolic Thickness          1.6 cm                0.6 - 1.0 / 0.6 - 0.9 cm 

 LV Relative Wall Thickness        0.5                    

 RV Internal Dim ED PLAX           2.7 cm                 

 LA Systolic Diameter LX           2.8 cm                3.0 - 4.0 / 2.7 - 3.8 cm 

 

 M-MODE 

 Aortic Root Diameter MM           2.9 cm                 

 LA Systolic Diameter MM           3.3 cm                 

 LA Ao Ratio MM                    1.1                    

 MV E Point Septal Separation      1.1 cm                 

 AV Cusp Separation MM             1.9 cm                 

 

 DOPPLER 

 TR Peak Velocity                  303.4 cm/s             

 TR Peak Gradient                  36.8 mmHg              

 Right Ventricular Systolic Press  41.4 mmHg              

 

 

 FINDINGS 

 Left Ventricle 

 Left ventricular ejection fraction is estimated at 20-25 %. Anterseptel, [place]  

 Hypokinesis 

 

 Right Ventricle 

 Normal right ventricular size and function. Mild pulmonary hypertension. 

 

 Right Atrium 

 No right atrial thrombus or mass seen. 

 

 Left Atrium 

 Normal left atrial size. 

 

 Mitral Valve 

 Structurally normal mitral valve. Mild mitral regurgitation. 

 

 Aortic Valve 

 Trileaflet aortic valve. Moderate Aortic valve sclerosis. 

 

 Tricuspid Valve 

 Structurally normal tricuspid valve. Mild tricuspid regurgitation. 

 

 Pulmonic Valve 

 Structurally normal pulmonic valve. 

 

 Pericardium 

 Normal pericardium. 

 

 Aorta 

 

 

 CONCLUSIONS 

 Dilated left ventricle with severe LV dysfunction 

 Previewed by:  

 Dr. Rupert Shabazz MD 

 (Electronically Signed) 

 Final Date:      2022 12:22

## 2022-09-17 LAB
ALBUMIN SERPL-MCNC: 3.3 G/DL (ref 3.5–5)
ALP SERPL-CCNC: 127 U/L (ref 38–126)
ALT SERPL-CCNC: 15 U/L (ref 4–34)
ANION GAP SERPL CALC-SCNC: 6 MMOL/L
AST SERPL-CCNC: 21 U/L (ref 14–36)
BASOPHILS # BLD AUTO: 0 K/UL (ref 0–0.2)
BASOPHILS NFR BLD AUTO: 0 %
BUN SERPL-SCNC: 16 MG/DL (ref 7–17)
CALCIUM SPEC-MCNC: 8.9 MG/DL (ref 8.4–10.2)
CHLORIDE SERPL-SCNC: 101 MMOL/L (ref 98–107)
CO2 SERPL-SCNC: 35 MMOL/L (ref 22–30)
EOSINOPHIL # BLD AUTO: 0.1 K/UL (ref 0–0.7)
EOSINOPHIL NFR BLD AUTO: 1 %
ERYTHROCYTE [DISTWIDTH] IN BLOOD BY AUTOMATED COUNT: 4.69 M/UL (ref 3.8–5.4)
ERYTHROCYTE [DISTWIDTH] IN BLOOD: 14.2 % (ref 11.5–15.5)
GLUCOSE SERPL-MCNC: 106 MG/DL (ref 74–99)
HCT VFR BLD AUTO: 45.8 % (ref 34–46)
HGB BLD-MCNC: 14.5 GM/DL (ref 11.4–16)
LYMPHOCYTES # SPEC AUTO: 1.2 K/UL (ref 1–4.8)
LYMPHOCYTES NFR SPEC AUTO: 10 %
MCH RBC QN AUTO: 31 PG (ref 25–35)
MCHC RBC AUTO-ENTMCNC: 31.8 G/DL (ref 31–37)
MCV RBC AUTO: 97.5 FL (ref 80–100)
MONOCYTES # BLD AUTO: 0.7 K/UL (ref 0–1)
MONOCYTES NFR BLD AUTO: 6 %
NEUTROPHILS # BLD AUTO: 9.8 K/UL (ref 1.3–7.7)
NEUTROPHILS NFR BLD AUTO: 82 %
PLATELET # BLD AUTO: 216 K/UL (ref 150–450)
POTASSIUM SERPL-SCNC: 4.4 MMOL/L (ref 3.5–5.1)
PROT SERPL-MCNC: 5.5 G/DL (ref 6.3–8.2)
SODIUM SERPL-SCNC: 142 MMOL/L (ref 137–145)
WBC # BLD AUTO: 11.9 K/UL (ref 3.8–10.6)

## 2022-09-17 RX ADMIN — LEVOTHYROXINE SODIUM SCH MCG: 0.11 TABLET ORAL at 11:18

## 2022-09-17 RX ADMIN — RANOLAZINE SCH MG: 500 TABLET, FILM COATED, EXTENDED RELEASE ORAL at 21:45

## 2022-09-17 RX ADMIN — DILTIAZEM HYDROCHLORIDE SCH: 5 INJECTION INTRAVENOUS at 08:26

## 2022-09-17 RX ADMIN — ATORVASTATIN CALCIUM SCH: 80 TABLET, FILM COATED ORAL at 13:40

## 2022-09-17 RX ADMIN — FAMOTIDINE SCH MG: 20 TABLET, FILM COATED ORAL at 21:29

## 2022-09-17 RX ADMIN — NICOTINE SCH PATCH: 14 PATCH, EXTENDED RELEASE TRANSDERMAL at 10:34

## 2022-09-17 RX ADMIN — METOPROLOL TARTRATE SCH MG: 50 TABLET, FILM COATED ORAL at 11:17

## 2022-09-17 RX ADMIN — LACOSAMIDE SCH MLS/HR: 10 INJECTION INTRAVENOUS at 21:50

## 2022-09-17 RX ADMIN — METOPROLOL TARTRATE SCH MG: 50 TABLET, FILM COATED ORAL at 21:29

## 2022-09-17 RX ADMIN — LACOSAMIDE SCH MLS/HR: 10 INJECTION INTRAVENOUS at 11:08

## 2022-09-17 RX ADMIN — SERTRALINE HYDROCHLORIDE SCH MG: 100 TABLET ORAL at 21:45

## 2022-09-17 RX ADMIN — FAMOTIDINE SCH: 20 TABLET, FILM COATED ORAL at 13:40

## 2022-09-17 RX ADMIN — NITROGLYCERIN SCH: 20 INJECTION INTRAVENOUS at 10:08

## 2022-09-17 RX ADMIN — ASPIRIN 81 MG CHEWABLE TABLET SCH MG: 81 TABLET CHEWABLE at 11:18

## 2022-09-17 RX ADMIN — RANOLAZINE SCH: 500 TABLET, FILM COATED, EXTENDED RELEASE ORAL at 13:40

## 2022-09-17 RX ADMIN — CLOPIDOGREL BISULFATE SCH MG: 75 TABLET ORAL at 11:17

## 2022-09-17 NOTE — P.PN
Subjective


Progress Note Date: 09/17/22


Principal diagnosis: 





Mental status changes.





Pulmonary consult dated 09/16/2022.





This is a 67-year-old female who was initially admitted back on September 9, 

with abdominal pain and chest pain.  The patient was found to have a non-ST 

segment elevation myocardial infarction, and the plan was for heart 

catheterization today, September 16.  She ended up coming to the ICU on Septe mber 16, because she was found to have an elevated blood pressure, increased 

heart rate, and she became poorly responsive, and obtunded.  A code stroke was 

called, and her NIH score was 19.  She was very poorly responsive.  She appeared

to have some right-sided weakness.  I'm currently seeing the patient in the 

intensive care unit.  The patient did have a CT of the brain which showed an old

right occipital infarct.  CT angiogram was negative.  Currently, she is on 2 L 

of nasal cannula.  She's getting saline at 10 mL an hour.  She is on a 

nitroglycerin drip at 10 mcg/m.  She is poorly responsive but she does arouse.  

She is able to protect her airway at this time.  White count 17.8, hemoglobin 

15.1, hematocrit 47.8, and platelet count 297,000.  PT INR and PTT are all 

normal.  D-dimer is 1.68.  Sodium 140, potassium 4.4, chlorides 100, CO2 30, BUN

11, creatinine 0.64.  The patient's troponins were 0.083, 0.074 and 0.046.  

Chest x-ray from September 13 showed changes of CHF.





Progress note dated 09/17/2022.





67-year-old female who was seen in consultation yesterday.  She was initially a

dmitted back on September 9, with abdominal pain and chest pain.  She was found 

to have a non-ST segment elevation myocardial infarction.  The patient ended up 

in the intensive care unit on September 16 because of mental status changes.  

Initially, she was thought to possibly have a CVA.  She was seen by neurology 

and there thinking that maybe she had a seizure, which explains her mental 

status changes.  She's on 2 L of oxygen.  She's getting saline at 10 mL an hour.

 The Cardizem drip has been turned off.  His bit more awake and alert.  

Initially, it appeared that she was not moving her right side.  That is not the 

case now.  White count 11.9, hemoglobin 14.5, hematocrit 45.8, and platelet 

count 216,000.  Sodium 142, potassium 4.4, chlorides 101, CO2 35, BUN 16, 

creatinine 0.49.  Albumin is 3.3.  EEG was consistent with slowing, and brain 

MRI was reviewed.





Objective





- Vital Signs


Vital signs: 


                                   Vital Signs











Temp  97 F L  09/17/22 03:00


 


Pulse  66   09/17/22 07:00


 


Resp  16   09/17/22 07:00


 


BP  146/66   09/17/22 07:00


 


Pulse Ox  99   09/17/22 07:00


 


FiO2      








                                 Intake & Output











 09/16/22 09/17/22 09/17/22





 18:59 06:59 18:59


 


Intake Total 190 120 133.583


 


Output Total 730 390 15


 


Balance -540 -270 118.583


 


Intake:   


 


   120 10


 


    .9 KVO 90 120 10


 


    levETIRAcetam IV 1,500 mg 100  





    In Saline 1 100ml.bag @   





    400 mls/hr IVPB ONCE STA   





    Rx#:[id number]   


 


  Intake, IV Titration   123.583





  Amount   


 


    Diltiazem 125 mg In   123.583





    Sodium Chloride 0.9% 100   





    ml @ 5 MG/HR 5 mls/hr IV   





    .Q24H [facility] Rx#:[id number]   


 


Output:   


 


  Urine 730 390 15


 


Other:   


 


  Voiding Method Indwelling Catheter Indwelling Catheter 














- Exam





No acute distress, much more awake today.  Currently on 2 L of oxygen.





HEENT examination is grossly unremarkable.  





Neck supple.  Full range of motion.  No adenopathy thyromegaly or neck vein 

distention.





Cardiovascular examination reveals regular rhythm rate.  S1-S2 normal.  No S3 or

S4.  No discernible murmur noted.  Heart rate 60 bpm.





Lungs reveal mostly clear breath sounds.  Mild scattered rhonchi.  No wheezes or

crackles.  Breath sounds equal bilaterally.  Saturations 100 % on 2 L.





Abdomen soft bowel sounds are heard.  No masses or tenderness.





Extremities are intact.  No cyanosis clubbing or edema.





Skin is without rash or lesion.





Neurologic examination reveals patient to be much more awake and alert.  She 

does move all 4 extremities.





- Labs


CBC & Chem 7: 


                                 09/17/22 06:12





                                 09/17/22 06:12


Labs: 


                  Abnormal Lab Results - Last 24 Hours (Table)











  09/16/22 09/16/22 09/17/22 Range/Units





  11:07 11:07 06:12 


 


WBC    11.9 H  (3.8-10.6)  k/uL


 


Neutrophils #    9.8 H  (1.3-7.7)  k/uL


 


Carbon Dioxide     (22-30)  mmol/L


 


Creatinine     (0.52-1.04)  mg/dL


 


Glucose     (74-99)  mg/dL


 


Alkaline Phosphatase     ()  U/L


 


Ammonia   31 H   (<30)  umol/L


 


Total Protein     (6.3-8.2)  g/dL


 


Albumin     (3.5-5.0)  g/dL


 


Triglycerides  246.00 H    (0..00)  mg/dL


 


VLDL Cholesterol, Calc  49.20 H    (5.00-40.00)  mg/dL


 


HDL Cholesterol  35.80 L    (40.00-60.00)  mg/dL














  09/17/22 Range/Units





  06:12 


 


WBC   (3.8-10.6)  k/uL


 


Neutrophils #   (1.3-7.7)  k/uL


 


Carbon Dioxide  35 H  (22-30)  mmol/L


 


Creatinine  0.49 L  (0.52-1.04)  mg/dL


 


Glucose  106 H  (74-99)  mg/dL


 


Alkaline Phosphatase  127 H  ()  U/L


 


Ammonia   (<30)  umol/L


 


Total Protein  5.5 L  (6.3-8.2)  g/dL


 


Albumin  3.3 L  (3.5-5.0)  g/dL


 


Triglycerides   (0..00)  mg/dL


 


VLDL Cholesterol, Calc   (5.00-40.00)  mg/dL


 


HDL Cholesterol   (40.00-60.00)  mg/dL














Assessment and Plan


Assessment: 





Rule out acute cerebrovascular accident, versus possible seizure and postictal 

state.





Non-ST segment elevation myocardial infarction.





History of myocardial infarction.





History of hypertension.





History of hyperlipidemia.





History of hypothyroidism.





Prior history of bypass grafting.





CAD with prior stent placement.





Old right occipital infarct on computed tomography scan of the brain.


Plan: 





Plan dated 09/16/2022.





The patient was transferred to the intensive care unit, room 252.  She is 

evaluated there.  Currently, she is on 2 L of oxygen.  Labs, x-rays, and 

medications are all reviewed.  The patient's also getting saline at 10 mL an 

hour, and nitroglycerin at 10 mcg/m for blood pressure control.  Medications are

reviewed.  Prognosis is guarded.  Neurology is consulted.  Additional recomme

ndations and suggestions are forthcoming.





Plan dated 09/17/2022.





The patient appears much more awake today.  According to the nurse, neurology 

feels that the patient may have had a seizure.  Currently, the patient is moving

all 4 extremities.  No longer is a patient on nitroglycerin for blood pressure 

control.  Also, Cardizem drip has been turned off as well.  Labs, x-rays, and 

medications are reviewed.  Prognosis is guarded.  We will continue to follow.  

EEG showed diffuse slowing, and also possible epileptiform area.


Time with Patient: Greater than 30

## 2022-09-17 NOTE — P.PN
Progress Note - Text


Progress Note Date: 09/17/22





Hospital course:


Patient is a 67-year-old female with a known history of coronary artery disease 

status post CABG, history of MI, history of stent placement and bilateral 

carotid surgery and hypothyroidism


Was sent to ER by her primary care physician.  Patient has been having abdominal

pain mainly in the lower abdomen associate with nausea for the past 3 to 4 

months.  Patient could not keep down food and also weight loss about 7 non-B 

during the last couple 1 to 2 months.  Patient is also having generalized 

weakness and could not keep her balance.  Denied any diarrhea.  No complaints of

chest pain or shortness of breath.  Patient has been feeling very weak.


Patient was discharged from the hospital on 5/3/2022.,  Admitted due to unstable

angina and underwent nuclear stress test showed predominantly a fixed defect.  

Imdur was added at that time.


Chest x-ray showed no evidence for acute pulmonary disease.





CT of the abdomen pelvis showed there is mild wall thickening of the colon 

diffusely correlate for mild colitis.  There is also evidence of diverticulosis 

of the sigmoid colon.  Wall thickening is noted which can be associated with a 

mucosal lesion correlate with direct visualization as clinically warranted.


There is a 3.2 cm left adnexal cystic mass likely tubo-ovarian.


Severe right iliac artery stenosis.


There is prominent small bowel loops ileus is favored.


Calcification around the apex of the heart can be associated with myocardial 

calcified calcinosis and previous infarction.





Laboratory data showed WBC 8.2, hemoglobin 14.0 and platelets 214


Sodium 139 potassium 4.4 chloride 105 bicarb is 26 BUN 17 and creatinine 0.74 

and blood sugar is 109 AST 23 alk phos 141 and ALT 49 bilirubin level is 0.5 

magnesium 1.7


TSH 0.039 and free T4 levels 1.67.  Albumin 4.1


Urinalysis negative for infection.





9/10/2022


Patient is currently lying in bed.  Still feels very weak.  No complaints of 

chest pain.  No worsening shortness of breath.  Still nauseated.  Decreased oral

intake.  No cough or sputum production.


No headache or dizziness or lightheadedness.


Lab data this morning showed sodium 142 potassium 4.3 chloride 106 bicarb is 

28.4 BUN 13.3, creatinine 0.6.


 6.8, AFP 2.2 and CEA 6.5.


Urine is negative for infection.  Stool for occult blood is negative.





Patient was hypotensive this morning required fluid bolus and blood pressure did

improve.


Patient was also started on antibiotics for possible colitis.





09/11/2022


Patient is currently resting in bed.  Awake alert and oriented.  Denied any 

complaints of chest pain or worsening shortness of breath.  No complaints of 

abdominal pain today.  Patient says that she has feels very weak.


Yesterday evening patient had chest pain associated with shortness of breath.  

And is also complaining of epigastric pain.  Troponin level is elevated at 0.526

and 0.311.  Patient was started on heparin drip and was transferred to telemetry

unit.


Patient has been afebrile.  No diarrhea.  Does have nausea.  No episodes of 

vomiting.  No cough or sputum production


Laboratory data showed WBC 8.1 hemoglobin 12.6 and platelets 147





September 12: I assumed care of patient today


Still having abdominal pain.  Decreased oral intake.  Had a BM yesterday.  

Rather diet.  Changing the diet to full liquids.  On IV Flagyl and ceftriaxone.


September 13: Patient was delirious last night likely after Dilaudid.-for pain. 

Doing much better this morning.  No abdominal pain is better.  Had liquid BM 

today.  Encourage oral intake.  Continue his Flagyl and ceftriaxone.  We'll get 

a GI consultation.


September 14: Tired.  Does not like the hospital food contents does not want to 

eat.  Patient has passed her daughter to bring in some food.  Has chronic back 

pain.  Abdominal pain is much better.  Outpatient colonoscopy per GI.  Patient 

rather weak but PTOT.  Myself and the  talk to the patient about 

possible rehab.  Patient is keen to go home.   will be to the 

 the patient this afternoon.


September 15: Patient had chest pain overnight.  Did tolerate some diet brought 

in by her daughter.  Put on IV nitroglycerin.  Seen by cardiology today.  

Planning cardiac catheterization tomorrow.  Reclining chair.


September 16: Overnight patient was found unresponsive.  A team was called.  His

question about some left arm weakness.  Patient smoked with ICU.  Neurology was 

consulted.  Patient still lethargic.  Pupils slightly dilated.  No focal 

weakness.  Spoke to the patient's daughter and  at the bedside.  

Telemetry was showing atrial tachycardia versus sinus tachycardia.  Nitro drip 

was discontinued.  Started on Cardizem drip.  MRI of the brain done.   

and Dr. Glez to patient have the NG tube for medications.


September 17: ICU: Discussed with Dr. alcazar from neurology this morning.  He has

to patient on IV Vimpat.  Cardizem drip was discontinued yesterday evening.  

Lopressor dose increased.  Sinus rhythm.  Nurse reported patient not able to see

since the episode.  At the bedside patient can only see hand movements.  Not 

able to count fingers.  Can only see outline of people.  Answering questions 

slowly today.





Active Medications





Acetaminophen (Acetaminophen Tab 325 Mg Tab)  650 mg PO Q6HR PRN


   PRN Reason: Fever and/ or Pain


   Last Admin: 09/14/22 20:32 Dose:  650 mg


   


Aspirin (Aspirin 81 Mg)  81 mg PO DAILY [facility]


   Last Admin: 09/17/22 11:18 Dose:  81 mg


   


Atorvastatin Calcium (Atorvastatin 80 Mg Tab)  80 mg PO DAILY [facility]


   Last Admin: 09/17/22 13:40 Dose:  Not Given


   


Clonazepam (Clonazepam 1 Mg Tab)  1 mg PO HS [facility]


   Last Admin: 09/17/22 21:29 Dose:  1 mg


   


Clopidogrel Bisulfate (Clopidogrel 75 Mg Tab)  75 mg PO DAILY [facility]


   Last Admin: 09/17/22 11:17 Dose:  75 mg


   


Famotidine (Famotidine 20 Mg Tab)  20 mg PO BID [facility]


   Last Admin: 09/17/22 21:29 Dose:  20 mg


   


Nitroglycerin/Dextrose 50 mg/ (IV Solution)  250 mls @ 1.5 mls/hr IV .Q24H [facility]; 

Protocol


   Last Admin: 09/17/22 10:08 Dose:  Not Given


   


Diltiazem HCl 125 mg/ Sodium (Chloride)  125 mls @ 5 mls/hr IV .Q24H [facility]


   Last Infusion: 09/17/22 08:26 Dose:  0 mg/hr, 0 mls/hr


   


Lacosamide 100 mg/ Sodium (Chloride)  60 mls @ 100 mls/hr IVPB BID [facility]


   Last Admin: 09/17/22 21:50 Dose:  100 mls/hr


   


Levothyroxine Sodium (Levothyroxine 112 Mcg Tab)  112 mcg PO DAILY@0630 [facility]


   Last Admin: 09/17/22 11:18 Dose:  112 mcg


   


Lisinopril (Lisinopril 5 Mg Tab)  5 mg PO HS [facility]


   Last Admin: 09/17/22 21:29 Dose:  5 mg


   


Metoprolol Tartrate (Metoprolol Tartrate 5 Mg/5 Ml Vial)  5 mg IVP Q6HR PRN


   PRN Reason: Blood Pressure - High


Metoprolol Tartrate (Metoprolol Tartrate 50 Mg Tab)  50 mg PO BID [facility]


   Last Admin: 09/17/22 21:29 Dose:  50 mg


   


Naloxone HCl (Naloxone 0.4 Mg/Ml 1 Ml Vial)  0.2 mg IV Q2M PRN


   PRN Reason: Opioid Reversal


Nicotine (Nicotine 14mg/24hr Patch)  1 patch TRANSDERM DAILY [facility]


   Last Admin: 09/17/22 10:34 Dose:  1 patch


   


Ondansetron HCl (Ondansetron 4 Mg Tab)  4 mg PO Q8HR PRN


   PRN Reason: Nausea And Vomiting


   Last Admin: 09/13/22 21:07 Dose:  4 mg


   


Ranolazine (Ranolazine 500 Mg Tab.Er.12h)  500 mg PO Q12HR [facility]


   Last Admin: 09/17/22 21:45 Dose:  500 mg


   


Sertraline HCl (Sertraline 100 Mg Tab)  100 mg PO HS [facility]


   Last Admin: 09/17/22 21:45 Dose:  100 mg


   











On examination:


VITAL SIGNS: 98, 70, 16, 142/54, 98% on 2 L


GENERAL APPEARANCE: Laying in bed, lethargic but arousable.


HEENT: Normal external appearance of nose and ear.  Oral cavity normal


EYES: Pupils slightly enlarged.  Right eye some medial convergence. Conjunctiva 

normal. 


NECK: JVD not raised. Mass not palpable. 


RESPIRATORY: Respiratory effort normal. Lungs decreased breath sounds


CARDIOVASCULAR: First and second sounds normal. No edema. 


ABDOMEN: Soft.  Tender, no guarding rigidity Liver and spleen not palpable.  No 

mass palpable. 


PSYCHIATRY: Unable to assess


NEUROLOGICAL: Patient's vision is limited to hand movement.





INVESTIGATIONS, reviewed in the clinical context:


MRI brain: May be some leopoldo-infarct ischemic changes right occipital lobe.  

Chronic appearing subcortical white matter changes to the bilateral occipital 

lobes.  And periventricular white matter within the brainstem.


September 16: WBC 17.8 hemoglobin 15.1 potassium 4.4 creatinine 0.64 troponin 

0.046 LDL 66


September 15: Troponin 0.083, 0.074


September 14: White count 8.6 and regular 13.5 potassium 3.4 creatinine 0.44


Chest CTA: Negative PE.  IVC filter in place.  Cardiomegaly.  Trace bilateral 

pleural effusion.  


White count 8.1 hemoglobin 12.6 platelets 147 potassium 4 BUN 9 creatinine 0.56


Troponin I 0.012, 0.5-6, 0.311


CT abdomen and pelvis with contrast: Severe cardiomegaly.  Colonic 

diverticulosis.  3.2 cm left adnexal cyst mass likely tubo-ovarian.  Severe 

right iliac artery stenosis.











Assessment and plan: 





-Acute change in mental status with patient becoming unresponsive.  Patient now 

noticed to have limited vision which is new finding down to hand movement.  MRI 

brain is showing infarct in the occipital lobe.


Follow with neurology.





-Post infarct angina:


Patient initially declined cardiac catheterization.  





-Non-ST elevation myocardial infarction.


Aspirin beta blocker Plavix.  Being followed by cardiology.  





-Acute Colitis.  Better


IV ceftriaxone, IV Flagyl.   full liquids-advanced to soft diet.  Change to by 

mouth Augmentin.





- left adnexal cystic mass likely tubo-ovarian.Likely adnexal cyst, 


was seen by OB/GYN: [place] to be incidental finding.  Asymptomatic.  [facility] testing 

requested.





-Severe right iliac artery stenosis


Aspirin, Plavix.  Seen by Dr. Corey.  Will follow up outpatient.  4-6 weeks.





-Coronary artery disease history of CABG in 2010 and prior PCI's most recent in 

02/2021 and chronic total occluded RCA.


Aspirin, Lipitor, Plavix, Lopressor





-Chronic CHF, Ischemic cardiomyopathy ejection fraction 40 to 45%


 Aldactone 12.5 mg daily.  Lopressor.  Zestril





-Chronic left ventricle apical thrombus was on anticoagulation previously.  

Calcified





-Moderate to severe TR





-Peripheral arterial disease, 


Aspirin, Plavix.  





-Osteoarthritis primary bilateral


Use pain medications as needed





-Hypothyroidism


Synthroid 112 g a





-Chronic urinary stress incontinence





-Peripheral neuropathy





-Atrial tachycardia versus sinusitis cardiac.


IV Cardizem drip








In view of the new clinical findings of decreased vision limited to hand 

movement and finding of occipital lobe infarct.  We will have neurology assess. 

Patient is on IV Vimpat.  Patient been taking her medications.  Today

## 2022-09-17 NOTE — P.PN
Subjective


Progress Note Date: 09/17/22


The patient is seen at bedside and according to the nurse she is more awake and 

she was verbalizing to her briefly and stated she is having difficulty seeing.  

Upon seeing her, she was not verbalizing.








Objective





- Vital Signs


Vital signs: 


                                   Vital Signs











Temp  98.4 F   09/17/22 12:00


 


Pulse  65   09/17/22 12:00


 


Resp  13   09/17/22 12:00


 


BP  137/63   09/17/22 12:00


 


Pulse Ox  94 L  09/17/22 12:00


 


FiO2      








                                 Intake & Output











 09/16/22 09/17/22 09/17/22





 18:59 06:59 18:59


 


Intake Total 190 120 723.583


 


Output Total 730 390 55


 


Balance -540 -270 668.583


 


Intake:   


 


   120 600


 


    .9 KVO 90 120 50


 


    Lacosamide  mg In   50





    Sodium Chloride 0.9% 50   





    ml @ 100 mls/hr IVPB BID   





    [facility] Rx#:[id number]   


 


    Lactated Ringers 500 ml @   500





    999 mls/hr IV .Q31M ONE   





    Rx#:[id number]   


 


    levETIRAcetam IV 1,500 mg 100  





    In Saline 1 100ml.bag @   





    400 mls/hr IVPB ONCE STA   





    Rx#:[id number]   


 


  Intake, IV Titration   123.583





  Amount   


 


    Diltiazem 125 mg In   123.583





    Sodium Chloride 0.9% 100   





    ml @ 5 MG/HR 5 mls/hr IV   





    .Q24H [facility] Rx#:[id number]   


 


Output:   


 


  Urine 730 390 55


 


Other:   


 


  Voiding Method Indwelling Catheter Indwelling Catheter 














- Exam


GENERAL: The patient is lying in bed and is not in acute distress.





NEUROLOGICAL:


Higher mental function: The patient is awake but was not verbalizing for the 

most part then stated "Leave me alone".  Is not following commands.  


Cranial nerves: The pupils are round, equal and reactive to light.  No facial 

weakness.  No dysarthria.  Otherwise could not assess rest of cranial nerves.   


Motor: The strength is unable to assess because of condition.   Normal tone and 

bulk.  


Cerebellum: Unable to assess.


Sensation: Unable to assess.








SOME OF THE WORK-UP DURING THIS HOSPITAL VISIT:


CT of the head was ordered and is a reported as old right occipital lobe 

infarct.  No acute intracranial abnormality.  


CT angiography of the head and neck was reported as negative CT angiography of 

the brain.  Negative CT angiography of the neck were dominant left vertebral and

diminutive right vertebral.  Large pulmonary artery suggestive of pulmonary 

hypertension.  Mild aneurysm of the aortic arch.


TSH is 0.039 and free T4 is 1.67.


Ammonia is 31 (normal <30).


Lipid panel is triglycerides 246, cholesterol is 151, LDL 66 and HDL 35.


MRI Brain is reported as there maybe some mild per-infarct ischemic changes 

right occipital lobe.  Chronic appearing subcortical white matter changes to the

bilateral occipital lobses, periventricular white matter, whithin the brainstem.

 Suspicious changes to suggest metastasis is not identified.  I personally 

reviewed MRI Brain and I do not feel patient has acute or subacute ischemic 

stroke.  Patient does have some patchy increase signal on DWI over right 

occipital and left medial temporal/hippocampus region without ADC correlation.  

On FLAIR, patient has evidence of right occipital stroke and has hyperintensity 

over the bilateral occipital, cerebellum parietal region possible suggestive of 

posterior reversible encephalopathy syndrome.  Also has hyperintesity over 

brainstem.  Again I do not feel patient has acute or subacute ischemic stroke. 


Routine EEG on 09/16/2022: Is abnormal.  The background slowing is suggestive of

moderate to severe encephalopathy.  The epileptiform dischargs over the left 

frontotemporal region increases risk for seizure.  There is no seizure noted 

during this study.


2D echo is reported as dilated left ventricle with severe LV dysfunction (20-

25%).  Normal Left atrium.  








- Labs


CBC & Chem 7: 


                                 09/17/22 06:12





                                 09/17/22 06:12


Labs: 


                  Abnormal Lab Results - Last 24 Hours (Table)











  09/16/22 09/17/22 09/17/22 Range/Units





  11:07 06:12 06:12 


 


WBC   11.9 H   (3.8-10.6)  k/uL


 


Neutrophils #   9.8 H   (1.3-7.7)  k/uL


 


Carbon Dioxide    35 H  (22-30)  mmol/L


 


Creatinine    0.49 L  (0.52-1.04)  mg/dL


 


Glucose    106 H  (74-99)  mg/dL


 


Alkaline Phosphatase    127 H  ()  U/L


 


Total Protein    5.5 L  (6.3-8.2)  g/dL


 


Albumin    3.3 L  (3.5-5.0)  g/dL


 


Triglycerides  246.00 H    (0..00)  mg/dL


 


VLDL Cholesterol, Calc  49.20 H    (5.00-40.00)  mg/dL


 


HDL Cholesterol  35.80 L    (40.00-60.00)  mg/dL








                      Microbiology - Last 24 Hours (Table)











 09/16/22 09:35 Blood Culture - Preliminary





 Blood    No Growth after 24 hours














Assessment and Plan


Assessment: 





Encephalopathy of unknown etiology but possibly seizure (on EEG has discharges 

over the left fronto-temporal).  On MRI Brain I feel suggestive of posterior 

revesible encephalopathy syndrome (PRES).


Has minimal elevated ammonia (31).


Non-STEMI


Tubo-ovarian mass reported on CT.


History of old right occipital stroke


History of CAD s/p stent and CABG


History of hypothyroidism


Severe depression


Anxiety


Tobacco use





Plan: 





* MRI Brain is reported as there maybe some mild per-infarct ischemic changes 

  right occipital lobe.  Chronic appearing subcortical white matter changes to 

  the bilateral occipital lobses, periventricular white matter, whithin the 

  brainstem.  Suspicious changes to suggest metastasis is not identified.  I 

  personally reviewed MRI Brain and I do not feel patient has acute or subacute 

  ischemic stroke.  Patient does have some patchy increase signal on DWI over 

  right occipital and left medial temporal/hippocampus region without ADC 

  correlation.  On FLAIR, patient has evidence of right occipital stroke and has

  hyperintensity over the bilateral occipital, cerebellum parietal region 

  possible suggestive of posterior reversible encephalopathy syndrome.  Also has

  hyperintesity over brainstem.  Again I do not feel patient has acute or 

  subacute ischemic stroke. Will discuss this with radiologist for second 

  opinion.


* Routine EEG on 09/16/2022: Is abnormal.  The background slowing is suggestive 

  of moderate to severe encephalopathy.  The epileptiform dischargs over the 

  left frontotemporal region increases risk for seizure.  There is no seizure 

  noted during this study.


* Currently the patient is on aspirin 81, Plavix 75 and the Lipitor 80 mg daily.


* Continue Vimpat 100mg IV every 12 hours.


* Her condition is improving but will get repeat EEG (likely by Monday).


* PT, OT are consulted


* Cardiology team is on board


* General surgery team is on board for possible diverticulitis/colitis


* Patient has 3.2 cm adnexal cystic mass likely tubo-ovarian and there is 

  consult for Dr. Foster.


* We'll defer the rest of the medical management to the primary denies CT





Plan was discussed with the patient's  nurse.





Dale Montero M.D.


Neuro-hospitalist








Time with Patient: Less than 30

## 2022-09-17 NOTE — P.PN
Subjective


Progress Note Date: 09/17/22


Principal diagnosis: 





Coronary artery disease





This is a 67-year-old female patient with sees a cardiologist out of the town 

with a past medical history significant for coronary artery disease with prior 

revascularization in terms of CABG and stenting with unknown details as well as 

mild cardiomyopathy was EF between 40-45% as well as history of carotid 

atherosclerosis and status post bilateral carotid endarterectomy as well as 

hypertension and dyslipidemia was admitted to the hospital with epigastric 

discomfort/chest discomfort.  She was diagnosed with hypertension emergency and 

she was treated medically and yesterday she was started on nitro drip.





September 16 of 2022


Last night the A team was called to see the patient because she developed change

in mental status.  Code stroke was called as well.  Computed tomography scan of 

the brain was performed and showed no evidence off acute infarct but old 

occipital infarct.  CTA of the neck as well was performed and showed no 

abnormalities.  An EKG was performed and showed tachycardia with a differential 

diagnosis of atrial tachycardia versus sinus tachycardia.  Hemodynamically she 

is stable and as a matter of fact she is hypertensive.  Currently she is on 

nitro drip.  Beside that she continues to have change in mental status and she 

is lethargic and not responding.  I am going to DC the nitro IV and start the 

patient on Cardizem IV for the heart rate and blood pressure.  I will obtain a 

d-dimer to rule out PE.  Beside that going to obtain an echocardiogram.  

Neurology is on the case.  Yesterday because the patient was having chest 

discomfort we discussed with her to proceed with heart catheterization but she 

refused.





September 17 of 2022


The patient was seen this morning.  Clinically she is doing better.  She is 

apparently not as lethargic as yesterday.  She is responding but she still 

confused.  She is favoring the left side.  She is having an issue with the 

patient.  Neurology is on the case.  She is in process of having repeat computed

tomography scan.  She converted to normal sinus mechanism.  I'm going to 

increase the dose of beta blocker to 50 mg by mouth twice a day was metoprolol 

tartrate and continue the rest of the current medical regimen.  





Objective





- Vital Signs


Vital signs: 


                                   Vital Signs











Temp  97 F L  09/17/22 03:00


 


Pulse  66   09/17/22 07:00


 


Resp  16   09/17/22 07:00


 


BP  146/66   09/17/22 07:00


 


Pulse Ox  99   09/17/22 07:00


 


FiO2      








                                 Intake & Output











 09/16/22 09/17/22 09/17/22





 18:59 06:59 18:59


 


Intake Total 190 120 10


 


Output Total 730 390 15


 


Balance -540 -270 -5


 


Intake:   


 


   120 10


 


    .9 KVO 90 120 10


 


    levETIRAcetam IV 1,500 mg 100  





    In Saline 1 100ml.bag @   





    400 mls/hr IVPB ONCE STA   





    Rx#:[id number]   


 


Output:   


 


  Urine 730 390 15


 


Other:   


 


  Voiding Method Indwelling Catheter Indwelling Catheter 














- Constitutional


General appearance: Present: no acute distress





- Respiratory


Respiratory: bilateral: CTA





- Cardiovascular


Rhythm: regular


Heart sounds: normal: S1, S2


Abnormal Heart Sounds: Present: systolic murmur





- Labs


CBC & Chem 7: 


                                 09/17/22 06:12





                                 09/16/22 05:49


Labs: 


                  Abnormal Lab Results - Last 24 Hours (Table)











  09/16/22 09/16/22 09/16/22 Range/Units





  05:49 09:56 11:07 


 


WBC     (3.8-10.6)  k/uL


 


Neutrophils #     (1.3-7.7)  k/uL


 


D-Dimer  1.68 H    (<0.60)  mg/L FEU


 


Ammonia     (<30)  umol/L


 


Triglycerides    246.00 H  (0..00)  mg/dL


 


VLDL Cholesterol, Calc    49.20 H  (5.00-40.00)  mg/dL


 


HDL Cholesterol    35.80 L  (40.00-60.00)  mg/dL


 


Ur Specific Gravity   1.041 H   (1.001-1.035)  


 


Urine Protein   2+ H   (Negative)  


 


Urine Glucose (UA)   Trace H   (Negative)  


 


Urine Mucus   Rare H   (None)  /hpf














  09/16/22 09/17/22 Range/Units





  11:07 06:12 


 


WBC   11.9 H  (3.8-10.6)  k/uL


 


Neutrophils #   9.8 H  (1.3-7.7)  k/uL


 


D-Dimer    (<0.60)  mg/L FEU


 


Ammonia  31 H   (<30)  umol/L


 


Triglycerides    (0..00)  mg/dL


 


VLDL Cholesterol, Calc    (5.00-40.00)  mg/dL


 


HDL Cholesterol    (40.00-60.00)  mg/dL


 


Ur Specific Gravity    (1.001-1.035)  


 


Urine Protein    (Negative)  


 


Urine Glucose (UA)    (Negative)  


 


Urine Mucus    (None)  /hpf














Assessment and Plan


Assessment: 





Assessment


Chest discomfort which has resolved


Hypertension emergency


CAD with prior revascularization


Carotid atherosclerosis was bilateral revascularization


Abdominal discomfort


Decrease appetite and weight loss


Mild cardiomyopathy


Change in mental status





Plan


Increase the dose of metoprolol to keep the patient in normal sinus mechanism


Continue dual antiplatelet therapy along with statin


She is in process of having a repeat computed tomography scan


Neurology services on the case


Follow-up with the patient

## 2022-09-18 LAB
ALBUMIN SERPL-MCNC: 3.1 G/DL (ref 3.5–5)
ALP SERPL-CCNC: 103 U/L (ref 38–126)
ALT SERPL-CCNC: 14 U/L (ref 4–34)
ANION GAP SERPL CALC-SCNC: 7 MMOL/L
AST SERPL-CCNC: 22 U/L (ref 14–36)
BASOPHILS # BLD AUTO: 0.1 K/UL (ref 0–0.2)
BASOPHILS NFR BLD AUTO: 1 %
BUN SERPL-SCNC: 19 MG/DL (ref 7–17)
CALCIUM SPEC-MCNC: 9.3 MG/DL (ref 8.4–10.2)
CHLORIDE SERPL-SCNC: 99 MMOL/L (ref 98–107)
CO2 SERPL-SCNC: 32 MMOL/L (ref 22–30)
EOSINOPHIL # BLD AUTO: 0.2 K/UL (ref 0–0.7)
EOSINOPHIL NFR BLD AUTO: 2 %
ERYTHROCYTE [DISTWIDTH] IN BLOOD BY AUTOMATED COUNT: 4.47 M/UL (ref 3.8–5.4)
ERYTHROCYTE [DISTWIDTH] IN BLOOD: 13.6 % (ref 11.5–15.5)
GLUCOSE SERPL-MCNC: 92 MG/DL (ref 74–99)
HCT VFR BLD AUTO: 42.6 % (ref 34–46)
HGB BLD-MCNC: 13.3 GM/DL (ref 11.4–16)
LYMPHOCYTES # SPEC AUTO: 1.8 K/UL (ref 1–4.8)
LYMPHOCYTES NFR SPEC AUTO: 18 %
MCH RBC QN AUTO: 29.8 PG (ref 25–35)
MCHC RBC AUTO-ENTMCNC: 31.2 G/DL (ref 31–37)
MCV RBC AUTO: 95.4 FL (ref 80–100)
MONOCYTES # BLD AUTO: 0.7 K/UL (ref 0–1)
MONOCYTES NFR BLD AUTO: 7 %
NEUTROPHILS # BLD AUTO: 7.3 K/UL (ref 1.3–7.7)
NEUTROPHILS NFR BLD AUTO: 73 %
PLATELET # BLD AUTO: 205 K/UL (ref 150–450)
POTASSIUM SERPL-SCNC: 4 MMOL/L (ref 3.5–5.1)
PROT SERPL-MCNC: 5.2 G/DL (ref 6.3–8.2)
SODIUM SERPL-SCNC: 138 MMOL/L (ref 137–145)
WBC # BLD AUTO: 10 K/UL (ref 3.8–10.6)

## 2022-09-18 RX ADMIN — LEVOTHYROXINE SODIUM SCH MCG: 0.11 TABLET ORAL at 06:39

## 2022-09-18 RX ADMIN — LACOSAMIDE SCH MLS/HR: 10 INJECTION INTRAVENOUS at 22:23

## 2022-09-18 RX ADMIN — SERTRALINE HYDROCHLORIDE SCH MG: 100 TABLET ORAL at 22:23

## 2022-09-18 RX ADMIN — NITROGLYCERIN SCH: 20 INJECTION INTRAVENOUS at 08:04

## 2022-09-18 RX ADMIN — NICOTINE SCH PATCH: 14 PATCH, EXTENDED RELEASE TRANSDERMAL at 09:29

## 2022-09-18 RX ADMIN — RANOLAZINE SCH MG: 500 TABLET, FILM COATED, EXTENDED RELEASE ORAL at 22:23

## 2022-09-18 RX ADMIN — FAMOTIDINE SCH MG: 20 TABLET, FILM COATED ORAL at 22:23

## 2022-09-18 RX ADMIN — HEPARIN SODIUM SCH UNIT: 5000 INJECTION INTRAVENOUS; SUBCUTANEOUS at 17:32

## 2022-09-18 RX ADMIN — ATORVASTATIN CALCIUM SCH MG: 80 TABLET, FILM COATED ORAL at 09:29

## 2022-09-18 RX ADMIN — METOPROLOL TARTRATE SCH MG: 50 TABLET, FILM COATED ORAL at 22:23

## 2022-09-18 RX ADMIN — RANOLAZINE SCH MG: 500 TABLET, FILM COATED, EXTENDED RELEASE ORAL at 09:29

## 2022-09-18 RX ADMIN — METOPROLOL TARTRATE SCH MG: 50 TABLET, FILM COATED ORAL at 09:29

## 2022-09-18 RX ADMIN — FAMOTIDINE SCH MG: 20 TABLET, FILM COATED ORAL at 09:29

## 2022-09-18 RX ADMIN — LACOSAMIDE SCH MLS/HR: 10 INJECTION INTRAVENOUS at 09:37

## 2022-09-18 RX ADMIN — ASPIRIN 81 MG CHEWABLE TABLET SCH MG: 81 TABLET CHEWABLE at 09:29

## 2022-09-18 RX ADMIN — CLOPIDOGREL BISULFATE SCH MG: 75 TABLET ORAL at 09:29

## 2022-09-18 RX ADMIN — DILTIAZEM HYDROCHLORIDE SCH: 5 INJECTION INTRAVENOUS at 08:04

## 2022-09-18 NOTE — P.PN
Subjective


Progress Note Date: 09/18/22


Principal diagnosis: 





Coronary artery disease





This is a 67-year-old female patient with sees a cardiologist out of the town 

with a past medical history significant for coronary artery disease with prior 

revascularization in terms of CABG and stenting with unknown details as well as 

mild cardiomyopathy was EF between 40-45% as well as history of carotid 

atherosclerosis and status post bilateral carotid endarterectomy as well as 

hypertension and dyslipidemia was admitted to the hospital with epigastric 

discomfort/chest discomfort.  She was diagnosed with hypertension emergency and 

she was treated medically and yesterday she was started on nitro drip.





September 16 of 2022


Last night the A team was called to see the patient because she developed change

in mental status.  Code stroke was called as well.  Computed tomography scan of 

the brain was performed and showed no evidence off acute infarct but old 

occipital infarct.  CTA of the neck as well was performed and showed no 

abnormalities.  An EKG was performed and showed tachycardia with a differential 

diagnosis of atrial tachycardia versus sinus tachycardia.  Hemodynamically she 

is stable and as a matter of fact she is hypertensive.  Currently she is on 

nitro drip.  Beside that she continues to have change in mental status and she 

is lethargic and not responding.  I am going to DC the nitro IV and start the 

patient on Cardizem IV for the heart rate and blood pressure.  I will obtain a 

d-dimer to rule out PE.  Beside that going to obtain an echocardiogram.  

Neurology is on the case.  Yesterday because the patient was having chest 

discomfort we discussed with her to proceed with heart catheterization but she 

refused.





September 17 of 2022


The patient was seen this morning.  Clinically she is doing better.  She is 

apparently not as lethargic as yesterday.  She is responding but she still 

confused.  She is favoring the left side.  She is having an issue with the 

patient.  Neurology is on the case.  She is in process of having repeat computed

tomography scan.  She converted to normal sinus mechanism.  I'm going to 

increase the dose of beta blocker to 50 mg by mouth twice a day was metoprolol 

tartrate and continue the rest of the current medical regimen.  





September 18 of 2022


The patient was seen this morning.  Her mentation has improved significantly.  

Apparently she was seen by the neurology service and the patient potentially had

seizure as an etiology for the change in mental status more than stroke.  

Hemodynamically she is stable.  Clinically she reports no pain in the chest or 

shortness of breath.  The blood pressure is elevated in the morning to increase 

the dose of lisinopril to 10 mg by mouth daily.  Meanwhile continue dual 

antiplatelet therapy along with high intensity statin.  The patient can be 

transferred out of the intensive care unit.





Objective





- Vital Signs


Vital signs: 


                                   Vital Signs











Temp  98.4 F   09/17/22 20:00


 


Pulse  56 L  09/18/22 06:00


 


Resp  6 L  09/18/22 06:00


 


BP  164/66   09/18/22 06:00


 


Pulse Ox  99   09/18/22 06:00


 


FiO2      








                                 Intake & Output











 09/17/22 09/18/22 09/18/22





 18:59 06:59 18:59


 


Intake Total 803.583 160 


 


Output Total 205 205 


 


Balance 598.583 -45 


 


Weight  50.8 kg 


 


Intake:   


 


   160 


 


    .9  110 


 


    Lacosamide  mg In 50 50 





    Sodium Chloride 0.9% 50   





    ml @ 100 mls/hr IVPB BID   





    [facility] Rx#:[id number]   


 


    Lactated Ringers 500 ml @ 500  





    999 mls/hr IV .Q31M ONE   





    Rx#:[id number]   


 


  Intake, IV Titration 123.583  





  Amount   


 


    Diltiazem 125 mg In 123.583  





    Sodium Chloride 0.9% 100   





    ml @ 5 MG/HR 5 mls/hr IV   





    .Q24H [facility] Rx#:[id number]   


 


Output:   


 


  Urine 205 205 


 


Other:   


 


  Voiding Method Indwelling Catheter Indwelling Catheter 


 


  # Bowel Movements 1 1 














- Constitutional


General appearance: Present: no acute distress





- Respiratory


Respiratory: bilateral: CTA





- Cardiovascular


Rhythm: regular





- Labs


CBC & Chem 7: 


                                 09/18/22 05:09





                                 09/18/22 05:09


Labs: 


                  Abnormal Lab Results - Last 24 Hours (Table)











  09/17/22 09/18/22 Range/Units





  06:12 05:09 


 


Carbon Dioxide  35 H  32 H  (22-30)  mmol/L


 


BUN   19 H  (7-17)  mg/dL


 


Creatinine  0.49 L  0.44 L  (0.52-1.04)  mg/dL


 


Glucose  106 H   (74-99)  mg/dL


 


Alkaline Phosphatase  127 H   ()  U/L


 


Total Protein  5.5 L  5.2 L  (6.3-8.2)  g/dL


 


Albumin  3.3 L  3.1 L  (3.5-5.0)  g/dL








                      Microbiology - Last 24 Hours (Table)











 09/16/22 09:35 Blood Culture - Preliminary





 Blood    No Growth after 24 hours














Assessment and Plan


Assessment: 





Assessment


Chest discomfort which has resolved


Hypertension emergency


CAD with prior revascularization


Carotid atherosclerosis was bilateral revascularization


Abdominal discomfort


Decrease appetite and weight loss


Mild cardiomyopathy


Change in mental status which has improved





Plan


Increase the dose of lisinopril for better blood pressure control


Continue dual antiplatelet therapy


Continue high intensity statin


The patient can be transferred out of the intensive care unit

## 2022-09-18 NOTE — P.PN
Subjective


Progress Note Date: 09/18/22


Patient is seen at bedside and per nurse she was notified by her daughters that 

she does have vision issues at baseline but not to extent of what she is 

presenting currently.  Nursing staff feels she is responding somewhat better but

there is delay and gets agitated. 








Objective





- Vital Signs


Vital signs: 


                                   Vital Signs











Temp  98.2 F   09/18/22 08:00


 


Pulse  61   09/18/22 10:00


 


Resp  14   09/18/22 10:00


 


BP  166/68   09/18/22 10:00


 


Pulse Ox  99   09/18/22 10:00


 


FiO2      








                                 Intake & Output











 09/17/22 09/18/22 09/18/22





 18:59 06:59 18:59


 


Intake Total 803.583 160 80


 


Output Total 205 205 245


 


Balance 598.583 -45 -165


 


Weight  50.8 kg 


 


Intake:   


 


   160 80


 


    .9  110 30


 


    Lacosamide  mg In 50 50 50





    Sodium Chloride 0.9% 50   





    ml @ 100 mls/hr IVPB BID   





    [facility] Rx#:[id number]   


 


    Lactated Ringers 500 ml @ 500  





    999 mls/hr IV .Q31M [facility]   





    Rx#:[id number]   


 


  Intake, IV Titration 123.583  





  Amount   


 


    Diltiazem 125 mg In 123.583  





    Sodium Chloride 0.9% 100   





    ml @ 5 MG/HR 5 mls/hr IV   





    .Q24H [facility] Rx#:[id number]   


 


Output:   


 


  Urine 205 205 245


 


Other:   


 


  Voiding Method Indwelling Catheter Indwelling Catheter Indwelling Catheter


 


  # Bowel Movements 1 1 1














- Exam


GENERAL: The patient is lying in bed and is not in acute distress.





NEUROLOGICAL:


Limited because of her condition. 


Higher mental function: The patient is awake.  She correctly stated her name.  

With options she stated she was in the hospital.  She did not respond to year or

month.  There is delay in responding but seems better compared to the last two 

day.   


Cranial nerves: The pupils are round, equal and reactive to light.   Visual 

field is hard to assess because of appearance of her visual deficits but was 

able to name phone and pen upon showing her and appear she is seeing right and 

appears ?right upper bilaterally.  She was able to look to right and left 

without any difficulty.  No facial weakness.  No dysarthria.  Otherwise could 

not assess rest of cranial nerves.   


Motor: The strength is lifting all extremities above gravity without focality.  

Normal tone and bulk.  


Cerebellum: Unable to assess.


Sensation: Unable to assess.








SOME OF THE WORK-UP DURING THIS HOSPITAL VISIT:


CT of the head was ordered and is a reported as old right occipital lobe 

infarct.  No acute intracranial abnormality.  


CT angiography of the head and neck was reported as negative CT angiography of 

the brain.  Negative CT angiography of the neck were dominant left vertebral and

diminutive right vertebral.  Large pulmonary artery suggestive of pulmonary 

hypertension.  Mild aneurysm of the aortic arch.


TSH is 0.039 and free T4 is 1.67.


Ammonia is 31 (normal <30).


Lipid panel is triglycerides 246, cholesterol is 151, LDL 66 and HDL 35.


MRI Brain is reported as there maybe some mild per-infarct ischemic changes 

right occipital lobe.  Chronic appearing subcortical white matter changes to the

bilateral occipital lobses, periventricular white matter, whithin the brainstem.

 Suspicious changes to suggest metastasis is not identified.  I personally 

reviewed MRI Brain Patient does have some patchy increase signal on DWI over 

right occipital and left medial temporal/hippocampus region.  On FLAIR, patient 

has evidence of right occipital stroke and has hyperintensity over the bilateral

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 

reversible encephalopathy syndrome.  Also has hyperintesity over brainstem.  


Routine EEG on 09/16/2022: Is abnormal.  The background slowing is suggestive of

moderate to severe encephalopathy.  The epileptiform dischargs over the left 

frontotemporal region increases risk for seizure.  There is no seizure noted 

during this study.


2D echo is reported as dilated left ventricle with severe LV dysfunction (20-

25%).  Normal Left atrium.  








- Labs


CBC & Chem 7: 


                                 09/18/22 05:09





                                 09/18/22 05:09


Labs: 


                  Abnormal Lab Results - Last 24 Hours (Table)











  09/18/22 Range/Units





  05:09 


 


Carbon Dioxide  32 H  (22-30)  mmol/L


 


BUN  19 H  (7-17)  mg/dL


 


Creatinine  0.44 L  (0.52-1.04)  mg/dL


 


Total Protein  5.2 L  (6.3-8.2)  g/dL


 


Albumin  3.1 L  (3.5-5.0)  g/dL








                      Microbiology - Last 24 Hours (Table)











 09/16/22 09:35 Blood Culture - Preliminary





 Blood    No Growth after 24 hours














Assessment and Plan


Assessment: 





Encephalopathy with visual disturbance appears due to Posterior reversible 

encephalopathy syndrome (PRES).  On MRI Brain reported as possible per-infarct 

ischemic changes right occipital lobe.    I feel MRI is suggestive of posterior 

reversible encephalopathy syndrome (PRES) and cannot rule out in addition 

possible stroke over the right occipital.  on EEG has discharges over the left 

fronto-temporal but no seizure noted.  Unsure exact cause of PRES but possible 

uncontrolled Hypertension as high as 203/132.  


Has minimal elevated ammonia (31).


Non-STEMI


Tubo-ovarian mass reported on CT.


History of old right occipital stroke


History of CAD s/p stent and CABG


History of hypothyroidism


Severe depression


Anxiety


Tobacco use





Plan: 





* MRI Brain is reported as there maybe some mild per-infarct ischemic changes 

  right occipital lobe.  Chronic appearing subcortical white matter changes to 

  the bilateral occipital lobses, periventricular white matter, whithin the 

  brainstem.  Suspicious changes to suggest metastasis is not identified.  I 

  personally reviewed MRI Brain Patient does have some patchy increase signal on

  DWI over right occipital and left medial temporal/hippocampus region.  On 

  FLAIR, patient has evidence of right occipital stroke and has hyperintensity 

  over the bilateral occipital, cerebellum parietal region possible suggestive 

  of posterior reversible encephalopathy syndrome.  Also has hyperintesity over 

  brainstem.  


* Upon looking up MRI Findings on ADC and DWI for PRES I found reports: DWI is 

  usually normal, sometimes hyperintese due to edema (T2 shine-throughout) or 

  true restricted diffusion.  Usually ADC there is usually increased signal due 

  to increased diffusion, but restricted diffusion is present in a quarter of 

  cases.


* Routine EEG on 09/16/2022: Is abnormal.  The background slowing is suggestive 

  of moderate to severe encephalopathy.  The epileptiform dischargs over the 

  left frontotemporal region increases risk for seizure.  There is no seizure 

  noted during this study.


* Currently the patient is on aspirin 81, Plavix 75 and  Lipitor 80 mg daily.


* Continue Vimpat 100mg IV every 12 hours.


* I will get repeat EEG (to be completed tomorrow).


* Can consider repeat MRI Brain down the line if assess if any improvement in 

  FLAIR lesions.


* PT, OT are consulted


* Continue neuro checks.


* Cardiology team is on board


* General surgery team is on board for possible diverticulitis/colitis


* We'll defer the rest of the medical management to the primary team


* For DVT prophylaxis: Started on subq heparin 5000U every 8 hours.





Plan was discussed with the primary attending and patient's  nurse.


Dr. Butler will start neurology service Tomorrow A.M.





CONRAD Flores.D.


Neuro-hospitalist








Time with Patient: Less than 30

## 2022-09-18 NOTE — P.PN
Subjective


Progress Note Date: 09/18/22


Principal diagnosis: 





Mental status changes.





Pulmonary consult dated 09/16/2022.





This is a 67-year-old female who was initially admitted back on September 9, 

with abdominal pain and chest pain.  The patient was found to have a non-ST 

segment elevation myocardial infarction, and the plan was for heart 

catheterization today, September 16.  She ended up coming to the ICU on Septe mber 16, because she was found to have an elevated blood pressure, increased 

heart rate, and she became poorly responsive, and obtunded.  A code stroke was 

called, and her NIH score was 19.  She was very poorly responsive.  She appeared

to have some right-sided weakness.  I'm currently seeing the patient in the 

intensive care unit.  The patient did have a CT of the brain which showed an old

right occipital infarct.  CT angiogram was negative.  Currently, she is on 2 L 

of nasal cannula.  She's getting saline at 10 mL an hour.  She is on a 

nitroglycerin drip at 10 mcg/m.  She is poorly responsive but she does arouse.  

She is able to protect her airway at this time.  White count 17.8, hemoglobin 

15.1, hematocrit 47.8, and platelet count 297,000.  PT INR and PTT are all 

normal.  D-dimer is 1.68.  Sodium 140, potassium 4.4, chlorides 100, CO2 30, BUN

11, creatinine 0.64.  The patient's troponins were 0.083, 0.074 and 0.046.  

Chest x-ray from September 13 showed changes of CHF.





Progress note dated 09/17/2022.





67-year-old female who was seen in consultation yesterday.  She was initially a

dmitted back on September 9, with abdominal pain and chest pain.  She was found 

to have a non-ST segment elevation myocardial infarction.  The patient ended up 

in the intensive care unit on September 16 because of mental status changes.  

Initially, she was thought to possibly have a CVA.  She was seen by neurology 

and there thinking that maybe she had a seizure, which explains her mental 

status changes.  She's on 2 L of oxygen.  She's getting saline at 10 mL an hour.

 The Cardizem drip has been turned off.  His bit more awake and alert.  

Initially, it appeared that she was not moving her right side.  That is not the 

case now.  White count 11.9, hemoglobin 14.5, hematocrit 45.8, and platelet 

count 216,000.  Sodium 142, potassium 4.4, chlorides 101, CO2 35, BUN 16, 

creatinine 0.49.  Albumin is 3.3.  EEG was consistent with slowing, and brain 

MRI was reviewed.





Progress note dated 09/18/2022.





67-year-old female again seen in [facility].  The patient was seen in consultation 2 

days ago.  She was admitted back on September 9 with abdominal pain and chest 

pain.  She was found have a non-ST segment elevation myocardial infarction.  She

came to the intensive care unit on September 16, because of mental status 

changes, and was initially thought to have had a CVA.  She was seen by 

neurology.  They think actually she may have had a seizure.  Currently, she is 

on 2 L of oxygen.  She's getting saline at 10 mL an hour.  Her mental status has

improved tremendously.  White count 10, hemoglobin 13.3, hematocrit 42.6, and 

platelet count 205,000.  Sodium 138, potassium 4, chlorides 99, CO2 32, BUN 19, 

creatinine 0.44.  No chest x-ray today.





Objective





- Vital Signs


Vital signs: 


                                   Vital Signs











Temp  98.4 F   09/17/22 20:00


 


Pulse  58 L  09/18/22 07:00


 


Resp  12   09/18/22 07:00


 


BP  168/72   09/18/22 07:00


 


Pulse Ox  99   09/18/22 07:00


 


FiO2      








                                 Intake & Output











 09/17/22 09/18/22 09/18/22





 18:59 06:59 18:59


 


Intake Total 803.583 160 10


 


Output Total 205 205 


 


Balance 598.583 -45 10


 


Weight  50.8 kg 


 


Intake:   


 


   160 10


 


    .9  110 10


 


    Lacosamide  mg In 50 50 





    Sodium Chloride 0.9% 50   





    ml @ 100 mls/hr IVPB BID   





    [facility] Rx#:[id number]   


 


    Lactated Ringers 500 ml @ 500  





    999 mls/hr IV .Q31M [facility]   





    Rx#:[id number]   


 


  Intake, IV Titration 123.583  





  Amount   


 


    Diltiazem 125 mg In 123.583  





    Sodium Chloride 0.9% 100   





    ml @ 5 MG/HR 5 mls/hr IV   





    .Q24H [facility] Rx#:[id number]   


 


Output:   


 


  Urine 205 205 


 


Other:   


 


  Voiding Method Indwelling Catheter Indwelling Catheter 


 


  # Bowel Movements 1 1 














- Exam





No acute distress, much more awake today.  Currently on 2 L of oxygen.





HEENT examination is grossly unremarkable.  





Neck supple.  Full range of motion.  No adenopathy thyromegaly or neck vein 

distention.





Cardiovascular examination reveals regular rhythm rate.  S1-S2 normal.  No S3 or

S4.  No discernible murmur noted.  Heart rate 58 bpm.





Lungs reveal mostly clear breath sounds.  Mild scattered rhonchi.  No wheezes or

crackles.  Breath sounds equal bilaterally.  Saturations 99 % on 2 L.





Abdomen soft bowel sounds are heard.  No masses or tenderness.





Extremities are intact.  No cyanosis clubbing or edema.





Skin is without rash or lesion.





Neurologic examination reveals patient to be much more awake and alert.  She 

does move all 4 extremities.





- Labs


CBC & Chem 7: 


                                 09/18/22 05:09





                                 09/18/22 05:09


Labs: 


                  Abnormal Lab Results - Last 24 Hours (Table)











  09/18/22 Range/Units





  05:09 


 


Carbon Dioxide  32 H  (22-30)  mmol/L


 


BUN  19 H  (7-17)  mg/dL


 


Creatinine  0.44 L  (0.52-1.04)  mg/dL


 


Total Protein  5.2 L  (6.3-8.2)  g/dL


 


Albumin  3.1 L  (3.5-5.0)  g/dL








                      Microbiology - Last 24 Hours (Table)











 09/16/22 09:35 Blood Culture - Preliminary





 Blood    No Growth after 24 hours














Assessment and Plan


Assessment: 





Rule out acute cerebrovascular accident, versus possible seizure and postictal 

state.





Non-ST segment elevation myocardial infarction.





History of myocardial infarction.





History of hypertension.





History of hyperlipidemia.





History of hypothyroidism.





Prior history of bypass grafting.





CAD with prior stent placement.





Old right occipital infarct on computed tomography scan of the brain.


Plan: 





Plan dated 09/16/2022.





The patient was transferred to the intensive care unit, room 252.  She is 

evaluated there.  Currently, she is on 2 L of oxygen.  Labs, x-rays, and 

medications are all reviewed.  The patient's also getting saline at 10 mL an 

hour, and nitroglycerin at 10 mcg/m for blood pressure control.  Medications are

reviewed.  Prognosis is guarded.  Neurology is consulted.  Additional 

recommendations and suggestions are forthcoming.





Plan dated 09/17/2022.





The patient appears much more awake today.  According to the nurse, neurology 

feels that the patient may have had a seizure.  Currently, the patient is moving

all 4 extremities.  No longer is a patient on nitroglycerin for blood pressure 

control.  Also, Cardizem drip has been turned off as well.  Labs, x-rays, and 

medications are reviewed.  Prognosis is guarded.  We will continue to follow.  

EEG showed diffuse slowing, and also possible epileptiform area.





Plan dated 09/18/2022.





The patient's doing much better.  She remains on 2 L.  Is getting saline at 10 

mL an hour.  Her mental status is improved.  Labs, x-rays, and medications are 

reviewed.  She's been followed by neurology.  Prognosis is guarded.  We will 

continue to follow the patient and make recommendations along the way.  

Unnecessary medications are discontinued.


Time with Patient: Less than 30

## 2022-09-18 NOTE — P.PN
Progress Note - Text


Progress Note Date: 09/18/22





Hospital course:


Patient is a 67-year-old female with a known history of coronary artery disease 

status post CABG, history of MI, history of stent placement and bilateral 

carotid surgery and hypothyroidism


Was sent to ER by her primary care physician.  Patient has been having abdominal

pain mainly in the lower abdomen associate with nausea for the past 3 to 4 

months.  Patient could not keep down food and also weight loss about 7 non-B 

during the last couple 1 to 2 months.  Patient is also having generalized 

weakness and could not keep her balance.  Denied any diarrhea.  No complaints of

chest pain or shortness of breath.  Patient has been feeling very weak.


Patient was discharged from the hospital on 5/3/2022.,  Admitted due to unstable

angina and underwent nuclear stress test showed predominantly a fixed defect.  

Imdur was added at that time.


Chest x-ray showed no evidence for acute pulmonary disease.





CT of the abdomen pelvis showed there is mild wall thickening of the colon 

diffusely correlate for mild colitis.  There is also evidence of diverticulosis 

of the sigmoid colon.  Wall thickening is noted which can be associated with a 

mucosal lesion correlate with direct visualization as clinically warranted.


There is a 3.2 cm left adnexal cystic mass likely tubo-ovarian.


Severe right iliac artery stenosis.


There is prominent small bowel loops ileus is favored.


Calcification around the apex of the heart can be associated with myocardial 

calcified calcinosis and previous infarction.





Laboratory data showed WBC 8.2, hemoglobin 14.0 and platelets 214


Sodium 139 potassium 4.4 chloride 105 bicarb is 26 BUN 17 and creatinine 0.74 

and blood sugar is 109 AST 23 alk phos 141 and ALT 49 bilirubin level is 0.5 

magnesium 1.7


TSH 0.039 and free T4 levels 1.67.  Albumin 4.1


Urinalysis negative for infection.





9/10/2022


Patient is currently lying in bed.  Still feels very weak.  No complaints of 

chest pain.  No worsening shortness of breath.  Still nauseated.  Decreased oral

intake.  No cough or sputum production.


No headache or dizziness or lightheadedness.


Lab data this morning showed sodium 142 potassium 4.3 chloride 106 bicarb is 

28.4 BUN 13.3, creatinine 0.6.


 6.8, AFP 2.2 and CEA 6.5.


Urine is negative for infection.  Stool for occult blood is negative.





Patient was hypotensive this morning required fluid bolus and blood pressure did

improve.


Patient was also started on antibiotics for possible colitis.





09/11/2022


Patient is currently resting in bed.  Awake alert and oriented.  Denied any 

complaints of chest pain or worsening shortness of breath.  No complaints of 

abdominal pain today.  Patient says that she has feels very weak.


Yesterday evening patient had chest pain associated with shortness of breath.  

And is also complaining of epigastric pain.  Troponin level is elevated at 0.526

and 0.311.  Patient was started on heparin drip and was transferred to telemetry

unit.


Patient has been afebrile.  No diarrhea.  Does have nausea.  No episodes of 

vomiting.  No cough or sputum production


Laboratory data showed WBC 8.1 hemoglobin 12.6 and platelets 147





September 12: I assumed care of patient today


Still having abdominal pain.  Decreased oral intake.  Had a BM yesterday.  

Rather diet.  Changing the diet to full liquids.  On IV Flagyl and ceftriaxone.


September 13: Patient was delirious last night likely after Dilaudid.-for pain. 

Doing much better this morning.  No abdominal pain is better.  Had liquid BM 

today.  Encourage oral intake.  Continue his Flagyl and ceftriaxone.  We'll get 

a GI consultation.


September 14: Tired.  Does not like the hospital food contents does not want to 

eat.  Patient has passed her daughter to bring in some food.  Has chronic back 

pain.  Abdominal pain is much better.  Outpatient colonoscopy per GI.  Patient 

rather weak but PTOT.  Myself and the  talk to the patient about 

possible rehab.  Patient is keen to go home.   will be to the 

 the patient this afternoon.


September 15: Patient had chest pain overnight.  Did tolerate some diet brought 

in by her daughter.  Put on IV nitroglycerin.  Seen by cardiology today.  

Planning cardiac catheterization tomorrow.  Reclining chair.


September 16: Overnight patient was found unresponsive.  A team was called.  His

question about some left arm weakness.  Patient smoked with ICU.  Neurology was 

consulted.  Patient still lethargic.  Pupils slightly dilated.  No focal 

weakness.  Spoke to the patient's daughter and  at the bedside.  

Telemetry was showing atrial tachycardia versus sinus tachycardia.  Nitro drip 

was discontinued.  Started on Cardizem drip.  MRI of the brain done.   

and Dr. Glez to patient have the NG tube for medications.


September 17: ICU: Discussed with Dr. alcazar from neurology this morning.  He has

to patient on IV Vimpat.  Cardizem drip was discontinued yesterday evening.  

Lopressor dose increased.  Sinus rhythm.  Nurse reported patient not able to see

since the episode.  At the bedside patient can only see hand movements.  Not 

able to count fingers.  Can only see outline of people.  Answering questions 

slowly today.


September 18: ICU: Patient's former awake.  Vision is still affected.  Can only 

appreciate hand movements.  Discussed with Dr. Alcazar from neurology.  He feels 

that patient does not have a stroke and has posterior reversible encephalopathy 

syndrome.  Vimpat is to continue.  Patient is in sinus rhythm.  Patient is off 

Cardizem drip.  Increase dose of metoprolol.





Active Medications





Acetaminophen (Acetaminophen Tab 325 Mg Tab)  650 mg PO Q6HR PRN


   PRN Reason: Fever and/ or Pain


   Last Admin: 09/14/22 20:32 Dose:  650 mg


   


Aspirin (Aspirin 81 Mg)  81 mg PO DAILY Formerly [facility], [facility]1983


   Last Admin: 09/18/22 09:29 Dose:  81 mg


   


Atorvastatin Calcium (Atorvastatin 80 Mg Tab)  80 mg PO DAILY [facility]ly [facility], [facility]


   Last Admin: 09/18/22 09:29 Dose:  80 mg


   


Clonazepam (Clonazepam 1 Mg Tab)  1 mg PO HS [facility], [facility]


   Last Admin: 09/17/22 21:29 Dose:  1 mg


   


Clopidogrel Bisulfate (Clopidogrel 75 Mg Tab)  75 mg PO DAILY [facility], [facility]


   Last Admin: 09/18/22 09:29 Dose:  75 mg


   


Famotidine (Famotidine 20 Mg Tab)  20 mg PO BID [facility], [facility]


   Last Admin: 09/18/22 09:29 Dose:  20 mg


   


Diltiazem HCl 125 mg/ Sodium (Chloride)  125 mls @ 5 mls/hr IV .Q24H [facility], [facility]


   Last Admin: 09/18/22 08:04 Dose:  Not Given


   


Lacosamide 100 mg/ Sodium (Chloride)  60 mls @ 100 mls/hr IVPB BID [facility]


   Last Admin: 09/18/22 09:37 Dose:  100 mls/hr


   


Levothyroxine Sodium (Levothyroxine 112 Mcg Tab)  112 mcg PO DAILY@0630 [facility], [facility]


   Last Admin: 09/18/22 06:39 Dose:  112 mcg


   


Lisinopril (Lisinopril 10 Mg Tab)  10 mg PO DAILY [facility], [facility]


   Last Admin: 09/18/22 09:29 Dose:  10 mg


   


Metoprolol Tartrate (Metoprolol Tartrate 5 Mg/5 Ml Vial)  5 mg IVP Q6HR PRN


   PRN Reason: Blood Pressure - High


Metoprolol Tartrate (Metoprolol Tartrate 50 Mg Tab)  50 mg PO BID [facility]


   Last Admin: 09/18/22 09:29 Dose:  50 mg


   


Naloxone HCl (Naloxone 0.4 Mg/Ml 1 Ml Vial)  0.2 mg IV Q2M PRN


   PRN Reason: Opioid Reversal


Nicotine (Nicotine 14mg/24hr Patch)  1 patch TRANSDERM DAILY [facility], [facility]


   Last Admin: 09/18/22 09:29 Dose:  1 patch


   


Ondansetron HCl (Ondansetron 4 Mg Tab)  4 mg PO Q8HR PRN


   PRN Reason: Nausea And Vomiting


   Last Admin: 09/13/22 21:07 Dose:  4 mg


   


Ranolazine (Ranolazine 500 Mg Tab.Er.12h)  500 mg PO Q12HR [facility], [facility]


   Last Admin: 09/18/22 09:29 Dose:  500 mg


   


Sertraline HCl (Sertraline 100 Mg Tab)  100 mg PO HS [facility], [facility]


   Last Admin: 09/17/22 21:45 Dose:  100 mg


   














On examination:


VITAL SIGNS: 98.2, 63, 10, 1 69 x 65, 100% on 2 L


GENERAL APPEARANCE: Laying in bed, more awake.


HEENT: Normal external appearance of nose and ear.  Oral cavity normal


EYES: Pupils slightly enlarged.  Right eye some medial convergence. Conjunctiva 

normal. 


NECK: JVD not raised. Mass not palpable. 


RESPIRATORY: Respiratory effort normal. Lungs decreased breath sounds


CARDIOVASCULAR: First and second sounds normal. No edema. 


ABDOMEN: Soft.  Tender, no guarding rigidity Liver and spleen not palpable.  No 

mass palpable. 


PSYCHIATRY: Answering simple questions.


NEUROLOGICAL:  vision is limited to hand movement.





INVESTIGATIONS, reviewed in the clinical context:


September 18: WBC 10 hemoglobin 13.3 potassium 4 creatinine 0.44


MRI brain: May be some leopoldo-infarct ischemic changes right occipital lobe.  

Chronic appearing subcortical white matter changes to the bilateral occipital 

lobes.  And periventricular white matter within the brainstem.


September 16: WBC 17.8 hemoglobin 15.1 potassium 4.4 creatinine 0.64 troponin 

0.046 LDL 66


September 15: Troponin 0.083, 0.074


September 14: White count 8.6 and regular 13.5 potassium 3.4 creatinine 0.44


Chest CTA: Negative PE.  IVC filter in place.  Cardiomegaly.  Trace bilateral 

pleural effusion.  


White count 8.1 hemoglobin 12.6 platelets 147 potassium 4 BUN 9 creatinine 0.56


Troponin I 0.012, 0.5-6, 0.311


CT abdomen and pelvis with contrast: Severe cardiomegaly.  Colonic 

diverticulosis.  3.2 cm left adnexal cyst mass likely tubo-ovarian.  Severe 

right iliac artery stenosis.











Assessment and plan: 





-Acute change in mental status with patient becoming unresponsive.  Patient now 

noticed to have limited vision which is new finding down to hand movement.  MRI 

brain is showing infarct in the occipital lobe.


Discussed with neurology Dr. Alcazar.  He feels patient does not have a stroke.  

His diagnosis is posterior reversible encephalopathy syndrome: Slow to respond





-Post infarct angina:


Patient initially declined cardiac catheterization.  





-Non-ST elevation myocardial infarction.


Aspirin beta blocker Plavix.  Being followed by cardiology.  





-Acute Colitis.  Better


IV ceftriaxone, IV Flagyl.   full liquids-advanced to soft diet.  Change to by 

mouth Augmentin.





- left adnexal cystic mass likely tubo-ovarian.Likely adnexal cyst, 


was seen by OB/GYN: Honeydew to be incidental finding.  Asymptomatic.  Ova testing 

requested.





-Severe right iliac artery stenosis


Aspirin, Plavix.  Seen by Dr. Corey.  Will follow up outpatient.  4-6 weeks.





-Coronary artery disease history of CABG in 2010 and prior PCI's most recent in 

02/2021 and chronic total occluded RCA.


Aspirin, Lipitor, Plavix, Lopressor





-Chronic CHF, Ischemic cardiomyopathy ejection fraction 40 to 45%


 Aldactone 12.5 mg daily.  Lopressor.  Zestril





-Chronic left ventricle apical thrombus was on anticoagulation previously.  

Calcified





-Moderate to severe TR





-Peripheral arterial disease, 


Aspirin, Plavix.  





-Osteoarthritis primary bilateral


Use pain medications as needed





-Hypothyroidism


Synthroid 112 g a





-Chronic urinary stress incontinence





-Peripheral neuropathy





-Atrial tachycardia versus sinus tachycardia: Patient back in sinus rhythm..


IV Cardizem drip discontinued.  Decreased metoprolol tartrate 50 mg twice a day








Discussed with neurology.  Continue current treatment plan.  Continue Vimpat.  

Follow with cardiology.  Metoprolol dose increased.

## 2022-09-19 LAB
ALBUMIN SERPL-MCNC: 3.6 G/DL (ref 3.8–4.9)
ALBUMIN/GLOB SERPL: 1.89 G/DL (ref 1.6–3.17)
ALP SERPL-CCNC: 115 U/L (ref 41–126)
ALT SERPL-CCNC: 14 U/L (ref 8–44)
ANION GAP SERPL CALC-SCNC: 8.8 MMOL/L (ref 10–18)
AST SERPL-CCNC: 19 U/L (ref 13–35)
BASOPHILS # BLD AUTO: 0.03 X 10*3/UL (ref 0–0.1)
BASOPHILS NFR BLD AUTO: 0.3 %
BUN SERPL-SCNC: 12.6 MG/DL (ref 9–27)
BUN/CREAT SERPL: 25.2 RATIO (ref 12–20)
CALCIUM SPEC-MCNC: 9.3 MG/DL (ref 8.7–10.3)
CHLORIDE SERPL-SCNC: 100 MMOL/L (ref 96–109)
CO2 SERPL-SCNC: 34.2 MMOL/L (ref 20–27.5)
EOSINOPHIL # BLD AUTO: 0.14 X 10*3/UL (ref 0.04–0.35)
EOSINOPHIL NFR BLD AUTO: 1.4 %
ERYTHROCYTE [DISTWIDTH] IN BLOOD BY AUTOMATED COUNT: 4.53 X 10*6/UL (ref 4.1–5.2)
ERYTHROCYTE [DISTWIDTH] IN BLOOD: 13.2 % (ref 11.5–14.5)
GLOBULIN SER CALC-MCNC: 1.9 G/DL (ref 1.6–3.3)
GLUCOSE SERPL-MCNC: 89 MG/DL (ref 70–110)
HCT VFR BLD AUTO: 42.3 % (ref 37.2–46.3)
HGB BLD-MCNC: 13.7 G/DL (ref 12–15)
IMM GRANULOCYTES BLD QL AUTO: 0.3 %
LYMPHOCYTES # SPEC AUTO: 2.47 X 10*3/UL (ref 0.9–5)
LYMPHOCYTES NFR SPEC AUTO: 25.5 %
MCH RBC QN AUTO: 30.2 PG (ref 27–32)
MCHC RBC AUTO-ENTMCNC: 32.4 G/DL (ref 32–37)
MCV RBC AUTO: 93.4 FL (ref 80–97)
MONOCYTES # BLD AUTO: 0.7 X 10*3/UL (ref 0.2–1)
MONOCYTES NFR BLD AUTO: 7.2 %
NEUTROPHILS # BLD AUTO: 6.32 X 10*3/UL (ref 1.8–7.7)
NEUTROPHILS NFR BLD AUTO: 65.3 %
NRBC BLD AUTO-RTO: 0 /100 WBCS (ref 0–0)
PLATELET # BLD AUTO: 251 X 10*3/UL (ref 140–440)
POTASSIUM SERPL-SCNC: 4.3 MMOL/L (ref 3.5–5.5)
PROT SERPL-MCNC: 5.5 G/DL (ref 6.2–8.2)
SODIUM SERPL-SCNC: 143 MMOL/L (ref 135–145)
WBC # BLD AUTO: 9.69 X 10*3/UL (ref 4.5–10)

## 2022-09-19 RX ADMIN — FAMOTIDINE SCH MG: 20 TABLET, FILM COATED ORAL at 20:21

## 2022-09-19 RX ADMIN — LEVOTHYROXINE SODIUM SCH MCG: 0.11 TABLET ORAL at 06:27

## 2022-09-19 RX ADMIN — ASPIRIN 81 MG CHEWABLE TABLET SCH MG: 81 TABLET CHEWABLE at 09:01

## 2022-09-19 RX ADMIN — NICOTINE SCH PATCH: 14 PATCH, EXTENDED RELEASE TRANSDERMAL at 09:00

## 2022-09-19 RX ADMIN — SERTRALINE HYDROCHLORIDE SCH MG: 100 TABLET ORAL at 20:21

## 2022-09-19 RX ADMIN — METOPROLOL TARTRATE SCH MG: 50 TABLET, FILM COATED ORAL at 09:03

## 2022-09-19 RX ADMIN — HEPARIN SODIUM SCH UNIT: 5000 INJECTION INTRAVENOUS; SUBCUTANEOUS at 23:58

## 2022-09-19 RX ADMIN — CLOPIDOGREL BISULFATE SCH MG: 75 TABLET ORAL at 09:00

## 2022-09-19 RX ADMIN — LACOSAMIDE SCH MLS/HR: 10 INJECTION INTRAVENOUS at 10:44

## 2022-09-19 RX ADMIN — FAMOTIDINE SCH MG: 20 TABLET, FILM COATED ORAL at 09:00

## 2022-09-19 RX ADMIN — LACOSAMIDE SCH MLS/HR: 10 INJECTION INTRAVENOUS at 20:21

## 2022-09-19 RX ADMIN — DILTIAZEM HYDROCHLORIDE SCH: 5 INJECTION INTRAVENOUS at 08:57

## 2022-09-19 RX ADMIN — RANOLAZINE SCH MG: 500 TABLET, FILM COATED, EXTENDED RELEASE ORAL at 20:21

## 2022-09-19 RX ADMIN — METOPROLOL TARTRATE SCH MG: 50 TABLET, FILM COATED ORAL at 20:21

## 2022-09-19 RX ADMIN — HEPARIN SODIUM SCH UNIT: 5000 INJECTION INTRAVENOUS; SUBCUTANEOUS at 09:00

## 2022-09-19 RX ADMIN — ATORVASTATIN CALCIUM SCH MG: 80 TABLET, FILM COATED ORAL at 09:00

## 2022-09-19 RX ADMIN — RANOLAZINE SCH MG: 500 TABLET, FILM COATED, EXTENDED RELEASE ORAL at 09:03

## 2022-09-19 RX ADMIN — HEPARIN SODIUM SCH UNIT: 5000 INJECTION INTRAVENOUS; SUBCUTANEOUS at 00:10

## 2022-09-19 RX ADMIN — HEPARIN SODIUM SCH UNIT: 5000 INJECTION INTRAVENOUS; SUBCUTANEOUS at 16:22

## 2022-09-19 NOTE — P.PN
Subjective


Progress Note Date: 09/19/22








This is a 67-year-old female who was initially admitted back on September 9, 

with abdominal pain and chest pain.  The patient was found to have a non-ST 

segment elevation myocardial infarction, and the plan was for heart 

catheterization today, September 16.  She ended up coming to the ICU on 

September 16, because she was found to have an elevated blood pressure, 

increased heart rate, and she became poorly responsive, and obtunded.  A code 

stroke was called, and her NIH score was 19.  She was very poorly responsive.  

She appeared to have some right-sided weakness.  I'm currently seeing the 

patient in the intensive care unit.  The patient did have a CT of the brain 

which showed an old right occipital infarct.  CT angiogram was negative.  

Currently, she is on 2 L of nasal cannula.  She's getting saline at 10 mL an 

hour.  She is on a nitroglycerin drip at 10 mcg/m.  She is poorly responsive but

she does arouse.  She is able to protect her airway at this time.  White count 

17.8, hemoglobin 15.1, hematocrit 47.8, and platelet count 297,000.  PT INR and 

PTT are all normal.  D-dimer is 1.68.  Sodium 140, potassium 4.4, chlorides 100,

CO2 30, BUN 11, creatinine 0.64.  The patient's troponins were 0.083, 0.074 and 

0.046.  Chest x-ray from September 13 showed changes of CHF.





Progress note dated 09/17/2022.





67-year-old female who was seen in consultation yesterday.  She was initially 

admitted back on September 9, with abdominal pain and chest pain.  She was found

to have a non-ST segment elevation myocardial infarction.  The patient ended up 

in the intensive care unit on September 16 because of mental status changes.  

Initially, she was thought to possibly have a CVA.  She was seen by neurology 

and there thinking that maybe she had a seizure, which explains her mental 

status changes.  She's on 2 L of oxygen.  She's getting saline at 10 mL an hour.

 The Cardizem drip has been turned off.  His bit more awake and alert.  

Initially, it appeared that she was not moving her right side.  That is not the 

case now.  White count 11.9, hemoglobin 14.5, hematocrit 45.8, and platelet 

count 216,000.  Sodium 142, potassium 4.4, chlorides 101, CO2 35, BUN 16, 

creatinine 0.49.  Albumin is 3.3.  EEG was consistent with slowing, and brain 

MRI was reviewed.





Progress note dated 09/18/2022.





67-year-old female again seen in [facility].  The patient was seen in consultation 2 

days ago.  She was admitted back on September 9 with abdominal pain and chest 

pain.  She was found have a non-ST segment elevation myocardial infarction.  She

came to the intensive care unit on September 16, because of mental status 

changes, and was initially thought to have had a CVA.  She was seen by 

neurology.  They think actually she may have had a seizure.  Currently, she is 

on 2 L of oxygen.  She's getting saline at 10 mL an hour.  Her mental status has

improved tremendously.  White count 10, hemoglobin 13.3, hematocrit 42.6, and 

platelet count 205,000.  Sodium 138, potassium 4, chlorides 99, CO2 32, BUN 19, 

creatinine 0.44.  No chest x-ray today.








On 09/18/2022, I'm seeing the patient for a follow-up.  The patient is post ICU 

admission for acute non-ST segment elevation myocardial infarction and altered 

mentation.  She also developed vision changes and the patient was further workup

by neurology and the patient is suspected to have seizures and the patient is 

currently taking Vimpat.  EEG was abnormal.  MRI of the brain also showed some 

mild leopoldo-infarct ischemic changes in the right occipital lobe.  There were also

some chronic-appearing subcortical white matter changes to the bilateral 

occipital lobes and periventricular white matter disease within the brainstem.  

The patient is currently on Vimpat and the patient is doing well and the vision 

is gradually improving.  She is fairly of any chest pain.  Hemodynamically 

stable.  The blood work from today shows a white cell count of 9.6 with a h

emoglobin of 13.7 and platelet count of 251.  Electrodes are all within normal 

limits.  LFTs are all within normal limits.  She is known to have CAD.  She also

is known to have cardiomyopathy with an ejection fraction of 20-25% and 

anteroseptal and apical hypokinesis.  No significant valvular abnormalities.  

Cardiology is also on the case.  For now, the patient remains on aspirin, 

patient is also on Plavix, patient is on metoprolol 50 mg twice a day, and the 

patient is also on high-dose statins with Lipitor.  The patient is also on 

Synthroid for hypothyroidism and Zoloft on a milligrams by mouth daily for 

chronic anxiety/depression.  He is known to have coronary artery disease.  She 

is undergone previous cardiac bypass surgery.  The patient remains to be on of 

confused and she has episodes of encephalopathy along with visual disturbance.  

One consideration his posterior reversible encephalopathy syndrome and on MRI of

the brain the patient had abnormalities involving the right occipital lobe.  EEG

discharges over the left frontotemporal lobe.  A repeat EEG was done today and 

neurology remains on the case.





Objective





- Vital Signs


Vital signs: 


                                   Vital Signs











Temp  98 F   09/19/22 12:55


 


Pulse  59 L  09/19/22 12:55


 


Resp  18   09/19/22 12:55


 


BP  136/74   09/19/22 12:55


 


Pulse Ox  100   09/19/22 12:55


 


FiO2      








                                 Intake & Output











 09/18/22 09/19/22 09/19/22





 18:59 06:59 18:59


 


Intake Total 80 590 


 


Output Total 390  


 


Balance -310 590 


 


Intake:   


 


  IV 80 50 


 


    .9 KVO 30  


 


    Lacosamide  mg In 50 50 





    Sodium Chloride 0.9% 50   





    ml @ 100 mls/hr IVPB BID   





    [facility] Rx#:[id number]   


 


  Oral  540 


 


Output:   


 


  Urine 390  


 


Other:   


 


  Voiding Method Indwelling Catheter Toilet Toilet


 


  # Voids 2 2 


 


  # Bowel Movements 1  














- Exam








No acute distress, much more awake today.  Currently on 2 L of oxygen.





HEENT examination is grossly unremarkable.  





Neck supple.  Full range of motion.  No adenopathy thyromegaly or neck vein 

distention.





Cardiovascular examination reveals regular rhythm rate.  S1-S2 normal.  No S3 or

S4.  No discernible murmur noted.  Heart rate 58 bpm.





Lungs reveal mostly clear breath sounds.  Mild scattered rhonchi.  No wheezes or

crackles.  Breath sounds equal bilaterally.  Saturations 99 % on 2 L.





Abdomen soft bowel sounds are heard.  No masses or tenderness.





Extremities are intact.  No cyanosis clubbing or edema.





Skin is without rash or lesion.





Neurologic examination reveals patient to be much more awake and alert.  She 

does move all 4 extremities.





- Labs


CBC & Chem 7: 


                                 09/19/22 04:54





                                 09/19/22 04:54


Labs: 


                  Abnormal Lab Results - Last 24 Hours (Table)











  09/19/22 Range/Units





  04:54 


 


Carbon Dioxide  34.2 H  (20.0-27.5)  mmol/L


 


Anion Gap  8.80 L  (10.00-18.00)  mmol/L


 


Creatinine  0.5 L  (0.6-1.5)  mg/dL


 


BUN/Creatinine Ratio  25.20 H  (12.00-20.00)  Ratio


 


Total Protein  5.5 L  (6.2-8.2)  g/dL


 


Albumin  3.6 L  (3.8-4.9)  g/dL








                      Microbiology - Last 24 Hours (Table)











 09/16/22 09:35 Blood Culture - Preliminary





 Blood    No Growth after 72 hours














Assessment and Plan


Plan: 











Rule out acute cerebrovascular accident, versus possible seizure and postictal 

state.  Posterior reversible encephalopathy syndrome is also another possible 

diagnosis of her looking into.  Please refer to the EEG and MRI of the brain in 

detail escalations was given by the neurologist.  The patient is currently on 

Vimpat.  





Coronary artery disease with previous bypass surgery





Severe ischemic cardiomyopathy with an ejection fraction of 20-25% 





 status post Non-ST segment elevation myocardial infarction.





History of myocardial infarction.





History of hypertension.





History of hyperlipidemia.





History of hypothyroidism.





Prior history of bypass grafting.





CAD with prior stent placement.





Old right occipital infarct on computed tomography scan of the brain.





Plan:





Continue optimizing CHF and post MI state and the patient is currently on a 

combination of aspirin, Plavix, metoprolol, ACE inhibitor and Lipitor


Continue Vimpat for seizure activity


Repeat EEG was done today


Continue to follow

## 2022-09-19 NOTE — P.PN
Subjective


Patient is pleasant 67-year-old female with history of carotid artery stenosis 

status post bilateral carotid endarterectomy, hypothyroidism, CAD status post 

CABG as well as PCI, hypertension, hyperlipidemia, hypothyroidism, recent 50 

pound weight loss in the last 3 months. Patient used to follow with Dr. Raul Noel [phone], however has not followed in a while. Patient presents with 

multiple symptoms. Patient has not had much of an appetite and has not been 

eating and drinking that much.  Additionally has been states she is very weak 

and if it were not for him "she would sleep 24 hours ". Apparently this has been

going on for the last few weeks and even few months. Multiple symptoms including

abdominal pain, back pain, chest pain, lightheadedness, weak.





Patient had presented in May 2022 with findings of a Lexiscan stress test with 

predominantly fixed apical defect consistent with prior myocardial infarction 

with minimal questionable leopoldo-infarct ischemia and an EF 41%.  Echocardiogram 

had showed EF 40-45% with a fixed thrombus in the left ventricle, moderate to 

severe tricuspid regurgitation and apical scarring.  EKG on presentation shows 

sinus rhythm, left axis deviation, incomplete left bundle branch block, ST 

depressions in the inferior and lateral leads.  This does appear somewhat 

similar to prior EKG from May 2022. 





CT abdomen and pelvis which showed mild thickening of the colon, 3.2 cm left 

adnexal mass, severe right iliac artery stenosis, calcification or on the apex 

of the heart. 





Echo revealed EF 20-25%, anterseptal, apex hypokinesis, dilated LV.  





On 9/16, patient developed change in mental status and was transferred to ICU. 





9/19


Patient seen and examined at bedside, no acute distress. She was transferred out

of the ICU. She is sitting up in the bed with no complaints. She continues to be

confused. She is alert. oriented to person only. She denies any pain or 

shortness of breath, no chest pain. BP has improved 148/65 HR 62.  





She's currently maintained on aspirin 81 mg daily, atorvastatin 80 mg daily, 

Plavix 75 mg daily, metoprolol tartrate 25 mg BID, lisinopril 10mg daily, Ranexa

500mg BID 





PHYSICAL EXAMINATION


Vital signs reviewed.


CONSTITUTIONAL: No apparent distress, ill appearing, lethargic


HEENT: Neck Supple.  No JVD. 


CHEST EXAMINATION: Lungs are clear to auscultation. No chest wall tenderness is 

noted on palpation or with deep breathing. 


HEART EXAMINATION: Regular rate and rhythm. S1, S2 heard. No murmurs, gallops or

rub.


ABDOMEN: Soft, nontender. Positive bowel sounds.


EXTREMITIES: 2+ peripheral pulses, no lower extremity edema and no calf tende

rness.


NEUROLOGIC EXAMINATION: Patient is alert, oriented to person and place  





ASSESSMENT


Hypertensive emergency


Chest discomfort with elevated troponin, possible NSTEMI, however patient not 

agreement to proceed with cardiac cath initially and was treated medically


Altered mental status


MRI Brains reported  may be some mild per-infarct ischemic changes right 

occipital lobe.


Worsening cardiomyopathy with EF 20-25%, ischemic vs non-ischemic 


CAD with history of CABG and bypass


Recent 50 pound weight loss


Ovarian mass, rule out cancer


Abdominal pain, back pain, multiple other symptoms


Somnolence, failure to thrive of unclear etiology


Decreased appetite


Chronic  heart failure with reduced ejection fraction 


History of Ischemic cardiomyopathy


Chronic apical thrombus appears calcified on CT


PAD





PLAN


We will maximize medical therapy


Continue dual antiplatelet therapy with aspirin and plavix 


Continue metoprolol tartrate 25mg BID, Ranexa and statin


Neurology following 


Further recommendations based on clinical course





Nurse practitioner note has been reviewed by physician. Signing provider agrees 

with the documented findings, assessment, and plan of care. 














Objective





- Vital Signs


Vital signs: 


                                   Vital Signs











Temp  98.4 F   09/19/22 03:20


 


Pulse  62   09/19/22 03:20


 


Resp  15   09/19/22 03:20


 


BP  148/65   09/19/22 03:20


 


Pulse Ox  99   09/19/22 03:20


 


FiO2      








                                 Intake & Output











 09/18/22 09/19/22 09/19/22





 18:59 06:59 18:59


 


Intake Total 80 590 


 


Output Total 390  


 


Balance -310 590 


 


Intake:   


 


  IV 80 50 


 


    .9 KVO 30  


 


    Lacosamide  mg In 50 50 





    Sodium Chloride 0.9% 50   





    ml @ 100 mls/hr IVPB BID   





    [facility] Rx#:[id number]   


 


  Oral  540 


 


Output:   


 


  Urine 390  


 


Other:   


 


  Voiding Method Indwelling Catheter Toilet 


 


  # Voids 2 2 


 


  # Bowel Movements 1  














- Labs


CBC & Chem 7: 


                                 09/19/22 04:54





                                 09/19/22 04:54


Labs: 


                      Microbiology - Last 24 Hours (Table)











 09/16/22 09:35 Blood Culture - Preliminary





 Blood    No Growth after 48 hours

## 2022-09-20 RX ADMIN — FAMOTIDINE SCH MG: 20 TABLET, FILM COATED ORAL at 10:09

## 2022-09-20 RX ADMIN — CLOPIDOGREL BISULFATE SCH MG: 75 TABLET ORAL at 10:09

## 2022-09-20 RX ADMIN — RANOLAZINE SCH MG: 500 TABLET, FILM COATED, EXTENDED RELEASE ORAL at 10:12

## 2022-09-20 RX ADMIN — LEVOTHYROXINE SODIUM SCH MCG: 0.11 TABLET ORAL at 05:54

## 2022-09-20 RX ADMIN — ASPIRIN 81 MG CHEWABLE TABLET SCH MG: 81 TABLET CHEWABLE at 10:09

## 2022-09-20 RX ADMIN — NICOTINE SCH PATCH: 14 PATCH, EXTENDED RELEASE TRANSDERMAL at 10:09

## 2022-09-20 RX ADMIN — LACOSAMIDE SCH MLS/HR: 10 INJECTION INTRAVENOUS at 10:27

## 2022-09-20 RX ADMIN — SPIRONOLACTONE SCH MG: 25 TABLET, FILM COATED ORAL at 10:08

## 2022-09-20 RX ADMIN — HEPARIN SODIUM SCH UNIT: 5000 INJECTION INTRAVENOUS; SUBCUTANEOUS at 10:09

## 2022-09-20 RX ADMIN — FAMOTIDINE SCH MG: 20 TABLET, FILM COATED ORAL at 22:10

## 2022-09-20 RX ADMIN — HEPARIN SODIUM SCH UNIT: 5000 INJECTION INTRAVENOUS; SUBCUTANEOUS at 16:48

## 2022-09-20 RX ADMIN — LACOSAMIDE SCH MG: 150 TABLET, FILM COATED ORAL at 22:10

## 2022-09-20 RX ADMIN — RANOLAZINE SCH MG: 500 TABLET, FILM COATED, EXTENDED RELEASE ORAL at 22:10

## 2022-09-20 RX ADMIN — SERTRALINE HYDROCHLORIDE SCH MG: 100 TABLET ORAL at 22:10

## 2022-09-20 RX ADMIN — METOPROLOL TARTRATE SCH MG: 50 TABLET, FILM COATED ORAL at 10:09

## 2022-09-20 RX ADMIN — ATORVASTATIN CALCIUM SCH MG: 80 TABLET, FILM COATED ORAL at 10:09

## 2022-09-20 RX ADMIN — METOPROLOL TARTRATE SCH MG: 50 TABLET, FILM COATED ORAL at 22:10

## 2022-09-20 NOTE — EEG
ELECTROENCEPHALOGRAM REPORT



PREAMBLE:

This is a 67-year-old female who has episodes of confusion.  The patient had an

abnormal EEG performed 3 days ago on 09/16/2022.  This is a followup study.  The

patient has history of an occipital stroke on the right side.



EEG FINDINGS:

This is a 21-channel digital EEG recorded with video component, utilizing 10/20

international system with referential and bipolar montages.  Background consists of

moderately well-developed and regulated mixed frequencies of fast frequency beta

intermixed with some alpha and some delta activity seen in bihemispheric region.

Background seems to be reactive to eye opening and closing.  The left temporal periodic

lateralized epileptiform activity that was seen on the previous study has much improved

and now lower amplitude sharp phase still seen sporadically involving the left temporal

region during this study.  Some drowsiness was seen with appearance of bilaterally

symmetric theta frequency rhythm.  Deeper stages of sleep were not seen during the

study.  Photic driving response was not seen.  No obvious electrographic seizure was

recorded.



IMPRESSION:

1. Abnormal EEG due to background slowing, disorganization of mild-to-moderate degree,

    suggestive of encephalopathy.

2. Focal slowing with focal epileptiform activity is seen frequently involving the

    left temporal region suggestive of focal cortical neuronal dysfunction with

    underlying cortical irritability and tendency for seizure.  When compared to the

    EEG from 09/16/2022, the epileptiform activity has much improved, although still

    present.  Clinical correlation, and prolonged EEG recommended, if clinically

    indicated.





MMJOHNY / CRISTOBAL: [id number] / Job#: [id number]

## 2022-09-20 NOTE — P.PN
Subjective


Progress Note Date: 09/20/22 09/20/2022: Patient was seen for a follow-up.  Patient denies headache.  Patient

is more sleepy today.  She feels very tired.  Denies any numbness or tingling.





09/19/2022: Patient initially seen by Dr. Dale Montero.  Please refer to his note 

for details.  In summary it appears patient came to the hospital with abdominal 

pain.  Patient was found to have non-STEMI.  Patient while in the hospital was 

found unresponsive, with left-sided weakness.  Stroke code was activated.  She 

was not a candidate for TPA as last known well was uncertain.  No previous 

history of seizures.





Patient has presented with visual hallucinations.  At present she states that 

her hallucinations are occurring "every now and then".  Patient could not 

describe what she actually is hallucinating.  Patient denies any previous 

history of strokes.  There is evidence of bilateral CEA in the past.  Patient d

enies any headache, denies any focal symptoms.








SOME OF THE WORK-UP DURING THIS HOSPITAL VISIT:


CT of the head was ordered and is a reported as old right occipital lobe 

infarct.  No acute intracranial abnormality.  I personally reviewed CT head 

agree with the findings.


CT angiography of the head and neck was reported as negative CT angiography of 

the brain.  Negative CT angiography of the neck were dominant left vertebral and

diminutive right vertebral.  Large pulmonary artery suggestive of pulmonary 

hypertension.  Mild aneurysm of the aortic arch.


TSH is 0.039 and free T4 is 1.67.


Ammonia is 31 (normal <30).


Lipid panel is triglycerides 246, cholesterol is 151, LDL 66 and HDL 35.


MRI Brain is reported as there maybe some mild leopoldo-infarct ischemic changes 

right occipital lobe.  Chronic appearing subcortical white matter changes to the

bilateral occipital lobses, periventricular white matter, whithin the brainstem.

 Suspicious changes to suggest metastasis is not identified.  I personally 

reviewed MRI Brain Patient does have some patchy increase signal on DWI over 

right occipital and left medial temporal/hippocampus region.  On FLAIR, patient 

has evidence of right occipital stroke and has hyperintensity over the bilateral

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 

reversible encephalopathy syndrome.  Also has hyperintesity over brainstem.  


Routine EEG on 09/16/2022: Is abnormal.  The background slowing is suggestive of

moderate to severe encephalopathy.  The epileptiform dischargs over the left 

frontotemporal region increases risk for seizure.  There is no seizure noted 

during this study.


2D echo is reported as dilated left ventricle with severe LV dysfunction (20-

25%).  Normal Left atrium.  





Objective





- Vital Signs


Vital signs: 


                                   Vital Signs











Temp  98.5 F   09/20/22 11:25


 


Pulse  55 L  09/20/22 11:25


 


Resp  16   09/20/22 11:25


 


BP  153/73   09/20/22 11:25


 


Pulse Ox  100   09/20/22 11:25


 


FiO2      








                                 Intake & Output











 09/19/22 09/20/22 09/20/22





 18:59 06:59 18:59


 


Intake Total 118 120 


 


Balance 118 120 


 


Intake:   


 


  Oral 118 120 


 


Other:   


 


  Voiding Method Toilet Toilet Toilet





  Incontinent Incontinent


 


  # Voids 2 3 














- Exam





GENERAL: The patient is comfortably laying in the bed, somnolent.





NEUROLOGICAL:


Patient is somnolent, but does wake up.


Higher mental function: The patient is awake.  Patient knows it is September 2022.  Patient states that she is in "[facility]".  She believes that 

she is in [facility].   There is delay in responding.   She appears 

slightly encephalopathic.  No obvious seizure activity noted.


Cranial nerves: The pupils are round, equal and reactive to light.   Patient has

left homonymous hemianopia.  Extraocular muscles intact.  No facial weakness.  

No dysarthria.  Facial sensation is normal.  Tongue protrudes the midline.  

Shoulder shrug normal.  Hearing appears fairly intact.


Motor: Muscle strength is normal in the arms and legs.   Normal tone and bulk.  


Cerebellum: No ataxia for finger-to-nose testing, although she was slightly 

slow.


Sensation: Equal bilaterally, with no neglect on double simultaneous 

stimulation..


Gait: Deferred.





- Labs


CBC & Chem 7: 


                                 09/19/22 04:54





                                 09/21/22 05:18


Labs: 


                      Microbiology - Last 24 Hours (Table)











 09/16/22 09:35 Blood Culture - Preliminary





 Blood    No Growth after 96 hours














Assessment and Plan


Assessment: 





* Abnormal EEG with periodic lateralized epileptiform discharges involving the 

  left temporal region.  Rule out nonconvulsive status.  Patient's current EEG 

  has much improved, but still with presence of epileptiform activity on the 

  left side.  


* Encephalopathy with visual disturbance, possibly ictal or postictal 

  phenomenon.  Rule out paraneoplastic syndrome or NMDA encephalitis.


* Abnormal MRI Brain reported as possible leopoldo-infarct ischemic changes right 

  occipital lobe.    Chronic appearing subcortical white matter changes to the 

  bilateral occipital lobes, periventricular white matter, within the brainstem.

   On my review, there is evidence of an old right occipital lobe infarct.  Also

  has white matter changes in the watershed distribution between MCA/MALI.  Also 

  some white matter changes involving bilateral occipital region.  On my review,

  there is also abnormal wedge-shaped signal on FLAIR involving bilateral 

  posterior cerebellar region, also a small cystic lesion in the left medial 

  temporal region in hippocampus.  No evidence of acute ischemia however.   

  Posterior reversible encephalopathy syndrome (PRES) also in the differential, 

  but the blood pressure has not been running really high.  


* Has minimal elevated ammonia (31).


* Non-STEMI


* Tubo-ovarian mass reported on CT.


* History of old right occipital stroke


* History of CAD s/p stent and CABG


* History of hypothyroidism


* Severe depression


* Anxiety


* Tobacco use


Plan: 








* Continue Vimpat 150 mg twice a day.  Will switch to oral route.


* Repeat prolonged EEG 2.5 hours in the morning.  Consider transfer to higher 

  level care for continuous EEG monitoring.  


* Consider LP rule out encephalitis.  However patient is on Plavix.  We will 

  check an NMDA antibodies.  Check B12, folate level.





* MRI Brain reported as possible leopoldo-infarct ischemic changes right occipital 

  lobe.    Chronic appearing subcortical white matter changes to the bilateral 

  occipital lobes, periventricular white matter, within the brainstem.  On my 

  review, there is evidence of an old right occipital lobe infarct.  Also has 

  white matter changes in the watershed distribution between MCA/MALI.  Also some

  white matter changes involving bilateral occipital region.  On my review, 

  there is also abnormal wedge-shaped signal on FLAIR involving bilateral 

  posterior cerebellar region, also a small cystic lesion in the left medial 

  temporal region in hippocampus.  No evidence of acute ischemia however.   

  Posterior reversible encephalopathy syndrome (PRES) also in the differential, 

  but the blood pressure has not been running really high.





* Initial EEG on 09/16/2022 reviewed by Dr. Montero: Is abnormal.  The background 

  slowing is suggestive of moderate to severe encephalopathy.  The epileptiform 

  dischargs over the left frontotemporal region increases risk for seizure.  

  There is no seizure noted during this study.





* Repeat EEG 09/19/2022 was abnormal due to background slowing, disorganization 

  of mild-to-moderate degree, suggestive of encephalopathy.  Focal slowing with 

  focal epileptiform activity seen frequently involving the left temporal region

  suggestive of focal cortical neuronal dysfunction with underlying cortical 

  irritability and tendency for seizures.  No electrographic seizure was 

  recorded.  When compared to the EEG from 09/16/2022, the epileptiform activity

  has much improved, although still present.  Clinical correlation and a 

  prolonged EEG recommended if clinically indicated.





* Continue aspirin 81, Plavix 75 and  Lipitor 80 mg daily.


 


* PT, OT are consulted


* Continue neuro checks.


* Cardiology team is on board


* General surgery team is on board for possible diverticulitis/colitis


* We'll defer the rest of the medical management to the primary team


* For DVT prophylaxis: Started on subq heparin 5000U every 8 hours.

## 2022-09-20 NOTE — P.PN
Subjective


Progress Note Date: 09/20/22








This is a 67-year-old female who was initially admitted back on September 9, 

with abdominal pain and chest pain.  The patient was found to have a non-ST 

segment elevation myocardial infarction, and the plan was for heart 

catheterization today, September 16.  She ended up coming to the ICU on 

September 16, because she was found to have an elevated blood pressure, 

increased heart rate, and she became poorly responsive, and obtunded.  A code 

stroke was called, and her NIH score was 19.  She was very poorly responsive.  

She appeared to have some right-sided weakness.  I'm currently seeing the 

patient in the intensive care unit.  The patient did have a CT of the brain 

which showed an old right occipital infarct.  CT angiogram was negative.  

Currently, she is on 2 L of nasal cannula.  She's getting saline at 10 mL an 

hour.  She is on a nitroglycerin drip at 10 mcg/m.  She is poorly responsive but

she does arouse.  She is able to protect her airway at this time.  White count 

17.8, hemoglobin 15.1, hematocrit 47.8, and platelet count 297,000.  PT INR and 

PTT are all normal.  D-dimer is 1.68.  Sodium 140, potassium 4.4, chlorides 100,

CO2 30, BUN 11, creatinine 0.64.  The patient's troponins were 0.083, 0.074 and 

0.046.  Chest x-ray from September 13 showed changes of CHF.





Progress note dated 09/17/2022.





67-year-old female who was seen in consultation yesterday.  She was initially 

admitted back on September 9, with abdominal pain and chest pain.  She was found

to have a non-ST segment elevation myocardial infarction.  The patient ended up 

in the intensive care unit on September 16 because of mental status changes.  

Initially, she was thought to possibly have a CVA.  She was seen by neurology 

and there thinking that maybe she had a seizure, which explains her mental 

status changes.  She's on 2 L of oxygen.  She's getting saline at 10 mL an hour.

 The Cardizem drip has been turned off.  His bit more awake and alert.  

Initially, it appeared that she was not moving her right side.  That is not the 

case now.  White count 11.9, hemoglobin 14.5, hematocrit 45.8, and platelet 

count 216,000.  Sodium 142, potassium 4.4, chlorides 101, CO2 35, BUN 16, 

creatinine 0.49.  Albumin is 3.3.  EEG was consistent with slowing, and brain 

MRI was reviewed.





Progress note dated 09/18/2022.





67-year-old female again seen in [facility].  The patient was seen in consultation 2 

days ago.  She was admitted back on September 9 with abdominal pain and chest 

pain.  She was found have a non-ST segment elevation myocardial infarction.  She

came to the intensive care unit on September 16, because of mental status 

changes, and was initially thought to have had a CVA.  She was seen by 

neurology.  They think actually she may have had a seizure.  Currently, she is 

on 2 L of oxygen.  She's getting saline at 10 mL an hour.  Her mental status has

improved tremendously.  White count 10, hemoglobin 13.3, hematocrit 42.6, and 

platelet count 205,000.  Sodium 138, potassium 4, chlorides 99, CO2 32, BUN 19, 

creatinine 0.44.  No chest x-ray today.








On 09/19/2022, I'm seeing the patient for a follow-up.  The patient is post ICU 

admission for acute non-ST segment elevation myocardial infarction and altered 

mentation.  She also developed vision changes and the patient was further workup

by neurology and the patient is suspected to have seizures and the patient is 

currently taking Vimpat.  EEG was abnormal.  MRI of the brain also showed some 

mild leopoldo-infarct ischemic changes in the right occipital lobe.  There were also

some chronic-appearing subcortical white matter changes to the bilateral 

occipital lobes and periventricular white matter disease within the brainstem.  

The patient is currently on Vimpat and the patient is doing well and the vision 

is gradually improving.  She is fairly of any chest pain.  Hemodynamically 

stable.  The blood work from today shows a white cell count of 9.6 with a h

emoglobin of 13.7 and platelet count of 251.  Electrodes are all within normal 

limits.  LFTs are all within normal limits.  She is known to have CAD.  She also

is known to have cardiomyopathy with an ejection fraction of 20-25% and 

anteroseptal and apical hypokinesis.  No significant valvular abnormalities.  

Cardiology is also on the case.  For now, the patient remains on aspirin, 

patient is also on Plavix, patient is on metoprolol 50 mg twice a day, and the 

patient is also on high-dose statins with Lipitor.  The patient is also on 

Synthroid for hypothyroidism and Zoloft on a milligrams by mouth daily for 

chronic anxiety/depression.  He is known to have coronary artery disease.  She 

is undergone previous cardiac bypass surgery.  The patient remains to be on of 

confused and she has episodes of encephalopathy along with visual disturbance.  

One consideration his posterior reversible encephalopathy syndrome and on MRI of

the brain the patient had abnormalities involving the right occipital lobe.  EEG

discharges over the left frontotemporal lobe.  A repeat EEG was done today and 

neurology remains on the case.





09/20/2022, the patient continues to have episodes of confusion.  I was told by 

the nursing staff that overnight the patient was having some visual 

hallucinations.  Her EEG was repeated on 09/19/2022 and showed abnormal EEG due 

to background slowing and disorganization with mild-to-moderate degree of 

encephalopathy.  At the same time, there was slowing with focal epileptiform 

activity involving the left temporal region suggestive of focal cortical 

neuronal dysfunction with underlying cortical irritability and tendency for 

seizure.  However, upon comparing the EEG is, the activity has much improved.  

The patient remains on Vimpat.  No respiratory difficulties.  Cardiac condition 

remains stable.  The patient has CHF with systolic heart failure and ejection 

fraction of 20% in addition to COPD.  Patient has hypothyroidism along with 

chronic anxiety and depression.  Neurologist on the case.  Blood work has not 

been repeated from today.





Objective





- Vital Signs


Vital signs: 


                                   Vital Signs











Temp  98.5 F   09/20/22 11:25


 


Pulse  55 L  09/20/22 11:25


 


Resp  16   09/20/22 11:25


 


BP  153/73   09/20/22 11:25


 


Pulse Ox  100   09/20/22 11:25


 


FiO2      








                                 Intake & Output











 09/19/22 09/20/22 09/20/22





 18:59 06:59 18:59


 


Intake Total 118 120 


 


Balance 118 120 


 


Intake:   


 


  Oral 118 120 


 


Other:   


 


  Voiding Method Toilet Toilet Toilet





  Incontinent Incontinent


 


  # Voids 2 3 














- Exam








No acute distress, much more awake today.  Currently on 2 L of oxygen.





HEENT examination is grossly unremarkable.  





Neck supple.  Full range of motion.  No adenopathy thyromegaly or neck vein 

distention.





Cardiovascular examination reveals regular rhythm rate.  S1-S2 normal.  No S3 or

S4.  No discernible murmur noted.  Heart rate 58 bpm.





Lungs reveal mostly clear breath sounds.  Mild scattered rhonchi.  No wheezes or

crackles.  Breath sounds equal bilaterally.  Saturations 99 % on 2 L.





Abdomen soft bowel sounds are heard.  No masses or tenderness.





Extremities are intact.  No cyanosis clubbing or edema.





Skin is without rash or lesion.





Neurologic examination reveals patient to be much more awake and alert.  She 

does move all 4 extremities.





- Labs


CBC & Chem 7: 


                                 09/19/22 04:54





                                 09/19/22 04:54


Labs: 


                      Microbiology - Last 24 Hours (Table)











 09/16/22 09:35 Blood Culture - Preliminary





 Blood    No Growth after 96 hours














Assessment and Plan


Plan: 











Altered mental status.  Rule out acute cerebrovascular accident, versus possible

seizure and postictal state.  Posterior reversible encephalopathy syndrome is 

also another possible  diagnosis of her looking into.  Please refer to the EEG 

and MRI of the brain in detail escalations was given by the neurologist.  The 

patient is currently on Vimpat.  





Coronary artery disease with previous bypass surgery





Severe ischemic cardiomyopathy with an ejection fraction of 20-25% 





 status post Non-ST segment elevation myocardial infarction.





History of myocardial infarction.





History of hypertension.





History of hyperlipidemia.





History of hypothyroidism.





Prior history of bypass grafting.





CAD with prior stent placement.





Old right occipital infarct on computed tomography scan of the brain.





Plan:





Continue Vimpat 


Neurology follow-up 


Repeat EEG was noted and the findings are improved 


Continue optimizing CHF and post MI state and the patient is currently on a 

combination of aspirin, Plavix, metoprolol, ACE inhibitor and Lipitor


No active pulmonary or critical care issue and we are going to sign off the case

## 2022-09-20 NOTE — P.PN
Subjective


Progress Note Date: 09/19/22





Patient initially seen by Dr. Dale Montero.  Please refer to his note for details.

 In summary it appears patient came to the hospital with abdominal pain.  

Patient was found to have non-STEMI.  Patient while in the hospital was found 

unresponsive, with left-sided weakness.  Stroke code was activated.  She was not

a candidate for TPA as last known well was uncertain.  No previous history of 

seizures.





Patient has presented with visual hallucinations.  At present she states that 

her hallucinations are occurring "every now and then".  Patient could not 

describe what she actually is hallucinating.  Patient denies any previous 

history of strokes.  There is evidence of bilateral CEA in the past.  Patient 

denies any headache, denies any focal symptoms.








SOME OF THE WORK-UP DURING THIS HOSPITAL VISIT:


CT of the head was ordered and is a reported as old right occipital lobe 

infarct.  No acute intracranial abnormality.  I personally reviewed CT head 

agree with the findings.


CT angiography of the head and neck was reported as negative CT angiography of 

the brain.  Negative CT angiography of the neck were dominant left vertebral and

diminutive right vertebral.  Large pulmonary artery suggestive of pulmonary 

hypertension.  Mild aneurysm of the aortic arch.


TSH is 0.039 and free T4 is 1.67.


Ammonia is 31 (normal <30).


Lipid panel is triglycerides 246, cholesterol is 151, LDL 66 and HDL 35.


MRI Brain is reported as there maybe some mild per-infarct ischemic changes 

right occipital lobe.  Chronic appearing subcortical white matter changes to the

bilateral occipital lobses, periventricular white matter, whithin the brainstem.

 Suspicious changes to suggest metastasis is not identified.  I personally 

reviewed MRI Brain Patient does have some patchy increase signal on DWI over 

right occipital and left medial temporal/hippocampus region.  On FLAIR, patient 

has evidence of right occipital stroke and has hyperintensity over the bilateral

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 

reversible encephalopathy syndrome.  Also has hyperintesity over brainstem.  


Routine EEG on 09/16/2022: Is abnormal.  The background slowing is suggestive of

moderate to severe encephalopathy.  The epileptiform dischargs over the left 

frontotemporal region increases risk for seizure.  There is no seizure noted 

during this study.


2D echo is reported as dilated left ventricle with severe LV dysfunction (20-

25%).  Normal Left atrium.  





Objective





- Vital Signs


Vital signs: 


                                   Vital Signs











Temp  98 F   09/19/22 12:55


 


Pulse  59 L  09/19/22 12:55


 


Resp  18   09/19/22 12:55


 


BP  136/74   09/19/22 12:55


 


Pulse Ox  100   09/19/22 12:55


 


FiO2      








                                 Intake & Output











 09/18/22 09/19/22 09/19/22





 18:59 06:59 18:59


 


Intake Total 80 590 


 


Output Total 390  


 


Balance -310 590 


 


Intake:   


 


  IV 80 50 


 


    .9 KVO 30  


 


    Lacosamide  mg In 50 50 





    Sodium Chloride 0.9% 50   





    ml @ 100 mls/hr IVPB BID   





    KT Rx#:[id number]   


 


  Oral  540 


 


Output:   


 


  Urine 390  


 


Other:   


 


  Voiding Method Indwelling Catheter Toilet Toilet


 


  # Voids 2 2 


 


  # Bowel Movements 1  














- Exam





GENERAL: The patient is comfortably sitting in the recliner.  





NEUROLOGICAL:


Limited because of her condition. 


Higher mental function: The patient is awake.  Patient knows it is September 2022 and that she lives in [facility].  She believes that she is in 

[facility].  [facility] but 

could not tell name of the building.  There is delay in responding.   She 

appears slightly encephalopathic.


Cranial nerves: The pupils are round, equal and reactive to light.   Patient has

left homonymous hemianopia.  Extraocular muscles intact.  No facial weakness.  

No dysarthria.  Otherwise could not assess rest of cranial nerves.   


Motor: Muscle strength is normal in the arms and legs.   Normal tone and bulk.  


Cerebellum: No obvious ataxia for finger-to-nose testing.


Sensation: Equal bilaterally, with no neglect on double simultaneous stim

ulation..





- Labs


CBC & Chem 7: 


                                 09/19/22 04:54





                                 09/19/22 04:54


Labs: 


                  Abnormal Lab Results - Last 24 Hours (Table)











  09/19/22 Range/Units





  04:54 


 


Carbon Dioxide  34.2 H  (20.0-27.5)  mmol/L


 


Anion Gap  8.80 L  (10.00-18.00)  mmol/L


 


Creatinine  0.5 L  (0.6-1.5)  mg/dL


 


BUN/Creatinine Ratio  25.20 H  (12.00-20.00)  Ratio


 


Total Protein  5.5 L  (6.2-8.2)  g/dL


 


Albumin  3.6 L  (3.8-4.9)  g/dL








                      Microbiology - Last 24 Hours (Table)











 09/16/22 09:35 Blood Culture - Preliminary





 Blood    No Growth after 72 hours














Assessment and Plan


Assessment: 





* Abnormal EEG with periodic lateralized epileptiform discharges involving the 

  left temporal region.  Rule out nonconvulsive status.  Patient's current EEG 

  has much improved, but still with presence of epileptiform activity on the 

  left side.  


* Encephalopathy with visual disturbance, possibly ictal or postictal 

  phenomenon.  


* Abnormal MRI Brain reported as possible leopoldo-infarct ischemic changes right 

  occipital lobe.    Chronic appearing subcortical white matter changes to the 

  bilateral occipital lobes, periventricular white matter, within the brainstem.

   On my review, there is evidence of an old right occipital lobe infarct.  Also

  has white matter changes in the watershed distribution between MCA/MALI.  Also 

  some white matter changes involving bilateral occipital region.  On my review,

  there is also abnormal wedge-shaped signal on FLAIR involving bilateral 

  posterior cerebellar region, also a small cystic lesion in the left medial 

  temporal region in hippocampus.  No evidence of acute ischemia however.   

  Posterior reversible encephalopathy syndrome (PRES) also in the differential, 

  but the blood pressure has not been running really high.  


* Has minimal elevated ammonia (31).


* Non-STEMI


* Tubo-ovarian mass reported on CT.


* History of old right occipital stroke


* History of CAD s/p stent and CABG


* History of hypothyroidism


* Severe depression


* Anxiety


* Tobacco use


Plan: 








* MRI Brain reported as possible leopoldo-infarct ischemic changes right occipital l

  obe.    Chronic appearing subcortical white matter changes to the bilateral 

  occipital lobes, periventricular white matter, within the brainstem.  On my 

  review, there is evidence of an old right occipital lobe infarct.  Also has 

  white matter changes in the watershed distribution between MCA/MALI.  Also some

  white matter changes involving bilateral occipital region.  On my review, 

  there is also abnormal wedge-shaped signal on FLAIR involving bilateral 

  posterior cerebellar region, also a small cystic lesion in the left medial 

  temporal region in hippocampus.  No evidence of acute ischemia however.   

  Posterior reversible encephalopathy syndrome (PRES) also in the differential, 

  but the blood pressure has not been running really high.





* Per Dr. Dale Montero report: Upon looking up MRI Findings on ADC and DWI for 

  PRES I found reports: DWI is usually normal, sometimes hyperintese due to 

  edema (T2 shine-throughout) or true restricted diffusion.  Usually ADC there 

  is usually increased signal due to increased diffusion, but restricted 

  diffusion is present in a quarter of cases.





* Initial EEG on 09/16/2022 reviewed by Dr. Montero: Is abnormal.  The background 

  slowing is suggestive of moderate to severe encephalopathy.  The epileptiform 

  dischargs over the left frontotemporal region increases risk for seizure.  

  There is no seizure noted during this study.





* Repeat EEG performed today was abnormal due to background slowing, 

  disorganization of mild-to-moderate degree, suggestive of encephalopathy.  

  Focal slowing with focal epileptiform activity seen frequently involving the 

  left temporal region suggestive of focal cortical neuronal dysfunction with 

  underlying cortical irritability and tendency for seizures.  When compared to 

  the EEG from 09/16/2022, the epileptiform activity has much improved, although

  still present.  Clinical correlation and a prolonged EEG recommended if 

  clinically indicated.


* We will increase Vimpat to 150 mg twice a day.





* Continue aspirin 81, Plavix 75 and  Lipitor 80 mg daily.





* If no improvement, we will consider repeating MRI brain.  


* PT, OT are consulted


* Continue neuro checks.


* Cardiology team is on board


* General surgery team is on board for possible diverticulitis/colitis


* We'll defer the rest of the medical management to the primary team


* For DVT prophylaxis: Started on subq heparin 5000U every 8 hours.


Time with Patient: Greater than 30

## 2022-09-20 NOTE — P.PN
Subjective


Patient is pleasant 67-year-old female with history of carotid artery stenosis 

status post bilateral carotid endarterectomy, hypothyroidism, CAD status post 

CABG as well as PCI, hypertension, hyperlipidemia, hypothyroidism, recent 50 

pound weight loss in the last 3 months. Patient used to follow with Dr. Raul Noel [phone], however has not followed in a while. Patient presents with 

multiple symptoms. Patient has not had much of an appetite and has not been 

eating and drinking that much.  Additionally has been states she is very weak 

and if it were not for him "she would sleep 24 hours ". Apparently this has been

going on for the last few weeks and even few months. Multiple symptoms including

abdominal pain, back pain, chest pain, lightheadedness, weak.





Patient had presented in May 2022 with findings of a Lexiscan stress test with 

predominantly fixed apical defect consistent with prior myocardial infarction 

with minimal questionable leopoldo-infarct ischemia and an EF 41%.  Echocardiogram 

had showed EF 40-45% with a fixed thrombus in the left ventricle, moderate to 

severe tricuspid regurgitation and apical scarring.  EKG on presentation shows 

sinus rhythm, left axis deviation, incomplete left bundle branch block, ST 

depressions in the inferior and lateral leads.  This does appear somewhat 

similar to prior EKG from May 2022. 





CT abdomen and pelvis which showed mild thickening of the colon, 3.2 cm left 

adnexal mass, severe right iliac artery stenosis, calcification or on the apex 

of the heart. 





Echo revealed EF 20-25%, anterseptal, apex hypokinesis, dilated LV.  





On 9/16, patient developed change in mental status and was transferred to ICU. 





9/20


Patient seen and examined at bedside, no acute distress.  She is sitting up in 

the bed with no complaints. She is having episodes of visual disturbances. She 

continues to be confused. She is alert. oriented to person only. She is able to 

state her husbands name. She denies any pain or shortness of breath, no chest 

pain. /72 HR 77.





Labs from yesterday,  CBC unremarkable, sodium 143, potassium 4.3, BUN 12.6, 

serum creatinine 0.5





She's currently maintained on aspirin 81 mg daily, atorvastatin 80 mg daily, 

Plavix 75 mg daily, metoprolol tartrate 25 mg BID, lisinopril 10mg daily, Ranexa

500mg BID 





PHYSICAL EXAMINATION


Vital signs reviewed.


CONSTITUTIONAL: No apparent distress, ill appearing, more alert this morning, 

confused 


HEENT: Neck Supple.  No JVD. 


CHEST EXAMINATION: Lungs are clear to auscultation. No chest wall tenderness is 

noted on palpation or with deep breathing. 


HEART EXAMINATION: Regular rate and rhythm. S1, S2 heard. No murmurs, gallops or

rub.


ABDOMEN: Soft, nontender. Positive bowel sounds.


EXTREMITIES: 2+ peripheral pulses, no lower extremity edema and no calf 

tenderness.


NEUROLOGIC EXAMINATION: Patient is alert, oriented to person only 





ASSESSMENT


Hypertensive emergency


Chest discomfort with elevated troponin, possible NSTEMI, however patient not 

agreement to proceed with cardiac cath initially and was treated medically


Altered mental status


Visual disturbances


Encephalopathy 


MRI Brains reported  may be some mild per-infarct ischemic changes right 

occipital lobe, Neurology following, Posterior reversible encephalopathy 

syndrome is also in the differential 


Abnormal EEG, neurology following 


Worsening cardiomyopathy with EF 20-25%, ischemic vs non-ischemic 


CAD with history of CABG and bypass


Recent 50 pound weight loss


Ovarian mass, rule out cancer


Abdominal pain, back pain, multiple other symptoms


Somnolence, failure to thrive of unclear etiology


Decreased appetite


Chronic  heart failure with reduced ejection fraction 


History of Ischemic cardiomyopathy


Chronic apical thrombus appears calcified on CT


PAD





PLAN


We will maximize medical therapy


Add spironolactone 12.5mg daily 


Continue dual antiplatelet therapy with aspirin and plavix 


Continue metoprolol tartrate 25mg BID, Ranexa and statin


Neurology following 


Further recommendations based on clinical course





Nurse practitioner note has been reviewed by physician. Signing provider agrees 

with the documented findings, assessment, and plan of care. 














Objective





- Vital Signs


Vital signs: 


                                   Vital Signs











Temp  97.5 F L  09/20/22 08:08


 


Pulse  77   09/20/22 08:08


 


Resp  16   09/20/22 08:08


 


BP  158/72   09/20/22 08:08


 


Pulse Ox  100   09/20/22 08:08


 


FiO2      








                                 Intake & Output











 09/19/22 09/20/22 09/20/22





 18:59 06:59 18:59


 


Intake Total 118 120 


 


Balance 118 120 


 


Intake:   


 


  Oral 118 120 


 


Other:   


 


  Voiding Method Toilet Toilet 





  Incontinent 


 


  # Voids 2 3 














- Labs


CBC & Chem 7: 


                                 09/19/22 04:54





                                 09/19/22 04:54


Labs: 


                  Abnormal Lab Results - Last 24 Hours (Table)











  09/19/22 Range/Units





  04:54 


 


Carbon Dioxide  34.2 H  (20.0-27.5)  mmol/L


 


Anion Gap  8.80 L  (10.00-18.00)  mmol/L


 


Creatinine  0.5 L  (0.6-1.5)  mg/dL


 


BUN/Creatinine Ratio  25.20 H  (12.00-20.00)  Ratio


 


Total Protein  5.5 L  (6.2-8.2)  g/dL


 


Albumin  3.6 L  (3.8-4.9)  g/dL








                      Microbiology - Last 24 Hours (Table)











 09/16/22 09:35 Blood Culture - Preliminary





 Blood    No Growth after 72 hours

## 2022-09-20 NOTE — P.PN
Subjective


Progress Note Date: 09/19/22











Hospital course:


Patient is a 67-year-old female with a known history of coronary artery disease 

status post CABG, history of MI, history of stent placement and bilateral 

carotid surgery and hypothyroidism


Was sent to ER by her primary care physician.  Patient has been having abdominal

pain mainly in the lower abdomen associate with nausea for the past 3 to 4 

months.  Patient could not keep down food and also weight loss about 7 non-B 

during the last couple 1 to 2 months.  Patient is also having generalized 

weakness and could not keep her balance.  Denied any diarrhea.  No complaints of

chest pain or shortness of breath.  Patient has been feeling very weak.


Patient was discharged from the hospital on 5/3/2022.,  Admitted due to unstable

angina and underwent nuclear stress test showed predominantly a fixed defect.  

Imdur was added at that time.


Chest x-ray showed no evidence for acute pulmonary disease.





CT of the abdomen pelvis showed there is mild wall thickening of the colon 

diffusely correlate for mild colitis.  There is also evidence of diverticulosis 

of the sigmoid colon.  Wall thickening is noted which can be associated with a 

mucosal lesion correlate with direct visualization as clinically warranted.


There is a 3.2 cm left adnexal cystic mass likely tubo-ovarian.


Severe right iliac artery stenosis.


There is prominent small bowel loops ileus is favored.


Calcification around the apex of the heart can be associated with myocardial 

calcified calcinosis and previous infarction.





Laboratory data showed WBC 8.2, hemoglobin 14.0 and platelets 214


Sodium 139 potassium 4.4 chloride 105 bicarb is 26 BUN 17 and creatinine 0.74 

and blood sugar is 109 AST 23 alk phos 141 and ALT 49 bilirubin level is 0.5 

magnesium 1.7


TSH 0.039 and free T4 levels 1.67.  Albumin 4.1


Urinalysis negative for infection.





9/10/2022


Patient is currently lying in bed.  Still feels very weak.  No complaints of 

chest pain.  No worsening shortness of breath.  Still nauseated.  Decreased oral

intake.  No cough or sputum production.


No headache or dizziness or lightheadedness.


Lab data this morning showed sodium 142 potassium 4.3 chloride 106 bicarb is 

28.4 BUN 13.3, creatinine 0.6.


 6.8, AFP 2.2 and CEA 6.5.


Urine is negative for infection.  Stool for occult blood is negative.





Patient was hypotensive this morning required fluid bolus and blood pressure did

improve.


Patient was also started on antibiotics for possible colitis.





09/11/2022


Patient is currently resting in bed.  Awake alert and oriented.  Denied any 

complaints of chest pain or worsening shortness of breath.  No complaints of 

abdominal pain today.  Patient says that she has feels very weak.


Yesterday evening patient had chest pain associated with shortness of breath.  

And is also complaining of epigastric pain.  Troponin level is elevated at 0.526

and 0.311.  Patient was started on heparin drip and was transferred to telemetry

unit.


Patient has been afebrile.  No diarrhea.  Does have nausea.  No episodes of 

vomiting.  No cough or sputum production


Laboratory data showed WBC 8.1 hemoglobin 12.6 and platelets 147





September 12: I assumed care of patient today


Still having abdominal pain.  Decreased oral intake.  Had a BM yesterday.  

Rather diet.  Changing the diet to full liquids.  On IV Flagyl and ceftriaxone.


September 13: Patient was delirious last night likely after Dilaudid.-for pain. 

Doing much better this morning.  No abdominal pain is better.  Had liquid BM 

today.  Encourage oral intake.  Continue his Flagyl and ceftriaxone.  We'll get 

a GI consultation.


September 14: Tired.  Does not like the hospital food contents does not want to 

eat.  Patient has passed her daughter to bring in some food.  Has chronic back 

pain.  Abdominal pain is much better.  Outpatient colonoscopy per GI.  Patient 

rather weak but PTOT.  Myself and the  talk to the patient about 

possible rehab.  Patient is keen to go home.   will be to the 

 the patient this afternoon.


September 15: Patient had chest pain overnight.  Did tolerate some diet brought 

in by her daughter.  Put on IV nitroglycerin.  Seen by cardiology today.  

Planning cardiac catheterization tomorrow.  Reclining chair.


September 16: Overnight patient was found unresponsive.  A team was called.  His

question about some left arm weakness.  Patient smoked with ICU.  Neurology was 

consulted.  Patient still lethargic.  Pupils slightly dilated.  No focal 

weakness.  Spoke to the patient's daughter and  at the bedside.  

Telemetry was showing atrial tachycardia versus sinus tachycardia.  Nitro drip 

was discontinued.  Started on Cardizem drip.  MRI of the brain done.   

and Dr. Glez to patient have the NG tube for medications.


September 17: ICU: Discussed with Dr. montero from neurology this morning.  He has

to patient on IV Vimpat.  Cardizem drip was discontinued yesterday evening.  

Lopressor dose increased.  Sinus rhythm.  Nurse reported patient not able to see

since the episode.  At the bedside patient can only see hand movements.  Not 

able to count fingers.  Can only see outline of people.  Answering questions 

slowly today.





09/19/2022





Patient is seen in follow-up currently undergoing neurological workup with Dr. Hester at the bedside.  Patient has had prolonged hospitalization with visual 

disturbances although patient reports her vision is improving and patient is 

continued on Vimpat.  EEG is ordered and pending at this time.  Originally seen 

by Dr. Dale Montero neurology and not convinced this was a stroke and more 

concerned with posterior encephalopathy syndrome was slowly showing some 

improvements.  Patient is weak and recommend physical therapy to evaluate the 

patient.  EEG was recommending severe encephalopathy with no seizure activity 

noted thus far.  And awaiting repeat EEG.  An Vimpat being increased.  We'll 

need to discuss further with neurology along with cardiology and pulmonary were 

following as well.  Neurosurgery also following his patient originally came in 

with abdominal pain and ruling out diverticulitis versus colitis.  Patient 

denies chest pain or shortness of breath.  Per nursing staff patient was able to

see letters on the paper and reports her vision is improving.











On examination:





GENERAL APPEARANCE: Laying in bed, more awake.


HEENT: Normal external appearance of nose and ear.  Oral cavity normal


EYES: Pupils slightly enlarged.  Right eye some medial convergence. Conjunctiva 

normal. 


NECK: JVD not raised. Mass not palpable. 


RESPIRATORY: Respiratory effort normal. Lungs decreased breath sounds


CARDIOVASCULAR: First and second sounds normal. No edema. 


ABDOMEN: Soft.  Tender, no guarding rigidity Liver and spleen not palpable.  No 

mass palpable. 


PSYCHIATRY: Answering simple questions.


NEUROLOGICAL:  vision is limited to hand movement.





INVESTIGATIONS, reviewed in the clinical context:


September 18: WBC 10 hemoglobin 13.3 potassium 4 creatinine 0.44


MRI brain: May be some leopoldo-infarct ischemic changes right occipital lobe.  

Chronic appearing subcortical white matter changes to the bilateral occipital 

lobes.  And periventricular white matter within the brainstem.


September 16: WBC 17.8 hemoglobin 15.1 potassium 4.4 creatinine 0.64 troponin 

0.046 LDL 66


September 15: Troponin 0.083, 0.074


September 14: White count 8.6 and regular 13.5 potassium 3.4 creatinine 0.44


Chest CTA: Negative PE.  IVC filter in place.  Cardiomegaly.  Trace bilateral 

pleural effusion.  


White count 8.1 hemoglobin 12.6 platelets 147 potassium 4 BUN 9 creatinine 0.56


Troponin I 0.012, 0.5-6, 0.311


CT abdomen and pelvis with contrast: Severe cardiomegaly.  Colonic 

diverticulosis.  3.2 cm left adnexal cyst mass likely tubo-ovarian.  Severe 

right iliac artery stenosis.











Assessment and plan: 





-Acute change in mental status with patient becoming unresponsive.  Patient now 

noticed to have limited vision which is new finding down to hand movement.  MRI 

brain is showing infarct in the occipital lobe.


Discussed with neurology Dr. Montero.  He feels patient does not have a stroke.  H

is diagnosis is posterior reversible encephalopathy syndrome: Slow to respond





-Post infarct angina:


Patient initially declined cardiac catheterization.  





-Non-ST elevation myocardial infarction.


Aspirin beta blocker Plavix.  Being followed by cardiology.  





-Acute Colitis.  Better


IV ceftriaxone, IV Flagyl.   full liquids-advanced to soft diet.  Change to by 

mouth Augmentin.





- left adnexal cystic mass likely tubo-ovarian.Likely adnexal cyst, 


was seen by OB/GYN: [facility] to be incidental finding.  Asymptomatic.  Ova testing 

requested.





-Severe right iliac artery stenosis


Aspirin, Plavix.  Seen by Dr. Corey.  Will follow up outpatient.  4-6 weeks.





-Coronary artery disease history of CABG in 2010 and prior PCI's most recent in 

02/2021 and chronic total occluded RCA.


Aspirin, Lipitor, Plavix, Lopressor





-Chronic CHF, Ischemic cardiomyopathy ejection fraction 40 to 45%


 Aldactone 12.5 mg daily.  Lopressor.  Zestril





-Chronic left ventricle apical thrombus was on anticoagulation previously.  

Calcified





-Moderate to severe TR





-Peripheral arterial disease, 


Aspirin, Plavix.  





-Osteoarthritis primary bilateral


Use pain medications as needed





-Hypothyroidism


Synthroid 112 g a





-Chronic urinary stress incontinence





-Peripheral neuropathy





-Atrial tachycardia versus sinus tachycardia: Patient back in sinus rhythm..


IV Cardizem drip discontinued.  Decreased metoprolol tartrate 50 mg twice a day








Plan:





Recommend close monitoring and continued neurological workup and will discuss 

further with consultations about treatment plan moving forward.  Awaiting repeat

EEG today.  Medications have been adjusted and patient is off IV Cardizem with 

rate medications increased.  Patient is continued on the BiPAP neurology 

considering increasing.  Recommend PT/OT evaluation and will follow-up with 

social work about discharge planning needs as well.  Prognosis remains guarded.





The impression and plan of care has been dictated by Rose Mary Flannery, Nurse 

Practitioner as directed.





Dr. Dagoberto MD


I have performed a history and examination and MDM of this patient, discussed 

the same with the dictator, and  agree with the dictator's assessment and plan 

as written ,documented as a scribe. Based on total visit time,  I have performed

more than 50% of the visit.  





Objective





- Vital Signs


Vital signs: 


                                   Vital Signs











Temp  98.4 F   09/19/22 03:20


 


Pulse  62   09/19/22 03:20


 


Resp  15   09/19/22 03:20


 


BP  148/65   09/19/22 03:20


 


Pulse Ox  99   09/19/22 03:20


 


FiO2      








                                 Intake & Output











 09/18/22 09/19/22 09/19/22





 18:59 06:59 18:59


 


Intake Total 80 590 


 


Output Total 390  


 


Balance -310 590 


 


Intake:   


 


  IV 80 50 


 


    .9 KVO 30  


 


    Lacosamide  mg In 50 50 





    Sodium Chloride 0.9% 50   





    ml @ 100 mls/hr IVPB BID   





    [facility] Rx#:[id number]   


 


  Oral  540 


 


Output:   


 


  Urine 390  


 


Other:   


 


  Voiding Method Indwelling Catheter Toilet 


 


  # Voids 2 2 


 


  # Bowel Movements 1  














- Labs


CBC & Chem 7: 


                                 09/19/22 04:54





                                 09/19/22 04:54


Labs: 


                  Abnormal Lab Results - Last 24 Hours (Table)











  09/19/22 Range/Units





  04:54 


 


Carbon Dioxide  34.2 H  (20.0-27.5)  mmol/L


 


Anion Gap  8.80 L  (10.00-18.00)  mmol/L


 


Creatinine  0.5 L  (0.6-1.5)  mg/dL


 


BUN/Creatinine Ratio  25.20 H  (12.00-20.00)  Ratio


 


Total Protein  5.5 L  (6.2-8.2)  g/dL


 


Albumin  3.6 L  (3.8-4.9)  g/dL








                      Microbiology - Last 24 Hours (Table)











 09/16/22 09:35 Blood Culture - Preliminary





 Blood    No Growth after 48 hours

## 2022-09-21 LAB
ANION GAP SERPL CALC-SCNC: 7 MMOL/L
BUN SERPL-SCNC: 13 MG/DL (ref 7–17)
CALCIUM SPEC-MCNC: 9.2 MG/DL (ref 8.4–10.2)
CHLORIDE SERPL-SCNC: 101 MMOL/L (ref 98–107)
CO2 SERPL-SCNC: 32 MMOL/L (ref 22–30)
GLUCOSE SERPL-MCNC: 89 MG/DL (ref 74–99)
POTASSIUM SERPL-SCNC: 3.4 MMOL/L (ref 3.5–5.1)
SODIUM SERPL-SCNC: 140 MMOL/L (ref 137–145)

## 2022-09-21 RX ADMIN — HEPARIN SODIUM SCH: 5000 INJECTION INTRAVENOUS; SUBCUTANEOUS at 00:02

## 2022-09-21 RX ADMIN — SERTRALINE HYDROCHLORIDE SCH MG: 100 TABLET ORAL at 20:12

## 2022-09-21 RX ADMIN — POTASSIUM CHLORIDE SCH MEQ: 20 TABLET, EXTENDED RELEASE ORAL at 09:58

## 2022-09-21 RX ADMIN — HEPARIN SODIUM SCH UNIT: 5000 INJECTION INTRAVENOUS; SUBCUTANEOUS at 07:32

## 2022-09-21 RX ADMIN — LACOSAMIDE SCH MG: 150 TABLET, FILM COATED ORAL at 09:58

## 2022-09-21 RX ADMIN — LEVOTHYROXINE SODIUM SCH MCG: 0.11 TABLET ORAL at 06:01

## 2022-09-21 RX ADMIN — CLOPIDOGREL BISULFATE SCH MG: 75 TABLET ORAL at 09:58

## 2022-09-21 RX ADMIN — NICOTINE SCH PATCH: 14 PATCH, EXTENDED RELEASE TRANSDERMAL at 09:59

## 2022-09-21 RX ADMIN — FAMOTIDINE SCH MG: 20 TABLET, FILM COATED ORAL at 09:59

## 2022-09-21 RX ADMIN — HEPARIN SODIUM SCH UNIT: 5000 INJECTION INTRAVENOUS; SUBCUTANEOUS at 23:51

## 2022-09-21 RX ADMIN — HEPARIN SODIUM SCH: 5000 INJECTION INTRAVENOUS; SUBCUTANEOUS at 17:18

## 2022-09-21 RX ADMIN — LACOSAMIDE SCH MG: 150 TABLET, FILM COATED ORAL at 20:12

## 2022-09-21 RX ADMIN — POTASSIUM CHLORIDE SCH MEQ: 20 TABLET, EXTENDED RELEASE ORAL at 07:29

## 2022-09-21 RX ADMIN — FAMOTIDINE SCH MG: 20 TABLET, FILM COATED ORAL at 20:13

## 2022-09-21 RX ADMIN — ASPIRIN 81 MG CHEWABLE TABLET SCH MG: 81 TABLET CHEWABLE at 09:59

## 2022-09-21 RX ADMIN — METOPROLOL TARTRATE SCH MG: 50 TABLET, FILM COATED ORAL at 09:58

## 2022-09-21 RX ADMIN — RANOLAZINE SCH MG: 500 TABLET, FILM COATED, EXTENDED RELEASE ORAL at 20:12

## 2022-09-21 RX ADMIN — ATORVASTATIN CALCIUM SCH MG: 80 TABLET, FILM COATED ORAL at 09:58

## 2022-09-21 RX ADMIN — RANOLAZINE SCH MG: 500 TABLET, FILM COATED, EXTENDED RELEASE ORAL at 09:58

## 2022-09-21 RX ADMIN — SPIRONOLACTONE SCH MG: 25 TABLET, FILM COATED ORAL at 10:01

## 2022-09-21 RX ADMIN — SPIRONOLACTONE SCH: 25 TABLET, FILM COATED ORAL at 10:14

## 2022-09-21 RX ADMIN — METOPROLOL TARTRATE SCH MG: 50 TABLET, FILM COATED ORAL at 20:12

## 2022-09-21 NOTE — P.PN
Subjective


Patient is pleasant 67-year-old female with history of carotid artery stenosis 

status post bilateral carotid endarterectomy, hypothyroidism, CAD status post 

CABG as well as PCI, hypertension, hyperlipidemia, hypothyroidism, recent 50 

pound weight loss in the last 3 months. Patient used to follow with Dr. Raul Noel [phone], however has not followed in a while. Patient presents with 

multiple symptoms. Patient has not had much of an appetite and has not been 

eating and drinking that much.  Additionally has been states she is very weak 

and if it were not for him "she would sleep 24 hours ". Apparently this has been

going on for the last few weeks and even few months. Multiple symptoms including

abdominal pain, back pain, chest pain, lightheadedness, weak.





Patient had presented in May 2022 with findings of a Lexiscan stress test with 

predominantly fixed apical defect consistent with prior myocardial infarction 

with minimal questionable leopoldo-infarct ischemia and an EF 41%.  Echocardiogram 

had showed EF 40-45% with a fixed thrombus in the left ventricle, moderate to 

severe tricuspid regurgitation and apical scarring.  EKG on presentation shows 

sinus rhythm, left axis deviation, incomplete left bundle branch block, ST 

depressions in the inferior and lateral leads.  This does appear somewhat 

similar to prior EKG from May 2022. 





CT abdomen and pelvis which showed mild thickening of the colon, 3.2 cm left 

adnexal mass, severe right iliac artery stenosis, calcification or on the apex 

of the heart. 





Echo revealed EF 20-25%, anterseptal, apex hypokinesis, dilated LV.  





On 9/16, patient developed change in mental status and was transferred to ICU. 





9/21


Patient seen and examined at bedside, no acute distress.  She is sitting up in 

the bed with no complaints. She continues to be pleasantly confused. She is 

alert. oriented to person only. She denies any pain or shortness of breath, no 

chest pain. BP slightly elevated this morning, prior to her cardiac medications.







Labs sodium 140, potassium 3.4, BUN 13, serum creatinine 0.5





She's currently maintained on aspirin 81 mg daily, atorvastatin 80 mg daily, 

Plavix 75 mg daily, metoprolol tartrate 25 mg BID, lisinopril 10mg daily, Ranexa

500mg BID, spironolactone 12.5mg daily 





PHYSICAL EXAMINATION


Vital signs reviewed.


CONSTITUTIONAL: No apparent distress, ill appearing, more alert this morning, 

confused 


HEENT: Neck Supple.  No JVD. 


CHEST EXAMINATION: Lungs are clear to auscultation. No chest wall tenderness is 

noted on palpation or with deep breathing. 


HEART EXAMINATION: Regular rate and rhythm. S1, S2 heard. No murmurs, gallops or

rub.


ABDOMEN: Soft, nontender. Positive bowel sounds.


EXTREMITIES: 2+ peripheral pulses, no lower extremity edema and no calf 

tenderness.


NEUROLOGIC EXAMINATION: Patient is alert, oriented to person only 





ASSESSMENT


Hypertensive emergency


Chest discomfort with elevated troponin, possible NSTEMI, however patient not 

agreement to proceed with cardiac cath initially and was treated medically


Altered mental status


Visual disturbances


Encephalopathy 


MRI Brains reported  may be some mild per-infarct ischemic changes right 

occipital lobe, Neurology following, Posterior reversible encephalopathy synd

jeanie is also in the differential 


Abnormal EEG, neurology following 


Worsening cardiomyopathy with EF 20-25%, ischemic vs non-ischemic 


CAD with history of CABG and bypass


Recent 50 pound weight loss


Ovarian mass, rule out cancer


Abdominal pain, back pain, multiple other symptoms


Somnolence, failure to thrive of unclear etiology


Decreased appetite


Chronic  heart failure with reduced ejection fraction 


History of Ischemic cardiomyopathy


Chronic apical thrombus appears calcified on CT


PAD





PLAN


We will maximize medical therapy and heart failure regimen


Increase lisinopril to 20mg daily 


Replace potassium per protocol 


Continue dual antiplatelet therapy with aspirin and plavix 


Continue metoprolol tartrate, lisinopril, Ranexa and statin, spironolactone 


Consider SGLT2 inhibitor 


Neurology following 


Further recommendations based on clinical course





Nurse practitioner note has been reviewed by physician. Signing provider agrees 

with the documented findings, assessment, and plan of care. 














Objective





- Vital Signs


Vital signs: 


                                   Vital Signs











Temp  97.7 F   09/21/22 07:37


 


Pulse  63   09/21/22 07:37


 


Resp  16   09/21/22 07:37


 


BP  174/72   09/21/22 07:37


 


Pulse Ox  99   09/21/22 07:37


 


FiO2      








                                 Intake & Output











 09/20/22 09/21/22 09/21/22





 18:59 06:59 18:59


 


Intake Total 50 240 


 


Balance 50 240 


 


Weight 50.8 kg  


 


Intake:   


 


  Intake, IV Titration 50  





  Amount   


 


    Lacosamide  mg In 50  





    Sodium Chloride 0.9% 50   





    ml @ 100 mls/hr IVPB BID   





    [facility] Rx#:[id number]   


 


  Oral  240 


 


Other:   


 


  Voiding Method Toilet Toilet 





 Incontinent Incontinent 


 


  # Voids 2  














- Labs


CBC & Chem 7: 


                                 09/19/22 04:54





                                 09/21/22 05:18


Labs: 


                  Abnormal Lab Results - Last 24 Hours (Table)











  09/21/22 Range/Units





  05:18 


 


Potassium  3.4 L  (3.5-5.1)  mmol/L


 


Carbon Dioxide  32 H  (22-30)  mmol/L








                      Microbiology - Last 24 Hours (Table)











 09/16/22 09:35 Blood Culture - Preliminary





 Blood    No Growth after 96 hours

## 2022-09-21 NOTE — P.PN
Subjective


Progress Note Date: 09/21/22 09/21/2022: Patient was seen for a follow-up.  Patient just returned back from 

2.5 hours EEG.  Patient denies headache.  Patient appears very confused, 

fumbling around with the blanket.  Stating that she is trying to find her cell 

phone, although it was present right in front of her on the table.  She states 

"I'm frustrated as I cannot find what I'm looking at".  Her telemetry monitoring

showing sinus rhythm with sinus bradycardia, PVCs.


I spoke to patient's  Kip on the phone.  He mentioned that about 1-1/2 

months ago patient passed out at the house.  He gave her nitro and she came 

around.  The following day he took her to her primary doctor, who recommended 

her to go to the hospital.  Patient was admitted to the [facility] for 

couple days and then released.  Since then her vision has been steadily getting 

worse.   Patient has smoked half pack per day for 30 years.  No alcoholism.  

Patient has lost about 35 pounds in the last 4-6 months.  She has decreased 

appetite.  No previous history of cancers.  No previous history of carotid 

endarterectomy that he is aware of.  He is not sure as to the cause of the 

surgical scars in her neck.





09/20/2022: Patient was seen for a follow-up.  Patient denies headache.  Patient

is more sleepy today.  She feels very tired.  Denies any numbness or tingling.





09/19/2022: Patient initially seen by Dr. Dale Montero.  Please refer to his note 

for details.  In summary it appears patient came to the hospital with abdominal 

pain.  Patient was found to have non-STEMI.  Patient while in the hospital was 

found unresponsive, with left-sided weakness.  Stroke code was activated.  She 

was not a candidate for TPA as last known well was uncertain.  No previous 

history of seizures.





Patient has presented with visual hallucinations.  At present she states that 

her hallucinations are occurring "every now and then".  Patient could not 

describe what she actually is hallucinating.  Patient denies any previous 

history of strokes.  There is evidence of bilateral CEA in the past.  Patient 

denies any headache, denies any focal symptoms.








SOME OF THE WORK-UP DURING THIS HOSPITAL VISIT:


CT of the head was ordered and is a reported as old right occipital lobe 

infarct.  No acute intracranial abnormality.  I personally reviewed CT head 

agree with the findings.


CT angiography of the head and neck was reported as negative CT angiography of 

the brain.  Negative CT angiography of the neck were dominant left vertebral and

diminutive right vertebral.  Large pulmonary artery suggestive of pulmonary 

hypertension.  Mild aneurysm of the aortic arch.


TSH is 0.039 and free T4 is 1.67.


Ammonia is 31 (normal <30).


Lipid panel is triglycerides 246, cholesterol is 151, LDL 66 and HDL 35.


MRI Brain is reported as there maybe some mild leopoldo-infarct ischemic changes 

right occipital lobe.  Chronic appearing subcortical white matter changes to the

bilateral occipital lobses, periventricular white matter, whithin the brainstem.

 Suspicious changes to suggest metastasis is not identified.  I personally 

reviewed MRI Brain Patient does have some patchy increase signal on DWI over 

right occipital and left medial temporal/hippocampus region.  On FLAIR, patient 

has evidence of right occipital stroke and has hyperintensity over the bilateral

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 

reversible encephalopathy syndrome.  Also has hyperintesity over brainstem.  


Routine EEG on 09/16/2022: Is abnormal.  The background slowing is suggestive of

moderate to severe encephalopathy.  The epileptiform dischargs over the left 

frontotemporal region increases risk for seizure.  There is no seizure noted 

during this study.


2D echo is reported as dilated left ventricle with severe LV dysfunction (20-

25%).  Normal Left atrium.  





Objective





- Vital Signs


Vital signs: 


                                   Vital Signs











Temp  97.8 F   09/21/22 14:00


 


Pulse  65   09/21/22 14:00


 


Resp  18   09/21/22 14:00


 


BP  137/71   09/21/22 14:00


 


Pulse Ox  99   09/21/22 14:00


 


FiO2      








                                 Intake & Output











 09/20/22 09/21/22 09/21/22





 18:59 06:59 18:59


 


Intake Total 50 240 296


 


Balance 50 240 296


 


Weight 50.8 kg  


 


Intake:   


 


  Intake, IV Titration 50  





  Amount   


 


    Lacosamide  mg In 50  





    Sodium Chloride 0.9% 50   





    ml @ 100 mls/hr IVPB BID   





    [facility] Rx#:[id number]   


 


  Oral  240 296


 


Other:   


 


  Voiding Method Toilet Toilet Bedside Commode





 Incontinent Incontinent 


 


  # Voids 2  














- Exam





GENERAL: The patient is sitting comfortably in the bed, but appears somewhat 

worried, confused, inattentive, spacey.





NEUROLOGICAL:


Patient is more alert and awake.


Higher mental function: The patient is awake.  Patient states it is June but 

then corrected it to September 22.  She says that she is in [facility].  

Patient not able to answer the questions on 1 attempt, could not tell what city 

she is in although mumbled [place] at random later on.  There is delay in 

responding.   She appears slightly encephalopathic.  No obvious seizure activity

noted.


Cranial nerves: The pupils are round, equal and reactive to light.   Patient has

left homonymous hemianopia.  Extraocular muscles intact.  No facial weakness.  N

o dysarthria.  Facial sensation is normal.  Tongue protrudes the midline.  

Shoulder shrug normal.  Hearing appears fairly intact.


Motor: Muscle strength is normal in the arms and legs.   Normal tone and bulk.  


Cerebellum: No ataxia for finger-to-nose testing, although she was slightly 

slow.


Sensation: Equal bilaterally, with no neglect on double simultaneous 

stimulation..


Gait: Deferred.





- Labs


CBC & Chem 7: 


                                 09/19/22 04:54





                                 09/21/22 05:18


Labs: 


                  Abnormal Lab Results - Last 24 Hours (Table)











  09/21/22 Range/Units





  05:18 


 


Potassium  3.4 L  (3.5-5.1)  mmol/L


 


Carbon Dioxide  32 H  (22-30)  mmol/L








                      Microbiology - Last 24 Hours (Table)











 09/16/22 09:35 Blood Culture - Preliminary





 Blood    No Growth after 120 hours














Assessment and Plan


Assessment: 





* Abnormal EEG with periodic lateralized epileptiform discharges involving the 

  left temporal region.  Rule out nonconvulsive status.  Patient's current EEG 

  has much improved, but still with presence of epileptiform activity on the 

  left side.  Patient underwent a prolonged EEG testing today.


* Encephalopathy with visual disturbance, possibly ictal or postictal 

  phenomenon.  Rule out paraneoplastic syndrome or NMDA encephalitis versus 

  limbic encephalitis.


* Right occipital lobe infarct with left homonymous hemianopia.  Likely occurred

  one and half months ago.


* Abnormal MRI Brain reported as possible leopoldo-infarct ischemic changes right 

  occipital lobe.    Chronic appearing subcortical white matter changes to the 

  bilateral occipital lobes, periventricular white matter, within the brainstem.

   On my review, there is evidence of an old right occipital lobe infarct.  Also

  has white matter changes in the watershed distribution between MCA/MALI.  Also 

  some white matter changes involving bilateral occipital region.  On my review,

  there is also abnormal wedge-shaped signal on FLAIR involving bilateral 

  posterior cerebellar region, also a small cystic lesion in the left medial 

  temporal region in hippocampus.  No evidence of acute ischemia however.   

  Posterior reversible encephalopathy syndrome (PRES) also in the differential, 

  but the blood pressure has not been running really high.  


* Has minimal elevated ammonia (31).


* Non-STEMI


* Tubo-ovarian mass reported on CT.


* History of old right occipital stroke


* History of CAD s/p stent and CABG


* History of hypothyroidism


* Severe depression


* Anxiety


* Weight loss, rule out occult malignancy


* Folate deficiency


* Tobacco use


Plan: 








* Continue Vimpat 150 mg twice a day.  


* Patient underwent prolonged EEG 2.5 hours today.  Per preliminary report from 

  Dr. Vazquez, no evidence of status epilepticus.  The epileptiform activity 

  has further improved. 


* Consider LP rule out encephalitis.  However patient is on Plavix.  We will 

  check an NMDA antibodies.  We will also check anti-Hu and anti-Yo antibodies. 

  


* B12 541, folate level borderline 7.6.  We will start folate replacement.


* Repeat MRI brain with and without contrast.





* MRI Brain reported as possible leopoldo-infarct ischemic changes right occipital 

  lobe.    Chronic appearing subcortical white matter changes to the bilateral 

  occipital lobes, periventricular white matter, within the brainstem.  On my 

  review, there is evidence of an old right occipital lobe infarct.  Also has 

  white matter changes in the watershed distribution between MCA/MALI.  Also some

  white matter changes involving bilateral occipital region.  On my review, 

  there is also abnormal wedge-shaped signal on FLAIR involving bilateral post

  erior cerebellar region, also a small cystic lesion in the left medial 

  temporal region in hippocampus.  No evidence of acute ischemia however.   

  Posterior reversible encephalopathy syndrome (PRES) also in the differential, 

  but the blood pressure has not been running really high.





* Initial EEG on 09/16/2022 reviewed by Dr. Montero: Is abnormal.  The background 

  slowing is suggestive of moderate to severe encephalopathy.  The epileptiform 

  dischargs over the left frontotemporal region increases risk for seizure.  

  There is no seizure noted during this study.





* Repeat EEG 09/19/2022 was abnormal due to background slowing, disorganization 

  of mild-to-moderate degree, suggestive of encephalopathy.  Focal slowing with 

  focal epileptiform activity seen frequently involving the left temporal region

  suggestive of focal cortical neuronal dysfunction with underlying cortical 

  irritability and tendency for seizures.  No electrographic seizure was 

  recorded.  When compared to the EEG from 09/16/2022, the epileptiform activity

  has much improved, although still present.  Clinical correlation and a 

  prolonged EEG recommended if clinically indicated.





* Continue aspirin 81, Plavix 75 and  Lipitor 80 mg daily.


 


* PT, OT are consulted





* Cardiology team is on board


* General surgery team is on board for possible diverticulitis/colitis


* We'll defer the rest of the medical management to the primary team


* For DVT prophylaxis: Started on subq heparin 5000U every 8 hours.

## 2022-09-21 NOTE — P.PN
Subjective


Progress Note Date: 09/21/22











Hospital course:


Patient is a 67-year-old female with a known history of coronary artery disease 

status post CABG, history of MI, history of stent placement and bilateral 

carotid surgery and hypothyroidism


Was sent to ER by her primary care physician.  Patient has been having abdominal

pain mainly in the lower abdomen associate with nausea for the past 3 to 4 

months.  Patient could not keep down food and also weight loss about 7 non-B 

during the last couple 1 to 2 months.  Patient is also having generalized 

weakness and could not keep her balance.  Denied any diarrhea.  No complaints of

chest pain or shortness of breath.  Patient has been feeling very weak.


Patient was discharged from the hospital on 5/3/2022.,  Admitted due to unstable

angina and underwent nuclear stress test showed predominantly a fixed defect.  

Imdur was added at that time.


Chest x-ray showed no evidence for acute pulmonary disease.





CT of the abdomen pelvis showed there is mild wall thickening of the colon 

diffusely correlate for mild colitis.  There is also evidence of diverticulosis 

of the sigmoid colon.  Wall thickening is noted which can be associated with a 

mucosal lesion correlate with direct visualization as clinically warranted.


There is a 3.2 cm left adnexal cystic mass likely tubo-ovarian.


Severe right iliac artery stenosis.


There is prominent small bowel loops ileus is favored.


Calcification around the apex of the heart can be associated with myocardial 

calcified calcinosis and previous infarction.





Laboratory data showed WBC 8.2, hemoglobin 14.0 and platelets 214


Sodium 139 potassium 4.4 chloride 105 bicarb is 26 BUN 17 and creatinine 0.74 

and blood sugar is 109 AST 23 alk phos 141 and ALT 49 bilirubin level is 0.5 

magnesium 1.7


TSH 0.039 and free T4 levels 1.67.  Albumin 4.1


Urinalysis negative for infection.





9/10/2022


Patient is currently lying in bed.  Still feels very weak.  No complaints of 

chest pain.  No worsening shortness of breath.  Still nauseated.  Decreased oral

intake.  No cough or sputum production.


No headache or dizziness or lightheadedness.


Lab data this morning showed sodium 142 potassium 4.3 chloride 106 bicarb is 

28.4 BUN 13.3, creatinine 0.6.


 6.8, AFP 2.2 and CEA 6.5.


Urine is negative for infection.  Stool for occult blood is negative.





Patient was hypotensive this morning required fluid bolus and blood pressure did

improve.


Patient was also started on antibiotics for possible colitis.





09/11/2022


Patient is currently resting in bed.  Awake alert and oriented.  Denied any 

complaints of chest pain or worsening shortness of breath.  No complaints of 

abdominal pain today.  Patient says that she has feels very weak.


Yesterday evening patient had chest pain associated with shortness of breath.  

And is also complaining of epigastric pain.  Troponin level is elevated at 0.526

and 0.311.  Patient was started on heparin drip and was transferred to telemetry

unit.


Patient has been afebrile.  No diarrhea.  Does have nausea.  No episodes of 

vomiting.  No cough or sputum production


Laboratory data showed WBC 8.1 hemoglobin 12.6 and platelets 147





September 12: I assumed care of patient today


Still having abdominal pain.  Decreased oral intake.  Had a BM yesterday.  

Rather diet.  Changing the diet to full liquids.  On IV Flagyl and ceftriaxone.


September 13: Patient was delirious last night likely after Dilaudid.-for pain. 

Doing much better this morning.  No abdominal pain is better.  Had liquid BM 

today.  Encourage oral intake.  Continue his Flagyl and ceftriaxone.  We'll get 

a GI consultation.


September 14: Tired.  Does not like the hospital food contents does not want to 

eat.  Patient has passed her daughter to bring in some food.  Has chronic back 

pain.  Abdominal pain is much better.  Outpatient colonoscopy per GI.  Patient 

rather weak but PTOT.  Myself and the  talk to the patient about 

possible rehab.  Patient is keen to go home.   will be to the 

 the patient this afternoon.


September 15: Patient had chest pain overnight.  Did tolerate some diet brought 

in by her daughter.  Put on IV nitroglycerin.  Seen by cardiology today.  

Planning cardiac catheterization tomorrow.  Reclining chair.


September 16: Overnight patient was found unresponsive.  A team was called.  His

question about some left arm weakness.  Patient smoked with ICU.  Neurology was 

consulted.  Patient still lethargic.  Pupils slightly dilated.  No focal 

weakness.  Spoke to the patient's daughter and  at the bedside.  

Telemetry was showing atrial tachycardia versus sinus tachycardia.  Nitro drip 

was discontinued.  Started on Cardizem drip.  MRI of the brain done.   

and Dr. Glez to patient have the NG tube for medications.


September 17: ICU: Discussed with Dr. montero from neurology this morning.  He has

to patient on IV Vimpat.  Cardizem drip was discontinued yesterday evening.  

Lopressor dose increased.  Sinus rhythm.  Nurse reported patient not able to see

since the episode.  At the bedside patient can only see hand movements.  Not 

able to count fingers.  Can only see outline of people.  Answering questions 

slowly today.





09/19/2022





Patient is seen in follow-up currently undergoing neurological workup with Dr. Hester at the bedside.  Patient has had prolonged hospitalization with visual 

disturbances although patient reports her vision is improving and patient is 

continued on Vimpat.  EEG is ordered and pending at this time.  Originally seen 

by Dr. Dale Montero neurology and not convinced this was a stroke and more 

concerned with posterior encephalopathy syndrome was slowly showing some 

improvements.  Patient is weak and recommend physical therapy to evaluate the 

patient.  EEG was recommending severe encephalopathy with no seizure activity 

noted thus far.  And awaiting repeat EEG.  An Vimpat being increased.  We'll 

need to discuss further with neurology along with cardiology and pulmonary were 

following as well.  Neurosurgery also following his patient originally came in 

with abdominal pain and ruling out diverticulitis versus colitis.  Patient 

denies chest pain or shortness of breath.  Per nursing staff patient was able to

see letters on the paper and reports her vision is improving.





9/20/2022





Patient is seen today and continues with visual disturbances and reports is 

unable to see out of the left eye. Neurology following and repeat eeg remains ab

normal. Recommend transfer to tertiary treatment center for continuous eeg 

monitoring. Family made aware and agreeable. Case management following and 

initiated the process although waitlisted. No beds available. Patient is 

continued on increased dose of vimpat 150mg bid. Patient is lethargic although 

arousable. Afebrile and denies chest pain or shortness of breath. Pulmonary and 

cardiology following as well and pulmonary signing off. 





09/21/2022





Patient is seen this morning continues with some confusion and visual 

disturbances.  Patient is scheduled for prolonged EEG today per neurology.  Case

management following as neurology recommending continuous EEG monitoring at 

[facility] although at capacity and not accepting patients at this time and 

waitlisted.  Patient is afebrile denies chest pain or shortness of breath.  

Cardiology also following maximizing medical management.  Patient denies any 

abdominal pain and oral intake continues to be fair patient needs encouragement 

and reports she does not have much of an appetite.  Patient is maintained on 2 L

and reports she does not wear oxygen in the outpatient setting.  Recommend 

weaning as tolerated.  Patient with weakness recommend physical therapy daily.  

Patient continues to have left side visual disturbance reporting she is not 

seeing anything out of the left eye.  Potassium was 3.3 today and will replace 

and recommend repeat labs.





On examination:





GENERAL APPEARANCE: Laying in bed, awake.


HEENT: Normal external appearance of nose and ear.  Oral cavity normal


EYES: Pupils slightly enlarged.  Right eye some medial convergence. Conjunctiva 

normal. 


NECK: JVD not raised. Mass not palpable. 


RESPIRATORY: Respiratory effort normal. Lungs decreased breath sounds


CARDIOVASCULAR: First and second sounds normal. No edema. 


ABDOMEN: Soft.  Tender, no guarding rigidity Liver and spleen not palpable.  No 

mass palpable. 


PSYCHIATRY: Answering simple questions.


NEUROLOGICAL:  vision is limited to hand movement.





INVESTIGATIONS, reviewed in the clinical context:


September 18: WBC 10 hemoglobin 13.3 potassium 4 creatinine 0.44


MRI brain: May be some leopoldo-infarct ischemic changes right occipital lobe.  

Chronic appearing subcortical white matter changes to the bilateral occipital 

lobes.  And periventricular white matter within the brainstem.


September 16: WBC 17.8 hemoglobin 15.1 potassium 4.4 creatinine 0.64 troponin 0

.046 LDL 66


September 15: Troponin 0.083, 0.074


September 14: White count 8.6 and regular 13.5 potassium 3.4 creatinine 0.44


Chest CTA: Negative PE.  IVC filter in place.  Cardiomegaly.  Trace bilateral 

pleural effusion.  


White count 8.1 hemoglobin 12.6 platelets 147 potassium 4 BUN 9 creatinine 0.56


Troponin I 0.012, 0.5-6, 0.311


CT abdomen and pelvis with contrast: Severe cardiomegaly.  Colonic 

diverticulosis.  3.2 cm left adnexal cyst mass likely tubo-ovarian.  Severe 

right iliac artery stenosis.











Assessment and plan: 





-Acute change in mental status with patient becoming unresponsive.  Patient now 

noticed to have limited vision which is new finding down to hand movement.  MRI 

brain is showing infarct in the occipital lobe.


Discussed with neurology Dr. Montero.  He feels patient does not have a stroke.  

His diagnosis is posterior reversible encephalopathy syndrome: Slow to respond, 

neurology recommending transfer to [facility] for continuous eeg monitoring.

 No beds available at this time case management following working with them 

daily.  Patient to undergo prolonged EEG today





-Post infarct angina:


Patient initially declined cardiac catheterization.  





-Non-ST elevation myocardial infarction.


Aspirin beta blocker Plavix.  Being followed by cardiology.  





-Acute Colitis.  Better


IV ceftriaxone, IV Flagyl.   full liquids-advanced to soft diet.  Change to by 

mouth Augmentin.





- left adnexal cystic mass likely tubo-ovarian.Likely adnexal cyst, 


was seen by OB/GYN: [place] to be incidental finding.  Asymptomatic.  [facility] testing 

requested.





-Severe right iliac artery stenosis


Aspirin, Plavix.  Seen by Dr. Corey.  Will follow up outpatient.  4-6 weeks.





-Coronary artery disease history of CABG in 2010 and prior PCI's most recent in 

02/2021 and chronic total occluded RCA.


Aspirin, Lipitor, Plavix, Lopressor





-Chronic CHF, Ischemic cardiomyopathy ejection fraction 40 to 45%


 Aldactone 12.5 mg daily.  Lopressor.  Zestril





-Chronic left ventricle apical thrombus was on anticoagulation previously.  

Calcified





-Moderate to severe TR





-Peripheral arterial disease, 


Aspirin, Plavix.  





-Osteoarthritis primary bilateral


Use pain medications as needed





-Hypothyroidism


Synthroid 112 g a





-Chronic urinary stress incontinence





-Peripheral neuropathy





-Atrial tachycardia versus sinus tachycardia: Patient back in sinus rhythm..


IV Cardizem drip discontinued.  Decreased metoprolol tartrate 50 mg twice a day








Plan:





Recommend close monitoring and continued neurological workup and have discussed 

possible transfer to [facility] for continuous EEG monitoring as patient is 

continuing to have lethargy and increased confusion visual disturbances and 

neurology recommending. EEG remains abnormal. Vimpat increased.  There are no 

beds available currently at [facility] and have tried other options with no 

beds available as well and awaiting on a wait list for a bed to become available

at [facility] and family is aware.  Case management following daily and 

checking in to assess for any bed availability.  Patient is to undergo prolonged

2.5 hour EEG today.  Recommend PT/OT daily.  Potassium 3.3 today and being 

replaced per protocol recommend repeat labs.  Prognosis remains guarded.





The impression and plan of care has been dictated by Rose Mary Flannery, Nurse 

Practitioner as directed.





Dr. Dagoberto MD


I have performed a history and examination and MDM of this patient, discussed 

the same with the dictator, and  agree with the dictator's assessment and plan 

as written ,documented as a scribe. Based on total visit time,  I have performed

more than 50% of the visit.  





Objective





- Vital Signs


Vital signs: 


                                   Vital Signs











Temp  97.7 F   09/21/22 07:37


 


Pulse  63   09/21/22 07:37


 


Resp  16   09/21/22 07:37


 


BP  174/72   09/21/22 07:37


 


Pulse Ox  99   09/21/22 07:37


 


FiO2      








                                 Intake & Output











 09/20/22 09/21/22 09/21/22





 18:59 06:59 18:59


 


Intake Total 50 240 


 


Balance 50 240 


 


Weight 50.8 kg  


 


Intake:   


 


  Intake, IV Titration 50  





  Amount   


 


    Lacosamide  mg In 50  





    Sodium Chloride 0.9% 50   





    ml @ 100 mls/hr IVPB BID   





    [facility] Rx#:[id number]   


 


  Oral  240 


 


Other:   


 


  Voiding Method Toilet Toilet 





 Incontinent Incontinent 


 


  # Voids 2  














- Labs


CBC & Chem 7: 


                                 09/19/22 04:54





                                 09/21/22 05:18


Labs: 


                  Abnormal Lab Results - Last 24 Hours (Table)











  09/21/22 Range/Units





  05:18 


 


Potassium  3.4 L  (3.5-5.1)  mmol/L


 


Carbon Dioxide  32 H  (22-30)  mmol/L








                      Microbiology - Last 24 Hours (Table)











 09/16/22 09:35 Blood Culture - Preliminary





 Blood    No Growth after 96 hours

## 2022-09-21 NOTE — P.PN
Subjective


Progress Note Date: 09/20/22











Hospital course:


Patient is a 67-year-old female with a known history of coronary artery disease 

status post CABG, history of MI, history of stent placement and bilateral 

carotid surgery and hypothyroidism


Was sent to ER by her primary care physician.  Patient has been having abdominal

pain mainly in the lower abdomen associate with nausea for the past 3 to 4 

months.  Patient could not keep down food and also weight loss about 7 non-B 

during the last couple 1 to 2 months.  Patient is also having generalized 

weakness and could not keep her balance.  Denied any diarrhea.  No complaints of

chest pain or shortness of breath.  Patient has been feeling very weak.


Patient was discharged from the hospital on 5/3/2022.,  Admitted due to unstable

angina and underwent nuclear stress test showed predominantly a fixed defect.  

Imdur was added at that time.


Chest x-ray showed no evidence for acute pulmonary disease.





CT of the abdomen pelvis showed there is mild wall thickening of the colon 

diffusely correlate for mild colitis.  There is also evidence of diverticulosis 

of the sigmoid colon.  Wall thickening is noted which can be associated with a 

mucosal lesion correlate with direct visualization as clinically warranted.


There is a 3.2 cm left adnexal cystic mass likely tubo-ovarian.


Severe right iliac artery stenosis.


There is prominent small bowel loops ileus is favored.


Calcification around the apex of the heart can be associated with myocardial 

calcified calcinosis and previous infarction.





Laboratory data showed WBC 8.2, hemoglobin 14.0 and platelets 214


Sodium 139 potassium 4.4 chloride 105 bicarb is 26 BUN 17 and creatinine 0.74 

and blood sugar is 109 AST 23 alk phos 141 and ALT 49 bilirubin level is 0.5 

magnesium 1.7


TSH 0.039 and free T4 levels 1.67.  Albumin 4.1


Urinalysis negative for infection.





9/10/2022


Patient is currently lying in bed.  Still feels very weak.  No complaints of 

chest pain.  No worsening shortness of breath.  Still nauseated.  Decreased oral

intake.  No cough or sputum production.


No headache or dizziness or lightheadedness.


Lab data this morning showed sodium 142 potassium 4.3 chloride 106 bicarb is 

28.4 BUN 13.3, creatinine 0.6.


 6.8, AFP 2.2 and CEA 6.5.


Urine is negative for infection.  Stool for occult blood is negative.





Patient was hypotensive this morning required fluid bolus and blood pressure did

improve.


Patient was also started on antibiotics for possible colitis.





09/11/2022


Patient is currently resting in bed.  Awake alert and oriented.  Denied any 

complaints of chest pain or worsening shortness of breath.  No complaints of 

abdominal pain today.  Patient says that she has feels very weak.


Yesterday evening patient had chest pain associated with shortness of breath.  

And is also complaining of epigastric pain.  Troponin level is elevated at 0.526

and 0.311.  Patient was started on heparin drip and was transferred to telemetry

unit.


Patient has been afebrile.  No diarrhea.  Does have nausea.  No episodes of 

vomiting.  No cough or sputum production


Laboratory data showed WBC 8.1 hemoglobin 12.6 and platelets 147





September 12: I assumed care of patient today


Still having abdominal pain.  Decreased oral intake.  Had a BM yesterday.  

Rather diet.  Changing the diet to full liquids.  On IV Flagyl and ceftriaxone.


September 13: Patient was delirious last night likely after Dilaudid.-for pain. 

Doing much better this morning.  No abdominal pain is better.  Had liquid BM 

today.  Encourage oral intake.  Continue his Flagyl and ceftriaxone.  We'll get 

a GI consultation.


September 14: Tired.  Does not like the hospital food contents does not want to 

eat.  Patient has passed her daughter to bring in some food.  Has chronic back 

pain.  Abdominal pain is much better.  Outpatient colonoscopy per GI.  Patient 

rather weak but PTOT.  Myself and the  talk to the patient about 

possible rehab.  Patient is keen to go home.   will be to the 

 the patient this afternoon.


September 15: Patient had chest pain overnight.  Did tolerate some diet brought 

in by her daughter.  Put on IV nitroglycerin.  Seen by cardiology today.  

Planning cardiac catheterization tomorrow.  Reclining chair.


September 16: Overnight patient was found unresponsive.  A team was called.  His

question about some left arm weakness.  Patient smoked with ICU.  Neurology was 

consulted.  Patient still lethargic.  Pupils slightly dilated.  No focal 

weakness.  Spoke to the patient's daughter and  at the bedside.  

Telemetry was showing atrial tachycardia versus sinus tachycardia.  Nitro drip 

was discontinued.  Started on Cardizem drip.  MRI of the brain done.   

and Dr. Glez to patient have the NG tube for medications.


September 17: ICU: Discussed with Dr. montero from neurology this morning.  He has

to patient on IV Vimpat.  Cardizem drip was discontinued yesterday evening.  

Lopressor dose increased.  Sinus rhythm.  Nurse reported patient not able to see

since the episode.  At the bedside patient can only see hand movements.  Not 

able to count fingers.  Can only see outline of people.  Answering questions 

slowly today.





09/19/2022





Patient is seen in follow-up currently undergoing neurological workup with Dr. Hester at the bedside.  Patient has had prolonged hospitalization with visual 

disturbances although patient reports her vision is improving and patient is 

continued on Vimpat.  EEG is ordered and pending at this time.  Originally seen 

by Dr. Dale Montero neurology and not convinced this was a stroke and more 

concerned with posterior encephalopathy syndrome was slowly showing some 

improvements.  Patient is weak and recommend physical therapy to evaluate the 

patient.  EEG was recommending severe encephalopathy with no seizure activity 

noted thus far.  And awaiting repeat EEG.  An Vimpat being increased.  We'll 

need to discuss further with neurology along with cardiology and pulmonary were 

following as well.  Neurosurgery also following his patient originally came in 

with abdominal pain and ruling out diverticulitis versus colitis.  Patient 

denies chest pain or shortness of breath.  Per nursing staff patient was able to

see letters on the paper and reports her vision is improving.





9/20/2022





Patient is seen today and continues with visual disturbances and reports is 

unable to see out of the left eye. Neurology following and repeat eeg remains ab

normal. Recommend transfer to tertiary treatment center for continuous eeg 

monitoring. Family made aware and agreeable. Case management following and 

initiated the process although waitlisted. No beds available. Patient is 

continued on increased dose of vimpat 150mg bid. Patient is lethargic although 

arousable. Afebrile and denies chest pain or shortness of breath. Pulmonary and 

cardiology following as well and pulmonary signing off. 





On examination:





GENERAL APPEARANCE: Laying in bed, more awake.


HEENT: Normal external appearance of nose and ear.  Oral cavity normal


EYES: Pupils slightly enlarged.  Right eye some medial convergence. Conjunctiva 

normal. 


NECK: JVD not raised. Mass not palpable. 


RESPIRATORY: Respiratory effort normal. Lungs decreased breath sounds


CARDIOVASCULAR: First and second sounds normal. No edema. 


ABDOMEN: Soft.  Tender, no guarding rigidity Liver and spleen not palpable.  No 

mass palpable. 


PSYCHIATRY: Answering simple questions.


NEUROLOGICAL:  vision is limited to hand movement.





INVESTIGATIONS, reviewed in the clinical context:


September 18: WBC 10 hemoglobin 13.3 potassium 4 creatinine 0.44


MRI brain: May be some leopoldo-infarct ischemic changes right occipital lobe.  

Chronic appearing subcortical white matter changes to the bilateral occipital 

lobes.  And periventricular white matter within the brainstem.


September 16: WBC 17.8 hemoglobin 15.1 potassium 4.4 creatinine 0.64 troponin 

0.046 LDL 66


September 15: Troponin 0.083, 0.074


September 14: White count 8.6 and regular 13.5 potassium 3.4 creatinine 0.44


Chest CTA: Negative PE.  IVC filter in place.  Cardiomegaly.  Trace bilateral 

pleural effusion.  


White count 8.1 hemoglobin 12.6 platelets 147 potassium 4 BUN 9 creatinine 0.56


Troponin I 0.012, 0.5-6, 0.311


CT abdomen and pelvis with contrast: Severe cardiomegaly.  Colonic 

diverticulosis.  3.2 cm left adnexal cyst mass likely tubo-ovarian.  Severe 

right iliac artery stenosis.











Assessment and plan: 





-Acute change in mental status with patient becoming unresponsive.  Patient now 

noticed to have limited vision which is new finding down to hand movement.  MRI 

brain is showing infarct in the occipital lobe.


Discussed with neurology Dr. Montero.  He feels patient does not have a stroke.  

His diagnosis is posterior reversible encephalopathy syndrome: Slow to respond, 

neurology recommending transfer to [facility] for continuous eeg monitoring





-Post infarct angina:


Patient initially declined cardiac catheterization.  





-Non-ST elevation myocardial infarction.


Aspirin beta blocker Plavix.  Being followed by cardiology.  





-Acute Colitis.  Better


IV ceftriaxone, IV Flagyl.   full liquids-advanced to soft diet.  Change to by 

mouth Augmentin.





- left adnexal cystic mass likely tubo-ovarian.Likely adnexal cyst, 


was seen by OB/GYN: [place] to be incidental finding.  Asymptomatic.  [facility] testing 

requested.





-Severe right iliac artery stenosis


Aspirin, Plavix.  Seen by Dr. Corey.  Will follow up outpatient.  4-6 weeks.





-Coronary artery disease history of CABG in 2010 and prior PCI's most recent in 

02/2021 and chronic total occluded RCA.


Aspirin, Lipitor, Plavix, Lopressor





-Chronic CHF, Ischemic cardiomyopathy ejection fraction 40 to 45%


 Aldactone 12.5 mg daily.  Lopressor.  Zestril





-Chronic left ventricle apical thrombus was on anticoagulation previously.  

Calcified





-Moderate to severe TR





-Peripheral arterial disease, 


Aspirin, Plavix.  





-Osteoarthritis primary bilateral


Use pain medications as needed





-Hypothyroidism


Synthroid 112 g a





-Chronic urinary stress incontinence





-Peripheral neuropathy





-Atrial tachycardia versus sinus tachycardia: Patient back in sinus rhythm..


IV Cardizem drip discontinued.  Decreased metoprolol tartrate 50 mg twice a day








Plan:





Recommend close monitoring and continued neurological workup and have discussed 

possible transfer to [facility] for continuous EEG monitoring as patient is 

more lethargic today and neurology recommending. EEG remains abnormal. Vimpat 

increased.  Patient initially did not want transfer and wants to stay here and 

family is agreeable to the transfer if that is what is needed. Case management 

following and initiated the process. No beds available and waitlisted. Recommend

prolonged EEG tomorrow.  Recommend PT/OT evaluation and will follow.  Prognosis 

remains guarded.





The impression and plan of care has been dictated by Rose Mary Flannery, Nurse 

Practitioner as directed.





Dr. Dagoberto MD


I have performed a history and examination and MDM of this patient, discussed 

the same with the dictator, and  agree with the dictator's assessment and plan 

as written ,documented as a scribe. Based on total visit time,  I have performed

more than 50% of the visit.  





Objective





- Vital Signs


Vital signs: 


                                   Vital Signs











Temp  97.5 F L  09/20/22 08:08


 


Pulse  77   09/20/22 08:08


 


Resp  16   09/20/22 08:08


 


BP  158/72   09/20/22 08:08


 


Pulse Ox  100   09/20/22 08:08


 


FiO2      








                                 Intake & Output











 09/19/22 09/20/22 09/20/22





 18:59 06:59 18:59


 


Intake Total 118 120 


 


Balance 118 120 


 


Intake:   


 


  Oral 118 120 


 


Other:   


 


  Voiding Method Toilet Toilet 





  Incontinent 


 


  # Voids 2 3 














- Labs


CBC & Chem 7: 


                                 09/19/22 04:54





                                 09/19/22 04:54


Labs: 


                      Microbiology - Last 24 Hours (Table)











 09/16/22 09:35 Blood Culture - Preliminary





 Blood    No Growth after 72 hours

## 2022-09-22 RX ADMIN — METOPROLOL TARTRATE SCH: 50 TABLET, FILM COATED ORAL at 20:23

## 2022-09-22 RX ADMIN — ASPIRIN 81 MG CHEWABLE TABLET SCH MG: 81 TABLET CHEWABLE at 08:12

## 2022-09-22 RX ADMIN — HEPARIN SODIUM SCH UNIT: 5000 INJECTION INTRAVENOUS; SUBCUTANEOUS at 23:23

## 2022-09-22 RX ADMIN — FAMOTIDINE SCH MG: 20 TABLET, FILM COATED ORAL at 20:23

## 2022-09-22 RX ADMIN — METOPROLOL TARTRATE SCH MG: 50 TABLET, FILM COATED ORAL at 08:12

## 2022-09-22 RX ADMIN — LACOSAMIDE SCH MG: 150 TABLET, FILM COATED ORAL at 08:11

## 2022-09-22 RX ADMIN — CLOPIDOGREL BISULFATE SCH MG: 75 TABLET ORAL at 08:11

## 2022-09-22 RX ADMIN — HEPARIN SODIUM SCH UNIT: 5000 INJECTION INTRAVENOUS; SUBCUTANEOUS at 08:11

## 2022-09-22 RX ADMIN — HEPARIN SODIUM SCH UNIT: 5000 INJECTION INTRAVENOUS; SUBCUTANEOUS at 16:07

## 2022-09-22 RX ADMIN — RANOLAZINE SCH MG: 500 TABLET, FILM COATED, EXTENDED RELEASE ORAL at 08:11

## 2022-09-22 RX ADMIN — FAMOTIDINE SCH MG: 20 TABLET, FILM COATED ORAL at 08:12

## 2022-09-22 RX ADMIN — NICOTINE SCH PATCH: 14 PATCH, EXTENDED RELEASE TRANSDERMAL at 08:12

## 2022-09-22 RX ADMIN — RANOLAZINE SCH MG: 500 TABLET, FILM COATED, EXTENDED RELEASE ORAL at 20:23

## 2022-09-22 RX ADMIN — SPIRONOLACTONE SCH MG: 25 TABLET, FILM COATED ORAL at 08:12

## 2022-09-22 RX ADMIN — ONDANSETRON HYDROCHLORIDE PRN MG: 4 TABLET, FILM COATED ORAL at 21:14

## 2022-09-22 RX ADMIN — ATORVASTATIN CALCIUM SCH MG: 80 TABLET, FILM COATED ORAL at 08:12

## 2022-09-22 RX ADMIN — LACOSAMIDE SCH MG: 150 TABLET, FILM COATED ORAL at 20:23

## 2022-09-22 RX ADMIN — SERTRALINE HYDROCHLORIDE SCH MG: 100 TABLET ORAL at 20:23

## 2022-09-22 RX ADMIN — FOLIC ACID SCH MG: 1 TABLET ORAL at 11:38

## 2022-09-22 RX ADMIN — LEVOTHYROXINE SODIUM SCH MCG: 0.11 TABLET ORAL at 05:47

## 2022-09-22 NOTE — P.PN
Subjective


Patient is pleasant 67-year-old female with history of carotid artery stenosis 

status post bilateral carotid endarterectomy, hypothyroidism, CAD status post 

CABG as well as PCI, hypertension, hyperlipidemia, hypothyroidism, recent 50 

pound weight loss in the last 3 months. Patient used to follow with Dr. Raul Noel [phone], however has not followed in a while. Patient presents with 

multiple symptoms. Patient has not had much of an appetite and has not been 

eating and drinking that much.  Additionally has been states she is very weak 

and if it were not for him "she would sleep 24 hours ". Apparently this has been

going on for the last few weeks and even few months. Multiple symptoms including

abdominal pain, back pain, chest pain, lightheadedness, weak.





Patient had presented in May 2022 with findings of a Lexiscan stress test with 

predominantly fixed apical defect consistent with prior myocardial infarction 

with minimal questionable leopoldo-infarct ischemia and an EF 41%.  Echocardiogram 

had showed EF 40-45% with a fixed thrombus in the left ventricle, moderate to 

severe tricuspid regurgitation and apical scarring.  EKG on presentation shows 

sinus rhythm, left axis deviation, incomplete left bundle branch block, ST 

depressions in the inferior and lateral leads.  This does appear somewhat 

similar to prior EKG from May 2022. 





CT abdomen and pelvis which showed mild thickening of the colon, 3.2 cm left 

adnexal mass, severe right iliac artery stenosis, calcification or on the apex 

of the heart. 





Echo revealed EF 20-25%, anterseptal, apex hypokinesis, dilated LV.  





On 9/16, patient developed change in mental status and was transferred to ICU. 





9/22


Patient seen and examined at bedside, no acute distress.  She is sitting up in 

the bed with no complaints. She continues to be pleasantly confused. She is 

alert. oriented to person only. She continues to be confused about her care. She

denies any pain or shortness of breath, no chest pain.  Medications were 

adjusted yesterday and blood pressure is improved.








She's currently maintained on aspirin 81 mg daily, atorvastatin 80 mg daily, 

Plavix 75 mg daily, metoprolol tartrate 25 mg BID, lisinopril 20mg daily, Ranexa

500mg BID, spironolactone 25mg daily 





PHYSICAL EXAMINATION


Vital signs reviewed.


CONSTITUTIONAL: No apparent distress, ill appearing, more alert this morning, 

confused 


HEENT: Neck Supple.  No JVD. 


CHEST EXAMINATION: Lungs are clear to auscultation. No chest wall tenderness is 

noted on palpation or with deep breathing. 


HEART EXAMINATION: Regular rate and rhythm. S1, S2 heard. No murmurs, gallops or

rub.


ABDOMEN: Soft, nontender. Positive bowel sounds.


EXTREMITIES: 2+ peripheral pulses, no lower extremity edema and no calf 

tenderness.


NEUROLOGIC EXAMINATION: Patient is alert, oriented to person only 





ASSESSMENT


Hypertensive emergency


Chest discomfort with elevated troponin, possible NSTEMI, however patient not ag

reement to proceed with cardiac cath initially and was treated medically


Altered mental status


Visual disturbances


Encephalopathy 


MRI Brains reported  may be some mild per-infarct ischemic changes right 

occipital lobe, Neurology following, Posterior reversible encephalopathy 

syndrome is also in the differential 


Abnormal EEG, neurology following 


Worsening cardiomyopathy with EF 20-25%, ischemic vs non-ischemic 


CAD with history of CABG and bypass


Recent 50 pound weight loss


Ovarian mass, rule out cancer


Abdominal pain, back pain, multiple other symptoms


Somnolence, failure to thrive of unclear etiology


Decreased appetite


Chronic  heart failure with reduced ejection fraction 


History of Ischemic cardiomyopathy


Chronic apical thrombus appears calcified on CT


PAD





PLAN


We will maximize medical therapy and heart failure regimen


Continue dual antiplatelet therapy with aspirin and plavix 


Continue metoprolol tartrate, lisinopril, Ranexa and statin, spironolactone 


Consider SGLT2 inhibitor 


Neurology following, plan for repeat MRI brain today 


Further recommendations based on clinical course





Nurse practitioner note has been reviewed by physician. Signing provider agrees 

with the documented findings, assessment, and plan of care. 














Objective





- Vital Signs


Vital signs: 


                                   Vital Signs











Temp  98.5 F   09/22/22 04:33


 


Pulse  64   09/22/22 04:33


 


Resp  14   09/22/22 04:33


 


BP  119/71   09/22/22 04:33


 


Pulse Ox  95   09/22/22 04:33


 


FiO2      








                                 Intake & Output











 09/21/22 09/22/22 09/22/22





 18:59 06:59 18:59


 


Intake Total 476 500 


 


Balance 476 500 


 


Intake:   


 


  Oral 476 500 


 


Other:   


 


  Voiding Method Bedside Commode Bedside Commode 


 


  # Voids 1 1 














- Labs


CBC & Chem 7: 


                                 09/19/22 04:54





                                 09/21/22 05:18


Labs: 


                      Microbiology - Last 24 Hours (Table)











 09/16/22 09:35 Blood Culture - Preliminary





 Blood    No Growth after 120 hours

## 2022-09-22 NOTE — P.PN
Subjective


Progress Note Date: 09/22/22 09/22/2022: Patient was seen for a follow-up.  Patient's  was present.  

Patient's daughter was also present over the speakerphone of her  cell 

phone.  Patient is sitting comfortably in the bed.  I spoke to patient's 

daughter, who said that the patient's visual disturbance has been going on for 1

year (her  stated was 1-1/2 months).  It appears the family is aware of 

patient having a stroke in the past.  All workup has been performed at [facility].  We do not have any records from [facility].





09/21/2022: Patient was seen for a follow-up.  Patient just returned back from 

2.5 hours EEG.  Patient denies headache.  Patient appears very confused, fumb

ling around with the blanket.  Stating that she is trying to find her cell 

phone, although it was present right in front of her on the table.  She states 

"I'm frustrated as I cannot find what I'm looking at".  Her telemetry monitoring

showing sinus rhythm with sinus bradycardia, PVCs.


I spoke to patient's  Kip on the phone.  He mentioned that about 1-1/2 

months ago patient passed out at the house.  He gave her nitro and she came 

around.  The following day he took her to her primary doctor, who recommended 

her to go to the hospital.  Patient was admitted to the [facility] for 

couple days and then released.  Since then her vision has been steadily getting 

worse.   Patient has smoked half pack per day for 30 years.  No alcoholism.  

Patient has lost about 35 pounds in the last 4-6 months.  She has decreased 

appetite.  No previous history of cancers.  No previous history of carotid 

endarterectomy that he is aware of.  He is not sure as to the cause of the 

surgical scars in her neck.





09/20/2022: Patient was seen for a follow-up.  Patient denies headache.  Patient

is more sleepy today.  She feels very tired.  Denies any numbness or tingling.





09/19/2022: Patient initially seen by Dr. Dale Montero.  Please refer to his note 

for details.  In summary it appears patient came to the hospital with abdominal 

pain.  Patient was found to have non-STEMI.  Patient while in the hospital was 

found unresponsive, with left-sided weakness.  Stroke code was activated.  She 

was not a candidate for TPA as last known well was uncertain.  No previous 

history of seizures.





Patient has presented with visual hallucinations.  At present she states that 

her hallucinations are occurring "every now and then".  Patient could not 

describe what she actually is hallucinating.  Patient denies any previous 

history of strokes.  There is evidence of bilateral CEA in the past.  Patient 

denies any headache, denies any focal symptoms.








SOME OF THE WORK-UP DURING THIS HOSPITAL VISIT:


CT of the head was ordered and is a reported as old right occipital lobe 

infarct.  No acute intracranial abnormality.  I personally reviewed CT head 

agree with the findings.


CT angiography of the head and neck was reported as negative CT angiography of 

the brain.  Negative CT angiography of the neck were dominant left vertebral and

diminutive right vertebral.  Large pulmonary artery suggestive of pulmonary 

hypertension.  Mild aneurysm of the aortic arch.


TSH is 0.039 and free T4 is 1.67.


Ammonia is 31 (normal <30).


Lipid panel is triglycerides 246, cholesterol is 151, LDL 66 and HDL 35.


MRI Brain is reported as there maybe some mild leopoldo-infarct ischemic changes 

right occipital lobe.  Chronic appearing subcortical white matter changes to the

bilateral occipital lobses, periventricular white matter, whithin the brainstem.

 Suspicious changes to suggest metastasis is not identified.  I personally 

reviewed MRI Brain Patient does have some patchy increase signal on DWI over 

right occipital and left medial temporal/hippocampus region.  On FLAIR, patient 

has evidence of right occipital stroke and has hyperintensity over the bilateral

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 

reversible encephalopathy syndrome.  Also has hyperintesity over brainstem.  


Routine EEG on 09/16/2022: Is abnormal.  The background slowing is suggestive of

moderate to severe encephalopathy.  The epileptiform dischargs over the left 

frontotemporal region increases risk for seizure.  There is no seizure noted 

during this study.


2D echo is reported as dilated left ventricle with severe LV dysfunction (20-

25%).  Normal Left atrium.  





Objective





- Vital Signs


Vital signs: 


                                   Vital Signs











Temp  97.5 F L  09/22/22 11:34


 


Pulse  61   09/22/22 11:34


 


Resp  15   09/22/22 11:34


 


BP  123/58   09/22/22 11:34


 


Pulse Ox  98   09/22/22 11:34


 


FiO2      








                                 Intake & Output











 09/21/22 09/22/22 09/22/22





 18:59 06:59 18:59


 


Intake Total 476 500 


 


Balance 476 500 


 


Intake:   


 


  Oral 476 500 


 


Other:   


 


  Voiding Method Bedside Commode Bedside Commode Bedside Commode





   Diaper





   Incontinent


 


  # Voids 1 1 














- Exam





GENERAL: The patient is sitting comfortably in the bed, but appears confused, 

inattentive, spacey.  However slightly more interactive.





NEUROLOGICAL:


Patient is more alert and awake.


Higher mental function: The patient is awake. 


Cranial nerves: The pupils are round, equal and reactive to light.   Patient has

left homonymous hemianopia.  Extraocular muscles intact.  No facial weakness.  

No dysarthria.  Facial sensation is normal.  Tongue protrudes the midline.  

Shoulder shrug normal.  Hearing appears fairly intact.


Motor: Muscle strength is normal in the arms and legs.   Normal tone and bulk.  


Cerebellum: No ataxia for finger-to-nose testing, although she was slightly 

slow.


Sensation: Equal bilaterally, with no neglect on double simultaneous 

stimulation..


Gait: Deferred.





- Labs


CBC & Chem 7: 


                                 09/19/22 04:54





                                 09/21/22 05:18


Labs: 


                      Microbiology - Last 24 Hours (Table)











 09/16/22 09:35 Blood Culture - Final





 Blood    No Growth after 144 hours














Assessment and Plan


Assessment: 





* Abnormal EEG with periodic lateralized epileptiform discharges involving the 

  left temporal region, now seems to have resolved.


* Encephalopathy with visual disturbance, possibly related to previous CVA in 

  the right occipital region, or ictal or postictal phenomenon.  Rule out 

  paraneoplastic syndrome or NMDA encephalitis versus limbic encephalitis.


* Right occipital lobe infarct with left homonymous hemianopia.  Likely occurred

  one and half months ago.


* Abnormal MRI Brain  


* History of bilateral CEA.


* Has minimal elevated ammonia (31).


* Non-STEMI


* Tubo-ovarian mass reported on CT.


* History of old right occipital stroke


* History of CAD s/p stent and CABG


* History of hypothyroidism


* Severe depression


* Anxiety


* Weight loss, rule out occult malignancy


* Folate deficiency


* Tobacco use


Plan: 








* Continue Vimpat 150 mg twice a day.  Epileptiform activity has resolved.


* Prolonged EEG 2.5 hours performed 02/21/2022 was limited study, abnormal EEG 

  predominantly sleep EEG.  During the rare periods of increased arousal, 

  diffuse facet are range slowing was seen.  These findings indicate mild to 

  moderate diffuse cerebral dysfunction as may be seen in a toxic metabolic 

  encephalopathy. 


* Lumbar puncture to rule out encephalitis.  Plavix now held for LP. 


* Await NMDA antibodies, anti-Hu and anti-Yo antibodies.  


* B12 541, folate level borderline 7.6.  We will start folate replacement.


* Repeat MRI brain with and without contrast performed today revealed no 

  evidence of intracranial mass on limited testing disease or acute/subacute 

  infarct.  No significant change from 09/16/2022.  Nonspecific white matter 

  changes, likely secondary to small vessel ischemic disease.  Similar remote 

  encephalomalacia of the right occipital lobe.  No evidence of mass.  I 

  personally reviewed MRI of the brain.  Agree with these findings, but there 

  appears to be an abnormal signal in the left hippocampus particularly noted in

  the FLAIR.  The cystic component appears somewhat similar as compared to the 

  previous MRI.  No abnormal enhancement however noticed in this region.  We 

  will review with the radiologist. 


* Obtain records from [facility].





* Initial EEG on 09/16/2022 reviewed by Dr. Montero: Is abnormal.  The background 

  slowing is suggestive of moderate to severe encephalopathy.  The epileptiform 

  dischargs over the left frontotemporal region increases risk for seizure.  

  There is no seizure noted during this study.





* Repeat EEG 09/19/2022 was abnormal due to background slowing, disorganization 

  of mild-to-moderate degree, suggestive of encephalopathy.  Focal slowing with 

  focal epileptiform activity seen frequently involving the left temporal region

  suggestive of focal cortical neuronal dysfunction with underlying cortical 

  irritability and tendency for seizures.  No electrographic seizure was 

  recorded.  When compared to the EEG from 09/16/2022, the epileptiform activity

  has much improved, although still present.  Clinical correlation and a pro

  longed EEG recommended if clinically indicated.





* Continue aspirin 81, Plavix 75 mg (held for LP), and  Lipitor 80 mg daily.


 


* PT, OT are consulted





* Cardiology team is on board


* General surgery team is on board for possible diverticulitis/colitis


* We'll defer the rest of the medical management to the primary team


* For DVT prophylaxis: Started on subq heparin 5000U every 8 hours.

## 2022-09-22 NOTE — MR
EXAMINATION TYPE: MR brain wo/w con

 

DATE OF EXAM: 9/22/2022

 

COMPARISON: MR brain 9/16/2022.

 

HISTORY: Follow-up brain lesion.

 

TECHNIQUE: 

Multiplanar, multisequence images of the brain and brainstem is performed without and with IV contras
t, utilizing 5 mL intravenous Gadavist .

 

FINDINGS: Similar suggested mild gyriform diffusion restriction within the right occipital lobe. Sherley
lar area of the right occipital lobe encephalomalacia from remote injury. No new areas of restricted 
diffusion on diffusion weighted images demonstrate no evidence of a recent infarct.  There is no extr
a-axial fluid collection. There are scattered high T2 foci throughout the T point matter.  The ventri
cular system and cisternal spaces are normal in size and appearance.  The brain volume is age appropr
iate.

 

Midline structures demonstrate normal morphology.  The craniocervical junction appears within normal 
limits.  Post contrast images demonstrate no abnormal enhancement. The dural venous sinuses appear pa
tent. The visualized sinuses are clear and the globes are intact.

 

IMPRESSION: 1. No evidence of intracranial mass or metastatic disease or acute/subacute infarct. No s
ignificant change from 9/16/2022.

2. Nonspecific white matter changes, likely secondary to small vessel ischemic disease.

3. Similar remote encephalomalacia of the right occipital lobe. No evidence of mass.

## 2022-09-22 NOTE — EEG
ELECTROENCEPHALOGRAM REPORT



ELECTROENCEPHALOGRAM (EEG) REPORT:

TECHNIQUE:  This is a report from a prolonged 2-1/2 hour inpatient digital EEG

performed using the 10/20 international electrode placement system.



HISTORY:

The patient initially arrived for abdominal pain and then started having chest pain,

the patient became unresponsive and was transferred to the ICU.  Other history includes

hallucinations.



CURRENT MEDICATIONS:

1. Tylenol.

2. Aspirin.

3. Lipitor.

4. Klonopin.

5. Plavix.

6. Pepcid.

7. Heparin.

8. Vimpat.

9. Synthroid.

10.Zestril.

11.Lopressor.

12.Nicotine patch.

13.Zofran.

14.Ranexa.

15.Zoloft.

16.Aldactone.



FINDINGS:

Recording start time:  09/21/2022 at 1057.

Recording end time:  09/21/2022 at 1527.



EVENTS:

During this 2.5 hour prolonged video EEG, no clinical or electrographic seizures were

recorded.



Please note that quality of this recording was limited by the presence of 60 cycle

artifact.  The quality of this recording was significantly limited by the presence of

60 cycle artifact.



BACKGROUND:

Please see section of abnormalities below.



ACTIVATION:

Hyperventilation:  Not performed.



Photic stimulation:  No driving seen.



Sleep:  Stage II sleep noted.



Please note that an excessive beta frequency activity was noted.  This is not

epileptiform in nature and may in part be due to medication effect.



ABNORMALITIES:

For the majority of this recording, the patient was asleep.  During the rare periods of

increased arousal, background frequencies were seen in the unsustained 6 to 8 hertz

range.  During those times with the more anterior regions diffuse 4 to 6 hertz theta

range slowing was seen.



Also note that artifact was noted over the bilateral frontotemporal chains.



IMPRESSION:

Limited study, abnormal EEG, predominantly sleep EEG.  During the rare periods of

increased arousal, diffuse theta range slowing was seen.  These findings indicate mild-

to-moderate diffuse cerebral dysfunction as may be seen in a toxic metabolic

encephalopathy.





MMODL / IJN: [id number] / Job#: [id number]

## 2022-09-22 NOTE — P.PN
Subjective


Progress Note Date: 09/22/22











Hospital course:


Patient is a 67-year-old female with a known history of coronary artery disease 

status post CABG, history of MI, history of stent placement and bilateral 

carotid surgery and hypothyroidism


Was sent to ER by her primary care physician.  Patient has been having abdominal

pain mainly in the lower abdomen associate with nausea for the past 3 to 4 

months.  Patient could not keep down food and also weight loss about 7 non-B 

during the last couple 1 to 2 months.  Patient is also having generalized 

weakness and could not keep her balance.  Denied any diarrhea.  No complaints of

chest pain or shortness of breath.  Patient has been feeling very weak.


Patient was discharged from the hospital on 5/3/2022.,  Admitted due to unstable

angina and underwent nuclear stress test showed predominantly a fixed defect.  

Imdur was added at that time.


Chest x-ray showed no evidence for acute pulmonary disease.





CT of the abdomen pelvis showed there is mild wall thickening of the colon 

diffusely correlate for mild colitis.  There is also evidence of diverticulosis 

of the sigmoid colon.  Wall thickening is noted which can be associated with a 

mucosal lesion correlate with direct visualization as clinically warranted.


There is a 3.2 cm left adnexal cystic mass likely tubo-ovarian.


Severe right iliac artery stenosis.


There is prominent small bowel loops ileus is favored.


Calcification around the apex of the heart can be associated with myocardial 

calcified calcinosis and previous infarction.





Laboratory data showed WBC 8.2, hemoglobin 14.0 and platelets 214


Sodium 139 potassium 4.4 chloride 105 bicarb is 26 BUN 17 and creatinine 0.74 

and blood sugar is 109 AST 23 alk phos 141 and ALT 49 bilirubin level is 0.5 

magnesium 1.7


TSH 0.039 and free T4 levels 1.67.  Albumin 4.1


Urinalysis negative for infection.





9/10/2022


Patient is currently lying in bed.  Still feels very weak.  No complaints of 

chest pain.  No worsening shortness of breath.  Still nauseated.  Decreased oral

intake.  No cough or sputum production.


No headache or dizziness or lightheadedness.


Lab data this morning showed sodium 142 potassium 4.3 chloride 106 bicarb is 

28.4 BUN 13.3, creatinine 0.6.


 6.8, AFP 2.2 and CEA 6.5.


Urine is negative for infection.  Stool for occult blood is negative.





Patient was hypotensive this morning required fluid bolus and blood pressure did

improve.


Patient was also started on antibiotics for possible colitis.





09/11/2022


Patient is currently resting in bed.  Awake alert and oriented.  Denied any 

complaints of chest pain or worsening shortness of breath.  No complaints of 

abdominal pain today.  Patient says that she has feels very weak.


Yesterday evening patient had chest pain associated with shortness of breath.  

And is also complaining of epigastric pain.  Troponin level is elevated at 0.526

and 0.311.  Patient was started on heparin drip and was transferred to telemetry

unit.


Patient has been afebrile.  No diarrhea.  Does have nausea.  No episodes of 

vomiting.  No cough or sputum production


Laboratory data showed WBC 8.1 hemoglobin 12.6 and platelets 147





September 12: I assumed care of patient today


Still having abdominal pain.  Decreased oral intake.  Had a BM yesterday.  

Rather diet.  Changing the diet to full liquids.  On IV Flagyl and ceftriaxone.


September 13: Patient was delirious last night likely after Dilaudid.-for pain. 

Doing much better this morning.  No abdominal pain is better.  Had liquid BM 

today.  Encourage oral intake.  Continue his Flagyl and ceftriaxone.  We'll get 

a GI consultation.


September 14: Tired.  Does not like the hospital food contents does not want to 

eat.  Patient has passed her daughter to bring in some food.  Has chronic back 

pain.  Abdominal pain is much better.  Outpatient colonoscopy per GI.  Patient 

rather weak but PTOT.  Myself and the  talk to the patient about 

possible rehab.  Patient is keen to go home.   will be to the 

 the patient this afternoon.


September 15: Patient had chest pain overnight.  Did tolerate some diet brought 

in by her daughter.  Put on IV nitroglycerin.  Seen by cardiology today.  

Planning cardiac catheterization tomorrow.  Reclining chair.


September 16: Overnight patient was found unresponsive.  A team was called.  His

question about some left arm weakness.  Patient smoked with ICU.  Neurology was 

consulted.  Patient still lethargic.  Pupils slightly dilated.  No focal 

weakness.  Spoke to the patient's daughter and  at the bedside.  

Telemetry was showing atrial tachycardia versus sinus tachycardia.  Nitro drip 

was discontinued.  Started on Cardizem drip.  MRI of the brain done.   

and Dr. Glez to patient have the NG tube for medications.


September 17: ICU: Discussed with Dr. montero from neurology this morning.  He has

to patient on IV Vimpat.  Cardizem drip was discontinued yesterday evening.  

Lopressor dose increased.  Sinus rhythm.  Nurse reported patient not able to see

since the episode.  At the bedside patient can only see hand movements.  Not 

able to count fingers.  Can only see outline of people.  Answering questions 

slowly today.





09/19/2022





Patient is seen in follow-up currently undergoing neurological workup with Dr. Hester at the bedside.  Patient has had prolonged hospitalization with visual 

disturbances although patient reports her vision is improving and patient is 

continued on Vimpat.  EEG is ordered and pending at this time.  Originally seen 

by Dr. Dale Montero neurology and not convinced this was a stroke and more 

concerned with posterior encephalopathy syndrome was slowly showing some 

improvements.  Patient is weak and recommend physical therapy to evaluate the 

patient.  EEG was recommending severe encephalopathy with no seizure activity 

noted thus far.  And awaiting repeat EEG.  An Vimpat being increased.  We'll 

need to discuss further with neurology along with cardiology and pulmonary were 

following as well.  Neurosurgery also following his patient originally came in 

with abdominal pain and ruling out diverticulitis versus colitis.  Patient 

denies chest pain or shortness of breath.  Per nursing staff patient was able to

see letters on the paper and reports her vision is improving.





9/20/2022





Patient is seen today and continues with visual disturbances and reports is 

unable to see out of the left eye. Neurology following and repeat eeg remains ab

normal. Recommend transfer to tertiary treatment center for continuous eeg 

monitoring. Family made aware and agreeable. Case management following and 

initiated the process although waitlisted. No beds available. Patient is 

continued on increased dose of vimpat 150mg bid. Patient is lethargic although 

arousable. Afebrile and denies chest pain or shortness of breath. Pulmonary and 

cardiology following as well and pulmonary signing off. 





09/21/2022





Patient is seen this morning continues with some confusion and visual 

disturbances.  Patient is scheduled for prolonged EEG today per neurology.  Case

management following as neurology recommending continuous EEG monitoring at 

[facility] although at capacity and not accepting patients at this time and 

waitlisted.  Patient is afebrile denies chest pain or shortness of breath.  

Cardiology also following maximizing medical management.  Patient denies any 

abdominal pain and oral intake continues to be fair patient needs encouragement 

and reports she does not have much of an appetite.  Patient is maintained on 2 L

and reports she does not wear oxygen in the outpatient setting.  Recommend 

weaning as tolerated.  Patient with weakness recommend physical therapy daily.  

Patient continues to have left side visual disturbance reporting she is not 

seeing anything out of the left eye.  Potassium was 3.3 today and will replace 

and recommend repeat labs.





09/22/2022





Patient is seen and evaluated in follow-up this morning lethargic although ar

ousable.  Patient is confused and continues to have visual disturbances of the 

left eye.  Neurology following closely and initially discussing possible 

transfer to tertiary treatment center [facility] for continuous EEG 

monitoring.  Patient had prolonged EEG yesterday showing although a limited 

study continues to be abnormal predominantly sleep EEG although findings 

indicate mild to moderate diffuse cerebral dysfunction as may be seen in those 

with toxic metabolic encephalopathy.  No epileptiform discharges were noted as 

mentioned previously on other EEGs that were done.  Patient is maintained on 

Vimpat twice daily and neurology recommending LP and repeat MRI which is to be 

scheduled for today.  LP will not be able to be performed until next week 

Tuesday as Plavix needs to be on hold for 5 days.  Plavix discontinued today as 

nursing staff reports that was are given this morning.  Recommend working with 

physical therapy and occupational therapy daily as patient continues to be signi

ficantly weak as well.  Repeat BMP ordered as potassium was 3.4 yesterday and 

awaiting as patient was replaced with potassium protocol.  Patient is afebrile 

denies chest pain or shortness of breath.





On examination:





GENERAL APPEARANCE: Sleeping in bed although arousable


Vitals: Temp is 97.5F, pulse is 61, respirations are 15, blood pressure 123/58,

oxygen 98% on room air


HEENT: Normal external appearance of nose and ear.  Oral cavity normal


EYES: Pupils slightly enlarged.  Right eye some medial convergence. Conjunctiva 

normal. 


NECK: JVD not raised. Mass not palpable. 


RESPIRATORY: Respiratory effort normal. Lungs decreased breath sounds


CARDIOVASCULAR: First and second sounds normal. No edema. 


ABDOMEN: Soft.  Tender, no guarding rigidity Liver and spleen not palpable.  No 

mass palpable. 


PSYCHIATRY: Answering simple questions although delayed and rambling at times.


NEUROLOGICAL:  vision is limited to hand movement.





INVESTIGATIONS, reviewed in the clinical context:


September 18: WBC 10 hemoglobin 13.3 potassium 4 creatinine 0.44


MRI brain: May be some leopoldo-infarct ischemic changes right occipital lobe.  

Chronic appearing subcortical white matter changes to the bilateral occipital 

lobes.  And periventricular white matter within the brainstem.


September 16: WBC 17.8 hemoglobin 15.1 potassium 4.4 creatinine 0.64 troponin 

0.046 LDL 66


September 15: Troponin 0.083, 0.074


September 14: White count 8.6 and regular 13.5 potassium 3.4 creatinine 0.44


Chest CTA: Negative PE.  IVC filter in place.  Cardiomegaly.  Trace bilateral 

pleural effusion.  


White count 8.1 hemoglobin 12.6 platelets 147 potassium 4 BUN 9 creatinine 0.56


Troponin I 0.012, 0.5-6, 0.311


CT abdomen and pelvis with contrast: Severe cardiomegaly.  Colonic 

diverticulosis.  3.2 cm left adnexal cyst mass likely tubo-ovarian.  Severe 

right iliac artery stenosis.











Assessment and plan: 





-Acute change in mental status with patient becoming unresponsive.  Patient now 

noticed to have limited vision which is new finding down to hand movement.  MRI 

brain is showing infarct in the occipital lobe.


Discussed with neurology Dr. Montero.  He feels patient does not have a stroke.  

His diagnosis is posterior reversible encephalopathy syndrome: Slow to respond, 

neurology initially recommending transfer to [facility] for continuous eeg 

monitoring.  Patient underwent prolonged EEG yesterday with no epileptiform 

discharges noted.  Neurology recommending LP and repeat MRI.  MRI is scheduled 

for today.





-Post infarct angina:


Patient initially declined cardiac catheterization.  





-Non-ST elevation myocardial infarction.


Aspirin beta blocker Plavix.  Being followed by cardiology.  





-Acute Colitis.  Better


IV ceftriaxone, IV Flagyl.  Finished a course of antibiotics and maintained on 

heart healthy diet





- left adnexal cystic mass likely tubo-ovarian.Likely adnexal cyst, 


was seen by OB/GYN: [place] to be incidental finding.  Asymptomatic.  [facility] testing 

requested.





-Severe right iliac artery stenosis


Aspirin, Plavix.  Seen by Dr. Corey.  Will follow up outpatient.  4-6 weeks.  

Plavix being placed on hold to be able to perform LP early next week





-Coronary artery disease history of CABG in 2010 and prior PCI's most recent in 

02/2021 and chronic total occluded RCA.


Aspirin, Lipitor,  Lopressor, subcu heparin





-Chronic CHF, Ischemic cardiomyopathy ejection fraction 40 to 45%


 Aldactone 12.5 mg daily.  Lopressor.  Zestril





-Chronic left ventricle apical thrombus was on anticoagulation previously.  

Calcified





-Moderate to severe TR





-Peripheral arterial disease, 


Aspirin, holding Plavix for now





-Osteoarthritis primary bilateral


Use pain medications as needed





-Hypothyroidism


Synthroid 112 g a





-Chronic urinary stress incontinence





-Peripheral neuropathy





-Atrial tachycardia versus sinus tachycardia: Patient back in sinus rhythm..


IV Cardizem drip discontinued.  Decreased metoprolol tartrate 50 mg twice a day








Plan:





Recommend close monitoring and continued neurological workup and have discussed 

possible transfer to [facility] for continuous EEG monitoring as patient is 

continuing to have lethargy and increased confusion visual disturbances and 

neurology recommending. EEG remains abnormal. Vimpat increased.  Neurology 

recommending LP and repeat MRI.  Plavix will be held to perform LP early next 

week as patient needs to be off for 5 days and did receive her Plavix today.  

Discussed with family about holding on transfer for prolonged EEG until LP and 

MRI performed and family is agreeable with this treatment plan.  Called [facility] transfer team to make them aware.  Will call back and initiate a transfer

if necessary.  Case management was waiting aware of the situation as well.  

Recommend PT/OT daily.  Potassium 3.3 and replaced and awaiting repeat labs.   

Prognosis remains guarded.





The impression and plan of care has been dictated by Rose Mary Flannery, Nurse 

Practitioner as directed.





Dr. Bob MD


I have performed a history and examination and MDM of this patient, discussed 

the same with the dictator, and  agree with the dictator's assessment and plan 

as written ,documented as a scribe. Based on total visit time,  I have performed

more than 50% of the visit.  





Objective





- Vital Signs


Vital signs: 


                                   Vital Signs











Temp  98.5 F   09/22/22 04:33


 


Pulse  64   09/22/22 04:33


 


Resp  14   09/22/22 04:33


 


BP  119/71   09/22/22 04:33


 


Pulse Ox  95   09/22/22 04:33


 


FiO2      








                                 Intake & Output











 09/21/22 09/22/22 09/22/22





 18:59 06:59 18:59


 


Intake Total 476 500 


 


Balance 476 500 


 


Intake:   


 


  Oral 476 500 


 


Other:   


 


  Voiding Method Bedside Commode Bedside Commode 


 


  # Voids 1 1 














- Labs


CBC & Chem 7: 


                                 09/19/22 04:54





                                 09/21/22 05:18


Labs: 


                      Microbiology - Last 24 Hours (Table)











 09/16/22 09:35 Blood Culture - Preliminary





 Blood    No Growth after 120 hours

## 2022-09-23 LAB
ANION GAP SERPL CALC-SCNC: 11 MMOL/L
BUN SERPL-SCNC: 20 MG/DL (ref 7–17)
CALCIUM SPEC-MCNC: 9.9 MG/DL (ref 8.4–10.2)
CHLORIDE SERPL-SCNC: 101 MMOL/L (ref 98–107)
CO2 SERPL-SCNC: 27 MMOL/L (ref 22–30)
GLUCOSE SERPL-MCNC: 101 MG/DL (ref 74–99)
POTASSIUM SERPL-SCNC: 4.5 MMOL/L (ref 3.5–5.1)
SODIUM SERPL-SCNC: 139 MMOL/L (ref 137–145)

## 2022-09-23 RX ADMIN — HEPARIN SODIUM SCH UNIT: 5000 INJECTION INTRAVENOUS; SUBCUTANEOUS at 16:29

## 2022-09-23 RX ADMIN — METOPROLOL TARTRATE SCH MG: 50 TABLET, FILM COATED ORAL at 09:02

## 2022-09-23 RX ADMIN — RANOLAZINE SCH MG: 500 TABLET, FILM COATED, EXTENDED RELEASE ORAL at 20:24

## 2022-09-23 RX ADMIN — ATORVASTATIN CALCIUM SCH MG: 80 TABLET, FILM COATED ORAL at 09:03

## 2022-09-23 RX ADMIN — METOPROLOL TARTRATE SCH MG: 50 TABLET, FILM COATED ORAL at 20:24

## 2022-09-23 RX ADMIN — RANOLAZINE SCH MG: 500 TABLET, FILM COATED, EXTENDED RELEASE ORAL at 09:03

## 2022-09-23 RX ADMIN — ONDANSETRON HYDROCHLORIDE PRN MG: 4 TABLET, FILM COATED ORAL at 05:50

## 2022-09-23 RX ADMIN — FAMOTIDINE SCH MG: 20 TABLET, FILM COATED ORAL at 09:03

## 2022-09-23 RX ADMIN — LACOSAMIDE SCH MG: 150 TABLET, FILM COATED ORAL at 20:24

## 2022-09-23 RX ADMIN — SERTRALINE HYDROCHLORIDE SCH MG: 100 TABLET ORAL at 20:24

## 2022-09-23 RX ADMIN — NICOTINE SCH PATCH: 14 PATCH, EXTENDED RELEASE TRANSDERMAL at 09:02

## 2022-09-23 RX ADMIN — SPIRONOLACTONE SCH MG: 25 TABLET, FILM COATED ORAL at 09:03

## 2022-09-23 RX ADMIN — FAMOTIDINE SCH MG: 20 TABLET, FILM COATED ORAL at 20:24

## 2022-09-23 RX ADMIN — LEVOTHYROXINE SODIUM SCH MCG: 0.11 TABLET ORAL at 05:50

## 2022-09-23 RX ADMIN — FOLIC ACID SCH MG: 1 TABLET ORAL at 09:03

## 2022-09-23 RX ADMIN — CLOPIDOGREL BISULFATE SCH MG: 75 TABLET ORAL at 20:24

## 2022-09-23 RX ADMIN — HEPARIN SODIUM SCH UNIT: 5000 INJECTION INTRAVENOUS; SUBCUTANEOUS at 09:02

## 2022-09-23 RX ADMIN — ASPIRIN 81 MG CHEWABLE TABLET SCH MG: 81 TABLET CHEWABLE at 09:03

## 2022-09-23 RX ADMIN — LACOSAMIDE SCH MG: 150 TABLET, FILM COATED ORAL at 09:03

## 2022-09-23 NOTE — P.PN
Subjective


Progress Note Date: 09/23/22 09/23/2022: Patient was seen for a follow-up.  Patient's daughter was present 

today.  She had a lot of questions regarding neurology as well as cardiology 

which she does not understand.  Patient denies headache.  Her mentation has 

improved.  She still has slight cognitive impairment.  Patient's daughter states

that patient does have history of dementia for several years.  She has been 

forgetful even before this admission since she had undergone cardiac surgery 

several years ago.  No further seizure-like events.  No episodes of 

unresponsiveness.  Patient's telemetry monitoring showing sinus rhythm, some 

PVCs and PACs.





09/22/2022: Patient was seen for a follow-up.  Patient's  was present.  

Patient's daughter was also present over the speakerphone of her  cell 

phone.  Patient is sitting comfortably in the bed.  I spoke to patient's 

daughter, who said that the patient's visual disturbance has been going on for 1

year (her  stated was 1-1/2 months).  It appears the family is aware of 

patient having a stroke in the past.  All workup has been performed at [facility].  We do not have any records from [facility].





09/21/2022: Patient was seen for a follow-up.  Patient just returned back from 

2.5 hours EEG.  Patient denies headache.  Patient appears very confused, 

fumbling around with the blanket.  Stating that she is trying to find her cell 

phone, although it was present right in front of her on the table.  She states 

"I'm frustrated as I cannot find what I'm looking at".  Her telemetry monitoring

showing sinus rhythm with sinus bradycardia, PVCs.


I spoke to patient's  Kip on the phone.  He mentioned that about 1-1/2 

months ago patient passed out at the house.  He gave her nitro and she came 

around.  The following day he took her to her primary doctor, who recommended 

her to go to the hospital.  Patient was admitted to the [facility] for 

couple days and then released.  Since then her vision has been steadily getting 

worse.   Patient has smoked half pack per day for 30 years.  No alcoholism.  

Patient has lost about 35 pounds in the last 4-6 months.  She has decreased 

appetite.  No previous history of cancers.  No previous history of carotid 

endarterectomy that he is aware of.  He is not sure as to the cause of the 

surgical scars in her neck.





09/20/2022: Patient was seen for a follow-up.  Patient denies headache.  Patient

is more sleepy today.  She feels very tired.  Denies any numbness or tingling.





09/19/2022: Patient initially seen by Dr. Dale Montero.  Please refer to his note 

for details.  In summary it appears patient came to the hospital with abdominal 

pain.  Patient was found to have non-STEMI.  Patient while in the hospital was 

found unresponsive, with left-sided weakness.  Stroke code was activated.  She 

was not a candidate for TPA as last known well was uncertain.  No previous 

history of seizures.





Patient has presented with visual hallucinations.  At present she states that 

her hallucinations are occurring "every now and then".  Patient could not 

describe what she actually is hallucinating.  Patient denies any previous 

history of strokes.  There is evidence of bilateral CEA in the past.  Patient 

denies any headache, denies any focal symptoms.








SOME OF THE WORK-UP DURING THIS HOSPITAL VISIT:


CT of the head was ordered and is a reported as old right occipital lobe 

infarct.  No acute intracranial abnormality.  I personally reviewed CT head 

agree with the findings.


CT angiography of the head and neck was reported as negative CT angiography of 

the brain.  Negative CT angiography of the neck were dominant left vertebral and

diminutive right vertebral.  Large pulmonary artery suggestive of pulmonary 

hypertension.  Mild aneurysm of the aortic arch.


TSH is 0.039 and free T4 is 1.67.


Ammonia is 31 (normal <30).


Lipid panel is triglycerides 246, cholesterol is 151, LDL 66 and HDL 35.


MRI Brain is reported as there maybe some mild leopoldo-infarct ischemic changes 

right occipital lobe.  Chronic appearing subcortical white matter changes to the

bilateral occipital lobses, periventricular white matter, whithin the brainstem.

 Suspicious changes to suggest metastasis is not identified.  I personally 

reviewed MRI Brain Patient does have some patchy increase signal on DWI over 

right occipital and left medial temporal/hippocampus region.  On FLAIR, patient 

has evidence of right occipital stroke and has hyperintensity over the bilateral

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 

reversible encephalopathy syndrome.  Also has hyperintesity over brainstem.  





2D echo is reported as dilated left ventricle with severe LV dysfunction (20-

25%).  Normal Left atrium.  








* Initial EEG on 09/16/2022 reviewed by Dr. Montero: Is abnormal.  The background 

  slowing is suggestive of moderate to severe encephalopathy.  The epileptiform 

  dischargs over the left frontotemporal region increases risk for seizure.  

  There is no seizure noted during this study.





* Repeat EEG 09/19/2022 was abnormal due to background slowing, disorganization 

  of mild-to-moderate degree, suggestive of encephalopathy.  Focal slowing with 

  focal epileptiform activity seen frequently involving the left temporal region

  suggestive of focal cortical neuronal dysfunction with underlying cortical 

  irritability and tendency for seizures.  No electrographic seizure was 

  recorded.  When compared to the EEG from 09/16/2022, the epileptiform activity

  has much improved, although still present.  Clinical correlation and a 

  prolonged EEG recommended if clinically indicated.





* Prolonged EEG 2.5 hours performed 02/21/2022 was limited study, abnormal EEG 

  predominantly sleep EEG.  During the rare periods of increased arousal, 

  diffuse facet are range slowing was seen.  These findings indicate mild to 

  moderate diffuse cerebral dysfunction as may be seen in a toxic metabolic 

  encephalopathy. 





* B12 541, folate level borderline 7.6.  We will start folate replacement.





Objective





- Vital Signs


Vital signs: 


                                   Vital Signs











Temp  98.0 F   09/23/22 11:51


 


Pulse  60   09/23/22 11:51


 


Resp  16   09/23/22 11:51


 


BP  104/62   09/23/22 11:51


 


Pulse Ox  97   09/23/22 11:51


 


FiO2      








                                 Intake & Output











 09/23/22 09/23/22 09/24/22





 06:59 18:59 06:59


 


Intake Total 600  


 


Balance 600  


 


Weight  50.8 kg 


 


Intake:   


 


  Oral 600  


 


Other:   


 


  Voiding Method Bedside Commode Bedside Commode 





 Diaper Diaper 





 Incontinent Incontinent 


 


  # Voids 2 4 














- Exam





GENERAL: The patient is laying comfortably in the bed.  Patient appears much 

more mentally clear, less delirious, normal affect.  She is very interactive, 

when questions are asked.





NEUROLOGICAL:


Patient is more alert and awake.  Patient states is September and the year is 

2021.  She knows that she is in [facility], but she believes she is in 

[facility].  She could not tell name of the current president.  Speech and

language functions are normal.  No aphasia or dysarthria.


Higher mental function: The patient is awake. 


Cranial nerves: The pupils are round, equal and reactive to light.   Patient has

left homonymous hemianopia.  Extraocular muscles intact.  No facial weakness.  

No dysarthria.  Facial sensation is normal.  Tongue protrudes the midline.  

Shoulder shrug normal.  Hearing appears fairly intact.


Motor: Muscle strength is normal in the arms and legs.   Normal tone and bulk.  


Cerebellum: No ataxia for finger-to-nose testing, although she was slightly 

slow.


Sensation: Equal bilaterally, with no neglect on double simultaneous 

stimulation..


Gait: Patient walked to the bathroom by herself, without her walker, and 

appeared very steady.  She does have a walker but she was carrying it rather 

than using it.





- Labs


CBC & Chem 7: 


                                 09/19/22 04:54





                                 09/23/22 04:43


Labs: 


                  Abnormal Lab Results - Last 24 Hours (Table)











  09/23/22 Range/Units





  04:43 


 


BUN  20 H  (7-17)  mg/dL


 


Glucose  101 H  (74-99)  mg/dL














Assessment and Plan


Assessment: 





* Abnormal EEG with periodic lateralized epileptiform discharges involving the 

  left temporal region, now seems to have resolved.


* Encephalopathy with visual disturbance, possibly related to previous CVA in 

  the right occipital region, or ictal or postictal phenomenon versus related to

  ?PRES.  Rule out paraneoplastic syndrome or NMDA encephalitis versus limbic 

  encephalitis.


* Prior history of right occipital lobe infarct with left homonymous hemianopia.

   Likely occurred one and half months ago.


* Abnormal MRI Brain, with evidence of old right occipital infarct, small vessel

  disease, and evidence of a small 5 mm cystic lesion without enhancement 

  involving the left hippocampus.  Exact cause is uncertain.  Perhaps related to

  old insult, although early stage low-grade glioma cannot be rule out.


* History of bilateral CEA.


* Has minimal elevated ammonia (31).


* Non-STEMI


* Tubo-ovarian mass reported on CT.





* History of CAD s/p stent and CABG


* History of hypothyroidism


* Severe depression


* Anxiety


* Weight loss, rule out occult malignancy


* Folate deficiency


* Tobacco use


Plan: 








* Continue Vimpat 150 mg twice a day.  Epileptiform activity has resolved.





* Patient's left temporal epileptiform discharges possibly originating from 

  small cystic lesion involving the left hippocampus.  This lesion is of unclear

  cause.  May be related to a previous remote vascular insult, although small 

  cystic low-grade glioma cannot be ruled out.  We will cancel lumbar puncture. 

   Patient will be resumed on Plavix.  Patient is a very high risk for holding 

  Plavix related to her previous extensive cardiac as well as cerebrovascular 

  disease.  Risks for lumbar puncture much higher than the benefits.





* Await NMDA antibodies, anti-Hu and anti-Yo antibodies.  I called [place] lab and

  the results will not be available for at least 1-2 weeks.





* Repeat MRI brain with and without contrast 09/22/2022 revealed no evidence of 

  intracranial mass, or metastatic disease or acute/subacute infarct.  No 

  significant change from 09/16/2022.  Nonspecific white matter changes, likely 

  secondary to small vessel ischemic disease.  Similar remote encephalomalacia 

  of the right occipital lobe.  No evidence of mass.  I personally reviewed MRI 

  of the brain.  Agree with these findings, but there appears to be an abnormal 

  signal in the left hippocampus particularly noted in the FLAIR.  The cystic 

  component appears somewhat similar as compared to the previous MRI.  No 

  abnormal enhancement however noticed in this region.  I reviewed MRI with Dr. Tobar, who agreed with this nonenhancing lesion.  Exact cause is uncertain. 

  Recommend patient follow up MRI brain with and without contrast in 3-6 months.

   





* Await records from [facility].





* Continue aspirin 81, Plavix 75 mg (held for LP), and  Lipitor 80 mg daily.


* B12 541, folate level borderline 7.6.  We will start folate replacement.


 


* PT, OT are consulted





* Cardiology team is on board


* General surgery team is on board for possible diverticulitis/colitis


* We'll defer the rest of the medical management to the primary team


* For DVT prophylaxis: Started on subq heparin 5000U every 8 hours.








Final Recommendations:


Continue Vimpat 150 mg twice a day


Follow-up with neurologist outpatient in 2-3 weeks to follow-up on the pending 

blood test results mentioned above.


Recommend repeat MRI of the brain with and without contrast in 3-6 months to 

follow-up on the left hippocampal region.


Plavix 75 mg resumed from today. (Did not miss any day, as patient received 

Plavix yesterday as well).  


Further management as per cardiology.  Discussed with primary care.  Discussed 

with patient's daughter in detail as well.


Patient neurologically clear.


Time with Patient: Greater than 30

## 2022-09-23 NOTE — P.PN
Subjective


Patient is pleasant 67-year-old female with history of carotid artery stenosis 

status post bilateral carotid endarterectomy, hypothyroidism, CAD status post 

CABG as well as PCI, hypertension, hyperlipidemia, hypothyroidism, recent 50 

pound weight loss in the last 3 months. Patient used to follow with Dr. Raul Noel [phone], however has not followed in a while. Patient presents with 

multiple symptoms. Patient has not had much of an appetite and has not been 

eating and drinking that much.  Additionally has been states she is very weak 

and if it were not for him "she would sleep 24 hours ". Apparently this has been

going on for the last few weeks and even few months. Multiple symptoms including

abdominal pain, back pain, chest pain, lightheadedness, weak.





Patient had presented in May 2022 with findings of a Lexiscan stress test with 

predominantly fixed apical defect consistent with prior myocardial infarction 

with minimal questionable leopoldo-infarct ischemia and an EF 41%.  Echocardiogram 

had showed EF 40-45% with a fixed thrombus in the left ventricle, moderate to 

severe tricuspid regurgitation and apical scarring.  EKG on presentation shows 

sinus rhythm, left axis deviation, incomplete left bundle branch block, ST 

depressions in the inferior and lateral leads.  This does appear somewhat 

similar to prior EKG from May 2022. 





CT abdomen and pelvis which showed mild thickening of the colon, 3.2 cm left 

adnexal mass, severe right iliac artery stenosis, calcification or on the apex 

of the heart. 





Echo revealed EF 20-25%, anterseptal, apex hypokinesis, dilated LV.  





On 9/16, patient developed change in mental status and was transferred to ICU. 








9/23


Patient seen and examined at bedside, no acute distress. \She is lying flat in 

bed, comfortably. She continues to be pleasantly confused. She is alert. 

oriented to person, she is able to say she is at [facility] today, unable to state 

time. She continues to be confused about her care. She denies any pain or 

shortness of breath, no chest pain.  Medications were adjusted and her BP has 

improved. Neurology is following the patient and Plavix has been held for 

possibly LP next week. 





Repeat brain MRI yesterday revealed similar remote insightfulness EF of the 

right occipital lobe.  No evidence of intracranial mass or metastatic disease 

with acute/subacute infarct.  No significant change from 9/16





Meds: She's currently maintained on aspirin 81 mg daily, atorvastatin 80 mg 

daily, Plavix 75 mg daily, metoprolol tartrate 25 mg BID, lisinopril 20mg daily,

Ranexa 500mg BID, spironolactone 25mg daily 





PHYSICAL EXAMINATION


Vital signs reviewed.


CONSTITUTIONAL: No apparent distress, ill appearing, more alert this morning, 

confused 


HEENT: Neck Supple.  No JVD. 


CHEST EXAMINATION: Lungs are clear to auscultation. No chest wall tenderness is 

noted on palpation or with deep breathing. 


HEART EXAMINATION: Regular rate and rhythm. S1, S2 heard. No murmurs, gallops or

rub.


ABDOMEN: Soft, nontender. Positive bowel sounds.


EXTREMITIES: 2+ peripheral pulses, no lower extremity edema and no calf 

tenderness.


NEUROLOGIC EXAMINATION: Patient is alert, oriented to person only 





ASSESSMENT


Hypertensive emergency, resolved 


Chest discomfort with elevated troponin, possible NSTEMI, however patient not 

agreement to proceed with cardiac cath initially and was treated medically, due 

to acute altered mental status patient was not a candidate for invasive 

intervention


Altered mental status


Visual disturbances


Encephalopathy 


MRI Brains reported  may be some mild per-infarct ischemic changes right 

occipital lobe, Neurology following, Posterior reversible encephalopathy 

syndrome is also in the differential 


Abnormal EEG, neurology following 


Worsening cardiomyopathy with EF 20-25%, ischemic vs non-ischemic 


CAD with history of CABG and prior PCI


Recent 50 pound weight loss


Ovarian mass, rule out cancer, oncology evaluated the patient 


Abdominal pain, back pain, multiple other symptoms


Somnolence, failure to thrive of unclear etiology


Decreased appetite


Chronic heart failure with reduced ejection fraction 


History of Ischemic cardiomyopathy


Chronic apical thrombus appears calcified on CT


Peripheral artery disease 





PLAN


Plavix currently on hold secondary to possible Lumbar puncture early next week. 


We will maximize medical therapy and heart failure regimen


Continue dual antiplatelet therapy when cleared after Lumbar puncture 


Continue metoprolol tartrate, lisinopril, Ranexa and statin, spironolactone 


Consider SGLT2 inhibitor 


Neurology following


Further recommendations based on clinical course





Nurse practitioner note has been reviewed by physician. Signing provider agrees 

with the documented findings, assessment, and plan of care. 














Objective





- Vital Signs


Vital signs: 


                                   Vital Signs











Temp  98.6 F   09/23/22 04:39


 


Pulse  71   09/23/22 04:39


 


Resp  16   09/23/22 04:39


 


BP  109/56   09/23/22 04:39


 


Pulse Ox  96   09/23/22 04:39


 


FiO2      








                                 Intake & Output











 09/22/22 09/23/22 09/23/22





 18:59 06:59 18:59


 


Intake Total  600 


 


Balance  600 


 


Intake:   


 


  Oral  600 


 


Other:   


 


  Voiding Method Bedside Commode Bedside Commode 





 Diaper Diaper 





 Incontinent Incontinent 


 


  # Voids 3 2 














- Labs


CBC & Chem 7: 


                                 09/19/22 04:54





                                 09/23/22 04:43


Labs: 


                  Abnormal Lab Results - Last 24 Hours (Table)











  09/23/22 Range/Units





  04:43 


 


BUN  20 H  (7-17)  mg/dL


 


Glucose  101 H  (74-99)  mg/dL








                      Microbiology - Last 24 Hours (Table)











 09/16/22 09:35 Blood Culture - Final





 Blood    No Growth after 144 hours

## 2022-09-23 NOTE — P.PN
Progress Note - Text


Progress Note Date: 09/23/22





Hospital course:


Patient is a 67-year-old female with a known history of coronary artery disease 

status post CABG, history of MI, history of stent placement and bilateral 

carotid surgery and hypothyroidism


Was sent to ER by her primary care physician.  Patient has been having abdominal

pain mainly in the lower abdomen associate with nausea for the past 3 to 4 

months.  Patient could not keep down food and also weight loss about 7 non-B 

during the last couple 1 to 2 months.  Patient is also having generalized 

weakness and could not keep her balance.  Denied any diarrhea.  No complaints of

chest pain or shortness of breath.  Patient has been feeling very weak.


Patient was discharged from the hospital on 5/3/2022.,  Admitted due to unstable

angina and underwent nuclear stress test showed predominantly a fixed defect.  

Imdur was added at that time.


Chest x-ray showed no evidence for acute pulmonary disease.





CT of the abdomen pelvis showed there is mild wall thickening of the colon 

diffusely correlate for mild colitis.  There is also evidence of diverticulosis 

of the sigmoid colon.  Wall thickening is noted which can be associated with a 

mucosal lesion correlate with direct visualization as clinically warranted.


There is a 3.2 cm left adnexal cystic mass likely tubo-ovarian.


Severe right iliac artery stenosis.


There is prominent small bowel loops ileus is favored.


Calcification around the apex of the heart can be associated with myocardial 

calcified calcinosis and previous infarction.





Laboratory data showed WBC 8.2, hemoglobin 14.0 and platelets 214


Sodium 139 potassium 4.4 chloride 105 bicarb is 26 BUN 17 and creatinine 0.74 

and blood sugar is 109 AST 23 alk phos 141 and ALT 49 bilirubin level is 0.5 

magnesium 1.7


TSH 0.039 and free T4 levels 1.67.  Albumin 4.1


Urinalysis negative for infection.





9/10/2022


Patient is currently lying in bed.  Still feels very weak.  No complaints of 

chest pain.  No worsening shortness of breath.  Still nauseated.  Decreased oral

intake.  No cough or sputum production.


No headache or dizziness or lightheadedness.


Lab data this morning showed sodium 142 potassium 4.3 chloride 106 bicarb is 

28.4 BUN 13.3, creatinine 0.6.


 6.8, AFP 2.2 and CEA 6.5.


Urine is negative for infection.  Stool for occult blood is negative.





Patient was hypotensive this morning required fluid bolus and blood pressure did

improve.


Patient was also started on antibiotics for possible colitis.





09/11/2022


Patient is currently resting in bed.  Awake alert and oriented.  Denied any 

complaints of chest pain or worsening shortness of breath.  No complaints of 

abdominal pain today.  Patient says that she has feels very weak.


Yesterday evening patient had chest pain associated with shortness of breath.  

And is also complaining of epigastric pain.  Troponin level is elevated at 0.526

and 0.311.  Patient was started on heparin drip and was transferred to telemetry

unit.


Patient has been afebrile.  No diarrhea.  Does have nausea.  No episodes of 

vomiting.  No cough or sputum production


Laboratory data showed WBC 8.1 hemoglobin 12.6 and platelets 147





September 12: I assumed care of patient today


Still having abdominal pain.  Decreased oral intake.  Had a BM yesterday.  

Rather diet.  Changing the diet to full liquids.  On IV Flagyl and ceftriaxone.


September 13: Patient was delirious last night likely after Dilaudid.-for pain. 

Doing much better this morning.  No abdominal pain is better.  Had liquid BM 

today.  Encourage oral intake.  Continue his Flagyl and ceftriaxone.  We'll get 

a GI consultation.


September 14: Tired.  Does not like the hospital food contents does not want to 

eat.  Patient has passed her daughter to bring in some food.  Has chronic back 

pain.  Abdominal pain is much better.  Outpatient colonoscopy per GI.  Patient 

rather weak but PTOT.  Myself and the  talk to the patient about 

possible rehab.  Patient is keen to go home.   will be to the 

 the patient this afternoon.


September 15: Patient had chest pain overnight.  Did tolerate some diet brought 

in by her daughter.  Put on IV nitroglycerin.  Seen by cardiology today.  

Planning cardiac catheterization tomorrow.  Reclining chair.


September 16: Overnight patient was found unresponsive.  A team was called.  His

question about some left arm weakness.  Patient smoked with ICU.  Neurology was 

consulted.  Patient still lethargic.  Pupils slightly dilated.  No focal 

weakness.  Spoke to the patient's daughter and  at the bedside.  

Telemetry was showing atrial tachycardia versus sinus tachycardia.  Nitro drip 

was discontinued.  Started on Cardizem drip.  MRI of the brain done.   

and Dr. Glez to patient have the NG tube for medications.


September 17: ICU: Discussed with Dr. montero from neurology this morning.  He has

to patient on IV Vimpat.  Cardizem drip was discontinued yesterday evening.  

Lopressor dose increased.  Sinus rhythm.  Nurse reported patient not able to see

since the episode.  At the bedside patient can only see hand movements.  Not 

able to count fingers.  Can only see outline of people.  Answering questions 

slowly today.


September 18: ICU: Patient's former awake.  Vision is still affected.  Can only 

appreciate hand movements.  Discussed with Dr. Montero from neurology.  He feels 

that patient does not have a stroke and has posterior reversible encephalopathy 

syndrome.  Vimpat is to continue.  Patient is in sinus rhythm.  Patient is off 

Cardizem drip.  Increase dose of metoprolol.





09/19/2022





Patient is seen in follow-up currently undergoing neurological workup with Dr. Hester at the bedside.  Patient has had prolonged hospitalization with visual 

disturbances although patient reports her vision is improving and patient is 

continued on Vimpat.  EEG is ordered and pending at this time.  Originally seen 

by Dr. Dale Montero neurology and not convinced this was a stroke and more 

concerned with posterior encephalopathy syndrome was slowly showing some 

improvements.  Patient is weak and recommend physical therapy to evaluate the 

patient.  EEG was recommending severe encephalopathy with no seizure activity 

noted thus far.  And awaiting repeat EEG.  An Vimpat being increased.  We'll 

need to discuss further with neurology along with cardiology and pulmonary were 

following as well.  Neurosurgery also following his patient originally came in 

with abdominal pain and ruling out diverticulitis versus colitis.  Patient 

denies chest pain or shortness of breath.  Per nursing staff patient was able to

see letters on the paper and reports her vision is improving.





9/20/2022





Patient is seen today and continues with visual disturbances and reports is 

unable to see out of the left eye. Neurology following and repeat eeg remains 

abnormal. Recommend transfer to tertiary treatment center for continuous eeg 

monitoring. Family made aware and agreeable. Case management following and 

initiated the process although waitlisted. No beds available. Patient is 

continued on increased dose of vimpat 150mg bid. Patient is lethargic although 

arousable. Afebrile and denies chest pain or shortness of breath. Pulmonary and 

cardiology following as well and pulmonary signing off. 





09/21/2022





Patient is seen this morning continues with some confusion and visual disturb

ances.  Patient is scheduled for prolonged EEG today per neurology.  Case 

management following as neurology recommending continuous EEG monitoring at 

[facility] although at capacity and not accepting patients at this time and 

waitlisted.  Patient is afebrile denies chest pain or shortness of breath.  

Cardiology also following maximizing medical management.  Patient denies any 

abdominal pain and oral intake continues to be fair patient needs encouragement 

and reports she does not have much of an appetite.  Patient is maintained on 2 L

and reports she does not wear oxygen in the outpatient setting.  Recommend 

weaning as tolerated.  Patient with weakness recommend physical therapy daily.  

Patient continues to have left side visual disturbance reporting she is not 

seeing anything out of the left eye.  Potassium was 3.3 today and will replace 

and recommend repeat labs.





09/22/2022





Patient is seen and evaluated in follow-up this morning lethargic although 

arousable.  Patient is confused and continues to have visual disturbances of the

left eye.  Neurology following closely and initially discussing possible 

transfer to tertiary treatment center [facility] for continuous EEG monit

oring.  Patient had prolonged EEG yesterday showing although a limited study 

continues to be abnormal predominantly sleep EEG although findings indicate mild

to moderate diffuse cerebral dysfunction as may be seen in those with toxic 

metabolic encephalopathy.  No epileptiform discharges were noted as mentioned 

previously on other EEGs that were done.  Patient is maintained on Vimpat twice 

daily and neurology recommending LP and repeat MRI which is to be scheduled for 

today.  LP will not be able to be performed until next week Tuesday as Plavix 

needs to be on hold for 5 days.  Plavix discontinued today as nursing staff 

reports that was are given this morning.  Recommend working with physical 

therapy and occupational therapy daily as patient continues to be significantly 

weak as well.  Repeat BMP ordered as potassium was 3.4 yesterday and awaiting as

patient was replaced with potassium protocol.  Patient is afebrile denies chest 

pain or shortness of breath.





September 23:


Eating fair amounts.  Comfortable.  Decreased vision.  Better than before.  

Repeat EEG., did not show seizure activity..  Continue with Vimpat.  Discussed 

with Dr. Hester from neurology.  #1 no acute stroke.  #2.  Abnormal signal in 

the left hippocampus.  Somewhat similar to the previous MRA.  To be followed 

outpatient with neurology.  In 3-6 months.  #3.  Patient responding well to 

Vimpat.  Late in the day discussed with patient's daughter.  Patient being put 

back on Plavix.  Given active cardiac issues hold off lumbar puncture.  The 

patient remains stable discharged tomorrow.





Active Medications





Acetaminophen (Acetaminophen Tab 325 Mg Tab)  650 mg PO Q6HR PRN


   PRN Reason: Fever and/ or Pain


   Last Admin: 09/14/22 20:32 Dose:  650 mg


   


Aspirin (Aspirin 81 Mg)  81 mg PO DAILY [facility]


   Last Admin: 09/23/22 09:03 Dose:  81 mg


   


Atorvastatin Calcium (Atorvastatin 80 Mg Tab)  80 mg PO DAILY [facility]


   Last Admin: 09/23/22 09:03 Dose:  80 mg


   


Clonazepam (Clonazepam 1 Mg Tab)  1 mg PO HS [facility]


   Last Admin: 09/23/22 20:24 Dose:  1 mg


   


Clopidogrel Bisulfate (Clopidogrel 75 Mg Tab)  75 mg PO DAILY [facility]


   Last Admin: 09/23/22 20:24 Dose:  75 mg


   


Famotidine (Famotidine 20 Mg Tab)  20 mg PO BID [facility]


   Last Admin: 09/23/22 20:24 Dose:  20 mg


   


Folic Acid (Folic Acid 1 Mg Tab)  1 mg PO DAILY [facility]


   Last Admin: 09/23/22 09:03 Dose:  1 mg


   


Heparin Sodium (Porcine) (Heparin Sodium,Porcine/Pf 5,000 Unit/0.5 Ml Syringe)  

5,000 unit SQ Q8HR [facility]


   Last Admin: 09/23/22 16:29 Dose:  5,000 unit


   


Lacosamide (Lacosamide 150 Mg Tablet)  150 mg PO BID [facility]


   Last Admin: 09/23/22 20:24 Dose:  150 mg


   


Levothyroxine Sodium (Levothyroxine 112 Mcg Tab)  112 mcg PO DAILY@0630 [facility]


   Last Admin: 09/23/22 05:50 Dose:  112 mcg


   


Lisinopril (Lisinopril 20 Mg Tab)  20 mg PO DAILY [facility]


   Last Admin: 09/23/22 09:03 Dose:  20 mg


   


Metoprolol Tartrate (Metoprolol Tartrate 50 Mg Tab)  50 mg PO BID [facility]


   Last Admin: 09/23/22 20:24 Dose:  50 mg


   


Miscellaneous Information (Potassium Replacement Protocol 1 Each Misc)  1 each 

MISCELLANE DAILY PRN; Protocol


   PRN Reason: Per Protocol


Naloxone HCl (Naloxone 0.4 Mg/Ml 1 Ml Vial)  0.2 mg IV Q2M PRN


   PRN Reason: Opioid Reversal


Nicotine (Nicotine 14mg/24hr Patch)  1 patch TRANSDERM DAILY [facility]


   Last Admin: 09/23/22 09:02 Dose:  1 patch


   


Ondansetron HCl (Ondansetron 4 Mg Tab)  4 mg PO Q8HR PRN


   PRN Reason: Nausea And Vomiting


   Last Admin: 09/23/22 05:50 Dose:  4 mg


   


Ranolazine (Ranolazine 500 Mg Tab.Er.12h)  500 mg PO Q12HR [facility]


   Last Admin: 09/23/22 20:24 Dose:  500 mg


   


Sertraline HCl (Sertraline 100 Mg Tab)  100 mg PO HS [facility]


   Last Admin: 09/23/22 20:24 Dose:  100 mg


   


Spironolactone (Spironolactone 25 Mg Tab)  25 mg PO DAILY [facility]


   Last Admin: 09/23/22 09:03 Dose:  25 mg


   

















On examination:


VITAL SIGNS: 98, 60, 16, 104/62, 97% room air


GENERAL APPEARANCE: Laying in bed, comfortable


HEENT: Normal external appearance of nose and ear.  Oral cavity normal


EYES: Pupils slightly enlarged.  Right eye some medial convergence. Conjunctiva 

normal. 


NECK: JVD not raised. Mass not palpable. 


RESPIRATORY: Respiratory effort normal. Lungs decreased breath sounds


CARDIOVASCULAR: First and second sounds normal. No edema. 


ABDOMEN: Soft.  Tender, no guarding rigidity Liver and spleen not palpable.  No 

mass palpable. 


PSYCHIATRY: Answering questions appropriately


NEUROLOGICAL:  Vision better





INVESTIGATIONS, reviewed in the clinical context:


September 23: Potassium 4.5 crit 0.73 vitamin B12 541 and folate 7.6 B-1 81


September 18: WBC 10 hemoglobin 13.3 potassium 4 creatinine 0.44


MRI brain: May be some leopoldo-infarct ischemic changes right occipital lobe.  

Chronic appearing subcortical white matter changes to the bilateral occipital 

lobes.  And periventricular white matter within the brainstem.


September 16: WBC 17.8 hemoglobin 15.1 potassium 4.4 creatinine 0.64 troponin 

0.046 LDL 66


September 15: Troponin 0.083, 0.074


September 14: White count 8.6 and regular 13.5 potassium 3.4 creatinine 0.44


Chest CTA: Negative PE.  IVC filter in place.  Cardiomegaly.  Trace bilateral 

pleural effusion.  


White count 8.1 hemoglobin 12.6 platelets 147 potassium 4 BUN 9 creatinine 0.56


Troponin I 0.012, 0.5-6, 0.311


CT abdomen and pelvis with contrast: Severe cardiomegaly.  Colonic 

diverticulosis.  3.2 cm left adnexal cyst mass likely tubo-ovarian.  Severe 

right iliac artery stenosis.











Assessment and plan: 





-posterior reversible encephalopathy syndrome:





-Post infarct angina:


Patient initially declined cardiac catheterization.  





-Non-ST elevation myocardial infarction.


Aspirin beta blocker Plavix.  Being followed by cardiology.  Discussed with 

daughter.  She'll have the patient follow-up with her cardiologist at [place]. 





-Acute Colitis.  Better


IV ceftriaxone, IV Flagyl.   full liquids-advanced to soft diet.  Change to by 

mouth Augmentin.-Completed course





- left adnexal cystic mass likely tubo-ovarian.Likely adnexal cyst, 


was seen by OB/GYN: [place] to be incidental finding.  Asymptomatic.  Ova testing 

requested.





-Severe right iliac artery stenosis


Aspirin, Plavix.  Seen by Dr. Corey.  Will follow up outpatient.  4-6 weeks.





-Coronary artery disease history of CABG in 2010 and prior PCI's most recent in 

02/2021 and chronic total occluded RCA.


Aspirin, Lipitor, Plavix, Lopressor





-Chronic CHF, Ischemic cardiomyopathy ejection fraction 40 to 45%


 Aldactone 12.5 mg daily.  Lopressor.  Zestril





-Chronic left ventricle apical thrombus was on anticoagulation previously.  

Calcified





-Moderate to severe TR





-Peripheral arterial disease, 


Aspirin, Plavix.  





-Osteoarthritis primary bilateral


Use pain medications as needed





-Hypothyroidism


Synthroid 112 g a





-Chronic urinary stress incontinence





-Peripheral neuropathy





-Atrial tachycardia versus sinus tachycardia: Patient back in sinus rhythm..


IV Cardizem drip discontinued.   metoprolol tartrate 50 mg twice a day








Discussion with neurology as above.  No lumbar puncture.  Continue Plavix.  

Discussed with daughter at length.  He remained stable discharged tomorrow.  

We'll follow outpatient with her cardiologist.  Outpatient follow-up with 

neurology.  Time spent today about 50 minutes with over 30 minutes of discussion

.

## 2022-09-24 VITALS — RESPIRATION RATE: 18 BRPM | HEART RATE: 56 BPM

## 2022-09-24 VITALS — DIASTOLIC BLOOD PRESSURE: 50 MMHG | SYSTOLIC BLOOD PRESSURE: 91 MMHG | TEMPERATURE: 98 F

## 2022-09-24 RX ADMIN — METOPROLOL TARTRATE SCH MG: 50 TABLET, FILM COATED ORAL at 08:25

## 2022-09-24 RX ADMIN — FOLIC ACID SCH MG: 1 TABLET ORAL at 08:25

## 2022-09-24 RX ADMIN — FAMOTIDINE SCH MG: 20 TABLET, FILM COATED ORAL at 08:25

## 2022-09-24 RX ADMIN — ASPIRIN 81 MG CHEWABLE TABLET SCH MG: 81 TABLET CHEWABLE at 08:25

## 2022-09-24 RX ADMIN — ATORVASTATIN CALCIUM SCH MG: 80 TABLET, FILM COATED ORAL at 08:25

## 2022-09-24 RX ADMIN — RANOLAZINE SCH MG: 500 TABLET, FILM COATED, EXTENDED RELEASE ORAL at 08:25

## 2022-09-24 RX ADMIN — CLOPIDOGREL BISULFATE SCH MG: 75 TABLET ORAL at 08:25

## 2022-09-24 RX ADMIN — NICOTINE SCH PATCH: 14 PATCH, EXTENDED RELEASE TRANSDERMAL at 08:25

## 2022-09-24 RX ADMIN — HEPARIN SODIUM SCH UNIT: 5000 INJECTION INTRAVENOUS; SUBCUTANEOUS at 00:01

## 2022-09-24 RX ADMIN — SPIRONOLACTONE SCH MG: 25 TABLET, FILM COATED ORAL at 08:25

## 2022-09-24 RX ADMIN — LACOSAMIDE SCH MG: 150 TABLET, FILM COATED ORAL at 08:25

## 2022-09-24 RX ADMIN — HEPARIN SODIUM SCH: 5000 INJECTION INTRAVENOUS; SUBCUTANEOUS at 08:31

## 2022-09-24 RX ADMIN — HEPARIN SODIUM SCH UNIT: 5000 INJECTION INTRAVENOUS; SUBCUTANEOUS at 08:25

## 2022-09-24 RX ADMIN — LEVOTHYROXINE SODIUM SCH MCG: 0.11 TABLET ORAL at 05:59

## 2022-09-24 NOTE — P.DS
Providers


Date of admission: 


09/09/22 14:48





Expected date of discharge: 09/24/22


Attending physician: 


Jose Antonio Blackwood





Consults: 





                                        





09/09/22 14:59


Consult Physician Routine 


   Consulting Provider: Zofia Foster


   Consult Reason/Comments: Ovarian mass, weight loss


   Do you want consulting provider notified?: Yes





09/10/22 21:45


Consult Physician Routine 


   Consulting Provider: Shaun Galeana


   Consult Reason/Comments: chest pain, elevated trops, cardiac hx


   Do you want consulting provider notified?: Yes





09/13/22 11:47


Consult Physician Routine 


   Consulting Provider: Inocencia Galeana


   Consult Reason/Comments: diverticulitis, colitis, and decreased appetite


   Do you want consulting provider notified?: Yes





09/14/22 11:46


Consult Physician Routine 


   Consulting Provider: Deandre Carrizales


   Consult Reason/Comments: rehab


   Do you want consulting provider notified?: Yes





09/14/22 21:26


Consult Physician Routine 


   Consulting Provider: Shaun Galeana


   Consult Reason/Comments: Chest pain


   Do you want consulting provider notified?: Yes, Notify in am





09/16/22 06:37


Consult Physician Routine 


   Consulting Provider: Dale Montero


   Consult Reason/Comments: Code Stroke


   Do you want consulting provider notified?: Yes











Primary care physician: 


Bernabe Colon





Hospital Course: 





Hospital course:


Patient is a 67-year-old female with a known history of coronary artery disease 

status post CABG, history of MI, history of stent placement and bilateral 

carotid surgery and hypothyroidism


Was sent to ER by her primary care physician.  Patient has been having abdominal

pain mainly in the lower abdomen associate with nausea for the past 3 to 4 

months.  Patient could not keep down food and also weight loss about 7 non-B 

during the last couple 1 to 2 months.  Patient is also having generalized 

weakness and could not keep her balance.  Denied any diarrhea.  No complaints of

chest pain or shortness of breath.  Patient has been feeling very weak.


Patient was discharged from the hospital on 5/3/2022.,  Admitted due to unstable

angina and underwent nuclear stress test showed predominantly a fixed defect.  

Imdur was added at that time.


Chest x-ray showed no evidence for acute pulmonary disease.





CT of the abdomen pelvis showed there is mild wall thickening of the colon 

diffusely correlate for mild colitis.  There is also evidence of diverticulosis 

of the sigmoid colon.  Wall thickening is noted which can be associated with a 

mucosal lesion correlate with direct visualization as clinically warranted.


There is a 3.2 cm left adnexal cystic mass likely tubo-ovarian.


Severe right iliac artery stenosis.


There is prominent small bowel loops ileus is favored.


Calcification around the apex of the heart can be associated with myocardial 

calcified calcinosis and previous infarction.





Laboratory data showed WBC 8.2, hemoglobin 14.0 and platelets 214


Sodium 139 potassium 4.4 chloride 105 bicarb is 26 BUN 17 and creatinine 0.74 

and blood sugar is 109 AST 23 alk phos 141 and ALT 49 bilirubin level is 0.5 

magnesium 1.7


TSH 0.039 and free T4 levels 1.67.  Albumin 4.1


Urinalysis negative for infection.





9/10/2022


Patient is currently lying in bed.  Still feels very weak.  No complaints of 

chest pain.  No worsening shortness of breath.  Still nauseated.  Decreased oral

intake.  No cough or sputum production.


No headache or dizziness or lightheadedness.


Lab data this morning showed sodium 142 potassium 4.3 chloride 106 bicarb is 

28.4 BUN 13.3, creatinine 0.6.


 6.8, AFP 2.2 and CEA 6.5.


Urine is negative for infection.  Stool for occult blood is negative.





Patient was hypotensive this morning required fluid bolus and blood pressure did

improve.


Patient was also started on antibiotics for possible colitis.





09/11/2022


Patient is currently resting in bed.  Awake alert and oriented.  Denied any 

complaints of chest pain or worsening shortness of breath.  No complaints of 

abdominal pain today.  Patient says that she has feels very weak.


Yesterday evening patient had chest pain associated with shortness of breath.  

And is also complaining of epigastric pain.  Troponin level is elevated at 0.526

and 0.311.  Patient was started on heparin drip and was transferred to telemetry

unit.


Patient has been afebrile.  No diarrhea.  Does have nausea.  No episodes of 

vomiting.  No cough or sputum production


Laboratory data showed WBC 8.1 hemoglobin 12.6 and platelets 147





September 12: I assumed care of patient today


Still having abdominal pain.  Decreased oral intake.  Had a BM yesterday.  

Rather diet.  Changing the diet to full liquids.  On IV Flagyl and ceftriaxone.


September 13: Patient was delirious last night likely after Dilaudid.-for pain. 

Doing much better this morning.  No abdominal pain is better.  Had liquid BM 

today.  Encourage oral intake.  Continue his Flagyl and ceftriaxone.  We'll get 

a GI consultation.


September 14: Tired.  Does not like the hospital food contents does not want to 

eat.  Patient has passed her daughter to bring in some food.  Has chronic back 

pain.  Abdominal pain is much better.  Outpatient colonoscopy per GI.  Patient 

rather weak but PTOT.  Myself and the  talk to the patient about 

possible rehab.  Patient is keen to go home.   will be to the 

 the patient this afternoon.


September 15: Patient had chest pain overnight.  Did tolerate some diet brought 

in by her daughter.  Put on IV nitroglycerin.  Seen by cardiology today.  

Planning cardiac catheterization tomorrow.  Reclining chair.


September 16: Overnight patient was found unresponsive.  A team was called.  His

question about some left arm weakness.  Patient smoked with ICU.  Neurology was 

consulted.  Patient still lethargic.  Pupils slightly dilated.  No focal 

weakness.  Spoke to the patient's daughter and  at the bedside.  Telem

etry was showing atrial tachycardia versus sinus tachycardia.  Nitro drip was 

discontinued.  Started on Cardizem drip.  MRI of the brain done.   and 

Dr. Glez to patient have the NG tube for medications.


September 17: ICU: Discussed with Dr. montero from neurology this morning.  He has

to patient on IV Vimpat.  Cardizem drip was discontinued yesterday evening.  

Lopressor dose increased.  Sinus rhythm.  Nurse reported patient not able to see

since the episode.  At the bedside patient can only see hand movements.  Not 

able to count fingers.  Can only see outline of people.  Answering questions 

slowly today.


September 18: ICU: Patient's former awake.  Vision is still affected.  Can only 

appreciate hand movements.  Discussed with Dr. Montero from neurology.  He feels 

that patient does not have a stroke and has posterior reversible encephalopathy 

syndrome.  Vimpat is to continue.  Patient is in sinus rhythm.  Patient is off 

Cardizem drip.  Increase dose of metoprolol.





09/19/2022





Patient is seen in follow-up currently undergoing neurological workup with Dr. Hester at the bedside.  Patient has had prolonged hospitalization with visual 

disturbances although patient reports her vision is improving and patient is 

continued on Vimpat.  EEG is ordered and pending at this time.  Originally seen 

by Dr. Dale Montero neurology and not convinced this was a stroke and more 

concerned with posterior encephalopathy syndrome was slowly showing some 

improvements.  Patient is weak and recommend physical therapy to evaluate the 

patient.  EEG was recommending severe encephalopathy with no seizure activity 

noted thus far.  And awaiting repeat EEG.  An Vimpat being increased.  We'll 

need to discuss further with neurology along with cardiology and pulmonary were 

following as well.  Neurosurgery also following his patient originally came in 

with abdominal pain and ruling out diverticulitis versus colitis.  Patient 

denies chest pain or shortness of breath.  Per nursing staff patient was able to

see letters on the paper and reports her vision is improving.





9/20/2022





Patient is seen today and continues with visual disturbances and reports is 

unable to see out of the left eye. Neurology following and repeat eeg remains 

abnormal. Recommend transfer to [facility] for continuous eeg 

monitoring. Family made aware and agreeable. Case management following and 

initiated the process although waitlisted. No beds available. Patient is 

continued on increased dose of vimpat 150mg bid. Patient is lethargic although 

arousable. Afebrile and denies chest pain or shortness of breath. Pulmonary and 

cardiology following as well and pulmonary signing off. 





09/21/2022





Patient is seen this morning continues with some confusion and visual 

disturbances.  Patient is scheduled for prolonged EEG today per neurology.  Case

management following as neurology recommending continuous EEG monitoring at 

[facility] although at capacity and not accepting patients at this time and 

waitlisted.  Patient is afebrile denies chest pain or shortness of breath.  

Cardiology also following maximizing medical management.  Patient denies any 

abdominal pain and oral intake continues to be fair patient needs encouragement 

and reports she does not have much of an appetite.  Patient is maintained on 2 L

and reports she does not wear oxygen in the outpatient setting.  Recommend 

weaning as tolerated.  Patient with weakness recommend physical therapy daily.  

Patient continues to have left side visual disturbance reporting she is not 

seeing anything out of the left eye.  Potassium was 3.3 today and will replace 

and recommend repeat labs.





09/22/2022





Patient is seen and evaluated in follow-up this morning lethargic although 

arousable.  Patient is confused and continues to have visual disturbances of the

left eye.  Neurology following closely and [facility].  Patient had prolonged EEG yesterday showing although a limited 

study continues to be abnormal predominantly sleep EEG although findings 

indicate mild to moderate diffuse cerebral dysfunction as may be seen in those 

with toxic metabolic encephalopathy.  No epileptiform discharges were noted as 

mentioned previously on other EEGs that were done.  Patient is maintained on 

Vimpat twice daily and neurology recommending LP and repeat MRI which is to be 

scheduled for today.  LP will not be able to be performed until next week 

Tuesday as Plavix needs to be on hold for 5 days.  Plavix discontinued today as 

nursing staff reports that was are given this morning.  Recommend working with 

physical therapy and occupational therapy daily as patient continues to be 

significantly weak as well.  Repeat BMP ordered as potassium was 3.4 yesterday 

and awaiting as patient was replaced with potassium protocol.  Patient is 

afebrile denies chest pain or shortness of breath.





September 23:


Eating fair amounts.  Comfortable.  Decreased vision.  Better than before.  

Repeat EEG., did not show seizure activity..  Continue with Vimpat.  Discussed 

with Dr. Hester from neurology.  #1 no acute stroke.  #2.  Abnormal signal in 

the left hippocampus.  Somewhat similar to the previous MRA.  To be followed 

outpatient with neurology.  In 3-6 months.  #3.  Patient responding well to 

Vimpat.  Late in the day discussed with patient's daughter.  Patient being put 

back on Plavix.  Given active cardiac issues hold off lumbar puncture.  The 

patient remains stable discharged tomorrow.


September 24: Comfortable.  Oral intake fair.  Discussed with patient.  Follow 

with neurology outpatient.  Continue Vimpat.  Follow-up with her own 

cardiologist at [place].  Has been up to the bathroom.  No chest pain.








On examination:


VITAL SIGNS: 98, 56, 18, 91/50, 97% room air


GENERAL APPEARANCE: Laying in bed, comfortable


HEENT: Normal external appearance of nose and ear.  Oral cavity normal


EYES: Pupils slightly enlarged.  Right eye some medial convergence. Conjunctiva 

normal. 


NECK: JVD not raised. Mass not palpable. 


RESPIRATORY: Respiratory effort normal. Lungs decreased breath sounds


CARDIOVASCULAR: First and second sounds normal. No edema. 


ABDOMEN: Soft.  Tender, no guarding rigidity Liver and spleen not palpable.  No 

mass palpable. 


PSYCHIATRY: Answering questions appropriately


NEUROLOGICAL:  Vision better





INVESTIGATIONS, reviewed in the clinical context:


September 23: Potassium 4.5 crit 0.73 vitamin B12 541 and folate 7.6 B-1 81


September 18: WBC 10 hemoglobin 13.3 potassium 4 creatinine 0.44


MRI brain: May be some leopoldo-infarct ischemic changes right occipital lobe.  

Chronic appearing subcortical white matter changes to the bilateral occipital 

lobes.  And periventricular white matter within the brainstem.


September 16: WBC 17.8 hemoglobin 15.1 potassium 4.4 creatinine 0.64 troponin 

0.046 LDL 66


September 15: Troponin 0.083, 0.074


September 14: White count 8.6 and regular 13.5 potassium 3.4 creatinine 0.44


Chest CTA: Negative PE.  IVC filter in place.  Cardiomegaly.  Trace bilateral 

pleural effusion.  


White count 8.1 hemoglobin 12.6 platelets 147 potassium 4 BUN 9 creatinine 0.56


Troponin I 0.012, 0.5-6, 0.311


CT abdomen and pelvis with contrast: Severe cardiomegaly.  Colonic 

diverticulosis.  3.2 cm left adnexal cyst mass likely tubo-ovarian.  Severe 

right iliac artery stenosis.











Assessment and plan: 





-posterior reversible encephalopathy syndrome:





-Post infarct angina:


Patient initially declined cardiac catheterization.  





-Non-ST elevation myocardial infarction.


Aspirin beta blocker Plavix-resumed.  Being followed by cardiology.  Discussed 

with daughter.  She'll have the patient follow-up with her cardiologist at 

[place]. 





-Acute Colitis.  Better


IV ceftriaxone, IV Flagyl.   soft diet.  Change to by mouth Augmentin.-Completed

course





- left adnexal cystic mass likely tubo-ovarian.Likely adnexal cyst, 


was seen by OB/GYN: [place] to be incidental finding.  Asymptomatic.  Ova testing 

requested.





-Severe right iliac artery stenosis


Aspirin, Plavix.  Seen by Dr. Corey.  Will follow up outpatient.  4-6 weeks.





-Coronary artery disease history of CABG in 2010 and prior PCI's most recent in 

02/2021 and chronic total occluded RCA.


Aspirin, Lipitor, Plavix, Lopressor





-Chronic CHF, Ischemic cardiomyopathy ejection fraction 40 to 45%


 Aldactone 12.5 mg daily.  Lopressor.  Zestril





-Chronic left ventricle apical thrombus was on anticoagulation previously.  

Calcified





-Moderate to severe TR





-Peripheral arterial disease, 


Aspirin, Plavix.  





-Osteoarthritis primary bilateral


Use pain medications as needed





-Hypothyroidism


Synthroid 112 g a





-Chronic urinary stress incontinence





-Peripheral neuropathy





-Atrial tachycardia versus sinus tachycardia: Patient back in sinus rhythm..


IV Cardizem drip discontinued.   metoprolol tartrate 50 mg twice a day








Disposition:


Home




















Plan - Discharge Summary


Discharge Rx Participant: Yes


New Discharge Prescriptions: 


New


   Spironolactone [Aldactone] 25 mg PO DAILY #30 tab


   Folic Acid 1 mg PO DAILY #30 tab


   Nicotine 14Mg/24Hr Patch [Habitrol] 1 patch TRANSDERM DAILY #14 patch


   Famotidine [Pepcid] 20 mg PO BID #60 tab


   Lacosamide [Vimpat] 150 mg PO BID #60 tab


   lisinopriL [Prinivil] 10 mg PO HS #30 tab


   Metoprolol Tartrate [Lopressor] 50 mg PO BID #60 tab


   Ranolazine [Ranexa] 500 mg PO Q12HR #60 tab





Continue


   clonazePAM [KlonoPIN] 1 mg PO HS


   Clopidogrel Bisulfate [Plavix] 75 mg PO DAILY


   Aspirin 81 mg PO DAILY 90 Days #90 tab


   Sertraline HCl [Zoloft] 100 mg PO HS


   Levothyroxine Sodium [Synthroid] 112 mcg PO DAILY


   Gabapentin 600 mg PO DAILY PRN


     PRN Reason: Pain


   Atorvastatin Calcium [Lipitor] 80 mg PO DAILY


   Nitroglycerin Sl Tabs [Nitrostat] 0.4 mg SL Q5M PRN


     PRN Reason: Chest Pain





Discontinued


   Losartan Potassium 50 mg PO DAILY


   Isosorbide Mononitrate ER [Imdur] 30 mg PO DAILY 90 Days #90 tab


   Metoprolol Tartrate [Lopressor] 25 mg PO BID #60 tab


Discharge Medication List





Atorvastatin Calcium [Lipitor] 80 mg PO DAILY 04/29/22 [History]


Clopidogrel Bisulfate [Plavix] 75 mg PO DAILY 04/29/22 [History]


Gabapentin 600 mg PO DAILY PRN 04/29/22 [History]


Levothyroxine Sodium [Synthroid] 112 mcg PO DAILY 04/29/22 [History]


Sertraline HCl [Zoloft] 100 mg PO HS 04/29/22 [History]


clonazePAM [KlonoPIN] 1 mg PO HS 04/29/22 [History]


Aspirin 81 mg PO DAILY 90 Days #90 tab 05/03/22 [Rx]


Nitroglycerin Sl Tabs [Nitrostat] 0.4 mg SL Q5M PRN 09/09/22 [History]


Famotidine [Pepcid] 20 mg PO BID #60 tab 09/24/22 [Rx]


Folic Acid 1 mg PO DAILY #30 tab 09/24/22 [Rx]


Lacosamide [Vimpat] 150 mg PO BID #60 tab 09/24/22 [Rx]


Metoprolol Tartrate [Lopressor] 50 mg PO BID #60 tab 09/24/22 [Rx]


Nicotine 14Mg/24Hr Patch [Habitrol] 1 patch TRANSDERM DAILY #14 patch 09/24/22 

[Rx]


Ranolazine [Ranexa] 500 mg PO Q12HR #60 tab 09/24/22 [Rx]


Spironolactone [Aldactone] 25 mg PO DAILY #30 tab 09/24/22 [Rx]


lisinopriL [Prinivil] 10 mg PO HS #30 tab 09/24/22 [Rx]








Follow up Appointment(s)/Referral(s): 


own-cardiologist, [Other] - 1 Week


Zhang Corey DO [STAFF PHYSICIAN] - 4 Weeks


[facility], [NON-STAFF] - 1 Week


Bernabe Colon MD [Primary Care Provider] - 1-2 days


Dale Lr MD [STAFF PHYSICIAN] - 3 Weeks


Shaun Galeana MD [STAFF PHYSICIAN] - As Needed


Patient Instructions/Handouts:  Metoprolol (By mouth), Spironolactone (By 

mouth), Lisinopril (By mouth), Famotidine (By mouth), Folic Acid (By mouth), 

Ranolazine (By mouth), Lacosamide (By mouth)


Activity/Diet/Wound Care/Special Instructions: 


Please follow up with your cardiologist Dr. Noel or Dr. Galeana in 1-2 weeks 

after discharge 


Dr. Butler recommends follow up MRI in 6 months.


Discharge Disposition: HOME WITH HOME HEALTH SERVICES

## 2022-09-24 NOTE — P.PN
Subjective


Progress Note Date: 09/24/22


Patient is pleasant 67-year-old female with history of carotid artery stenosis 

status post bilateral carotid endarterectomy, hypothyroidism, CAD status post 

CABG as well as PCI, hypertension, hyperlipidemia, hypothyroidism, recent 50 

pound weight loss in the last 3 months. Patient used to follow with Dr. Raul Noel [phone], however has not followed in a while. Patient presents with 

multiple symptoms. Patient has not had much of an appetite and has not been 

eating and drinking that much.  Additionally has been states she is very weak 

and if it were not for him "she would sleep 24 hours ". Apparently this has been

going on for the last few weeks and even few months. Multiple symptoms including

abdominal pain, back pain, chest pain, lightheadedness, weak.





Patient had presented in May 2022 with findings of a Lexiscan stress test with 

predominantly fixed apical defect consistent with prior myocardial infarction 

with minimal questionable leopoldo-infarct ischemia and an EF 41%.  Echocardiogram h

ad showed EF 40-45% with a fixed thrombus in the left ventricle, moderate to 

severe tricuspid regurgitation and apical scarring.  EKG on presentation shows 

sinus rhythm, left axis deviation, incomplete left bundle branch block, ST 

depressions in the inferior and lateral leads.  This does appear somewhat 

similar to prior EKG from May 2022. 





CT abdomen and pelvis which showed mild thickening of the colon, 3.2 cm left 

adnexal mass, severe right iliac artery stenosis, calcification or on the apex o

f the heart. 





Echo revealed EF 20-25%, anterseptal, apex hypokinesis, dilated LV.  





On 9/16, patient developed change in mental status and was transferred to ICU. 





9/24/2022


Patient was seen and examined resting comfortably in bed in no acute distress.  

She is lying flat in bed does not appear dyspneic.  She remains confused.  She 

is unable to state where she currently is at, is unsure of the year and says 

[place] is the President.  Plavix remains on hold for possible LP next week.











Objective





- Vital Signs


Vital signs: 


                                   Vital Signs











Temp  98.0 F   09/24/22 11:19


 


Pulse  56 L  09/24/22 11:19


 


Resp  18   09/24/22 11:19


 


BP  91/50   09/24/22 11:19


 


Pulse Ox  97   09/24/22 11:19


 


FiO2      








                                 Intake & Output











 09/23/22 09/24/22 09/24/22





 18:59 06:59 18:59


 


Intake Total  250 


 


Balance  250 


 


Weight 50.8 kg  


 


Intake:   


 


  Oral  250 


 


Other:   


 


  Voiding Method Bedside Commode Bedside Commode Bedside Commode





 Diaper Diaper Diaper





 Incontinent Incontinent Incontinent


 


  # Voids 4 3 1














- Exam


CONSTITUTIONAL: No apparent distress, ill appearing, more alert this morning, 

confused 


HEENT: Neck Supple.  No JVD. 


CHEST EXAMINATION: Lungs are clear to auscultation. No chest wall tenderness is 

noted on palpation or with deep breathing. 


HEART EXAMINATION: Regular rate and rhythm. S1, S2 heard. No murmurs, gallops or

rub.


ABDOMEN: Soft, nontender. Positive bowel sounds.


EXTREMITIES: 2+ peripheral pulses, no lower extremity edema and no calf 

tenderness.


NEUROLOGIC EXAMINATION: Patient is alert, oriented to person only 








- Labs


CBC & Chem 7: 


                                 09/19/22 04:54





                                 09/23/22 04:43





Assessment and Plan


Assessment: 


Hypertensive emergency, resolved 


Chest discomfort with elevated troponin, possible NSTEMI, however patient not 

agreement to proceed with cardiac cath initially and was treated medically, due 

to acute altered mental status patient was not a candidate for invasive 

intervention


Altered mental status


Visual disturbances


Encephalopathy 


MRI Brains reported  may be some mild per-infarct ischemic changes right 

occipital lobe, Neurology following, Posterior reversible encephalopathy synd

jeanie is also in the differential 


Abnormal EEG, neurology following 


Worsening cardiomyopathy with EF 20-25%, ischemic vs non-ischemic 


CAD with history of CABG and prior PCI


Recent 50 pound weight loss


Ovarian mass, rule out cancer, oncology evaluated the patient 


Abdominal pain, back pain, multiple other symptoms


Somnolence, failure to thrive of unclear etiology


Decreased appetite


Chronic heart failure with reduced ejection fraction 


History of Ischemic cardiomyopathy


Chronic apical thrombus appears calcified on CT


Peripheral artery disease 





Plan: 


Continue to hold Plavix for possible lumbar puncture next week.  Continue to 

maximize medical therapy.  Once cleared after lumbar puncture continue dual 

antiplatelet therapy with aspirin and Plavix.  Continue beta blocker, 

lisinopril, statin, Aldactone and Ranexa.  May consider adding SGLT2 inhibitor 

as an outpatient.  At this time we will follow the patient on an as-needed 

basis.  Please do not hesitate to contact us with questions.





NP note has been reviewed, I agree with a documented findings and plan of care. 

Patient was seen and examined.

## 2022-09-29 NOTE — CDI
Documentation Clarification Form



Date: 09/29/2022 12:42:00 PM

From: Isha Goss

MRN: [id number]

Admit Date: 09/09/2022 02:48:00 PM

Patient Name: Jaime Holguin

Visit Number: [id number]

Discharge Date:  09/24/2022 03:13:00 PM





ATTENTION: The Clinical Documentation Specialists (CDI) and [facility] Coding Staff 
appreciate your assistance in clarifying documentation. Please respond to the 
clarification below the line at the bottom and electronically sign. The CDI & 
[facility] Coding staff will review the response and follow-up if needed. Please note: 
Queries are made part of the Legal Health Record. If you have any questions, 
please contact the author of this message via ITS.



Dr. Jose Antonio Blackwood



NSTEMI  is documented in cardiac consult, PN's 09/19, 09/20, 09/21,09/22, 09/23 09/24, and DCS.  For each diagnosis, documentation must be clear to determine if
the condition was present at the time of the patients inpatient admission, 
evolving at the time of admission or developed during the hospital stay. 
Additional clarification regarding the NSTEMI is requested.



History/Risk Factors:  CAD, history of CABG and PCI, Ischemic cardiomyopathy, 
chronic CHF, weakness, Severe TR,  HTN, everyday smoker,  EKG 9/9 anteroseptal 
MI probably recent.



Clinical Indicators:   Elevated troponins on admit, Chest pain, abdominal pain, 
colitis, Sent by PCP, troponins 9/9 0.012,  09/10  0.526,    09/11   0.311



Treatment:   Heparin Drip, Nitro



Definition of Present on Admission (POA):  A diagnosis present at the time the 
order for admission to inpatient status was written.



Please clarify if the NSTEMI was evolving or present at the time of admission:



[  ]  Y = Yes, the condition was present and/or evolving at the time of the 
order for inpatient admission.



[  ]  N = No, the condition was not present at the time of the order for 
inpatient admission.



[  ]  W = Clinically undetermined if the condition was present at the time of 
the order for inpatient

admission.





___Non-ST revision MI, POA _
_______________________________________________________________________

MTDD

## 2023-08-10 ENCOUNTER — HOSPITAL ENCOUNTER (OUTPATIENT)
Dept: HOSPITAL 47 - EC | Age: 68
Setting detail: OBSERVATION
LOS: 3 days | Discharge: HOME | End: 2023-08-13
Attending: INTERNAL MEDICINE | Admitting: INTERNAL MEDICINE
Payer: MEDICARE

## 2023-08-10 DIAGNOSIS — I11.0: ICD-10-CM

## 2023-08-10 DIAGNOSIS — I65.23: ICD-10-CM

## 2023-08-10 DIAGNOSIS — E61.8: ICD-10-CM

## 2023-08-10 DIAGNOSIS — R55: Primary | ICD-10-CM

## 2023-08-10 DIAGNOSIS — E11.9: ICD-10-CM

## 2023-08-10 DIAGNOSIS — Z79.01: ICD-10-CM

## 2023-08-10 DIAGNOSIS — I50.22: ICD-10-CM

## 2023-08-10 DIAGNOSIS — G93.89: ICD-10-CM

## 2023-08-10 DIAGNOSIS — I25.5: ICD-10-CM

## 2023-08-10 DIAGNOSIS — F17.210: ICD-10-CM

## 2023-08-10 DIAGNOSIS — E03.9: ICD-10-CM

## 2023-08-10 DIAGNOSIS — R94.31: ICD-10-CM

## 2023-08-10 DIAGNOSIS — I10: ICD-10-CM

## 2023-08-10 DIAGNOSIS — I25.10: ICD-10-CM

## 2023-08-10 DIAGNOSIS — I51.3: ICD-10-CM

## 2023-08-10 DIAGNOSIS — Z86.73: ICD-10-CM

## 2023-08-10 DIAGNOSIS — I25.2: ICD-10-CM

## 2023-08-10 DIAGNOSIS — I73.9: ICD-10-CM

## 2023-08-10 LAB
ALBUMIN SERPL-MCNC: 3.8 G/DL (ref 3.5–5)
ALP SERPL-CCNC: 122 U/L (ref 38–126)
ALT SERPL-CCNC: 23 U/L (ref 4–34)
ANION GAP SERPL CALC-SCNC: 8 MMOL/L
APTT BLD: 23.2 SEC (ref 22–30)
AST SERPL-CCNC: 24 U/L (ref 14–36)
BASOPHILS # BLD AUTO: 0 K/UL (ref 0–0.2)
BASOPHILS NFR BLD AUTO: 0 %
BUN SERPL-SCNC: 19 MG/DL (ref 7–17)
CALCIUM SPEC-MCNC: 9.2 MG/DL (ref 8.4–10.2)
CHLORIDE SERPL-SCNC: 105 MMOL/L (ref 98–107)
CO2 SERPL-SCNC: 25 MMOL/L (ref 22–30)
EOSINOPHIL # BLD AUTO: 0.2 K/UL (ref 0–0.7)
EOSINOPHIL NFR BLD AUTO: 3 %
ERYTHROCYTE [DISTWIDTH] IN BLOOD BY AUTOMATED COUNT: 3.98 M/UL (ref 3.8–5.4)
ERYTHROCYTE [DISTWIDTH] IN BLOOD: 14.5 % (ref 11.5–15.5)
GLUCOSE SERPL-MCNC: 85 MG/DL (ref 74–99)
HCO3 BLDV-SCNC: 27 MMOL/L (ref 24–28)
HCT VFR BLD AUTO: 40.2 % (ref 34–46)
HGB BLD-MCNC: 13 GM/DL (ref 11.4–16)
INR PPP: 0.9 (ref ?–1.2)
LYMPHOCYTES # SPEC AUTO: 2.1 K/UL (ref 1–4.8)
LYMPHOCYTES NFR SPEC AUTO: 28 %
MAGNESIUM SPEC-SCNC: 2.2 MG/DL (ref 1.6–2.3)
MCH RBC QN AUTO: 32.7 PG (ref 25–35)
MCHC RBC AUTO-ENTMCNC: 32.3 G/DL (ref 31–37)
MCV RBC AUTO: 101 FL (ref 80–100)
MONOCYTES # BLD AUTO: 0.4 K/UL (ref 0–1)
MONOCYTES NFR BLD AUTO: 5 %
NEUTROPHILS # BLD AUTO: 4.8 K/UL (ref 1.3–7.7)
NEUTROPHILS NFR BLD AUTO: 63 %
NT-PROBNP SERPL-MCNC: 481 PG/ML
PCO2 BLDV: 53 MMHG (ref 37–51)
PH BLDV: 7.32 [PH] (ref 7.31–7.41)
PLATELET # BLD AUTO: 259 K/UL (ref 150–450)
POTASSIUM SERPL-SCNC: 5.1 MMOL/L (ref 3.5–5.1)
PROT SERPL-MCNC: 6.4 G/DL (ref 6.3–8.2)
PT BLD: 9.4 SEC (ref 9–12)
SODIUM SERPL-SCNC: 138 MMOL/L (ref 137–145)
WBC # BLD AUTO: 7.5 K/UL (ref 3.8–10.6)

## 2023-08-10 PROCEDURE — 85025 COMPLETE CBC W/AUTO DIFF WBC: CPT

## 2023-08-10 PROCEDURE — 93880 EXTRACRANIAL BILAT STUDY: CPT

## 2023-08-10 PROCEDURE — 93005 ELECTROCARDIOGRAM TRACING: CPT

## 2023-08-10 PROCEDURE — 84484 ASSAY OF TROPONIN QUANT: CPT

## 2023-08-10 PROCEDURE — 97162 PT EVAL MOD COMPLEX 30 MIN: CPT

## 2023-08-10 PROCEDURE — 83735 ASSAY OF MAGNESIUM: CPT

## 2023-08-10 PROCEDURE — 94760 N-INVAS EAR/PLS OXIMETRY 1: CPT

## 2023-08-10 PROCEDURE — 96360 HYDRATION IV INFUSION INIT: CPT

## 2023-08-10 PROCEDURE — 93270 REMOTE 30 DAY ECG REV/REPORT: CPT

## 2023-08-10 PROCEDURE — 70498 CT ANGIOGRAPHY NECK: CPT

## 2023-08-10 PROCEDURE — 85730 THROMBOPLASTIN TIME PARTIAL: CPT

## 2023-08-10 PROCEDURE — 85610 PROTHROMBIN TIME: CPT

## 2023-08-10 PROCEDURE — 70496 CT ANGIOGRAPHY HEAD: CPT

## 2023-08-10 PROCEDURE — 82803 BLOOD GASES ANY COMBINATION: CPT

## 2023-08-10 PROCEDURE — 70553 MRI BRAIN STEM W/O & W/DYE: CPT

## 2023-08-10 PROCEDURE — 85027 COMPLETE CBC AUTOMATED: CPT

## 2023-08-10 PROCEDURE — 70470 CT HEAD/BRAIN W/O & W/DYE: CPT

## 2023-08-10 PROCEDURE — 83880 ASSAY OF NATRIURETIC PEPTIDE: CPT

## 2023-08-10 PROCEDURE — 87636 SARSCOV2 & INF A&B AMP PRB: CPT

## 2023-08-10 PROCEDURE — 93306 TTE W/DOPPLER COMPLETE: CPT

## 2023-08-10 PROCEDURE — 97166 OT EVAL MOD COMPLEX 45 MIN: CPT

## 2023-08-10 PROCEDURE — 95816 EEG AWAKE AND DROWSY: CPT

## 2023-08-10 PROCEDURE — 71046 X-RAY EXAM CHEST 2 VIEWS: CPT

## 2023-08-10 PROCEDURE — 36415 COLL VENOUS BLD VENIPUNCTURE: CPT

## 2023-08-10 PROCEDURE — 80053 COMPREHEN METABOLIC PANEL: CPT

## 2023-08-10 PROCEDURE — 80048 BASIC METABOLIC PNL TOTAL CA: CPT

## 2023-08-10 PROCEDURE — 99285 EMERGENCY DEPT VISIT HI MDM: CPT

## 2023-08-10 RX ADMIN — ATORVASTATIN CALCIUM SCH MG: 80 TABLET, FILM COATED ORAL at 23:53

## 2023-08-10 RX ADMIN — CEFAZOLIN SCH MLS/HR: 330 INJECTION, POWDER, FOR SOLUTION INTRAMUSCULAR; INTRAVENOUS at 18:30

## 2023-08-10 RX ADMIN — RANOLAZINE SCH MG: 500 TABLET, FILM COATED, EXTENDED RELEASE ORAL at 23:53

## 2023-08-10 RX ADMIN — ZOLPIDEM TARTRATE PRN MG: 5 TABLET ORAL at 23:59

## 2023-08-10 RX ADMIN — HEPARIN SODIUM SCH UNIT: 5000 INJECTION, SOLUTION INTRAVENOUS; SUBCUTANEOUS at 23:53

## 2023-08-10 RX ADMIN — METOPROLOL TARTRATE SCH MG: 25 TABLET, FILM COATED ORAL at 23:53

## 2023-08-10 NOTE — CT
EXAMINATION: CT brain wo/w con

DATE AND TIME:  8/10/2023 5:20 PM

 

CLINICAL INDICATION: PHH; vision loss 

 

TECHNIQUE: Standard departmental protocol.; 2301.4

 

COMPARISON: 9/16/2022

 

FINDINGS: 

The calvarium is intact. There is no intracranial hemorrhage. 

 

Remote right occipital pole infarct redemonstrated. There is no intracranial mass or mass effect. No 
definite new intra-axial or extra-axial attenuation defect. 

 

The paranasal sinuses, middle ear cavities, and mastoid sinus air cells are clear. 

 

The orbits are unremarkable.

 

Contrast enhancement pattern is unremarkable.

 

 

IMPRESSION:

1.  No acute CT process.

2.  Remote right occipital pole infarct redemonstrated.

## 2023-08-10 NOTE — XR
EXAMINATION TYPE: XR chest 2V

 

DATE OF EXAM: 8/10/2023

 

COMPARISON: 9/16/2022

 

INDICATION: Syncope

 

TECHNIQUE:  Frontal and lateral views of the chest are obtained.

 

FINDINGS:  

The heart size is normal.  

The pulmonary vasculature is normal.

The lungs are clear.

 

IMPRESSION:  

1. No acute pulmonary process.

## 2023-08-10 NOTE — ED
General Adult HPI





- General


Chief complaint: Syncope


Stated complaint: LOSING CONSCIOUSNESS


Time Seen by Provider: 08/10/23 15:23


Source: patient, RN notes reviewed


Mode of arrival: wheelchair


Limitations: no limitations





- History of Present Illness


Initial comments: 





68-year-old  female with a past medical history significant for 

myocardial infarctions, cardiac stents presents the emergency department with a 

chief complaint of syncopal episodes.  Patient reports having multiple syncopal 

episodes that she will feel lightheaded and "pass out."  She remembers all the 

preceding events.  She denies hitting her head.  She is currently on Plavix.  

She does report having some vision loss in her left eye however she describes 

this is chronic in nature.  Denies any trauma or injury.  Denies any nausea, 

vomiting, diarrhea.





- Related Data


                                Home Medications











 Medication  Instructions  Recorded  Confirmed


 


Atorvastatin Calcium [Lipitor] 80 mg PO HS 04/29/22 08/10/23


 


Clopidogrel Bisulfate [Plavix] 75 mg PO DAILY 04/29/22 08/10/23


 


Levothyroxine Sodium [Synthroid] 112 mcg PO DAILY 04/29/22 08/10/23


 


Sertraline HCl [Zoloft] 100 mg PO AS DIRECTED 04/29/22 08/10/23


 


Nitroglycerin Sl Tabs [Nitrostat] 0.4 mg SL Q5M PRN 09/09/22 08/10/23


 


Ibuprofen/Diphenhydramine Cit 1 tab PO HS PRN 08/10/23 08/10/23





[Ibuprofen Pm Caplet]   


 


Isosorbide Mononitrate ER [Imdur] 30 mg PO DAILY 08/10/23 08/10/23


 


Lacosamide [Vimpat] 100 mg PO DAILY 08/10/23 08/10/23


 


Metoprolol Tartrate [Lopressor] 25 mg PO BID 08/10/23 08/10/23


 


Ranolazine [Ranexa] 1,000 mg PO BID 08/10/23 08/10/23











                                    Allergies











Allergy/AdvReac Type Severity Reaction Status Date / Time


 


codeine AdvReac  Unknown Verified 08/10/23 17:48














Review of Systems


ROS Statement: 


Those systems with pertinent positive or pertinent negative responses have been 

documented in the HPI.





ROS Other: All systems not noted in ROS Statement are negative.





Past Medical History


Past Medical History: Hypertension, Myocardial Infarction (MI), Thyroid Disorder


Last Myocardial Infarction Date:: 


History of Any Multi-Drug Resistant Organisms: None Reported


Past Surgical History: Appendectomy,  Section, Cholecystectomy, Coronary

 Bypass/CABG, Heart Catheterization, Heart Catheterization With Stent


Additional Past Surgical History / Comment(s): bilat carotid


Past Anesthesia/Blood Transfusion Reactions: No Reported Reaction


Date of Last Stent Placement:: 


Past Psychological History: No Psychological Hx Reported


Smoking Status: Never smoker


Past Alcohol Use History: None Reported


Past Drug Use History: None Reported





- Past Family History


  ** Father


History Unknown: Yes





  ** Mother


Family Medical History: Diabetes Mellitus, Hypertension





General Exam





- General Exam Comments


Initial Comments: 





General: Alert, in no acute distress


Head: atraumatic normocephalic.  Eyes PERRL, EOMI intact, mucous membranes moist

 


Respiratory: Lungs clear to auscultation bilaterally


Cardiovascular: Rate regular rate and rhythm


Abdominal: Soft without guarding or rebound


Extremities: Normal inspection with full range of motion and normal capillary 

refill


Neuroogic: alert and oriented 3, CN II-XII intact, able to ambulate with steady

 gait 


Skin: warm dry and intact with normal color


Limitations: no limitations





Course


                                   Vital Signs











  08/10/23 08/10/23 08/10/23





  15:16 16:17 16:25


 


Temperature 98.6 F  


 


Pulse Rate 66 57 L 


 


Respiratory 20 19 





Rate   


 


Blood Pressure 93/50 90/48 


 


O2 Sat by Pulse 95 97 80 L





Oximetry   














  08/10/23 08/10/23 08/10/23





  16:27 17:20 18:04


 


Temperature   


 


Pulse Rate  61 64


 


Respiratory  19 18





Rate   


 


Blood Pressure  112/53 113/58


 


O2 Sat by Pulse 99 97 95





Oximetry   














  08/10/23





  19:14


 


Temperature 98.7 F


 


Pulse Rate 60


 


Respiratory 16





Rate 


 


Blood Pressure 117/59


 


O2 Sat by Pulse 97





Oximetry 














EKG Findings





- EKG Comments:


EKG Findings:: Interpreted the following: EKG performed at 15:27 rate 65 bpm 

sinus rhythm VT interval 168, QRS duration 90, QT/QTc 420/431





Medical Decision Making





- Medical Decision Making





Was pt. sent in by a medical professional or institution (, PA, NP, urgent 

care, hospital, or nursing home...) When possible be specific


@  -[No]


Did you speak to anyone other than the patient for history (EMS, parent, family,

 police, friend...)? What history was obtained from this source 


@  -[No]


Did you review nursing and triage notes (agree or disagree)?  Why? 


@  -[I reviewed and agree with nursing and triage notes]


Were old charts reviewed (outside hosp., previous admission, EMS record, old 

EKG, old radiological studies, urgent care reports/EKG's, nursing home records)?

Report findings 


@  -[No old charts were reviewed]


Differential Diagnosis (chest pain, altered mental status, abdominal pain women,

abdominal pain men, vaginal bleeding, weakness, fever, dyspnea, syncope, 

headache, dizziness, GI bleed, back pain, seizure, CVA, palpatations, mental 

health, musculoskeletal)? 


@  -[not applicable]


EKG interpreted by me (3pts min.).


@  -[As above]


X-rays interpreted by me (1pt min.).


@  -[None done]


CT interpreted by me (1pt min.).


@  -[None done]


U/S interpreted by me (1pt. min.).


@  -[None done]


What testing was considered but not performed or refused? (CT, X-rays, U/S, 

labs)? Why?


@  -[None]


What meds were considered but not given or refused? Why?


@  -[None]


Did you discuss the management of the patient with other professionals 

(professionals i.e. , PA, NP, lab, RT, psych nurse, , , 

teacher, , )? Give summary


@  -[No]


Was smoking cessation discussed for >3mins.?


@  -[No]


Was critical care preformed (if so, how long)?


@  -[No]


Were there social determinants of health that impacted care today? How? 

(Homelessness, low income, unemployed, alcoholism, drug addiction, 

transportation, low edu. Level, literacy, decrease access to med. care, care home, 

rehab)?


@  -[No]


Was there de-escalation of care discussed even if they declined (Discuss DNR or 

withdrawal of care, Hospice)? DNR status


@  -[No]


What co-morbidities impacted this encounter? (DM, HTN, Smoking, COPD, CAD, 

Cancer, CVA, ARF, Chemo, Hep., AIDS, mental health diagnosis, sleep apnea, 

morbid obesity)?


@  -[None]


Was patient admitted / discharged? Hospital course, mention meds given and 

route, prescriptions, significant lab abnormalities, going to OR and other 

pertinent info.


@  -Admission.  This is a 68-year-old  female with past medical history

 significant in for STEMI presents to the emergency department with syncopal 

episodes.  Patient apparently history and physical exam performed while in the 

emergency department no focal neuro deficits are noted upon exam.  Patient is 

acting appropriately.  Heart rate regular rate and rhythm, lungs clear to 

auscultation bilaterally, abdomen soft nontender.  Patient able to move all 

extremities freely.  Lab work and imaging performed which were essentially 

negative.  Questions were addressed.  She will be admitted to this hospital for 

further observation with recommend cardiology consult.  Case discussed with Dr. Kelsie cheung who agrees and accepts the patient for further observation.





 Case discussed with Dr. Cartwright, Who agrees with plan of care


Undiagnosed new problem with uncertain prognosis?


@  -[No]


Drug Therapy requiring intensive monitoring for toxicity (Heparin, Nitro, 

Insulin, Cardizem)?


@  -[No]


Were any procedures done?


@  -[No]


Diagnosis/symptom?


@  -Syncope


Acute, or Chronic, or Acute on Chronic?


@  -Acute


Uncomplicated (without systemic symptoms) or Complicated (systemic symptoms)?


@  -Uncomplicated


Side effects of treatment?


@  -[No]


Exacerbation, Progression, or Severe Exacerbation?


@  -[No]


Poses a threat to life or bodily function? How? (Chest pain, USA, MI, pneumonia,

 PE, COPD, DKA, ARF, appy, cholecystitis, CVA, Diverticulitis, Homicidal, 

Suicidal, threat to staff... and all critical care pts)


@  -Low likelihood 





- Lab Data


Result diagrams: 


                                 08/10/23 16:00





                                 08/10/23 16:00


                                   Lab Results











  08/10/23 08/10/23 08/10/23 Range/Units





  16:00 16:00 16:00 


 


WBC  7.5    (3.8-10.6)  k/uL


 


RBC  3.98    (3.80-5.40)  m/uL


 


Hgb  13.0    (11.4-16.0)  gm/dL


 


Hct  40.2    (34.0-46.0)  %


 


MCV  101.0 H    (80.0-100.0)  fL


 


MCH  32.7    (25.0-35.0)  pg


 


MCHC  32.3    (31.0-37.0)  g/dL


 


RDW  14.5    (11.5-15.5)  %


 


Plt Count  259    (150-450)  k/uL


 


MPV  7.7    


 


Neutrophils %  63    %


 


Lymphocytes %  28    %


 


Monocytes %  5    %


 


Eosinophils %  3    %


 


Basophils %  0    %


 


Neutrophils #  4.8    (1.3-7.7)  k/uL


 


Lymphocytes #  2.1    (1.0-4.8)  k/uL


 


Monocytes #  0.4    (0-1.0)  k/uL


 


Eosinophils #  0.2    (0-0.7)  k/uL


 


Basophils #  0.0    (0-0.2)  k/uL


 


Macrocytosis  Slight    


 


PT   9.4   (9.0-12.0)  sec


 


INR   0.9   (<1.2)  


 


APTT   23.2   (22.0-30.0)  sec


 


VBG pH     (7.31-7.41)  


 


VBG pCO2     (37-51)  mmHg


 


VBG HCO3     (24-28)  mmol/L


 


Sodium    138  (137-145)  mmol/L


 


Potassium    5.1  (3.5-5.1)  mmol/L


 


Chloride    105  ()  mmol/L


 


Carbon Dioxide    25  (22-30)  mmol/L


 


Anion Gap    8  mmol/L


 


BUN    19 H  (7-17)  mg/dL


 


Creatinine    1.02  (0.52-1.04)  mg/dL


 


Est GFR (CKD-EPI)AfAm    66  (>60 ml/min/1.73 sqM)  


 


Est GFR (CKD-EPI)NonAf    57  (>60 ml/min/1.73 sqM)  


 


Glucose    85  (74-99)  mg/dL


 


Calcium    9.2  (8.4-10.2)  mg/dL


 


Magnesium    2.2  (1.6-2.3)  mg/dL


 


Total Bilirubin    0.5  (0.2-1.3)  mg/dL


 


AST    24  (14-36)  U/L


 


ALT    23  (4-34)  U/L


 


Alkaline Phosphatase    122  ()  U/L


 


Troponin I     (0.000-0.034)  ng/mL


 


NT-Pro-B Natriuret Pep    481  pg/mL


 


Total Protein    6.4  (6.3-8.2)  g/dL


 


Albumin    3.8  (3.5-5.0)  g/dL


 


Influenza Type A (PCR)     (Not Detectd)  


 


Influenza Type B (PCR)     (Not Detectd)  


 


RSV (PCR)     (Not Detectd)  


 


SARS-CoV-2 (PCR)     (Not Detectd)  














  08/10/23 08/10/23 08/10/23 Range/Units





  16:00 16:00 16:37 


 


WBC     (3.8-10.6)  k/uL


 


RBC     (3.80-5.40)  m/uL


 


Hgb     (11.4-16.0)  gm/dL


 


Hct     (34.0-46.0)  %


 


MCV     (80.0-100.0)  fL


 


MCH     (25.0-35.0)  pg


 


MCHC     (31.0-37.0)  g/dL


 


RDW     (11.5-15.5)  %


 


Plt Count     (150-450)  k/uL


 


MPV     


 


Neutrophils %     %


 


Lymphocytes %     %


 


Monocytes %     %


 


Eosinophils %     %


 


Basophils %     %


 


Neutrophils #     (1.3-7.7)  k/uL


 


Lymphocytes #     (1.0-4.8)  k/uL


 


Monocytes #     (0-1.0)  k/uL


 


Eosinophils #     (0-0.7)  k/uL


 


Basophils #     (0-0.2)  k/uL


 


Macrocytosis     


 


PT     (9.0-12.0)  sec


 


INR     (<1.2)  


 


APTT     (22.0-30.0)  sec


 


VBG pH    7.32  (7.31-7.41)  


 


VBG pCO2    53 H  (37-51)  mmHg


 


VBG HCO3    27  (24-28)  mmol/L


 


Sodium     (137-145)  mmol/L


 


Potassium     (3.5-5.1)  mmol/L


 


Chloride     ()  mmol/L


 


Carbon Dioxide     (22-30)  mmol/L


 


Anion Gap     mmol/L


 


BUN     (7-17)  mg/dL


 


Creatinine     (0.52-1.04)  mg/dL


 


Est GFR (CKD-EPI)AfAm     (>60 ml/min/1.73 sqM)  


 


Est GFR (CKD-EPI)NonAf     (>60 ml/min/1.73 sqM)  


 


Glucose     (74-99)  mg/dL


 


Calcium     (8.4-10.2)  mg/dL


 


Magnesium     (1.6-2.3)  mg/dL


 


Total Bilirubin     (0.2-1.3)  mg/dL


 


AST     (14-36)  U/L


 


ALT     (4-34)  U/L


 


Alkaline Phosphatase     ()  U/L


 


Troponin I  <0.012    (0.000-0.034)  ng/mL


 


NT-Pro-B Natriuret Pep     pg/mL


 


Total Protein     (6.3-8.2)  g/dL


 


Albumin     (3.5-5.0)  g/dL


 


Influenza Type A (PCR)   Not Detected   (Not Detectd)  


 


Influenza Type B (PCR)   Not Detected   (Not Detectd)  


 


RSV (PCR)   Not Detected   (Not Detectd)  


 


SARS-CoV-2 (PCR)   Not Detected   (Not Detectd)  














Disposition


Clinical Impression: 


 Syncope





Disposition: ADMITTED AS IP TO THIS HOSP


Condition: Stable


Is patient prescribed a controlled substance at d/c from ED?: No


Time of Disposition: 18:24

## 2023-08-11 LAB
ANION GAP SERPL CALC-SCNC: 7.8 MMOL/L (ref 4–12)
BASOPHILS # BLD AUTO: 0.04 X 10*3/UL (ref 0–0.1)
BASOPHILS NFR BLD AUTO: 0.4 %
BUN SERPL-SCNC: 17.6 MG/DL (ref 9–27)
BUN/CREAT SERPL: 17.6 RATIO (ref 12–20)
CALCIUM SPEC-MCNC: 8.6 MG/DL (ref 8.7–10.3)
CHLORIDE SERPL-SCNC: 107 MMOL/L (ref 96–109)
CO2 SERPL-SCNC: 26.2 MMOL/L (ref 21.6–31.8)
EOSINOPHIL # BLD AUTO: 0.2 X 10*3/UL (ref 0.04–0.35)
EOSINOPHIL NFR BLD AUTO: 1.8 %
ERYTHROCYTE [DISTWIDTH] IN BLOOD BY AUTOMATED COUNT: 3.77 X 10*6/UL (ref 4.1–5.2)
ERYTHROCYTE [DISTWIDTH] IN BLOOD: 14.6 % (ref 11.5–14.5)
GLUCOSE SERPL-MCNC: 89 MG/DL (ref 70–110)
HCT VFR BLD AUTO: 39.1 % (ref 37.2–46.3)
HGB BLD-MCNC: 12.1 D/DL (ref 12–15)
IMM GRANULOCYTES BLD QL AUTO: 0.3 %
LYMPHOCYTES # SPEC AUTO: 2.29 X 10*3/UL (ref 0.9–5)
LYMPHOCYTES NFR SPEC AUTO: 20.9 %
MCH RBC QN AUTO: 32.1 PG (ref 27–32)
MCHC RBC AUTO-ENTMCNC: 30.9 D/DL (ref 32–37)
MCV RBC AUTO: 103.7 FL (ref 80–97)
MONOCYTES # BLD AUTO: 0.72 X 10*3/UL (ref 0.2–1)
MONOCYTES NFR BLD AUTO: 6.6 %
NEUTROPHILS # BLD AUTO: 7.67 X 10*3/UL (ref 1.8–7.7)
NEUTROPHILS NFR BLD AUTO: 70 %
NRBC BLD AUTO-RTO: 0 X 10*3/UL (ref 0–0.01)
PLATELET # BLD AUTO: 253 X 10*3/UL (ref 140–440)
POTASSIUM SERPL-SCNC: 5 MMOL/L (ref 3.5–5.5)
SODIUM SERPL-SCNC: 141 MMOL/L (ref 135–145)
WBC # BLD AUTO: 10.95 X 10*3/UL (ref 4.5–10)

## 2023-08-11 RX ADMIN — LEVOTHYROXINE SODIUM SCH MCG: 0.11 TABLET ORAL at 06:33

## 2023-08-11 RX ADMIN — RANOLAZINE SCH: 500 TABLET, FILM COATED, EXTENDED RELEASE ORAL at 20:14

## 2023-08-11 RX ADMIN — HEPARIN SODIUM SCH: 5000 INJECTION, SOLUTION INTRAVENOUS; SUBCUTANEOUS at 15:45

## 2023-08-11 RX ADMIN — METOPROLOL TARTRATE SCH: 25 TABLET, FILM COATED ORAL at 20:14

## 2023-08-11 RX ADMIN — ISOSORBIDE MONONITRATE SCH MG: 30 TABLET, EXTENDED RELEASE ORAL at 08:57

## 2023-08-11 RX ADMIN — RANOLAZINE SCH MG: 500 TABLET, FILM COATED, EXTENDED RELEASE ORAL at 08:56

## 2023-08-11 RX ADMIN — HEPARIN SODIUM SCH: 5000 INJECTION, SOLUTION INTRAVENOUS; SUBCUTANEOUS at 23:05

## 2023-08-11 RX ADMIN — ZOLPIDEM TARTRATE PRN MG: 5 TABLET ORAL at 23:14

## 2023-08-11 RX ADMIN — HEPARIN SODIUM SCH: 5000 INJECTION, SOLUTION INTRAVENOUS; SUBCUTANEOUS at 09:00

## 2023-08-11 RX ADMIN — HEPARIN SODIUM SCH UNIT: 5000 INJECTION, SOLUTION INTRAVENOUS; SUBCUTANEOUS at 08:57

## 2023-08-11 RX ADMIN — CLOPIDOGREL BISULFATE SCH MG: 75 TABLET ORAL at 08:56

## 2023-08-11 RX ADMIN — CEFAZOLIN SCH: 330 INJECTION, POWDER, FOR SOLUTION INTRAMUSCULAR; INTRAVENOUS at 19:44

## 2023-08-11 RX ADMIN — METOPROLOL TARTRATE SCH MG: 25 TABLET, FILM COATED ORAL at 08:57

## 2023-08-11 RX ADMIN — ATORVASTATIN CALCIUM SCH: 80 TABLET, FILM COATED ORAL at 20:14

## 2023-08-11 NOTE — US
EXAMINATION TYPE: US carotid duplex BILAT

 

DATE OF EXAM: 8/11/2023

 

COMPARISON: NONE

 

CLINICAL INDICATION: Female, 68 years old with history of h/o stroke; Stroke, h/o bilateral endart

 

TECHNIQUE: Carotid duplex ultrasound examination. Indirect Doppler criteria was utilized.

 

FINDINGS:

 

EXAM MEASUREMENTS: 

 

RIGHT:  Peak Systolic Velocity (PSV) cm/sec

----- Right CCA:  74.7  

----- Right ICA:  359     

----- Right ECA:  443   

ICA/CCA ratio:  4.8    

 

RIGHT:  End Diastole cm/sec

----- Right CCA:  25.3   

----- Right ICA:  98.4      

----- Right ECA:  61.5     

 

LEFT:  Peak Systolic Velocity (PSV) cm/sec

----- Left CCA:  359  

----- Left ICA:  101   

----- Left ECA:  76.7  

ICA/CCA ratio:  0.3  

 

LEFT:  End Diastole cm/sec

----- Left CCA:  101  

----- Left ICA:  46.1   

----- Left ECA:  11.0 

 

VERTEBRALS (direction of flow):

Right Vertebral: Appeared occluded 

Left Vertebral: Antegrade

 

Rhythm:  Normal

 

SONOGRAPHER NOTES:

 

**Bilateral soft plaque with significantly elevated velocities right ICA and ECA, and abnormally elev
ated velocities left CCA **

 

 

 

IMPRESSION:  

 

1. Severe high-grade stenosis proximal right ICA.

2. The right vertebral artery may be occluded.

3. High velocities within the sampled left common carotid artery suggesting a significant proximal le
ft CCA stenosis.   

 

 

 

 

 

 

Criteria for Assigning % of Stenosis / Diameter reduction

(Estimation based on the indirect measurements of the internal carotid artery velocities (ICA PSV).

1.  Normal (no stenosis)=ICA PSV < 125 cm/s: ratio < 2.0: ICA EDV<40 cm/s.

2. Less than 50% stenosis=ICA PSV < 125 cm/s: ratio < 2.0: ICA EDV<40 cm/s.

3.  50 to 69% stenosis=ICA PSV of 125 to 230 cm/s: ration 2.0 ? 4.0: ICA EDV  cm/s.

4.  Greater than 70% stenosis to near occlusion= ICA PSV > 230 cm/s: ratio > 4.0: ICA EDV > 100 cm/s.
 

5.  Near occlusion= ICA PSV velocities may be low or undetectable: variable ratio and ICA EDV.

6.  Total occlusion=unable to detect flow.

## 2023-08-11 NOTE — P.PN
Subjective


Progress Note Date: 08/11/23





Hospital course:





Patient is a very pleasant 68-year-old female with a past medical history of CAD

status post CABG followed by stent placement on dual antiplatelet therapy with 

aspirin and Plavix, carotid artery stenosis status post bilateral carotid 

endarterectomy, hypertension, hyperlipidemia, hypothyroidism, and nicotine 

dependence.  She presented to the emergency department secondary to reports of 

recurrent syncopal episodes.  Patient reportedly having recurrent syncopal 

episodes multiple times per day throughout the last week.  Patient unable to 

identify any relieving factors, states sometimes it just before she will get a 

strange feeling in no she needs to sit down or lie down because she is going to 

pass out and other times it will just In with no warning signs.  Patient reports

this has happened while walking while sitting and while lying down and there are

no relation to causes leading up to these episodes.  She does report a mild 

frontal headache but denies having any dizziness, lightheadedness, difficulties 

with her speech, difficulty swallowing, chest pain or palpitations, shortness of

breath, or experiencing any numbness/tingling/weakness in her extremities.  She 

underwent full evaluation in the emergency department.  Upon arrival to facility

vital signs 93/50 blood pressure, heart rate 66, respiratory rate 20, temp 

98.6F, SpO2 of 95% on room air. Labs completed and reviewed.  CBC, coags, and 

CMP were unremarkable.  Troponin was negative at less than 0.012.  ProBNP was 

481.  Venous blood gas showing mild hypercarbia with elevated  pCO2 of 53 and 

normal pH of 7.32 and bicarb of 27.  EKG was completed showing normal sinus r

hythm at 65 bpm with anterolateral T-wave inversion in leads III, V4, V5, and 

V6.  CT brain was negative for acute intracranial process showing a remote right

occipital pole infarct redemonstrated.  Chest x-ray completed negative for acute

cardiopulmonary process.  Patient was admitted under our services to general 

medical unit with telemetry with consultations placed to cardiology and 

neurology for evaluation.





Physical exam:





Vital signs reviewed and stable. 


General: Nontoxic, no distress and appears stated age.  


Derm: Skin warm and dry, normal coloration for ethnicity.


Head: Atraumatic, normocephalic and symmetric.  


Eyes: EOMs intact, no lid lag, and anicteric sclera


Mouth: no lip lesions, mucus membranes moist


Cardiovascular: regular rate and rhythm with normal S1S2, systolic murmur, 

positive posterior tibial pulses bilaterally, and cap refill < 2 seconds.  


Lungs: Respirations even, regular, and unlabored on room air. Lungs CTA bilater

ally, no rhonchi, no rales, no wheezing, and no accessory muscle usage. 


Abdominal: soft, nontender to palpation, no guarding, no appreciable 

organomegaly


Ext: ROM intact. No gross muscle atrophy, no edema, no contractures


Neuro: Speech clear, face symmetrical and CN II-XII grossly intact with no noted

focal neuro deficits


Psych: Alert and oriented to person, place, time, and situation. Appropriate and

pleasant affect. 





Assessment and Plan of Care:





Recurrent syncopal episodes of unclear etiology


History of CAD status post CABG followed by stent placement


Carotid artery stenosis status post bilateral carotid endarterectomy


Hypertension


Hyperlipidemia


Hypothyroidism


Chronic nicotine dependence


-Labs completed and reviewed.  CBC, coags, and CMP were unremarkable.  Troponin 

was negative at less than 0.012.  ProBNP was 481.  Venous blood gas showing mild

hypercarbia with elevated  pCO2 of 53 and normal pH of 7.32 and bicarb of 27.  


-EKG was completed showing normal sinus rhythm at 65 bpm with anterolateral T-

wave inversion in leads III, V4, V5, and V6.  


-CT brain was negative for acute intracranial process showing a remote right 

occipital pole infarct redemonstrated.  


-Chest x-ray completed negative for acute cardiopulmonary process.


-Consult placed to cardiology and neurology for evaluation and appreciate 

recommendations.


-Continue neuro checks


-Echocardiogram completed and pending results.


-Carotid Dopplers completed and pending results.


-Patient to remain on telemetry monitoring.


-Fall precautions in place.


-MRI to be completed


-Patient to continue with dual antiplatelet therapy with aspirin and Plavix raúl

g with atorvastatin 80 mg daily, isosorbide mononitrate 30 mg daily, Vimpat 100 

mg daily, level thyroxine sodium 112 g daily, metoprolol 25 mg twice daily, and

Ranexa 1000 mg twice daily.


-Recommend smoking cessation.








CODE STATUS: Full code


DVT prophylaxis: heparin


Discussed with: patient, neurologist, cardiac NP and RN


Anticipated discharge date: Clinical course to determine


Anticipated discharge place: home


Patient was seen independently by Nurse Pracitioner.


This document was prepared using Dragon dictation software.  Please allow for 

errors in transcription, while rare they do occur.





Silvano Taveras NP rendered care for this patient independently, reviewed the 

findings and plan as documented in the note above.  I did not physically speak 

with or examine the patient on this date. 





Objective





- Vital Signs


Vital signs: 


                                   Vital Signs











Temp  98.1 F   08/11/23 01:13


 


Pulse  62   08/11/23 01:49


 


Resp  16   08/11/23 01:49


 


BP  108/57   08/11/23 01:13


 


Pulse Ox  99   08/11/23 08:06


 


FiO2      








                                 Intake & Output











 08/10/23 08/11/23 08/11/23





 18:59 06:59 18:59


 


Weight 52.163 kg  


 


Other:   


 


  Voiding Method  Toilet 


 


  # Voids  1 














- Labs


CBC & Chem 7: 


                                 08/11/23 07:05





                                 08/11/23 07:05


Labs: 


                  Abnormal Lab Results - Last 24 Hours (Table)











  08/10/23 08/10/23 08/10/23 Range/Units





  16:00 16:00 16:37 


 


MCV  101.0 H    (80.0-100.0)  fL


 


VBG pCO2    53 H  (37-51)  mmHg


 


BUN   19 H   (7-17)  mg/dL

## 2023-08-11 NOTE — CA
Transthoracic Echo Report 

 Name: Jaime Holguin 

 MRN:    [id number] 

 Age:    68     Gender:     F 

 

 :    1955 

 Exam Date:     2023 07:44 

 Exam Location: [facility] 

 Ht (in):     60     Wt (lb):     115 

 Ordering Physician:        Zoran Plummer MD 

 Attending/Referring Phys:         [id number], lEian 

 Technician         Cinthia Chang RDCS 

 Procedure CPT: 

 Indications:       Syncope 

 

 Cardiac Hx: 

 Technical Quality:      Good 

 Contrast 1:                                Total Dose (mL): 

 Contrast 2:                                Total Dose (mL): 

 

 MEASUREMENTS  (Male / Female) Normal Values 

 2D ECHO 

 LV Diastolic Diameter PLAX        4.6 cm                4.2 - 5.9 / 3.9 - 5.3 cm 

 LV Systolic Diameter PLAX         3.8 cm                 

 IVS Diastolic Thickness           1.1 cm                0.6 - 1.0 / 0.6 - 0.9 cm 

 LVPW Diastolic Thickness          1.3 cm                0.6 - 1.0 / 0.6 - 0.9 cm 

 LV Relative Wall Thickness        0.5                    

 RV Internal Dim ED PLAX           3.4 cm                 

 LA Systolic Diameter LX           4.0 cm                3.0 - 4.0 / 2.7 - 3.8 cm 

 LV Diastolic Volume MOD 4C        106.2 cm???              

 LV Systolic Volume MOD 4C         67.3 cm???               

 LV Ejection Fraction MOD 4C       36.6 %                 

 LV Cardiac Index MOD 4C           1534.7 cm???/min???m???      

 LV Diastolic Length 4C            7.2 cm                 

 LV Systolic Length 4C             6.5 cm                 

 LV Diastolic Volume MOD 2C        45.6 cm???               

 LV Systolic Volume MOD 2C         27.6 cm???               

 LV Ejection Fraction MOD 2C       39.4 %                 

 LV Cardiac Index MOD 2C           711.0 cm???/min???m???       

 LV Diastolic Length 2C            7.0 cm                 

 LV Systolic Length 2C             6.3 cm                 

 LA Volume                         49.0 cm???              18 - 58 / 22 - 52 cm??? 

 

 M-MODE 

 Aortic Root Diameter MM           2.8 cm                 

 MV E Point Septal Separation      1.5 cm                 

 AV Cusp Separation MM             1.8 cm                 

 

 DOPPLER 

 AV Peak Velocity                  128.7 cm/s             

 AV Peak Gradient                  6.6 mmHg               

 AI Peak Velocity                  327.8 cm/s             

 AI Peak Gradient                  43.0 mmHg              

 AI Pressure Half Time             797.0 ms               

 MV Area PHT                       4.4 cm???                

 Mitral E Point Velocity           102.0 cm/s             

 Mitral A Point Velocity           73.9 cm/s              

 Mitral E to A Ratio               1.4                    

 MV Deceleration Time              171.8 ms               

 MV E' Velocity                    4.9 cm/s               

 Mitral E to MV E' Ratio           20.7                   

 TR Peak Velocity                  249.3 cm/s             

 TR Peak Gradient                  24.9 mmHg              

 Right Ventricular Systolic Press  29.6 mmHg              

 

 

 FINDINGS 

 Left Ventricle 

 Left ventricular ejection fraction is estimated at 40-45 %. Left ventricular  

 cavity size normal. Mildly increased septal wall thickness. Mildly increased  

 posterior wall thickness.  There is atypical septal motion noted.  Hypokinesia  

 of the inferobasal wall as well as the distal anteroapical septal wall is noted 

 

 Right Ventricle 

 Mild right ventricular dilatation. Right ventricular systolic pressure within  

 normal limits. 

 

 Right Atrium 

 Normal right atrial size. 

 

 Left Atrium 

 Mildly increased left atrial diameter. 

 

 Mitral Valve 

 Mitral valve thickened. Mild mitral annular calcification. Mild mitral  

 regurgitation. 

 

 Aortic Valve 

 Trileaflet aortic valve. Focal thickening of the aortic valve cusps. Mild  

 aortic regurgitation. 

 

 Tricuspid Valve 

 Structurally normal tricuspid valve. Mild-to-moderate tricuspid regurgitation. 

 

 Pulmonic Valve 

 Structurally normal pulmonic valve. Trace pulmonic regurgitation. 

 

 Pericardium 

 No pericardial effusion. 

 

 Aorta 

 Normal size aortic root and proximal ascending aorta. 

 

 CONCLUSIONS 

 Left ventricle is of a normal size with atypical septal motion hypokinesia of  

 the inferobasal wall as well as the distal apical septal wall.  There is mild  

 mitral and tricuspid regurgitation as well as aortic regurgitation.   

 Calcification of aortic valve leaflets without significant restriction.  No  

 pericardial effusion no pulmonary hypertension 

 Previewed by:  

 Dr. Phong Ponce MD 

 (Electronically Signed) 

 Final Date:      2023 10:11

## 2023-08-11 NOTE — P.HPIM
History of Present Illness


H&P Date: 08/10/23


Chief Complaint: sncope





68-year-old female with hypertension, coronary artery disease status post stents





She is coming in for repeated syncopal episodes over the past week she describes

1-2 episodes every day not related to any positional changes happens randomly 

throughout the day usually while she is seated she denies any injuries related 

to those syncopal episode she denies any symptoms preceding the syncopal 

episodes then she would wake up either on the couch or on the floor and doesn't 

remember what happened some of them were witnessed  by the patient , ag

ain no report of any seizure-like activity loss of bladder or bowel control 

there is no report of any injuries cuts or wounds.





Patient admits that she feels exhausted she denies any associated shortness of 

breath, palpitation, chest pain, dizziness or lightheadedness, denies any new 

focal neuro deficits denies any changes in hearing.





When asked about vision she reports what seems like left homonymous hemianopsia 

of bilateral eyes this been going on for about a year she never mentioned 

anything to her doctor inferior of a terminal diagnosis she was always hoping 

that this will recover on its own.  She starts to cry when talks about this she 

is tearful and anxious about it.  She denies any associated headache denies any 

focal neuro deficits otherwise





She also gets some shortness of breath when she climbs stairs but otherwise she 

denies any shortness of breath with other moderate exertions like walking around

the house or walking to her mailbox and back.





She denies any bleeding denies being on any blood thinners denies any cough 

nausea vomiting denies any abdominal pain changes in bowel or urinary habits





She admits to tobacco smoking denies any illicit drugs or alcohol








review of systems


Pertinent positives as noted in HPI. All other systems were reviewed and are 

negative








on exam


Constitutional:          No acute distress, conversant, pleasant


Eyes:      Anicteric sclerae, moist conjunctiva, 


         Pupils equal round reactive to light





ENMT:      NC/AT


         Oropharynx clear, no erythema, or exudates





Neck:      Supple,  no masses, or JVD


         No carotid bruits


         No thyromegaly





Lungs:      Clear to auscultation


         Clear to percussion


         Normal respiratory effort, no accessory muscle use 





Cardiovascular:      Heart regular in rate and rhythm, 


         No murmurs, gallops, or rubs


         No peripheral edema





Abdominal:       Soft


         Nontender, no guarding, rebound or rigidity


         Abdomen moving with respiration


         Normoactive bowel sounds


         No hepatomegaly, No splenomegaly


         No palpable mass 


         No abdominal wall hernia noted 





Skin:      Normal temperature, tone, texture, turgor


         No induration


         No subcutaneous nodules 


         No rash, lesions


         No ulcers





Extremities:      No digital cyanosis 


         No clubbing


         Pedal pulses intact and symmetrical


         Radial pulses intact and symmetrical 


         No calf tenderness 





Psychiatric:      Alert and oriented to person, place and time


         Appropriate affect


         fair judgement   


      


Neuro      Muscles Strength 5/5 in all 4 extremities 


         Sensation to light touch grossly present throughout


         Left homonymous hemianopsia


Lymphatics:       no palpable cervical or supraclavicular   lymph nodes  








Past Medical History


Past Medical History: Hypertension, Myocardial Infarction (MI), Thyroid Disorder


Last Myocardial Infarction Date:: 


History of Any Multi-Drug Resistant Organisms: None Reported


Past Surgical History: Appendectomy,  Section, Cholecystectomy, Coronary

Bypass/CABG, Heart Catheterization, Heart Catheterization With Stent


Additional Past Surgical History / Comment(s): bilat carotid


Past Anesthesia/Blood Transfusion Reactions: No Reported Reaction


Date of Last Stent Placement:: 


Past Psychological History: No Psychological Hx Reported


Smoking Status: Never smoker


Past Alcohol Use History: None Reported


Past Drug Use History: None Reported





- Past Family History


  ** Father


History Unknown: Yes





  ** Mother


Family Medical History: Diabetes Mellitus, Hypertension





Medications and Allergies


                                Home Medications











 Medication  Instructions  Recorded  Confirmed  Type


 


Atorvastatin Calcium [Lipitor] 80 mg PO HS 04/29/22 08/10/23 History


 


Clopidogrel Bisulfate [Plavix] 75 mg PO DAILY 04/29/22 08/10/23 History


 


Levothyroxine Sodium [Synthroid] 112 mcg PO DAILY 04/29/22 08/10/23 History


 


Sertraline HCl [Zoloft] 100 mg PO AS DIRECTED 04/29/22 08/10/23 History


 


Nitroglycerin Sl Tabs [Nitrostat] 0.4 mg SL Q5M PRN 09/09/22 08/10/23 History


 


Ibuprofen/Diphenhydramine Cit 1 tab PO HS PRN 08/10/23 08/10/23 History





[Ibuprofen Pm Caplet]    


 


Isosorbide Mononitrate ER [Imdur] 30 mg PO DAILY 08/10/23 08/10/23 History


 


Lacosamide [Vimpat] 100 mg PO DAILY 08/10/23 08/10/23 History


 


Metoprolol Tartrate [Lopressor] 25 mg PO BID 08/10/23 08/10/23 History


 


Ranolazine [Ranexa] 1,000 mg PO BID 08/10/23 08/10/23 History








                                    Allergies











Allergy/AdvReac Type Severity Reaction Status Date / Time


 


codeine AdvReac  Unknown Verified 08/10/23 17:48














Physical Exam


Vitals: 


                                   Vital Signs











  Temp Pulse Pulse Resp BP BP Pulse Ox


 


 08/10/23 22:10    63  12   


 


 08/10/23 20:33  97.7 F   63  12   126/66  96


 


 08/10/23 20:03  98.6 F  63   18  126/67   98


 


 08/10/23 19:14  98.7 F  60   16  117/59   97


 


 08/10/23 18:04   64   18  113/58   95


 


 08/10/23 17:20   61   19  112/53   97


 


 08/10/23 16:27        99


 


 08/10/23 16:25        80 L


 


 08/10/23 16:17   57 L   19  90/48   97


 


 08/10/23 15:16  98.6 F  66   20  93/50   95








                                Intake and Output











 08/10/23 08/10/23 08/11/23





 14:59 22:59 06:59


 


Other:   


 


  Voiding Method  Toilet 


 


  # Voids  1 


 


  Weight  52.163 kg 














Results


CBC & Chem 7: 


                                 08/10/23 16:00





                                 08/10/23 16:00


Labs: 


                  Abnormal Lab Results - Last 24 Hours (Table)











  08/10/23 08/10/23 08/10/23 Range/Units





  16:00 16:00 16:37 


 


MCV  101.0 H    (80.0-100.0)  fL


 


VBG pCO2    53 H  (37-51)  mmHg


 


BUN   19 H   (7-17)  mg/dL














Thrombosis Risk Factor Assmnt





- Choose All That Apply


Any of the Below Risk Factors Present?: Yes


Each Factor Represents 1 point: Acute MI


Each Risk Factor Represents 2 Points: Age 61-74 years


Other congenital or acquired thrombophilia - If yes, enter type in comment: No


Thrombosis Risk Factor Assessment Total Risk Factor Score: 3


Thrombosis Risk Factor Assessment Level: Moderate Risk





Assessment and Plan


Assessment: 





68-year-old female with coronary artery disease status post stents coming in for

 repeated syncopal episodes over the past 1 week I discussed the case with the 

ED doctor and accepted the admission for cardiology and neurology evaluation 

with anticipated length of stay less than 2 midnights





Repeated syncopal episodes


Left homonymous hemianopsia for near duration


CT of the brain no acute pathology


Chest x-ray no acute pathology


Check brain MRI with and without contrast


Neurology consult


Cardiology consult


Check echocardiogram


EKG no acute ST changes, normal sinus rhythm


Troponins negative


Blood work overall unremarkable sodium 138 potassium 5.1, BUN 19 creatinine 1.02


Hemoglobin 13 white count 7.5





Acute viral panel negative for Covid, RSV, flu





Coronary artery disease status post stents


Continue with Plavix, statin


Continue with Imdur and metoprolol





Hypothyroid continue with levothyroxine





Full code


DVT prophylaxis heparin subcu 3 times a day





PT/OT evaluation


Fall precautions


Neurochecks

## 2023-08-11 NOTE — P.CRDCN
History of Present Illness


Consult date: 23


History of present illness: 





HISTORY OF PRESENTING ILLNESS


Patient is a 68-year-old female with history of carotid artery stenosis status 

post bilateral carotid endarterectomy, hypothyroidism, CAD status post CABG as 

well as PCI, hypertension, hyperlipidemia, hypothyroidism and active tobacco use

and dependence  down to a half to 1 pack per day.  We have been asked to 

evaluate the patient for syncope.  Patient was last seen by cardiology and 

Leela Gomes at which time she was diagnosed with a non-ST elevated MI and

due to concern for ovarian mass no cardiac catheterization was done at that time

and optimizing medical therapy was done.  Patient had another hospitalization in

2022 at which time she was seen for hypertensive emergency, possible 

non-ST MI Patient is a poor historian.  Patient's  has been contacted 

over the phone.  Patient has a cardiologist in [place] but is unclear 

when she had last follow-up.  He states that it has been very inconsistent 

because they live in [facility] for for them to drive.  We have 

been asked to evaluate the patient for syncope.  Patient apparently has had 

several episodes of syncope the patient is unable to accurately describe what 

these were.  Patient denies having any lightheadedness or dizziness, no chest 

pain.





EKG sinus rhythm with no acute changes, poor R-wave progression, cannot exclude 

old MI


Chest x-ray no acute process.


CAT scan of the brain no acute process.  Remote right occipital pole infarct 

redemonstrated.


Home cardiac medications: Lipitor 80 mg daily, Plavix 75 mg daily, Imdur 30 mg 

daily, Lopressor 25 mg twice daily, Nitrostat as needed, Ranexa 1000 mg twice 

daily.


Lexiscan stress test 2022 with predominantly fixed apical defect consistent 

with prior myocardial infarction with minimal questionable leopoldo-infarct ischemia

and an EF 41%.  


Echocardiogram 2022 showed EF 40-45% with a fixed thrombus in the left 

ventricle, moderate to severe tricuspid regurgitation and apical scarring.  








REVIEW OF SYSTEMS


At the time of my exam:


CONSTITUTIONAL: Denies fever or chills.


CARDIOVASCULAR: Denies chest pain, +chronic shortness of breath, no orthopnea, 

PND or palpitations.+syncope


RESPIRATORY: Denies cough. 


GASTROINTESTINAL: Denies abdominal pain, diarrhea, constipation, nausea or 

vomiting.


MUSCULOSKELETAL: Denies myalgias.


NEUROLOGIC: Denies numbness, tingling or weakness.


ENDOCRINE: Denies fatigue, weight change,  polydipsia or polyurina.


HEMATOLOGIC: Denies history of anemia or bleeding. 





PHYSICAL EXAMINATION


Vital signs reviewed.


CONSTITUTIONAL: No apparent distress, ill appearing, lethargic


HEENT: Head is normocephalic. Pupils are equal, round. Sclerae anicteric. Mucous

membranes of the mouth are moist.  No JVD. No carotid bruit.


CHEST EXAMINATION: Lungs are clear to auscultation. No chest wall tenderness is 

noted on palpation or with deep breathing. 


HEART EXAMINATION: Regular rate and rhythm. S1, S2 heard. No murmurs, gallops or

rub.


ABDOMEN: Soft, nontender. Positive bowel sounds.


EXTREMITIES: 2+ peripheral pulses, no lower extremity edema and no calf 

tenderness.


NEUROLOGIC EXAMINATION: Patient is somnolent but arousable





ASSESSMENT


Syncope of unclear etiology, rule out arrhythmia


CAD with history of CABG and PCI


Chronic systolic heart failure EF 40-45%


Ischemic cardiomyopathy with prior apical scar


Chronic apical thrombus appears calcified on CT


Hypertension


Hyperlipidemia


PAD


Status post bilateral carotid endarterectomy


Hypothyroidism


Tobacco use and dependence





PLAN


Resume patient's home cardiac medications


Obtain 2-D echocardiogram to assess cardiac function and valves


Event monitor at discharge


Continue telemetry monitoring


Smoking cessation


Discussed with patient's  the need for her to follow-up with a 

cardiologist consistently and further cardiac workup will be needed as an 

outpatient including cardiac catheterization and possible defibrillator 

implantation.  He is open to being established in [place] and appointment 

will be made at the time of discharge to follow-up with Dr. Medrano.


Further recommendations as patient progresses.


Thank you kindly for this consultation





Nurse practitioner note has been reviewed, I agree with the documented findings 

and plan of care.  Patient was seen and examined.














Past Medical History


Past Medical History: Hypertension, Myocardial Infarction (MI), Thyroid Disorder


Last Myocardial Infarction Date:: 


History of Any Multi-Drug Resistant Organisms: None Reported


Past Surgical History: Appendectomy,  Section, Cholecystectomy, Coronary

Bypass/CABG, Heart Catheterization, Heart Catheterization With Stent


Additional Past Surgical History / Comment(s): bilat carotid


Past Anesthesia/Blood Transfusion Reactions: No Reported Reaction


Date of Last Stent Placement:: 


Past Psychological History: No Psychological Hx Reported


Smoking Status: Never smoker


Past Alcohol Use History: None Reported


Past Drug Use History: None Reported





- Past Family History


  ** Father


History Unknown: Yes





  ** Mother


Family Medical History: Diabetes Mellitus, Hypertension





Medications and Allergies


                                Home Medications











 Medication  Instructions  Recorded  Confirmed  Type


 


Atorvastatin Calcium [Lipitor] 80 mg PO HS 04/29/22 08/10/23 History


 


Clopidogrel Bisulfate [Plavix] 75 mg PO DAILY 04/29/22 08/10/23 History


 


Levothyroxine Sodium [Synthroid] 112 mcg PO DAILY 04/29/22 08/10/23 History


 


Sertraline HCl [Zoloft] 100 mg PO AS DIRECTED 04/29/22 08/10/23 History


 


Nitroglycerin Sl Tabs [Nitrostat] 0.4 mg SL Q5M PRN 09/09/22 08/10/23 History


 


Ibuprofen/Diphenhydramine Cit 1 tab PO HS PRN 08/10/23 08/10/23 History





[Ibuprofen Pm Caplet]    


 


Isosorbide Mononitrate ER [Imdur] 30 mg PO DAILY 08/10/23 08/10/23 History


 


Lacosamide [Vimpat] 100 mg PO DAILY 08/10/23 08/10/23 History


 


Metoprolol Tartrate [Lopressor] 25 mg PO BID 08/10/23 08/10/23 History


 


Ranolazine [Ranexa] 1,000 mg PO BID 08/10/23 08/10/23 History








                                    Allergies











Allergy/AdvReac Type Severity Reaction Status Date / Time


 


codeine AdvReac  Unknown Verified 08/10/23 17:48














Physical Exam


Vitals: 


                                   Vital Signs











  Temp Pulse Pulse Resp BP BP Pulse Ox


 


 23 01:49    62  16   


 


 23 01:13  98.1 F   62  16   108/57  99


 


 08/10/23 22:10    63  12   


 


 08/10/23 20:33  97.7 F   63  12   126/66  96


 


 08/10/23 20:03  98.6 F  63   18  126/67   98


 


 08/10/23 19:14  98.7 F  60   16  117/59   97


 


 08/10/23 18:04   64   18  113/58   95


 


 08/10/23 17:20   61   19  112/53   97


 


 08/10/23 16:27        99


 


 08/10/23 16:25        80 L


 


 08/10/23 16:17   57 L   19  90/48   97


 


 08/10/23 15:16  98.6 F  66   20  93/50   95








                                Intake and Output











 08/10/23 08/11/23 08/11/23





 22:59 06:59 14:59


 


Other:   


 


  Voiding Method Toilet Toilet 


 


  # Voids 1 1 


 


  Weight 52.163 kg  














Results





                                 08/10/23 16:00





                                 08/10/23 16:00


                                 Cardiac Enzymes











  08/10/23 08/10/23 08/10/23 Range/Units





  16:00 16:00 18:03 


 


AST  24    (14-36)  U/L


 


Troponin I   <0.012  <0.012  (0.000-0.034)  ng/mL








                                   Coagulation











  08/10/23 Range/Units





  16:00 


 


PT  9.4  (9.0-12.0)  sec


 


APTT  23.2  (22.0-30.0)  sec








                                       CBC











  08/10/23 Range/Units





  16:00 


 


WBC  7.5  (3.8-10.6)  k/uL


 


RBC  3.98  (3.80-5.40)  m/uL


 


Hgb  13.0  (11.4-16.0)  gm/dL


 


Hct  40.2  (34.0-46.0)  %


 


Plt Count  259  (150-450)  k/uL








                          Comprehensive Metabolic Panel











  08/10/23 Range/Units





  16:00 


 


Sodium  138  (137-145)  mmol/L


 


Potassium  5.1  (3.5-5.1)  mmol/L


 


Chloride  105  ()  mmol/L


 


Carbon Dioxide  25  (22-30)  mmol/L


 


BUN  19 H  (7-17)  mg/dL


 


Creatinine  1.02  (0.52-1.04)  mg/dL


 


Glucose  85  (74-99)  mg/dL


 


Calcium  9.2  (8.4-10.2)  mg/dL


 


AST  24  (14-36)  U/L


 


ALT  23  (4-34)  U/L


 


Alkaline Phosphatase  122  ()  U/L


 


Total Protein  6.4  (6.3-8.2)  g/dL


 


Albumin  3.8  (3.5-5.0)  g/dL








                               Current Medications











Generic Name Dose Route Start Last Admin





  Trade Name Freq  PRN Reason Stop Dose Admin


 


Acetaminophen  650 mg  08/10/23 18:22  08/10/23 21:58





  Acetaminophen Tab 325 Mg Tab  PO   650 mg





  Q6HR PRN   Administration





  Mild Pain or Fever > 100.5  


 


Atorvastatin Calcium  80 mg  08/10/23 23:30  08/10/23 23:53





  Atorvastatin 80 Mg Tab  PO   80 mg





  HS KT   Administration


 


Clopidogrel Bisulfate  75 mg  23 09:00 





  Clopidogrel 75 Mg Tab  PO  





  DAILY [facility]  


 


Heparin Sodium (Porcine)  5,000 unit  23 00:00  08/10/23 23:53





  Heparin Sodium,Porcine 5,000 Unit/Ml 1 Ml Vial  SQ   5,000 unit





  Q8HR KT   Administration


 


Sodium Chloride  1,000 mls @ 20 mls/hr  08/10/23 18:30  08/10/23 18:30





  Saline 0.9%  IV   20 mls/hr





  .Q24H KT   Administration


 


Isosorbide Mononitrate  30 mg  23 09:00 





  Isosorbide Mononitrate Er 30 Mg Tab.Er.24h  PO  





  DAILY [facility]  


 


Ketorolac Tromethamine  15 mg  08/10/23 18:22  08/10/23 21:59





  Ketorolac 15 Mg/Ml 1 Ml Vial  IVP  23 18:23  15 mg





  Q6HR PRN   Administration





  Moderate Pain (Scale 4 to 6)  


 


Lacosamide  100 mg  23 09:00 





  Lacosamide 50 Mg Tablet  PO  





  DAILY [facility]  


 


Levothyroxine Sodium  112 mcg  23 06:30  23 06:33





  Levothyroxine 112 Mcg Tab  PO   112 mcg





  DAILY@0630 KT   Administration


 


Metoprolol Tartrate  25 mg  08/10/23 23:30  08/10/23 23:53





  Metoprolol Tartrate 25 Mg Tab  PO   25 mg





  BID KT   Administration


 


Miscellaneous Information  1 each  08/10/23 16:18 





  Rx Info: Iv Contrast Was Given 1 Each Misc  MISCELLANE  23 16:18 





  DAILY PRN  





  Per Protocol  


 


Naloxone HCl  0.2 mg  08/10/23 18:22 





  Naloxone 0.4 Mg/Ml 1 Ml Vial  IV  





  Q2M PRN  





  Opioid Reversal  


 


Ranolazine  1,000 mg  08/10/23 23:30  08/10/23 23:53





  Ranolazine 500 Mg Tab.Er.12h  PO   1,000 mg





  BID KT   Administration


 


Zolpidem Tartrate  5 mg  08/10/23 23:25  08/10/23 23:59





  Zolpidem 5 Mg Tab  PO   5 mg





  HS PRN   Administration





  Insomnia  








                                Intake and Output











 08/10/23 08/11/23 08/11/23





 22:59 06:59 14:59


 


Other:   


 


  Voiding Method Toilet Toilet 


 


  # Voids 1 1 


 


  Weight 52.163 kg  








                                        





                                 08/10/23 16:00 





                                 08/10/23 16:00

## 2023-08-11 NOTE — P.CNNES
History of Present Illness


Consult date: 23


Requesting physician: Zoran Plummer


Reason for Consult: Left homonymous hemianopia


History of Present Illness: 





Patient is a 68-year-old right-handed female came to the hospital yesterday at 

2:48 PM for recurrent syncopal spells.  Patient states that she has been passing

out for almost 1 year.  I spoke to patient's , who mentioned that this 

has been happening for 2 months.  Patient's  has seen it happening about 

15 times in the past couple months.  He says that she is usually standing and 

she passes out.  She is out for about 15-20 minutes.  There is no convulsion 

noted, no tongue bite or loss of control of urine.  Patient herself tells me 

that she could be sitting, standing or laying in the bed and passes out.  She 

has passed out laying in the couch.  She says that it can happen about once a d

ay, sometimes may skip a day.  No set pattern.  Patient states that she feels 

like a headrest and then she passes out.  She says her vision is not good.  She 

is constantly tired all the time.





Vital signs on arrival blood pressure 193/50, which later went up over and 

, pulse is 66, temperature 98.6.  Blood test shows normal CBC, with 

elevated .0.  PT/PTT normal, CMP normal, troponin negative, influenza 

screen, RSV and coronal virus PCR negative.  EKG showed sinus rhythm, possible 

left atrial enlargement.  Chest x-ray revealed no acute intracranial process.  

CT of head revealed no acute process.  Remote right occipital lobe infarct 

redemonstrated.  I personally reviewed CT head, agree with the findings.





Home medications include Plavix 75 mg, Zoloft 100 mg, levothyroxine, Lipitor 80 

mg, nitroglycerin, isosorbide, Ranexa, metoprolol, Vimpat 100 mg daily and 

ibuprofen.





Patient does not remember if she takes Vimpat or not.  Likewise her  does

not know details.





Apparently patient has been seen by neurology team in 2022 for visual 

hallucinations, abnormal EEG and syncopal spells.  Patient had an abnormal EEG 

as mentioned below.








* Initial EEG on 2022 reviewed by Dr. Montero: Is abnormal.  The background 

  slowing is suggestive of moderate to severe encephalopathy.  The epileptiform 

  dischargs over the left frontotemporal region increases risk for seizure.  

  There is no seizure noted during this study.





* Repeat EEG 2022 was abnormal due to background slowing, disorganization 

  of mild-to-moderate degree, suggestive of encephalopathy.  Focal slowing with 

  focal epileptiform activity seen frequently involving the left temporal region

  suggestive of focal cortical neuronal dysfunction with underlying cortical 

  irritability and tendency for seizures.  No electrographic seizure was 

  recorded.  When compared to the EEG from 2022, the epileptiform activity

  has much improved, although still present.  Clinical correlation and a 

  prolonged EEG recommended if clinically indicated.





* Prolonged EEG 2.5 hours performed 2022 was limited study, abnormal EEG 

  predominantly sleep EEG.  During the rare periods of increased arousal, 

  diffuse facet are range slowing was seen.  These findings indicate mild to 

  moderate diffuse cerebral dysfunction as may be seen in a toxic metabolic 

  encephalopathy. 








Patient had negative NMDA antibodies on 2022.





Review of Systems


Constitutional: Denies chills, Denies fever


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

blurred vision, denies diplopia


Ears: deny: decreased hearing, ear discharge, earache


Ears, nose, mouth and throat: Denies headache, Denies sore throat


Cardiovascular: Denies chest pain, Denies shortness of breath


Respiratory: Denies cough, Denies excessive sputum, Denies wheezing


Gastrointestinal: Denies abdominal pain, Denies diarrhea, Denies nausea, Denies 

vomiting


Genitourinary: Denies dysuria, Denies hematuria


Musculoskeletal: Denies myalgias, Denies neck pain


Integumentary: Denies pruritus, Denies rash


Psychiatric: Reports depression, Denies anxiety


Endocrine: Reports fatigue, Denies weight change





Past Medical History


Past Medical History: Hypertension, Myocardial Infarction (MI), Thyroid Disorder


Last Myocardial Infarction Date:: 


History of Any Multi-Drug Resistant Organisms: None Reported


Past Surgical History: Appendectomy,  Section, Cholecystectomy, Coronary

Bypass/CABG, Heart Catheterization, Heart Catheterization With Stent


Additional Past Surgical History / Comment(s): bilat carotid


Past Anesthesia/Blood Transfusion Reactions: No Reported Reaction


Date of Last Stent Placement:: 


Past Psychological History: No Psychological Hx Reported


Smoking Status: Never smoker


Past Alcohol Use History: None Reported


Past Drug Use History: None Reported





- Past Family History


  ** Father


History Unknown: Yes





  ** Mother


Family Medical History: Diabetes Mellitus, Hypertension





Medications and Allergies


                                Home Medications











 Medication  Instructions  Recorded  Confirmed  Type


 


Atorvastatin Calcium [Lipitor] 80 mg PO HS 04/29/22 08/10/23 History


 


Clopidogrel Bisulfate [Plavix] 75 mg PO DAILY 04/29/22 08/10/23 History


 


Levothyroxine Sodium [Synthroid] 112 mcg PO DAILY 04/29/22 08/10/23 History


 


Sertraline HCl [Zoloft] 100 mg PO AS DIRECTED 04/29/22 08/10/23 History


 


Nitroglycerin Sl Tabs [Nitrostat] 0.4 mg SL Q5M PRN 09/09/22 08/10/23 History


 


Ibuprofen/Diphenhydramine Cit 1 tab PO HS PRN 08/10/23 08/10/23 History





[Ibuprofen Pm Caplet]    


 


Isosorbide Mononitrate ER [Imdur] 30 mg PO DAILY 08/10/23 08/10/23 History


 


Lacosamide [Vimpat] 100 mg PO DAILY 08/10/23 08/10/23 History


 


Metoprolol Tartrate [Lopressor] 25 mg PO BID 08/10/23 08/10/23 History


 


Ranolazine [Ranexa] 1,000 mg PO BID 08/10/23 08/10/23 History








                                    Allergies











Allergy/AdvReac Type Severity Reaction Status Date / Time


 


codeine AdvReac  Unknown Verified 08/10/23 17:48














Physical Examination





- Vital Signs


Vital Signs: 


                                   Vital Signs











  Temp Pulse Pulse Pulse Pulse Pulse Resp


 


 23 16:02  98.1 F     61   18


 


 23 08:40  98.0 F    65  65  62  16


 


 23 08:06       


 


 23 01:49    62     16


 


 23 01:13  98.1 F   62     16


 


 08/10/23 22:10    63     12


 


 08/10/23 20:33  97.7 F   63     12


 


 08/10/23 20:03  98.6 F  63      18














  BP BP BP BP BP Pulse Ox


 


 23 16:02    91/49    97


 


 23 08:40   116/70  112/71  123/67   96


 


 23 08:06       99


 


 23 01:49      


 


 23 01:13      108/57  99


 


 08/10/23 22:10      


 


 08/10/23 20:33      126/66  96


 


 08/10/23 20:03  126/67      98








                                Intake and Output











 23





 06:59 14:59 22:59


 


Intake Total   960


 


Balance   960


 


Intake:   


 


  Oral   960


 


Other:   


 


  Voiding Method Toilet  


 


  # Voids 1  2














Patient is an elderly  female, laying comfortably in the bed.


Patient is alert awake oriented to time place and person.  Speech and language 

functions are normal.  Patient can name and repeat very well.  No aphasia or 

dysarthria.  Attention, concentration and fund of knowledge is adequate. 





On cranial nerve examination, pupils are equal, round and reacting to light, 

visual fields reveal complete left homonymous hemianopia.  Extraocular muscles 

are intact with no nystagmus.  Face is symmetric, tongue protrudes to the 

midline.  Palatal elevation and sensation normal, hearing and shoulder shrug 

normal, facial sensation normal.  





On muscle strength testing, there is no pronator drift and the strength is 

normal in arms and legs distally and proximally.





Deep tendon reflexes are (right/left) biceps 1/1, brachioradialis is 0/0, knees 

1/2, ankles 1/1 and plantars flat bilaterally.





Sensory to touch is equal with no neglect on double simultaneous stimulation.





Cerebellar function showed no ataxia for finger-to-nose testing.  No 

dysdiadochokinesia.  No ataxia for heel-to-shin testing on either side.  Tone 

and bulk of muscles normal.





Gait deferred..





On general examination, there is no carotid bruit or murmur, S1-S2 audible.  

Chest is clear on consultation.  Abdomen is soft nontender.  No organomegaly, 

bowel sounds present.   Peripheral pulses are present.  No edema.





Results





- Laboratory Findings


CBC and BMP: 


                                 23 07:05





                                 23 07:05


Abnormal Lab Findings: 


                                  Abnormal Labs











  08/10/23 08/10/23 08/10/23





  16:00 16:00 16:37


 


WBC   


 


RBC   


 


MCV  101.0 H  


 


MCH   


 


MCHC   


 


RDW   


 


VBG pCO2    53 H


 


BUN   19 H 


 


Calcium   














  23





  07:05 07:05


 


WBC  10.95 H 


 


RBC  3.77 L 


 


MCV  103.7 H 


 


MCH  32.1 H 


 


MCHC  30.9 L 


 


RDW  14.6 H 


 


VBG pCO2  


 


BUN  


 


Calcium   8.6 L














Assessment and Plan


Assessment: 





* Recurrent syncope, rule out seizures versus arrhythmia.


* History of seizures, with abnormal EEG with periodic lateralized epileptiform 

  discharges involving left temporal region in 2022, that had 

  resolved.


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


* Hypertension


* CAD





* History of bilateral CEA.


* History of CAD s/p stent and CABG


* History of hypothyroidism


* Severe depression


* Anxiety


* Folate deficiency


* Tobacco use











Plan: 





* Patient's EEG performed today was normal.  No epileptiform activity was seen. 

   However patient had an abnormal EEG in the past, and was recommended Vimpat 

  150 mg twice a day.  However at this time she is taking Vimpat only 100 mg 

  once a day.  Patient even does not know that if she is taking Vimpat or not.  

  Likewise her  is not sure if she is taking Vimpat or not.  May have to 

  check with her pharmacy and her primary physician if she is getting/receiving 

  Vimpat, and what dose.  At this time I will increase Vimpat to 100 mg twice a 

  day.  If she continues to have "syncopal spells", then may increase to 150 mg 

  twice a day.





* MRI of the brain with and without contrast performed today revealed remote 

  injury to the right occipital lobe with encephalomalacia.  No evidence of 

  intracranial mass, acute/subacute infarct or abnormal enhancement.  I pers

  onally reviewed MRI agree with the findings.


* Continue Plavix 75 mg daily and Lipitor 80 mg.





* Carotid Doppler performed today revealed severe high-grade stenosis proximal 

  right ICA, the right vertebral artery may be occluded.  High velocities within

   the sampled left common carotid artery suggesting a significant proximal left

   common carotid artery stenosis.  Antegrade flow in the left vertebral artery.


* CT angiography of the head and neck performed 2022 was reported as 

  negative CT angiography of the brain.  Negative CT angiography of the neck 

  were dominant left vertebral and diminutive right vertebral. 


* Consult vascular surgery.





* 2-D echo revealed normal left ventricular size with atypical septal motion 

  hypokinesis of the inferiorbasal wall as well as the distal apical septal wall

  .  EF is 40-45%.  Mild MR and ER.  Calcification of the aortic valve leaflets 

  without significant stenosis.  Left atrium mildly increased in diameter.





* B12 541, folate 7.6 on 2022


* TSH 0.039, slightly low with normal free T4 of 1.67.


* Dr. Armendariz will cover neurology service over the weekend.  Thank you for the 

  consult.











Time with Patient: Greater than 30

## 2023-08-11 NOTE — EEG
DATE OF SERVICE: 08/11/2023



ELECTROENCEPHALOGRAM REPORT



PREAMBLE:

This is a 68-year-old female with recurrent syncope.



CURRENT MEDICATIONS:

Plavix, Imdur, Toradol, Vimpat 100 mg every day, Synthroid, Lopressor, Ranexa, 
and

Ambien.



EEG FINDINGS:

This is a 21-channel digital EEG recorded with video component, utilizing 10/20

international system with referential and bipolar montages.  Background consists
of

well developed, well regulated, moderate voltage activity in 8 hertz alpha.  
Background

is posterior dominant, reactive to eye opening and closing.  Photic driving 
response

was not seen.  The patient was drowsy during most of the study with appearance 
of

bilaterally symmetric theta frequency rhythm.  Brief stage 2 sleep was seen.  No
focal

or generalized epileptiform activity was seen.  EKG channel showed no obvious

arrhythmia.



IMPRESSION:

This is a normal EEG during wakefulness, drowsiness, and stage 2 sleep.  No 
focal,

lateralized or epileptiform activity was seen.





MMODL / IJN: [id number] / Job#: [id number]

[id number]

## 2023-08-11 NOTE — MR
EXAMINATION TYPE: MR brain wo/w con

 

DATE OF EXAM: 8/11/2023 11:11 AM

 

CLINICAL INDICATION:Female, 68 years old with history of left homonymous hemianopsia; Left homonymous
 hemianopsia

 

COMPARISON: 9/22/2022, 9/16/2022.

 

TECHNIQUE: Multi planar, multi sequence imaging was performed through the brain including: T1, T2, In
version recovery, susceptibility weighted imaging and gradient echo imaging and Diffusion weighted im
aging. The patient was then given intravenous contrast and multi planar, T1 fat-saturation images wer
e obtained.

IV Contrast: 5 cc Gadavist

 

FINDINGS: 

Encephalomalacia of the right occipital lobe from remote injury which is similar to prior. Ventricula
r dilation to cerebral atrophy. Suspected hippocampal sulcal remnants on the left.

The gray-white junctions, ventricular system, basal cisterns appear unremarkable. Diffusion-weighted 
imaging shows no evidence of restricted diffusion to suggest acute/subacute infarct. Intracranial art
erial flow voids are maintained. Midline structures show no abnormality. Scattered foci of high T2 si
gnal intensity are seen within the periventricular white matter. The susceptibility weighted images d
o not reveal any evidence for micro-hemorrhage. After administration of gadolinium, no abnormal enhan
cement is seen.

 

The bone marrow signal is within normal limits. 

Paranasal sinuses and mastoid air cells: No significant paranasal sinus disease.

Visualized orbits: Orbital contents are intact.

 

IMPRESSION:

1. Remote injury to the right occipital lobe with encephalomalacia. No evidence of intracranial mass,
 acute/subacute infarct, or abnormal enhancement.

2. Nonspecific white matter changes, likely related to small vessel ischemic disease

## 2023-08-12 RX ADMIN — RANOLAZINE SCH MG: 500 TABLET, FILM COATED, EXTENDED RELEASE ORAL at 08:13

## 2023-08-12 RX ADMIN — CLOPIDOGREL BISULFATE SCH MG: 75 TABLET ORAL at 08:13

## 2023-08-12 RX ADMIN — LACOSAMIDE SCH MG: 50 TABLET, FILM COATED ORAL at 08:13

## 2023-08-12 RX ADMIN — CEFAZOLIN SCH: 330 INJECTION, POWDER, FOR SOLUTION INTRAMUSCULAR; INTRAVENOUS at 20:15

## 2023-08-12 RX ADMIN — HEPARIN SODIUM SCH UNIT: 5000 INJECTION, SOLUTION INTRAVENOUS; SUBCUTANEOUS at 20:13

## 2023-08-12 RX ADMIN — ATORVASTATIN CALCIUM SCH MG: 80 TABLET, FILM COATED ORAL at 20:12

## 2023-08-12 RX ADMIN — HEPARIN SODIUM SCH: 5000 INJECTION, SOLUTION INTRAVENOUS; SUBCUTANEOUS at 08:13

## 2023-08-12 RX ADMIN — LEVOTHYROXINE SODIUM SCH MCG: 0.11 TABLET ORAL at 08:13

## 2023-08-12 RX ADMIN — METOPROLOL TARTRATE SCH MG: 25 TABLET, FILM COATED ORAL at 08:13

## 2023-08-12 RX ADMIN — HEPARIN SODIUM SCH: 5000 INJECTION, SOLUTION INTRAVENOUS; SUBCUTANEOUS at 15:49

## 2023-08-12 RX ADMIN — METOPROLOL TARTRATE SCH MG: 25 TABLET, FILM COATED ORAL at 20:13

## 2023-08-12 RX ADMIN — RANOLAZINE SCH MG: 500 TABLET, FILM COATED, EXTENDED RELEASE ORAL at 20:13

## 2023-08-12 RX ADMIN — ISOSORBIDE MONONITRATE SCH MG: 30 TABLET, EXTENDED RELEASE ORAL at 08:13

## 2023-08-12 RX ADMIN — ZOLPIDEM TARTRATE PRN MG: 5 TABLET ORAL at 22:03

## 2023-08-12 RX ADMIN — LACOSAMIDE SCH MG: 50 TABLET, FILM COATED ORAL at 20:13

## 2023-08-12 NOTE — P.GSCN
History of Present Illness


Consult date: 23


Reason for Consult: 


Carotid stenosis.





History of present illness: 





Patient is a 68-year-old female who presented with symptoms of vertigo/syncope. 

Symptoms have been recurrent.  She has a history of bilateral carotid 

endarterectomy performed multiple years prior at [facility].  

She has not been following with her operative surgeon.  She recently suffered a 

myocardial infarction 3 months prior and is following with a cardiologist from 

[facility].  The patient is a smoker and continues to use tobacco at 

approximately 10-12 cigarettes daily.  She has approximately 40 years of tobacco

use history.  She is taking statin although apparently is not on aspirin 

therapy.





Past Medical History


Past Medical History: Hypertension, Myocardial Infarction (MI), Thyroid Disorder


Last Myocardial Infarction Date:: 


History of Any Multi-Drug Resistant Organisms: None Reported


Past Surgical History: Appendectomy,  Section, Cholecystectomy, Coronary

Bypass/CABG, Heart Catheterization, Heart Catheterization With Stent


Additional Past Surgical History / Comment(s): bilat carotid


Past Anesthesia/Blood Transfusion Reactions: No Reported Reaction


Date of Last Stent Placement:: 


Past Psychological History: No Psychological Hx Reported


Smoking Status: Never smoker


Past Alcohol Use History: None Reported


Past Drug Use History: None Reported





- Past Family History


  ** Father


History Unknown: Yes





  ** Mother


Family Medical History: Diabetes Mellitus, Hypertension





Medications and Allergies


                                Home Medications











 Medication  Instructions  Recorded  Confirmed  Type


 


Atorvastatin Calcium [Lipitor] 80 mg PO HS 04/29/22 08/10/23 History


 


Clopidogrel Bisulfate [Plavix] 75 mg PO DAILY 04/29/22 08/10/23 History


 


Levothyroxine Sodium [Synthroid] 112 mcg PO DAILY 04/29/22 08/10/23 History


 


Sertraline HCl [Zoloft] 100 mg PO AS DIRECTED 04/29/22 08/10/23 History


 


Nitroglycerin Sl Tabs [Nitrostat] 0.4 mg SL Q5M PRN 09/09/22 08/10/23 History


 


Ibuprofen/Diphenhydramine Cit 1 tab PO HS PRN 08/10/23 08/10/23 History





[Ibuprofen Pm Caplet]    


 


Isosorbide Mononitrate ER [Imdur] 30 mg PO DAILY 08/10/23 08/10/23 History


 


Lacosamide [Vimpat] 100 mg PO DAILY 08/10/23 08/10/23 History


 


Metoprolol Tartrate [Lopressor] 25 mg PO BID 08/10/23 08/10/23 History


 


Ranolazine [Ranexa] 1,000 mg PO BID 08/10/23 08/10/23 History








                                    Allergies











Allergy/AdvReac Type Severity Reaction Status Date / Time


 


codeine AdvReac  Unknown Verified 08/10/23 17:48














Surgical - Exam


Osteopathic Statement: *.  No significant issues noted on an osteopathic st

ructural exam other than those noted in the History and Physical/Consult.


                                   Vital Signs











Temp Pulse Resp BP Pulse Ox


 


 98.6 F   66   20   93/50   95 


 


 08/10/23 15:16  08/10/23 15:16  08/10/23 15:16  08/10/23 15:16  08/10/23 15:16














Patient is awake, cooperative in no apparent distress.





Neurological examination demonstrates cranial nerves II through XII very grossly

 intact.  Equal motor strength in the upper and lower extremities is noted 

bilaterally.


Heart: Regular rate and rhythm.


Lungs: Clear auscultation bilaterally.


Abdomen: Soft and otherwise benign.


Extremities: Femoral, popliteal, dorsalis pedis and posterior tibial pulses are 

intact bilaterally.  As are the brachial, radial and ulnar pulses in the upper 

extremities.  There is no leg edema noted.





Results





- Labs





                                 23 07:05





                                 23 07:05


                  Abnormal Lab Results - Last 24 Hours (Table)











  23 Range/Units





  07:05 07:05 


 


WBC  10.95 H   (4.50-10.00)  X 10*3/uL


 


RBC  3.77 L   (4.10-5.20)  X 10*6/uL


 


MCV  103.7 H   (80.0-97.0)  FL


 


MCH  32.1 H   (27.0-32.0)  pg


 


MCHC  30.9 L   (32.0-37.0)  d/dL


 


RDW  14.6 H   (11.5-14.5)  %


 


Calcium   8.6 L  (8.7-10.3)  mg/dL








                                 Diabetes panel











  23 Range/Units





  07:05 


 


Sodium  141  (135-145)  mmol/L


 


Potassium  5.0  (3.5-5.5)  mmol/L


 


Chloride  107  ()  mmol/L


 


Carbon Dioxide  26.2  (21.6-31.8)  mmol/L


 


BUN  17.6  (9.0-27.0)  mg/dL


 


Creatinine  1.0  (0.6-1.5)  mg/dL


 


Glucose  89  ()  mg/dL


 


Calcium  8.6 L  (8.7-10.3)  mg/dL








                                  Calcium panel











  23 Range/Units





  07:05 


 


Calcium  8.6 L  (8.7-10.3)  mg/dL








                                 Pituitary panel











  23 Range/Units





  07:05 


 


Sodium  141  (135-145)  mmol/L


 


Potassium  5.0  (3.5-5.5)  mmol/L


 


Chloride  107  ()  mmol/L


 


Carbon Dioxide  26.2  (21.6-31.8)  mmol/L


 


BUN  17.6  (9.0-27.0)  mg/dL


 


Creatinine  1.0  (0.6-1.5)  mg/dL


 


Glucose  89  ()  mg/dL


 


Calcium  8.6 L  (8.7-10.3)  mg/dL








                                  Adrenal panel











  23 Range/Units





  07:05 


 


Sodium  141  (135-145)  mmol/L


 


Potassium  5.0  (3.5-5.5)  mmol/L


 


Chloride  107  ()  mmol/L


 


Carbon Dioxide  26.2  (21.6-31.8)  mmol/L


 


BUN  17.6  (9.0-27.0)  mg/dL


 


Creatinine  1.0  (0.6-1.5)  mg/dL


 


Glucose  89  ()  mg/dL


 


Calcium  8.6 L  (8.7-10.3)  mg/dL














- Imaging


Additional studies: 





Carotid duplex ultrasound reviewed.





Assessment and Plan


Assessment: 





Recurrent bilateral carotid stenosis, currently appearing asymptomatic.





Plan: 





Plan: We'll obtain CTA of her carotids.


Anticipate outpatient follow-up for possible bilateral carotid stenting.


Business card given.  Patient asked to follow-up in the office.


Time with Patient: Greater than 30

## 2023-08-12 NOTE — CT
EXAMINATION TYPE: CT angio head neck

 

DATE OF EXAM: 8/12/2023

 

COMPARISON: CTA chest dated 4/29/2022

 

HISTORY: Neurologic symptoms

 

 

CONTRAST: 

CTA cervical carotids is performed without Oral Contrast and with IV Contrast, patient injected with 
65 mL of Isovue 370.

 

Contrast CTA of the cervical carotids was performed 3-D reconstruction imaging obtained at a separate
 workstation.  

 

There is three-vessel arch noted. [place] aneurysm or aortic arch is unchanged and measures 2.8 x 1
.6 cm.

 

Right carotid system: Mild plaque is seen of the right common carotid artery.  There is long segment 
soft plaque noted at the proximal ICA resulting in high-grade stenosis estimated at greater than 95%.
 There is also stenosis at the take off of the external carotid artery with stenosis of at least 90%.
 

Right vertebral artery appears unremarkable. 

 

Left carotid system: High-grade stenosis distal left common carotid artery approximately 2.7 cm proxi
mal to the carotid bulb. Estimated diameter reduction of greater than 95%. There is mild plaque also 
noted at the carotid bulb. No hemodynamically significant stenosis is present. ECA is patent. Left ve
rtebral artery appears unremarkable. 

 

IMPRESSION: 

1.  Estimated diameter reduction Right ICA greater than 95% 

2. High-grade stenosis distal left common carotid artery as discussed above estimated at greater than
 95%.

 

NASCET criteria was used in interpretation of this exam?

## 2023-08-12 NOTE — P.PN
Subjective


Progress Note Date: 08/12/23





Hospital course:





Patient is a very pleasant 68-year-old female with a past medical history of CAD

status post CABG followed by stent placement on dual antiplatelet therapy with 

aspirin and Plavix, carotid artery stenosis status post bilateral carotid 

endarterectomy, hypertension, hyperlipidemia, hypothyroidism, and nicotine 

dependence.  She presented to the emergency department secondary to reports of 

recurrent syncopal episodes. She underwent full evaluation in the emergency 

department.  Upon arrival to facility vital signs 93/50 blood pressure, heart 

rate 66, respiratory rate 20, temp 98.6F, SpO2 of 95% on room air. Labs 

completed and reviewed.  CBC, coags, and CMP were unremarkable.  Troponin was 

negative at less than 0.012.  ProBNP was 481. Venous blood gas showing mild 

hypercarbia with elevated  pCO2 of 53 and normal pH of 7.32 and bicarb of 27.  

EKG was completed showing normal sinus rhythm at 65 bpm with anterolateral T-

wave inversion in leads III, V4, V5, and V6.  CT brain was negative for acute 

intracranial process showing a remote right occipital pole infarct 

redemonstrated.  Chest x-ray completed negative for acute cardiopulmonary pr

ocess.  Patient was admitted under our services to general medical unit with 

telemetry with consultations placed to cardiology and neurology for evaluation.





Physical exam:





Patient seen and fully evaluated at bedside this morning.  Patient reports no 

further episodes of syncopal episodes since arrival to our facility.  Patient 

reports feeling good this morning and ready to go home.  Vascular surgery 

evaluated and least order for CT angiogram of head and neck to be completed.





Vital signs reviewed and stable. 


General: Nontoxic, no distress and appears stated age.  


Derm: Skin warm and dry, normal coloration for ethnicity.


Head: Atraumatic, normocephalic and symmetric.  


Eyes: EOMs intact, no lid lag, and anicteric sclera


Mouth: no lip lesions, mucus membranes moist


Cardiovascular: regular rate and rhythm with normal S1S2, systolic murmur, posit

giles posterior tibial pulses bilaterally, and cap refill < 2 seconds.  


Lungs: Respirations even, regular, and unlabored on room air. Lungs CTA 

bilaterally, no rhonchi, no rales, no wheezing, and no accessory muscle usage. 


Abdominal: soft, nontender to palpation, no guarding, no appreciable organome

litzy


Ext: ROM intact. No gross muscle atrophy, no edema, no contractures


Neuro: Speech clear, face symmetrical and CN II-XII grossly intact with no noted

focal neuro deficits


Psych: Alert and oriented to person, place, time, and situation. Appropriate and

pleasant affect. 





Assessment and Plan of Care:





Recurrent syncopal episodes of unclear etiology


History of CAD status post CABG followed by stent placement


Carotid artery stenosis status post bilateral carotid endarterectomy


Hypertension


Hyperlipidemia


Hypothyroidism


Chronic nicotine dependence


-Cardiology following and recommending event monitor and discharge from cardiac 

standpoint.


-Neurology following and increased patient is a Vimpat 100 mg twice daily and 

recommending if patient continues to have "syncopal spells" then patient will 

likely need Vimpat increased to 150 mg twice daily.


-Vascular surgery following least order for CTA head and neck.


-Patient to remain on telemetry monitoring with neuro checks every 4 hours.


-Fall precautions in place.


-Patient to continue with dual antiplatelet therapy with aspirin and Plavix 

along with atorvastatin 80 mg daily, isosorbide mononitrate 30 mg daily, Vimpat 

100 mg daily, levothyroxine sodium 112 g daily, metoprolol 25 mg twice daily, 

and Ranexa 1000 mg twice daily.


-Recommend smoking cessation.


-Orthostatic vitals reviewed and negative for orthostatic hypotension.





Data review:


-Carotid Dopplers revealing severe high-grade stenosis proximal right ICA, right

vertebral artery possible occlusion, and high velocities within the sampled left

common carotid artery suggesting a significant proximal left CCA stenosis.


-Echocardiogram completed and report reviewed showing mildly impaired EF of 40-

45% with mild mitral and tricuspid regurgitation and increased septal wall thi

ckness, posterior wall thickness, and atypical septal motion noted with 

hypokinesia of the inferiobasal wall as well as the distal anterioapical septal 

wall. 


-MRI reviewed showing remote injury of the right occipital lobe with 

encephalomalacia with no evidence of intracranial mass, acute/subacute infarct, 

or abnormal enhancement with nonspecific white matter changes likely related to 

small vessel ischemic disease.


-EEG was completed and report reviewed stating normal EEG during wakefulness, 

drowsiness, and stage II sleep with no focal lateralized or epileptiform 

activity noted.


-Morning blood pressure elevated at 178/47, heart rate 67, respiratory rate 16, 

and temp of 98.0F.








CODE STATUS: Full code


DVT prophylaxis: heparin


Discussed with: patient, cardiac NP and RN


Anticipated discharge date: Clinical course to determine


Anticipated discharge place: Home


Patient was seen independently by Nurse Pracitioner.


This document was prepared using Dragon dictation software.  Please allow for 

errors in transcription, while rare they do occur.


Silvano Taveras NP rendered care for this patient independently, reviewed the 

findings and plan as documented in the note above.  I did not physically speak 

with or examine the patient on this date. 





Objective





- Vital Signs


Vital signs: 


                                   Vital Signs











Temp  98.0 F   08/12/23 07:00


 


Pulse  67   08/12/23 07:00


 


Resp  16   08/12/23 07:00


 


BP  178/47   08/12/23 07:00


 


Pulse Ox  98   08/12/23 07:00


 


FiO2      








                                 Intake & Output











 08/11/23 08/12/23 08/12/23





 18:59 06:59 18:59


 


Intake Total 960  358


 


Balance 960  358


 


Intake:   


 


  Oral 960  358


 


Other:   


 


  Voiding Method  Toilet 


 


  # Voids 2 1 2














- Labs


CBC & Chem 7: 


                                 08/13/23 06:37





                                 08/13/23 06:37

## 2023-08-12 NOTE — P.PN
Subjective


Progress Note Date: 08/12/23


This is Suresh Woodruff NP, I'm dictating on behalf of Dr. Shabazz's H&P and A&P.


Patient was interviewed and examined.





Patient is a pleasant 60-year-old female who presented to the hospital after a 

syncopal episode.  Patient reports that she's feeling fine today.  She denies 

chest pain, heart palpitations, and shortness of breath.  Her echo shows a 

mildly reduced EF, with apical hypokinesis.  Orthostatic vital signs been 

negative.  Patient is walking the hallways without any issues.  Telemetry 

demonstrates normal sinus rhythm.  From a cardiology standpoint patient can be 

discharged.








GENERAL: Well-appearing, well-nourished and in no acute distress.


NECK: Supple without JVD or thyromegaly.


LUNGS: Breath sounds clear to auscultation bilaterally.  Respiration equal and 

unlabored.  No wheezes, rales or rhonchi.


HEART: Regular rate and rhythm without murmurs, rubs or gallops.  S1 and S2 

heard.


EXTREMITIES: Normal range of motion, no edema.  No clubbing or cyanosis.  

Peripheral pulses intact and strong.





VITALS:


Temp 98.0, pulse 67, respirations 16, blood pressure 178/47, O2 saturation 98% 

on room air





TELEMETRY:


Normal sinus rhythm





LABS:


No new labs since 08/11/2023





IMPRESSION:


1.  Syncope of unclear etiology


2.  CAD with history of CABG and PCI


3.  Chronic systolic heart failure, EF 40-45%


4.  Ischemic cardiomyopathy with prior apical scar


5.  Chronic apical thrombus appears calcified on CT


6.  Hypertension


7.  Hyperlipidemia


8.  PAD


9.  Status post bilateral carotid endarterectomy


10.  Hypothyroidism


11.  Tobacco use and dependence





PLAN:


2-D echo demonstrates mildly reduced ejection fraction.


No new medications at this time.


Patient can be discharged from a cardiology standpoint.


Patient should follow with Dr. Medrano after discharge.


Recommend event monitor.


Patient should seriously consider smoking cessation.











Objective





- Vital Signs


Vital signs: 


                                   Vital Signs











Temp  98.0 F   08/12/23 07:00


 


Pulse  67   08/12/23 07:00


 


Resp  16   08/12/23 07:00


 


BP  178/47   08/12/23 07:00


 


Pulse Ox  98   08/12/23 07:00


 


FiO2      








                                 Intake & Output











 08/11/23 08/12/23 08/12/23





 18:59 06:59 18:59


 


Intake Total 960  118


 


Balance 960  118


 


Intake:   


 


  Oral 960  118


 


Other:   


 


  Voiding Method  Toilet 


 


  # Voids 2 1 














- Labs


CBC & Chem 7: 


                                 08/11/23 07:05





                                 08/11/23 07:05


Labs: 


                  Abnormal Lab Results - Last 24 Hours (Table)











  08/11/23 08/11/23 Range/Units





  07:05 07:05 


 


WBC  10.95 H   (4.50-10.00)  X 10*3/uL


 


RBC  3.77 L   (4.10-5.20)  X 10*6/uL


 


MCV  103.7 H   (80.0-97.0)  FL


 


MCH  32.1 H   (27.0-32.0)  pg


 


MCHC  30.9 L   (32.0-37.0)  d/dL


 


RDW  14.6 H   (11.5-14.5)  %


 


Calcium   8.6 L  (8.7-10.3)  mg/dL

## 2023-08-13 VITALS — RESPIRATION RATE: 16 BRPM

## 2023-08-13 VITALS — TEMPERATURE: 98.3 F | DIASTOLIC BLOOD PRESSURE: 101 MMHG | SYSTOLIC BLOOD PRESSURE: 176 MMHG | HEART RATE: 86 BPM

## 2023-08-13 LAB
ALBUMIN SERPL-MCNC: 4.1 D/DL (ref 3.8–4.9)
ALBUMIN/GLOB SERPL: 2.05 RATIO (ref 1.6–3.17)
ALP SERPL-CCNC: 130 U/L (ref 41–126)
ALT SERPL-CCNC: 22 U/L (ref 8–44)
ANION GAP SERPL CALC-SCNC: 9.7 MMOL/L (ref 4–12)
AST SERPL-CCNC: 24 U/L (ref 13–35)
BUN SERPL-SCNC: 15.3 MG/DL (ref 9–27)
BUN/CREAT SERPL: 17 RATIO (ref 12–20)
CALCIUM SPEC-MCNC: 10 MG/DL (ref 8.7–10.3)
CHLORIDE SERPL-SCNC: 104 MMOL/L (ref 96–109)
CO2 SERPL-SCNC: 29.3 MMOL/L (ref 21.6–31.8)
ERYTHROCYTE [DISTWIDTH] IN BLOOD BY AUTOMATED COUNT: 4.16 X 10*6/UL (ref 4.1–5.2)
ERYTHROCYTE [DISTWIDTH] IN BLOOD: 14.2 % (ref 11.5–14.5)
GLOBULIN SER CALC-MCNC: 2 D/DL (ref 1.6–3.3)
GLUCOSE SERPL-MCNC: 93 MG/DL (ref 70–110)
HCT VFR BLD AUTO: 41.5 % (ref 37.2–46.3)
HGB BLD-MCNC: 13.6 D/DL (ref 12–15)
INR PPP: 0.9 (ref ?–1.2)
MAGNESIUM SPEC-SCNC: 1.9 MG/DL (ref 1.5–2.4)
MCH RBC QN AUTO: 32.7 PG (ref 27–32)
MCHC RBC AUTO-ENTMCNC: 32.8 D/DL (ref 32–37)
MCV RBC AUTO: 99.8 FL (ref 80–97)
NRBC BLD AUTO-RTO: 0 X 10*3/UL (ref 0–0.01)
PLATELET # BLD AUTO: 253 X 10*3/UL (ref 140–440)
POTASSIUM SERPL-SCNC: 5.4 MMOL/L (ref 3.5–5.5)
PROT SERPL-MCNC: 6.1 D/DL (ref 6.2–8.2)
PT BLD: 9.9 SEC (ref 9–12)
SODIUM SERPL-SCNC: 143 MMOL/L (ref 135–145)
WBC # BLD AUTO: 7 X 10*3/UL (ref 4.5–10)

## 2023-08-13 RX ADMIN — CLOPIDOGREL BISULFATE SCH MG: 75 TABLET ORAL at 08:05

## 2023-08-13 RX ADMIN — ISOSORBIDE MONONITRATE SCH MG: 30 TABLET, EXTENDED RELEASE ORAL at 08:05

## 2023-08-13 RX ADMIN — LEVOTHYROXINE SODIUM SCH MCG: 0.11 TABLET ORAL at 06:28

## 2023-08-13 RX ADMIN — METOPROLOL TARTRATE SCH: 25 TABLET, FILM COATED ORAL at 08:05

## 2023-08-13 RX ADMIN — LACOSAMIDE SCH MG: 50 TABLET, FILM COATED ORAL at 08:05

## 2023-08-13 RX ADMIN — HEPARIN SODIUM SCH: 5000 INJECTION, SOLUTION INTRAVENOUS; SUBCUTANEOUS at 08:06

## 2023-08-13 RX ADMIN — RANOLAZINE SCH MG: 500 TABLET, FILM COATED, EXTENDED RELEASE ORAL at 08:05

## 2023-08-13 NOTE — P.PN
Subjective


Progress Note Date: 08/13/23





The patient is a 68-year-old female who is seen in neurologic follow-up on 

August 13, 2023, in collaboration with Marisela Martinez, via teleneurology.


The patient states that she is feeling well.  There have been no further 

episodes of syncope.  She does report having a "mild" headache which she rates 

at a 7/10.  She did receive Tylenol which she felt was beneficial.


EEG is negative for epileptiform activity.


CT angiogram reveals greater than 95% stenosis of the left common carotid artery

and right ICA.





Objective





- Vital Signs


Vital signs: 


                                   Vital Signs











Temp  97.8 F   08/13/23 07:00


 


Pulse  56 L  08/13/23 07:00


 


Resp  16   08/13/23 07:00


 


BP  119/70   08/13/23 07:00


 


Pulse Ox  98   08/13/23 07:39


 


FiO2      








                                 Intake & Output











 08/12/23 08/13/23 08/13/23





 18:59 06:59 18:59


 


Intake Total 598  360


 


Balance 598  360


 


Intake:   


 


  Oral 598  360


 


Other:   


 


  Voiding Method  Toilet 


 


  # Voids 2 2 














- Exam





Gen.: The patient is seated in bedside chair.  She is well-nourished, well-

developed and in no acute distress.


HEENT: Head is atraumatic, normocephalic.  Fundus not visualized.  There is no 

scleral icterus.  Mucous members are moist.


Neurological examination


Mental status: The patient is awake, alert and oriented 3.  Her speech is 

clear.  There is no dysarthria or aphasia.


Cranial nerves: 2-12 grossly intact





- Labs


CBC & Chem 7: 


                                 08/13/23 06:37





                                 08/13/23 06:37


Labs: 


                  Abnormal Lab Results - Last 24 Hours (Table)











  08/13/23 08/13/23 Range/Units





  06:37 06:37 


 


MCV  99.8 H   (80.0-97.0)  FL


 


MCH  32.7 H   (27.0-32.0)  pg


 


Alkaline Phosphatase   130 H  ()  U/L


 


Total Protein   6.1 L  (6.2-8.2)  d/dL














Assessment and Plan


Assessment: 





Recurrent syncope, rule out seizures(Less likely) versus arrhythmia.


* History of seizures, with abnormal EEG with periodic lateralized epileptiform 

  discharges involving left temporal region in September 2022, that had 

  resolved.


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


* Hypertension


* CAD





* History of bilateral CEA.


* History of CAD s/p stent and CABG


* History of hypothyroidism


* Severe depression


* Anxiety


* Folate deficiency


* Tobacco use





Plan: 





1.  Continue current dosing of Vimpat


2.  Continue Plavix and high-dose statin


3.  The patient should be advised to follow up with neurology after discharge, 

for further attention to seizures.


4.  Neurology will sign off at this time.  Please call with questions or 

concerns.


Time with Patient: Less than 30 (27 minutes were spent caring for this patient 

today including, obtaining a history, examining the patient, reviewing chart 

documentation, imaging, labs, creating this note)

## 2023-08-13 NOTE — P.DS
Providers


Date of admission: 


08/10/23 17:59





Expected date of discharge: 08/13/23


Attending physician: 


Narciso Iglesias MD





Consults: 





                                        





08/10/23 18:22


Consult Physician Routine 


   Consulting Provider: Rogers Garcia


   Consult Reason/Comments: syncope


   Do you want consulting provider notified?: Yes





08/11/23 00:31


Consult Physician Routine 


   Consulting Provider: Dale Montero


   Consult Reason/Comments: left homonymous hemianopsia


   Do you want consulting provider notified?: Yes, Notify in am





08/11/23 17:28


Consult Physician Routine 


   Consulting Provider: Daisy Fuentes


   Consult Reason/Comments: carotid stenosis, recurrent syncopal episodes


   Do you want consulting provider notified?: Yes











Primary care physician: 


Rupert Noel MD





Hospital Course: 





Discharge Diagnosis:





Recurrent syncopal episodes of unclear etiology.  It is of unclear origin at 

this time, patient had no further events during hospitalization.  Cannot rule 

out breakthrough seizures as it is unclear if patient has been taking Vimpat as 

previously scheduled and has resumed this medication since hospitalization and 

had no further episodes since admission.  Patient also underwent full cardiac 

workup and they're recommending outpatient follow-up in our office for placement

of recommended event monitor and tilt table testing.  Neurology evaluated 

recommending patient continue Vimpat 100 mg twice daily and if continued repeat 

episodes recommend increasing Vimpat to 150 mg twice daily.  Patient underwent 

full workup and was found to have significant carotid artery stenosis, again she

was evaluated by a vascular surgeon and to follow up outpatient for scheduling 

of likely bilateral carotid artery stenting.





Severe Carotid artery stenosis status post previous bilateral carotid 

endarterectomy.  Continue with Plavix 75 mg daily and atorvastatin 80 mg daily. 

Patient was found to have greater than 95% stenosis of right ICA and high-grade 

stenosis of left distal common carotid artery also estimated at greater than 

95%.  Patient will need to follow up outpatient with vascular surgeon for sc

heduling and further discussion of likely bilateral carotid artery stenting.





History of CAD status post CABG followed by stent placement.  Echocardiogram 

revealing a mildly impaired EF of 40-45% with mild mitral and tricuspid 

regurgitation and increased septal wall thickness, posterior wall thickness, and

atypical septal motion noted with hypokinesia of the inferiobasal wall as well 

as the distal anterioapical septal wall.





Hypertension.  Continue daily medication regimen with metoprolol 25 mg twice 

daily and isosorbide mononitrate 30 mg daily.





Hyperlipidemia.  Continue atorvastatin 80 mg nightly.





Hypothyroidism.  Continue levothyroxine 112 g daily.





Chronic nicotine dependence, recommend stopping smoking.





Hospital Course:





Patient is a very pleasant 68-year-old female with a past medical history of CAD

status post CABG followed by stent placement on dual antiplatelet therapy with 

aspirin and Plavix, carotid artery stenosis status post bilateral carotid 

endarterectomy, hypertension, hyperlipidemia, hypothyroidism, and nicotine 

dependence.  She presented to the emergency department secondary to reports of 

recurrent syncopal episodes. She underwent full evaluation in the emergency 

department.  Upon arrival to facility vital signs 93/50 blood pressure, heart 

rate 66, respiratory rate 20, temp 98.6F, SpO2 of 95% on room air. Labs 

completed and reviewed.  CBC, coags, and CMP were unremarkable.  Troponin was 

negative at less than 0.012.  ProBNP was 481. Venous blood gas showing mild 

hypercarbia with elevated  pCO2 of 53 and normal pH of 7.32 and bicarb of 27.  

EKG was completed showing normal sinus rhythm at 65 bpm with anterolateral T-

wave inversion in leads III, V4, V5, and V6.  CT brain was negative for acute 

intracranial process showing a remote right occipital pole infarct 

redemonstrated.  Chest x-ray completed negative for acute cardiopulmonary 

process.  Patient was admitted under our services to general medical unit with 

telemetry with consultations placed to cardiology and neurology for evaluation. 

She was evaluated by cardiology recommending outpatient follow-up with their 

office for placement as recommended event monitor and scheduling of tilt table 

testing.  Patient was evaluated by neurologist recommending patient continue 

Vimpat 100 mg twice daily and to further syncopal episodes will likely need dose

increased to 150 mg twice daily as previously recommended.  Patient currently on

100 mg twice daily and is unclear if patient has been taking medications as 

prescribed at home as breakthrough seizures cannot be ruled out at this time. 

Carotid Dopplers revealing severe high-grade stenosis proximal right ICA, right 

vertebral artery possible occlusion, and high velocities within the sampled left

common carotid artery suggesting a significant proximal left CCA stenosis. 

Echocardiogram completed and report reviewed showing mildly impaired EF of 40-

45% with mild mitral and tricuspid regurgitation and increased septal wall 

thickness, posterior wall thickness, and atypical septal motion noted with 

hypokinesia of the inferiobasal wall as well as the distal anterioapical septal 

wall. MRI reviewed showing remote injury of the right occipital lobe with 

encephalomalacia with no evidence of intracranial mass, acute/subacute infarct, 

or abnormal enhancement with nonspecific white matter changes likely related to 

small vessel ischemic disease. EEG was completed and report reviewed stating 

normal EEG during wakefulness, drowsiness, and stage II sleep with no focal 

lateralized or epileptiform activity noted.  Secondary to severe carotid artery 

stenosis, consult was also placed to vascular surgeon.  Vascular surgeon 

evaluated patient and sent patient for CT angiogram of head and neck.  CT ang

iogram head and neck revealed estimated diameter reduction of the right ICA is 

greater than 95% and high-grade stenosis of distal left common carotid artery 

estimated at greater than 95%.  Vascular surgeon, Dr. Delong recommending 

patient to follow up outpatient with his office next week for scheduling and 

further discussion of likely bilateral carotid artery stenting.  As stated 

above, patient cleared by cardiology, neurology, and vascular surgery.  She has 

had no further events of reported syncopal episodes since hospitalization.  

Medically, patient is stable at this time. Discharge instructions were reviewed 

with patient along with recommended follow-ups. 





Physical exam:





Vital signs reviewed and stable. 


General: Nontoxic, no distress and appears stated age.  


Derm: Skin warm and dry, normal coloration for ethnicity.


Head: Atraumatic, normocephalic and symmetric.  


Eyes: EOMs intact, no lid lag, and anicteric sclera


Mouth: no lip lesions, mucus membranes moist


Cardiovascular: regular rate and rhythm with normal S1S2, systolic murmur, 

positive posterior tibial pulses bilaterally, and cap refill < 2 seconds.  


Lungs: Respirations even, regular, and unlabored on room air. Lungs CTA 

bilaterally, no rhonchi, no rales, no wheezing, and no accessory muscle usage. 


Abdominal: soft, nontender to palpation, no guarding, no appreciable org

anomegaly


Ext: ROM intact. No gross muscle atrophy, no edema, no contractures


Neuro: Speech clear, face symmetrical and CN II-XII grossly intact with no noted

focal neuro deficits


Psych: Alert and oriented to person, place, time, and situation. Appropriate and

pleasant affect. 











A total of 38 minutes of time were spent preparing this complex discharge 

summary.


Pt was discharged on 8/13/23 at 12:42 PM.


Patient was seen independently by Nurse Practitioner.


This document was prepared using Dragon dictation software.  Please allow for 

errors in transcription while rare they do occur.














I reviewed the documentation as provided by the RUBI above, who is the original 

author of this note.  I agree with the documented assessment and plan, with the 

following changes: none


Patient Condition at Discharge: Stable





Plan - Discharge Summary


Discharge Rx Participant: No


New Discharge Prescriptions: 


Continue


   Clopidogrel Bisulfate [Plavix] 75 mg PO DAILY


   Isosorbide Mononitrate ER [Imdur] 30 mg PO DAILY


   Ranolazine [Ranexa] 1,000 mg PO BID


   Metoprolol Tartrate [Lopressor] 25 mg PO BID


   Lacosamide [Vimpat] 100 mg PO DAILY


   Sertraline HCl [Zoloft] 100 mg PO AS DIRECTED


   Levothyroxine Sodium [Synthroid] 112 mcg PO DAILY


   Atorvastatin Calcium [Lipitor] 80 mg PO HS


   Nitroglycerin Sl Tabs [Nitrostat] 0.4 mg SL Q5M PRN


     PRN Reason: Chest Pain


   Ibuprofen/Diphenhydramine Cit [Ibuprofen Pm Caplet] 1 tab PO HS PRN


     PRN Reason: SLEEP/ALLERGIES


Discharge Medication List





Atorvastatin Calcium [Lipitor] 80 mg PO HS 04/29/22 [History]


Clopidogrel Bisulfate [Plavix] 75 mg PO DAILY 04/29/22 [History]


Levothyroxine Sodium [Synthroid] 112 mcg PO DAILY 04/29/22 [History]


Sertraline HCl [Zoloft] 100 mg PO AS DIRECTED 04/29/22 [History]


Nitroglycerin Sl Tabs [Nitrostat] 0.4 mg SL Q5M PRN 09/09/22 [History]


Ibuprofen/Diphenhydramine Cit [Ibuprofen Pm Caplet] 1 tab PO HS PRN 08/10/23 

[History]


Isosorbide Mononitrate ER [Imdur] 30 mg PO DAILY 08/10/23 [History]


Lacosamide [Vimpat] 100 mg PO DAILY 08/10/23 [History]


Metoprolol Tartrate [Lopressor] 25 mg PO BID 08/10/23 [History]


Ranolazine [Ranexa] 1,000 mg PO BID 08/10/23 [History]








Follow up Appointment(s)/Referral(s): 


Rupert Noel MD [Primary Care Provider] - 1-2 days


Mark Medrano DO [STAFF PHYSICIAN] - 1 Week


Gage Olivo DO [Doctor of Osteopathic Medicine] - 1 Week


Valeria Turner MD [Medical Doctor] - 1 Week


Patient Instructions/Handouts:  How to Stop Smoking (DC), Syncope (DC), Carotid 

Artery Disease (DC)


Activity/Diet/Wound Care/Special Instructions: 





Activity:





As tolerated.  Take breaks as needed.





Diet: 





Heart healthy and carb consistent diet. Avoid salts, or foods with hidden salts 

such as canned or boxed foods and frozen dinners. Extra salt makes your heart 

work harder and traps the fluid in your body for longer.





Special Instructions:  





Take all of your medications as directed and remember to keep all of your 

doctor's appointments and follow-up as needed.  





[place] state law states no driving until free from any syncopal episodes for 

at least 6 months.  It is also important to avoid climbing ladders, operating 

dangerous or heavy machinery or unsupervised swimming until free from any 

syncopal episodes for 6 months.





It is of utmost importance to follow-up with vascular surgery to discuss 

surgical interventions secondary to your severe carotid artery stenosis.





You will need to follow up outpatient with cardiology for placement of 

recommended event monitor and completion of tilt table test. 





You will will need to follow up with neurologist as seizure activity has not 

been fully ruled out.





Strongly recommend stopping smoking.





Thank you for allowing us to participate in your care, it was truly a pleasure 

having you for our patient!!! 





Discharge Disposition: HOME SELF-CARE

## 2023-09-16 ENCOUNTER — HOSPITAL ENCOUNTER (INPATIENT)
Dept: HOSPITAL 47 - EC | Age: 68
LOS: 7 days | Discharge: HOME | DRG: 252 | End: 2023-09-23
Attending: INTERNAL MEDICINE | Admitting: INTERNAL MEDICINE
Payer: MEDICARE

## 2023-09-16 VITALS — BODY MASS INDEX: 22.6 KG/M2

## 2023-09-16 DIAGNOSIS — D75.89: ICD-10-CM

## 2023-09-16 DIAGNOSIS — E03.9: ICD-10-CM

## 2023-09-16 DIAGNOSIS — F32.A: ICD-10-CM

## 2023-09-16 DIAGNOSIS — F17.210: ICD-10-CM

## 2023-09-16 DIAGNOSIS — Z91.199: ICD-10-CM

## 2023-09-16 DIAGNOSIS — Z88.5: ICD-10-CM

## 2023-09-16 DIAGNOSIS — Z79.899: ICD-10-CM

## 2023-09-16 DIAGNOSIS — F05: ICD-10-CM

## 2023-09-16 DIAGNOSIS — I69.312: ICD-10-CM

## 2023-09-16 DIAGNOSIS — I25.5: ICD-10-CM

## 2023-09-16 DIAGNOSIS — E83.52: ICD-10-CM

## 2023-09-16 DIAGNOSIS — G93.89: ICD-10-CM

## 2023-09-16 DIAGNOSIS — G40.909: ICD-10-CM

## 2023-09-16 DIAGNOSIS — K21.9: ICD-10-CM

## 2023-09-16 DIAGNOSIS — E53.8: ICD-10-CM

## 2023-09-16 DIAGNOSIS — Z95.5: ICD-10-CM

## 2023-09-16 DIAGNOSIS — Z79.02: ICD-10-CM

## 2023-09-16 DIAGNOSIS — F41.9: ICD-10-CM

## 2023-09-16 DIAGNOSIS — I51.81: ICD-10-CM

## 2023-09-16 DIAGNOSIS — I73.89: ICD-10-CM

## 2023-09-16 DIAGNOSIS — T42.76XA: ICD-10-CM

## 2023-09-16 DIAGNOSIS — E78.5: ICD-10-CM

## 2023-09-16 DIAGNOSIS — I95.9: ICD-10-CM

## 2023-09-16 DIAGNOSIS — Z91.148: ICD-10-CM

## 2023-09-16 DIAGNOSIS — Z79.890: ICD-10-CM

## 2023-09-16 DIAGNOSIS — G92.8: ICD-10-CM

## 2023-09-16 DIAGNOSIS — Z28.310: ICD-10-CM

## 2023-09-16 DIAGNOSIS — E86.0: ICD-10-CM

## 2023-09-16 DIAGNOSIS — N17.9: ICD-10-CM

## 2023-09-16 DIAGNOSIS — I65.01: ICD-10-CM

## 2023-09-16 DIAGNOSIS — I65.23: ICD-10-CM

## 2023-09-16 DIAGNOSIS — I25.2: ICD-10-CM

## 2023-09-16 DIAGNOSIS — H53.462: ICD-10-CM

## 2023-09-16 DIAGNOSIS — Z95.1: ICD-10-CM

## 2023-09-16 DIAGNOSIS — R00.1: ICD-10-CM

## 2023-09-16 DIAGNOSIS — I10: ICD-10-CM

## 2023-09-16 DIAGNOSIS — I25.110: Primary | ICD-10-CM

## 2023-09-16 DIAGNOSIS — Z53.8: ICD-10-CM

## 2023-09-16 DIAGNOSIS — Z82.49: ICD-10-CM

## 2023-09-16 DIAGNOSIS — H54.62: ICD-10-CM

## 2023-09-16 LAB
ALBUMIN SERPL-MCNC: 4.3 G/DL (ref 3.5–5)
ALP SERPL-CCNC: 104 U/L (ref 38–126)
ALT SERPL-CCNC: 20 U/L (ref 4–34)
ANION GAP SERPL CALC-SCNC: 9 MMOL/L
APTT BLD: 22.8 SEC (ref 22–30)
AST SERPL-CCNC: 26 U/L (ref 14–36)
BASOPHILS # BLD AUTO: 0 K/UL (ref 0–0.2)
BASOPHILS NFR BLD AUTO: 0 %
BUN SERPL-SCNC: 24 MG/DL (ref 7–17)
CALCIUM SPEC-MCNC: 10.3 MG/DL (ref 8.4–10.2)
CHLORIDE SERPL-SCNC: 103 MMOL/L (ref 98–107)
CO2 SERPL-SCNC: 30 MMOL/L (ref 22–30)
EOSINOPHIL # BLD AUTO: 0.1 K/UL (ref 0–0.7)
EOSINOPHIL NFR BLD AUTO: 1 %
ERYTHROCYTE [DISTWIDTH] IN BLOOD BY AUTOMATED COUNT: 4.32 M/UL (ref 3.8–5.4)
ERYTHROCYTE [DISTWIDTH] IN BLOOD: 14 % (ref 11.5–15.5)
GLUCOSE SERPL-MCNC: 105 MG/DL (ref 74–99)
HCT VFR BLD AUTO: 43.8 % (ref 34–46)
HGB BLD-MCNC: 13.9 GM/DL (ref 11.4–16)
INR PPP: 0.9 (ref ?–1.2)
LYMPHOCYTES # SPEC AUTO: 1.4 K/UL (ref 1–4.8)
LYMPHOCYTES NFR SPEC AUTO: 13 %
MAGNESIUM SPEC-SCNC: 2.1 MG/DL (ref 1.6–2.3)
MCH RBC QN AUTO: 32.3 PG (ref 25–35)
MCHC RBC AUTO-ENTMCNC: 31.9 G/DL (ref 31–37)
MCV RBC AUTO: 101.2 FL (ref 80–100)
MONOCYTES # BLD AUTO: 0.3 K/UL (ref 0–1)
MONOCYTES NFR BLD AUTO: 3 %
NEUTROPHILS # BLD AUTO: 8.9 K/UL (ref 1.3–7.7)
NEUTROPHILS NFR BLD AUTO: 83 %
NT-PROBNP SERPL-MCNC: 522 PG/ML
PLATELET # BLD AUTO: 253 K/UL (ref 150–450)
POTASSIUM SERPL-SCNC: 4.8 MMOL/L (ref 3.5–5.1)
PROT SERPL-MCNC: 7.3 G/DL (ref 6.3–8.2)
PT BLD: 9.3 SEC (ref 9–12)
SODIUM SERPL-SCNC: 142 MMOL/L (ref 137–145)
WBC # BLD AUTO: 10.7 K/UL (ref 3.8–10.6)

## 2023-09-16 PROCEDURE — 80053 COMPREHEN METABOLIC PANEL: CPT

## 2023-09-16 PROCEDURE — 37215 TRANSCATH STENT CCA W/EPS: CPT

## 2023-09-16 PROCEDURE — 84484 ASSAY OF TROPONIN QUANT: CPT

## 2023-09-16 PROCEDURE — 93005 ELECTROCARDIOGRAM TRACING: CPT

## 2023-09-16 PROCEDURE — 85025 COMPLETE CBC W/AUTO DIFF WBC: CPT

## 2023-09-16 PROCEDURE — 80048 BASIC METABOLIC PNL TOTAL CA: CPT

## 2023-09-16 PROCEDURE — 84132 ASSAY OF SERUM POTASSIUM: CPT

## 2023-09-16 PROCEDURE — 85027 COMPLETE CBC AUTOMATED: CPT

## 2023-09-16 PROCEDURE — 71045 X-RAY EXAM CHEST 1 VIEW: CPT

## 2023-09-16 PROCEDURE — 83880 ASSAY OF NATRIURETIC PEPTIDE: CPT

## 2023-09-16 PROCEDURE — 83735 ASSAY OF MAGNESIUM: CPT

## 2023-09-16 PROCEDURE — 99285 EMERGENCY DEPT VISIT HI MDM: CPT

## 2023-09-16 PROCEDURE — 36200 PLACE CATHETER IN AORTA: CPT

## 2023-09-16 PROCEDURE — 36415 COLL VENOUS BLD VENIPUNCTURE: CPT

## 2023-09-16 PROCEDURE — 85730 THROMBOPLASTIN TIME PARTIAL: CPT

## 2023-09-16 PROCEDURE — 71275 CT ANGIOGRAPHY CHEST: CPT

## 2023-09-16 PROCEDURE — 85379 FIBRIN DEGRADATION QUANT: CPT

## 2023-09-16 PROCEDURE — 70450 CT HEAD/BRAIN W/O DYE: CPT

## 2023-09-16 PROCEDURE — 85610 PROTHROMBIN TIME: CPT

## 2023-09-16 NOTE — ED
General Adult HPI





- General


Chief complaint: Chest Pain


Stated complaint: chest pain


Time Seen by Provider: 23 18:46


Source: patient


Mode of arrival: wheelchair


Limitations: no limitations





- History of Present Illness


Initial comments: 


Patient presents to the ED with her  for evaluation.  Patient states that

she has had intermittent chest heaviness for the past week or so.  Patient 

states that her symptoms have become worse over the past 2 hours.  Patient 

states that her chest pressure is currently 9/10 in severity.  Patient admits to

having associated nausea and dizziness.  Patient denies radiation of her pain, 

trauma or injury, fever or chills, headache, focal numbness/weakness/neuro 

deficit, neck/arm/jaw/back pain, pleuritic pain, dyspnea, cough or cold 

symptoms, palpitations, syncope, diaphoresis, vomiting, diarrhea,bloody or m

elanotic stool, abdominal pain, dysuria or urinary symptoms, decreased urine 

output, leg or calf swelling or pain, or any other symptoms or complaints.








- Related Data


                                Home Medications











 Medication  Instructions  Recorded  Confirmed


 


Atorvastatin Calcium [Lipitor] 80 mg PO HS 22


 


Clopidogrel Bisulfate [Plavix] 75 mg PO DAILY 22


 


Nitroglycerin Sl Tabs [Nitrostat] 0.4 mg SL Q5M PRN 22


 


Ibuprofen/Diphenhydramine Cit 1 tab PO HS PRN 08/10/23 09/16/23





[Ibuprofen Pm Caplet]   


 


Isosorbide Mononitrate ER [Imdur] 30 mg PO DAILY 08/10/23 09/16/23


 


Lacosamide [Vimpat] 50 mg PO DAILY 08/10/23 09/16/23


 


Metoprolol Tartrate [Lopressor] 25 mg PO BID 08/10/23 09/16/23


 


Ranolazine [Ranexa] 1,000 mg PO BID 08/10/23 09/16/23


 


Levothyroxine Sodium [Synthroid] 125 mcg PO DAILY 23











                                    Allergies











Allergy/AdvReac Type Severity Reaction Status Date / Time


 


codeine AdvReac  Unknown Verified 23 20:53














Review of Systems


ROS Statement: 


Those systems with pertinent positive or pertinent negative responses have been 

documented in the HPI.





ROS Other: All systems not noted in ROS Statement are negative.





Past Medical History


Past Medical History: Coronary Artery Disease (CAD), Hypertension, Myocardial 

Infarction (MI), Thyroid Disorder


Last Myocardial Infarction Date:: 


History of Any Multi-Drug Resistant Organisms: None Reported


Past Surgical History: Appendectomy,  Section, Cholecystectomy, Coronary

 Bypass/CABG, Heart Catheterization, Heart Catheterization With Stent


Additional Past Surgical History / Comment(s): bilat carotid


Past Anesthesia/Blood Transfusion Reactions: No Reported Reaction


Date of Last Stent Placement:: 


Past Psychological History: No Psychological Hx Reported


Smoking Status: Never smoker


Past Alcohol Use History: None Reported


Past Drug Use History: None Reported





- Past Family History


  ** Father


History Unknown: Yes





  ** Mother


Family Medical History: Diabetes Mellitus, Hypertension





General Exam


Limitations: no limitations


General appearance: alert, in no apparent distress


Eye exam: Present: normal appearance


ENT exam: Present: mucous membranes moist


Neck exam: Present: other (trachea is in midline)


Respiratory exam: Present: normal lung sounds bilaterally.  Absent: respiratory 

distress, wheezes, rales, rhonchi, stridor, chest wall tenderness


Cardiovascular Exam: Present: regular rate, normal rhythm, normal heart sounds, 

other (normal radial pulses bilaterally)


GI/Abdominal exam: Present: soft.  Absent: distended, tenderness, guarding


Extremities exam: Present: other (negative Homans sign bilaterally).  Absent: 

tenderness, pedal edema, calf tenderness


Neurological exam: Present: alert, oriented X3.  Absent: motor sensory deficit


Psychiatric exam: Present: normal affect, normal mood


Skin exam: Present: warm, dry, intact, normal color





Course


                                   Vital Signs











  23





  18:36 20:00 23:00


 


Temperature 98.4 F  


 


Pulse Rate 73 71 67


 


Respiratory 20 16 16





Rate   


 


Blood Pressure 93/49 110/54 102/63


 


O2 Sat by Pulse 96 96 98





Oximetry   














- Reevaluation(s)


Reevaluation #1: 





23 21:28


Case, H&P, test results thus far and ED management thus far were discussed with 

Dr. Tyson.  She accepts hospital admission.  She has no further recommendations 

at this time.


23 22:14


Patient states that her pain has currently improved, and she denies development 

of any new symptoms while in the ED.  Patient remains alert and breathing 

comfortably with a normal room air oxygen saturation.  Patient is aware of her 

test results thus far, and she agrees with hospital admission at this time.





EKG Findings





- EKG Comments:


EKG Findings:: ED physician interpretation (interpreted by me): normal sinus 

rhythm, no ectopy, ventricular rate of 76 bpm, normal CO and QRS intervals, 

normal QT interval, leftward axis, inferolateral T-wave abnormality, no 

significant change when compared to 08/10/2023 EKG





Medical Decision Making





- Medical Decision Making





Was pt. sent in by a medical professional or institution (, PA, NP, urgent 

care, hospital, or nursing home...) When possible be specific


@  -[No]


Did you speak to anyone other than the patient for history (EMS, parent, family,

 police, friend...)? What history was obtained from this source 


@  -[No]


Did you review nursing and triage notes (agree or disagree)?  Why? 


@  -[I reviewed and agree with nursing and triage notes]


Were old charts reviewed (outside hosp., previous admission, EMS record, old 

EKG, old radiological studies, urgent care reports/EKG's, nursing home records)?

Report findings 


@  -[No old charts were reviewed]


Differential Diagnosis (chest pain, altered mental status, abdominal pain women,

abdominal pain men, vaginal bleeding, weakness, fever, dyspnea, syncope, 

headache, dizziness, GI bleed, back pain, seizure, CVA, palpatations, mental 

health, musculoskeletal)? 


@  -[Differential Chest Pain:


Stable Angina, Unstable Angina, STEMI, NSTEMI, Aortic Dissection, Pneumothorax, 

Musculoskeletal, Esophageal Spasm, pulmonary embolism, GERD,  this is not meant 

to be an all-inclusive list. ]


EKG interpreted by me (3pts min.).


@  -[As above]


X-rays interpreted by me (1pt min.).


@  -[Chest x-ray was reviewed myself and shows post-surgical changes and no 

acute abnormality.]


CT interpreted by me (1pt min.).


@  -[pending]


U/S interpreted by me (1pt. min.).


@  -[None done]


What testing was considered but not performed or refused? (CT, X-rays, U/S, 

labs)? Why?


@  -[None]


What meds were considered but not given or refused? Why?


@  -[None]


Did you discuss the management of the patient with other professionals 

(professionals i.e. , PA, NP, lab, RT, psych nurse, , , 

teacher, , )? Give summary


@  -[as above.]


Was smoking cessation discussed for >3mins.?


@  -[No]


Was critical care preformed (if so, how long)?


@  -[No]


Were there social determinants of health that impacted care today? How? 

(Homelessness, low income, unemployed, alcoholism, drug addiction, 

transportation, low edu. Level, literacy, decrease access to med. care, custodial, 

rehab)?


@  -[No]


Was there de-escalation of care discussed even if they declined (Discuss DNR or 

withdrawal of care, Hospice)? DNR status


@  -[No]


What co-morbidities impacted this encounter? (DM, HTN, Smoking, COPD, CAD, 

Cancer, CVA, ARF, Chemo, Hep., AIDS, mental health diagnosis, sleep apnea, 

morbid obesity)?


@  -[coronary artery disease]


Was patient admitted / discharged? Hospital course, mention meds given and 

route, prescriptions, significant lab abnormalities, going to OR and other 

pertinent info.


@  -[Patient's pain has improved while in the ED.  Patient has been given a dose

 of aspirin in the ED.  Patient remains alert and breathing comfortably.  

Patient's EKG is unchanged.  Patient's initial troponin is negative.  Patient's 

d-dimer was minimally elevated, so CT angiography chest with IV contrast was 

ordered, but is still pending at this time.  Night ED physician to follow-up on 

the report.  Patient has been admitted to the hospital for cardiac monitoring 

and serial troponins.  Dr. Tyson is accepted hospital admission.] 


Undiagnosed new problem with uncertain prognosis?


@  -[No]


Drug Therapy requiring intensive monitoring for toxicity (Heparin, Nitro, 

Insulin, Cardizem)?


@  -[No]


Were any procedures done?


@  -[No]


Diagnosis/symptom?


@  -[chest pain]


Acute, or Chronic, or Acute on Chronic?


@  -[acute]


Uncomplicated (without systemic symptoms) or Complicated (systemic symptoms)?


@  -[default]


Side effects of treatment?


@  -[No]


Exacerbation, Progression, or Severe Exacerbation?


@  -[No]


Poses a threat to life or bodily function? How? (Chest pain, USA, MI, pneumonia,

 PE, COPD, DKA, ARF, appy, cholecystitis, CVA, Diverticulitis, Homicidal, 

Suicidal, threat to staff... and all critical care pts)


@  -[No]





- Lab Data


Result diagrams: 


                                 23 19:25





                                 23 19:25


                                   Lab Results











  23 Range/Units





  03:00 19:25 19:25 


 


WBC   10.7 H   (3.8-10.6)  k/uL


 


RBC   4.32   (3.80-5.40)  m/uL


 


Hgb   13.9   (11.4-16.0)  gm/dL


 


Hct   43.8   (34.0-46.0)  %


 


MCV   101.2 H   (80.0-100.0)  fL


 


MCH   32.3   (25.0-35.0)  pg


 


MCHC   31.9   (31.0-37.0)  g/dL


 


RDW   14.0   (11.5-15.5)  %


 


Plt Count   253   (150-450)  k/uL


 


MPV   7.9   


 


Neutrophils %   83   %


 


Lymphocytes %   13   %


 


Monocytes %   3   %


 


Eosinophils %   1   %


 


Basophils %   0   %


 


Neutrophils #   8.9 H   (1.3-7.7)  k/uL


 


Lymphocytes #   1.4   (1.0-4.8)  k/uL


 


Monocytes #   0.3   (0-1.0)  k/uL


 


Eosinophils #   0.1   (0-0.7)  k/uL


 


Basophils #   0.0   (0-0.2)  k/uL


 


Macrocytosis   Slight   


 


PT    9.3  (9.0-12.0)  sec


 


INR    0.9  (<1.2)  


 


APTT    22.8  (22.0-30.0)  sec


 


D-Dimer    1.07 H  (<0.60)  mg/L FEU


 


Sodium     (137-145)  mmol/L


 


Potassium     (3.5-5.1)  mmol/L


 


Chloride     ()  mmol/L


 


Carbon Dioxide     (22-30)  mmol/L


 


Anion Gap     mmol/L


 


BUN     (7-17)  mg/dL


 


Creatinine     (0.52-1.04)  mg/dL


 


Est GFR (CKD-EPI)AfAm     (>60 ml/min/1.73 sqM)  


 


Est GFR (CKD-EPI)NonAf     (>60 ml/min/1.73 sqM)  


 


Glucose     (74-99)  mg/dL


 


Calcium     (8.4-10.2)  mg/dL


 


Magnesium     (1.6-2.3)  mg/dL


 


Total Bilirubin     (0.2-1.3)  mg/dL


 


AST     (14-36)  U/L


 


ALT     (4-34)  U/L


 


Alkaline Phosphatase     ()  U/L


 


Troponin I  <0.012    (0.000-0.034)  ng/mL


 


NT-Pro-B Natriuret Pep     pg/mL


 


Total Protein     (6.3-8.2)  g/dL


 


Albumin     (3.5-5.0)  g/dL














  23 Range/Units





  19:25 19:25 


 


WBC    (3.8-10.6)  k/uL


 


RBC    (3.80-5.40)  m/uL


 


Hgb    (11.4-16.0)  gm/dL


 


Hct    (34.0-46.0)  %


 


MCV    (80.0-100.0)  fL


 


MCH    (25.0-35.0)  pg


 


MCHC    (31.0-37.0)  g/dL


 


RDW    (11.5-15.5)  %


 


Plt Count    (150-450)  k/uL


 


MPV    


 


Neutrophils %    %


 


Lymphocytes %    %


 


Monocytes %    %


 


Eosinophils %    %


 


Basophils %    %


 


Neutrophils #    (1.3-7.7)  k/uL


 


Lymphocytes #    (1.0-4.8)  k/uL


 


Monocytes #    (0-1.0)  k/uL


 


Eosinophils #    (0-0.7)  k/uL


 


Basophils #    (0-0.2)  k/uL


 


Macrocytosis    


 


PT    (9.0-12.0)  sec


 


INR    (<1.2)  


 


APTT    (22.0-30.0)  sec


 


D-Dimer    (<0.60)  mg/L FEU


 


Sodium  142   (137-145)  mmol/L


 


Potassium  4.8   (3.5-5.1)  mmol/L


 


Chloride  103   ()  mmol/L


 


Carbon Dioxide  30   (22-30)  mmol/L


 


Anion Gap  9   mmol/L


 


BUN  24 H   (7-17)  mg/dL


 


Creatinine  1.02   (0.52-1.04)  mg/dL


 


Est GFR (CKD-EPI)AfAm  66   (>60 ml/min/1.73 sqM)  


 


Est GFR (CKD-EPI)NonAf  57   (>60 ml/min/1.73 sqM)  


 


Glucose  105 H   (74-99)  mg/dL


 


Calcium  10.3 H   (8.4-10.2)  mg/dL


 


Magnesium  2.1   (1.6-2.3)  mg/dL


 


Total Bilirubin  0.5   (0.2-1.3)  mg/dL


 


AST  26   (14-36)  U/L


 


ALT  20   (4-34)  U/L


 


Alkaline Phosphatase  104   ()  U/L


 


Troponin I   <0.012  (0.000-0.034)  ng/mL


 


NT-Pro-B Natriuret Pep  522   pg/mL


 


Total Protein  7.3   (6.3-8.2)  g/dL


 


Albumin  4.3   (3.5-5.0)  g/dL














- Radiology Data





Chest x-ray (interpreted by me): Post-surgical changes, no acute antibody.





CT angiography chest with IV contrast: Report is still pending at this time. 

(night ED phyisician to follow up on report)





Disposition


Clinical Impression: 


 Chest pain





Disposition: ADMITTED AS IP TO THIS HOSP


Condition: Stable


Is patient prescribed a controlled substance at d/c from ED?: No


Time of Disposition: 21:31

## 2023-09-17 LAB
ALBUMIN SERPL-MCNC: 3.6 G/DL (ref 3.5–5)
ALP SERPL-CCNC: 78 U/L (ref 38–126)
ALT SERPL-CCNC: 16 U/L (ref 4–34)
ANION GAP SERPL CALC-SCNC: 4 MMOL/L
AST SERPL-CCNC: 25 U/L (ref 14–36)
BASOPHILS # BLD AUTO: 0 K/UL (ref 0–0.2)
BASOPHILS NFR BLD AUTO: 0 %
BUN SERPL-SCNC: 21 MG/DL (ref 7–17)
CALCIUM SPEC-MCNC: 9.1 MG/DL (ref 8.4–10.2)
CHLORIDE SERPL-SCNC: 110 MMOL/L (ref 98–107)
CO2 SERPL-SCNC: 23 MMOL/L (ref 22–30)
EOSINOPHIL # BLD AUTO: 0.2 K/UL (ref 0–0.7)
EOSINOPHIL NFR BLD AUTO: 2 %
ERYTHROCYTE [DISTWIDTH] IN BLOOD BY AUTOMATED COUNT: 3.95 M/UL (ref 3.8–5.4)
ERYTHROCYTE [DISTWIDTH] IN BLOOD: 13.8 % (ref 11.5–15.5)
GLUCOSE SERPL-MCNC: 86 MG/DL (ref 74–99)
HCT VFR BLD AUTO: 40.4 % (ref 34–46)
HGB BLD-MCNC: 13.1 GM/DL (ref 11.4–16)
LYMPHOCYTES # SPEC AUTO: 1.8 K/UL (ref 1–4.8)
LYMPHOCYTES NFR SPEC AUTO: 20 %
MCH RBC QN AUTO: 33.1 PG (ref 25–35)
MCHC RBC AUTO-ENTMCNC: 32.3 G/DL (ref 31–37)
MCV RBC AUTO: 102.4 FL (ref 80–100)
MONOCYTES # BLD AUTO: 0.3 K/UL (ref 0–1)
MONOCYTES NFR BLD AUTO: 4 %
NEUTROPHILS # BLD AUTO: 6.6 K/UL (ref 1.3–7.7)
NEUTROPHILS NFR BLD AUTO: 73 %
PLATELET # BLD AUTO: 217 K/UL (ref 150–450)
POTASSIUM SERPL-SCNC: 5.3 MMOL/L (ref 3.5–5.1)
PROT SERPL-MCNC: 6.2 G/DL (ref 6.3–8.2)
SODIUM SERPL-SCNC: 137 MMOL/L (ref 137–145)
WBC # BLD AUTO: 9.1 K/UL (ref 3.8–10.6)

## 2023-09-17 RX ADMIN — METOPROLOL TARTRATE SCH MG: 25 TABLET, FILM COATED ORAL at 20:18

## 2023-09-17 RX ADMIN — ISOSORBIDE MONONITRATE SCH MG: 30 TABLET, EXTENDED RELEASE ORAL at 11:56

## 2023-09-17 RX ADMIN — LACOSAMIDE SCH MG: 50 TABLET, FILM COATED ORAL at 11:57

## 2023-09-17 RX ADMIN — LEVOTHYROXINE SODIUM SCH MCG: 0.12 TABLET ORAL at 11:57

## 2023-09-17 RX ADMIN — RANOLAZINE SCH MG: 500 TABLET, FILM COATED, EXTENDED RELEASE ORAL at 11:56

## 2023-09-17 RX ADMIN — SODIUM BICARBONATE SCH MLS/HR: 84 INJECTION, SOLUTION INTRAVENOUS at 12:00

## 2023-09-17 RX ADMIN — ATORVASTATIN CALCIUM SCH MG: 80 TABLET, FILM COATED ORAL at 20:18

## 2023-09-17 RX ADMIN — CLOPIDOGREL BISULFATE SCH MG: 75 TABLET ORAL at 11:57

## 2023-09-17 RX ADMIN — METOPROLOL TARTRATE SCH MG: 25 TABLET, FILM COATED ORAL at 11:57

## 2023-09-17 RX ADMIN — RANOLAZINE SCH MG: 500 TABLET, FILM COATED, EXTENDED RELEASE ORAL at 20:18

## 2023-09-17 NOTE — XR
EXAM:

  XR Chest, 1 View

 

CLINICAL HISTORY:

  ITS.REASON XR Reason: chest pain

 

TECHNIQUE:

  Frontal view of the chest.

 

COMPARISON:

  No relevant prior studies available.

 

FINDINGS:

  Lungs:  Unremarkable.  No consolidation.

  Pleural space:  Unremarkable.  No pneumothorax.

  Heart:  Unremarkable.  No cardiomegaly.

  Mediastinum:  Unremarkable.

  Bones/joints:  Unremarkable.

  Tubes, lines and devices:  The wires.

 

IMPRESSION:     

  No acute findings in the chest.

## 2023-09-17 NOTE — P.HPIM
History of Present Illness


H&P Date: 23


Chief Complaint: Chest pain





Patient presents to the ED with her  for evaluation.  Patient states that

she has had intermittent chest heaviness for the past week or so.  Patient 

states that her symptoms have become worse over the past 2 hours.  Patient 

states that her chest pressure is currently 9/10 in severity.  Patient admits to

having associated nausea and dizziness.  Patient denies radiation of her pain, 

trauma or injury, fever or chills, headache, focal numbness/weakness/neuro 

deficit, neck/arm/jaw/back pain, pleuritic pain, dyspnea, cough or cold 

symptoms, palpitations, syncope, diaphoresis, vomiting, diarrhea,bloody or 

melanotic stool, abdominal pain, dysuria or urinary symptoms, decreased urine 

output, leg or calf swelling or pain, or any other symptoms or complaints.


EKG reveals sinus rhythm, T-wave inversions in inferolateral leads which appears

to be chronic.


Chest xray no acute findings.


Laboratory reviewed, hemoglobin 13.9, macrocytosis BUN 24, creatinine 1.02, 

troponin 2 negative and 2 proBNP 522.


Home cardiac medications include Plavix, atorvastatin, Ranexa, metoprolol. 








Review of Systems











REVIEW OF SYSTEMS: 


CONSTITUTIONAL: No fever, no malaise, no fatigue. 


HEENT: No recent visual problems or hearing problems. Denied any sore throat. 


CARDIOVASCULAR: No chest pain, orthopnea, PND, no palpitations, no syncope. 


PULMONARY: No shortness of breath, no cough, no hemoptysis. 


GASTROINTESTINAL: No diarrhea, no nausea, no vomiting, no abdominal pain. 


NEUROLOGICAL: No headaches, no weakness, no numbness. 


HEMATOLOGICAL: Denies any bleeding or petechiae. 


GENITOURINARY: Denies any burning micturition, frequency, or urgency. 


MUSCULOSKELETAL/RHEUMATOLOGICAL: Denies any joint pain, swelling, or any muscle 

pain. 


ENDOCRINE: Denies any polyuria or polydipsia. 





The rest of the 14-point review of systems is negative.





Past Medical History


Past Medical History: Coronary Artery Disease (CAD), Hypertension, Myocardial 

Infarction (MI), Thyroid Disorder


Last Myocardial Infarction Date:: 


History of Any Multi-Drug Resistant Organisms: None Reported


Past Surgical History: Appendectomy,  Section, Cholecystectomy, Coronary

Bypass/CABG, Heart Catheterization, Heart Catheterization With Stent


Additional Past Surgical History / Comment(s): bilat carotid


Past Anesthesia/Blood Transfusion Reactions: No Reported Reaction


Date of Last Stent Placement:: 


Past Psychological History: No Psychological Hx Reported


Smoking Status: Never smoker


Past Alcohol Use History: None Reported


Past Drug Use History: None Reported





- Past Family History


  ** Father


History Unknown: Yes





  ** Mother


Family Medical History: Diabetes Mellitus, Hypertension





Medications and Allergies


                                Home Medications











 Medication  Instructions  Recorded  Confirmed  Type


 


Atorvastatin Calcium [Lipitor] 80 mg PO HS 22 History


 


Clopidogrel Bisulfate [Plavix] 75 mg PO DAILY 22 History


 


Nitroglycerin Sl Tabs [Nitrostat] 0.4 mg SL Q5M PRN 22 History


 


Ibuprofen/Diphenhydramine Cit 1 tab PO HS PRN 08/10/23 09/16/23 History





[Ibuprofen Pm Caplet]    


 


Isosorbide Mononitrate ER [Imdur] 30 mg PO DAILY 08/10/23 09/16/23 History


 


Lacosamide [Vimpat] 50 mg PO DAILY 08/10/23 09/16/23 History


 


Metoprolol Tartrate [Lopressor] 25 mg PO BID 08/10/23 09/16/23 History


 


Ranolazine [Ranexa] 1,000 mg PO BID 08/10/23 09/16/23 History


 


Levothyroxine Sodium [Synthroid] 125 mcg PO DAILY 23 History








                                    Allergies











Allergy/AdvReac Type Severity Reaction Status Date / Time


 


codeine AdvReac  Unknown Verified 23 20:53














Physical Exam


Vitals: 


                                   Vital Signs











  Temp Pulse Pulse Resp BP BP Pulse Ox


 


 23 07:00  98.3 F   61  17   109/54  95


 


 23 06:19  97.8 F  65   16  112/67   97


 


 23 04:00   64   14  119/59   97


 


 23 00:00   73   18  106/50   96


 


 23 23:00   67   16  102/63   98


 


 23 20:00   71   16  110/54   96


 


 23 18:36  98.4 F  73   20  93/49   96








                                Intake and Output











 23





 22:59 06:59 14:59


 


Other:   


 


  Voiding Method   Toilet


 


  Weight 47.627 kg  




















PHYSICAL EXAMINATION: 





GENERAL: The patient is alert and oriented x3, not in any acute distress. Well 

developed, well nourished. 


HEENT: Pupils are round and equally reacting to light. EOMI. No scleral icterus.

 No conjunctival pallor. Normocephalic, atraumatic. No pharyngeal erythema. No 

thyromegaly. 


CARDIOVASCULAR: S1 and S2 present. No murmurs, rubs, or gallops. 


PULMONARY: Chest is clear to auscultation, no wheezing or crackles. 


ABDOMEN: Soft, nontender, nondistended, normoactive bowel sounds. No palpable 

organomegaly. 


MUSCULOSKELETAL: No joint swelling or deformity.


EXTREMITIES: No cyanosis, clubbing, or pedal edema. 


NEUROLOGICAL: Gross neurological examination did not reveal any focal deficits. 


SKIN: No rashes. 











Results


CBC & Chem 7: 


                                 23 10:09





                                 23 10:09


Labs: 


                  Abnormal Lab Results - Last 24 Hours (Table)











  23 Range/Units





  19:25 19:25 19:25 


 


WBC  10.7 H    (3.8-10.6)  k/uL


 


MCV  101.2 H    (80.0-100.0)  fL


 


Neutrophils #  8.9 H    (1.3-7.7)  k/uL


 


D-Dimer   1.07 H   (<0.60)  mg/L FEU


 


BUN    24 H  (7-17)  mg/dL


 


Glucose    105 H  (74-99)  mg/dL


 


Calcium    10.3 H  (8.4-10.2)  mg/dL














  23 Range/Units





  10:09 


 


WBC   (3.8-10.6)  k/uL


 


MCV  102.4 H  (80.0-100.0)  fL


 


Neutrophils #   (1.3-7.7)  k/uL


 


D-Dimer   (<0.60)  mg/L FEU


 


BUN   (7-17)  mg/dL


 


Glucose   (74-99)  mg/dL


 


Calcium   (8.4-10.2)  mg/dL














Assessment and Plan


Assessment: 





1.  Chest pain rule out acute coronary syndrome


- Patient will be admitted to telemetry; monitor EKG and trend troponin; 

recommend 2-D echo; patient remains on aspirin, atorvastatin, metoprolol, Plavix

 and Ranexa


- Cardiology is consulted





2.  Leukocytosis; white blood count slightly elevated at 10.7; likely reactive; 

no signs of infection





3.  Elevated d-dimer; CT chest completed in ED is negative for PE





4.  Mild AK I/dehydration; IV fluids in form of normal saline at rate of 75 mL 

an hour; we will monitor strict EULALIA's, daily weights, renal function and 

electrolytes; avoid nephrotoxins and hypotension





5.  Hypercalcemia; calcium level is at 10.3; likely related to mild AK I and 

dehydration; patient has been placed and IV fluids in form of half normal saline

 at rate of 75 mL an hour; we will monitor electrolytes closely





6.  Hypertension ; continue with home dose of metoprolol 





7.  Hyperlipidemia; continue with home statin therapy





DVT prophylaxis; SCDs


CODE STATUS; full code

## 2023-09-17 NOTE — CT
EXAM:

  CT Angiography Chest With Intravenous Contrast

 

CLINICAL HISTORY:

  ITS.REASON CT Reason: chest pain, elevated d-dimer

 

TECHNIQUE:

  Axial computed tomographic angiography images of the chest with 

intravenous contrast.  CTDI is 17.4 mGy and DLP is 224.9 mGy-cm.  This CT 

exam was performed using one or more of the following dose reduction 

techniques: automated exposure control, adjustment of the mA and/or kV 

according to patient size, and/or use of iterative reconstruction 

technique.

  MIP reconstructed images were created and reviewed.

 

COMPARISON:

  No relevant prior studies available.

 

FINDINGS:

  Pulmonary arteries:  Unremarkable.  No acute pulmonary embolism.

  Aorta:  Aneurysm of the aortic arch measured 3.4 cm.  Atherosclerotic 

changes of the aorta.

  Lungs:  Unremarkable.  No mass.  No consolidation.

  Pleural space:  Unremarkable.  No focal infiltrate, pleural effusion, 

or pneumothorax.

  Heart:  Cardiomegaly.  No significant pericardial effusion.  No 

evidence of RV dysfunction.

  Bones/joints:  Degenerative changes of the spine.  No acute fracture.  

No dislocation.

  Soft tissues:  Unremarkable.

  Lymph nodes:  Unremarkable.  No enlarged lymph nodes.

  Gallbladder and bile ducts:  Cholecystectomy clips.

 

IMPRESSION:     

1.  No focal infiltrate, pleural effusion, or pneumothorax.

 

2.  No acute pulmonary embolism.

 

3.  Aneurysm of the aortic arch measured 3.4 cm.

## 2023-09-17 NOTE — P.CRDCN
History of Present Illness


Consult date: 23


History of present illness: 





HISTORY OF PRESENTING ILLNESS


68-year-old female with past medical history of CAD status post CABG, carotid 

artery disease with bilateral endarterectomy, hypertension, dyslipidemia, 

hypothyroidism, chronic smoker. 





He was admitted to the hospital in 2023 with complaints of chest pain and

recurrent syncope and lightheadedness.  She had a complete workup done that 

time. 





This time she presents to the hospital with complains of feeling lightheaded all

the time and complaining of substernal chest heaviness and chest pain.  She is 

not able to give a detailed history but she reports that she has this chest pain

almost throughout the day.  Due to this she has been very limited in her 

physical capacity.





He is also complaining of loss of vision in her left eye.  MRI brain showed 

remote injury to her right occipital lobe and small vessel disease.  She has 95%

stenosis of right ICA and left CCA, right external carotid artery.





On exam she has a cold right upper extremity and vomiting left upper extremity. 

Her left radial pulses weak as compared to right.





 DIAGNOSTICS


EKG reveals sinus rhythm, T-wave inversions in inferolateral leads which appears

to be chronic.


Chest xray no acute findings.


Laboratory reviewed, hemoglobin 13.9, macrocytosis BUN 24, creatinine 1.02, 

troponin 2 negative and 2 proBNP 522.


Home cardiac medications include Plavix, atorvastatin, Ranexa, metoprolol. 





PRIOR CARDIAC TESTING


Echo from 2023 shows EF of 40-45%, apical and inferior wall hypokinesia 

suggestive of scar.  She also has a prior history of chronic calcified LV 

thrombus.





REVIEW OF SYSTEMS


14 point review of system is negative except what is mentioned above in HPI.





PHYSICAL EXAMINATION


Vital signs reviewed.


Head: Normocephalic.


Eyes: Sclerae nonicteric.  Reports loss of vision in his left eye


Neck: Bilateral carotid bruit, surgical scar IN PLACE B/L, no jugular venous 

distention.


Lungs: Clear to auscultation.


Heart: Regular rate and rhythm, S1-S2, no S3, no murmur or rub.


Abdomen: Soft nontender, positive bowel sounds no organomegaly.


Extremities: No edema, intact distal pulses.  4





Radial pulse as compared to right radial pulse.  Right upper extremity is warm. 

Left upper extremity is cold





ASSESSMENT


Substernal chest pain, constant.  Likely angina from residual CAD.  Rule out of 

ACS ECG changes are chronic and no elevation of troponins





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 

of calcific LV thrombus





CAD status post CABG





Lightheadedness and dizziness





Loss of vision in her left eye, MRI findings suggestive of right occipital 

remote injury with small vessel disease





Persistent lightheadedness and postauricular dizziness


Severe right ICA left CCA stenosis more than 95% 





Essential hypertension 


dyslipidemia


Continues to smoke





PLAN


Patient's symptoms are likely angina but she doesn't have ACS.  We will continue

her home medications includes Ranexa metoprolol Plavix and atorvastatin





She has differential pulses in her right upper extremity with poor pulse  left 

radial, and cold left upper extremity.  Her CTagio showed significant calcific 

disease and carotid distribution.  I feel that she has disease in her left 

subclavian which might be causing subclavian steal, poor left radial pulse, and 

may be angina in her LIMA distribution





Obtain orthostatic vital signs


Obtain blood pressure in bilateral upper extremities.  Note for any 

differentials


Further recommendations to follow


smoking cessation recs given 





Past Medical History


Past Medical History: Coronary Artery Disease (CAD), Hypertension, Myocardial 

Infarction (MI), Thyroid Disorder


Last Myocardial Infarction Date:: 


History of Any Multi-Drug Resistant Organisms: None Reported


Past Surgical History: Appendectomy,  Section, Cholecystectomy, Coronary

Bypass/CABG, Heart Catheterization, Heart Catheterization With Stent


Additional Past Surgical History / Comment(s): bilat carotid


Past Anesthesia/Blood Transfusion Reactions: No Reported Reaction


Date of Last Stent Placement:: 


Past Psychological History: No Psychological Hx Reported


Smoking Status: Never smoker


Past Alcohol Use History: None Reported


Past Drug Use History: None Reported





- Past Family History


  ** Father


History Unknown: Yes





  ** Mother


Family Medical History: Diabetes Mellitus, Hypertension





Medications and Allergies


                                Home Medications











 Medication  Instructions  Recorded  Confirmed  Type


 


Atorvastatin Calcium [Lipitor] 80 mg PO HS 22 History


 


Clopidogrel Bisulfate [Plavix] 75 mg PO DAILY 22 History


 


Nitroglycerin Sl Tabs [Nitrostat] 0.4 mg SL Q5M PRN 22 History


 


Ibuprofen/Diphenhydramine Cit 1 tab PO HS PRN 08/10/23 09/16/23 History





[Ibuprofen Pm Caplet]    


 


Isosorbide Mononitrate ER [Imdur] 30 mg PO DAILY 08/10/23 09/16/23 History


 


Lacosamide [Vimpat] 50 mg PO DAILY 08/10/23 09/16/23 History


 


Metoprolol Tartrate [Lopressor] 25 mg PO BID 08/10/23 09/16/23 History


 


Ranolazine [Ranexa] 1,000 mg PO BID 08/10/23 09/16/23 History


 


Levothyroxine Sodium [Synthroid] 125 mcg PO DAILY 23 History








                                    Allergies











Allergy/AdvReac Type Severity Reaction Status Date / Time


 


codeine AdvReac  Unknown Verified 23 20:53














Physical Exam


Vitals: 


                                   Vital Signs











  Temp Pulse Pulse Resp BP BP Pulse Ox


 


 23 07:00  98.3 F   61  17   109/54  95


 


 23 06:19  97.8 F  65   16  112/67   97


 


 23 04:00   64   14  119/59   97


 


 23 00:00   73   18  106/50   96


 


 23 23:00   67   16  102/63   98


 


 23 20:00   71   16  110/54   96


 


 23 18:36  98.4 F  73   20  93/49   96








                                Intake and Output











 23





 22:59 06:59 14:59


 


Other:   


 


  Voiding Method   Toilet


 


  Weight 47.627 kg  














Results





                                 23 10:09





                                 23 10:09


                                 Cardiac Enzymes











  23 Range/Units





  03:00 19:25 19:25 


 


AST   26   (14-36)  U/L


 


Troponin I  <0.012   <0.012  (0.000-0.034)  ng/mL














  23 Range/Units





  01:56 10:09 


 


AST   25  (14-36)  U/L


 


Troponin I  <0.012   (0.000-0.034)  ng/mL








                                   Coagulation











  23 Range/Units





  19:25 


 


PT  9.3  (9.0-12.0)  sec


 


APTT  22.8  (22.0-30.0)  sec








                                       CBC











  23 Range/Units





  19:25 10:09 


 


WBC  10.7 H  9.1  (3.8-10.6)  k/uL


 


RBC  4.32  3.95  (3.80-5.40)  m/uL


 


Hgb  13.9  13.1  (11.4-16.0)  gm/dL


 


Hct  43.8  40.4  (34.0-46.0)  %


 


Plt Count  253  217  (150-450)  k/uL








                          Comprehensive Metabolic Panel











  23 Range/Units





  19:25 10:09 


 


Sodium  142  137  (137-145)  mmol/L


 


Potassium  4.8  5.3 H  (3.5-5.1)  mmol/L


 


Chloride  103  110 H  ()  mmol/L


 


Carbon Dioxide  30  23  (22-30)  mmol/L


 


BUN  24 H  21 H  (7-17)  mg/dL


 


Creatinine  1.02  0.72  (0.52-1.04)  mg/dL


 


Glucose  105 H  86  (74-99)  mg/dL


 


Calcium  10.3 H  9.1  (8.4-10.2)  mg/dL


 


AST  26  25  (14-36)  U/L


 


ALT  20  16  (4-34)  U/L


 


Alkaline Phosphatase  104  78  ()  U/L


 


Total Protein  7.3  6.2 L  (6.3-8.2)  g/dL


 


Albumin  4.3  3.6  (3.5-5.0)  g/dL








                               Current Medications











Generic Name Dose Route Start Last Admin





  Trade Name Freq  PRN Reason Stop Dose Admin


 


Atorvastatin Calcium  80 mg  23 21:00 





  Atorvastatin 80 Mg Tab  PO  





  HS [facility]  


 


Clopidogrel Bisulfate  75 mg  23 10:45 





  Clopidogrel 75 Mg Tab  PO  





  DAILY KT  


 


Sodium Chloride  1,000 mls @ 75 mls/hr  23 11:00 





  Saline 0.45%  IV  





  .[id number] KT  


 


Isosorbide Mononitrate  30 mg  23 10:45 





  Isosorbide Mononitrate Er 30 Mg Tab.Er.24h  PO  





  DAILY KT  


 


Lacosamide  50 mg  23 10:45 





  Lacosamide 50 Mg Tablet  PO  





  DAILY [facility]  


 


Levothyroxine Sodium  125 mcg  23 10:45 





  Levothyroxine 125 Mcg Tab  PO  





  DAILY@0630 [facility]  


 


Metoprolol Tartrate  25 mg  23 10:45 





  Metoprolol Tartrate 25 Mg Tab  PO  





  BID KT  


 


Naloxone HCl  0.2 mg  23 21:31 





  Naloxone 0.4 Mg/Ml 1 Ml Vial  IV  





  Q2M PRN  





  Opioid Reversal  


 


Nitroglycerin  0.4 mg  23 10:43 





  Nitroglycerin Sl Tabs 0.4 Mg Tab  SUBLINGUAL  





  Q5M PRN  





  Chest Pain  


 


Ranolazine  1,000 mg  23 10:45 





  Ranolazine 500 Mg Tab.Er.12h  PO  





  BID KT  








                                Intake and Output











 23





 22:59 06:59 14:59


 


Other:   


 


  Voiding Method   Toilet


 


  Weight 47.627 kg  








                                        





                                 23 10:09 





                                 23 10:09

## 2023-09-18 LAB
ANION GAP SERPL CALC-SCNC: 7 MMOL/L (ref 4–12)
BUN SERPL-SCNC: 20.1 MG/DL (ref 9–27)
BUN/CREAT SERPL: 25.12 RATIO (ref 12–20)
CALCIUM SPEC-MCNC: 9.2 MG/DL (ref 8.7–10.3)
CHLORIDE SERPL-SCNC: 107 MMOL/L (ref 96–109)
CO2 SERPL-SCNC: 28 MMOL/L (ref 21.6–31.8)
GLUCOSE SERPL-MCNC: 93 MG/DL (ref 70–110)
POTASSIUM SERPL-SCNC: 4.9 MMOL/L (ref 3.5–5.5)
SODIUM SERPL-SCNC: 142 MMOL/L (ref 135–145)

## 2023-09-18 RX ADMIN — SODIUM BICARBONATE SCH MLS/HR: 84 INJECTION, SOLUTION INTRAVENOUS at 01:49

## 2023-09-18 RX ADMIN — METOPROLOL TARTRATE SCH MG: 25 TABLET, FILM COATED ORAL at 08:48

## 2023-09-18 RX ADMIN — RANOLAZINE SCH MG: 500 TABLET, FILM COATED, EXTENDED RELEASE ORAL at 21:32

## 2023-09-18 RX ADMIN — LEVOTHYROXINE SODIUM SCH MCG: 0.12 TABLET ORAL at 06:17

## 2023-09-18 RX ADMIN — PANTOPRAZOLE SODIUM SCH MG: 40 TABLET, DELAYED RELEASE ORAL at 13:50

## 2023-09-18 RX ADMIN — LEVOTHYROXINE SODIUM SCH MCG: 0.12 TABLET ORAL at 08:47

## 2023-09-18 RX ADMIN — PANTOPRAZOLE SODIUM SCH MG: 40 TABLET, DELAYED RELEASE ORAL at 18:48

## 2023-09-18 RX ADMIN — METOPROLOL TARTRATE SCH MG: 25 TABLET, FILM COATED ORAL at 21:32

## 2023-09-18 RX ADMIN — ATORVASTATIN CALCIUM SCH MG: 80 TABLET, FILM COATED ORAL at 21:32

## 2023-09-18 RX ADMIN — Medication SCH MG: at 22:45

## 2023-09-18 RX ADMIN — NITROGLYCERIN PRN MG: 0.4 TABLET SUBLINGUAL at 16:24

## 2023-09-18 RX ADMIN — ISOSORBIDE MONONITRATE SCH MG: 30 TABLET, EXTENDED RELEASE ORAL at 08:48

## 2023-09-18 RX ADMIN — CLOPIDOGREL BISULFATE SCH MG: 75 TABLET ORAL at 08:48

## 2023-09-18 RX ADMIN — RANOLAZINE SCH MG: 500 TABLET, FILM COATED, EXTENDED RELEASE ORAL at 08:48

## 2023-09-18 RX ADMIN — SODIUM BICARBONATE SCH: 84 INJECTION, SOLUTION INTRAVENOUS at 15:17

## 2023-09-18 RX ADMIN — LACOSAMIDE SCH MG: 50 TABLET, FILM COATED ORAL at 08:47

## 2023-09-18 NOTE — P.GSCN
History of Present Illness


Consult date: 23


Reason for Consult: 





Symptomatic carotid stenosis


Requesting physician: Chiquita Thompson


History of present illness: 





This is a pleasant 68-year-old female with a known history of carotid stenosis 

status post bilateral carotid endarterectomy who presented to the emergency 

department with complaints of chest pain.  She has a past medical history of 

coronary artery disease status post CABG, ischemic cardiac myopathy, hyp

ertension, MI, thyroid disorder, and tobacco abuse.  Patient is being followed 

and worked up by cardiology.  She has had previous hospitalizations with 

vertigo/syncope.  Vascular surgery was consulted at previous admission on 

2023 for carotid stenosis, at that time she had a CT angiogram head and 

neck which reported estimated diameter reduction right ICA greater than 95%, 

high-grade stenosis distal left common carotid artery greater than 95% with mild

plaque noted at the carotid bulb no hemodynamically significant internal carotid

artery stenosis.  Patient had followed up with Dr. Fuentes in the office and was 

referred to Dr. Garcia.  Patient had seen Dr. Garcia and sounds like there is tent

ative plan for October for intervention.  Patient currently denies any other 

symptoms, she continues to smoke 10-15 cigarettes a day.  She denies any chest 

pain at this time or shortness of breath.  No extremity weakness.





Review of Systems





A 14 point review systems was completed all pertinent positives and negatives as

stated in the HPI.





Past Medical History


Past Medical History: Coronary Artery Disease (CAD), Hypertension, Myocardial 

Infarction (MI), Thyroid Disorder


Last Myocardial Infarction Date:: 


History of Any Multi-Drug Resistant Organisms: None Reported


Past Surgical History: Appendectomy,  Section, Cholecystectomy, Coronary

Bypass/CABG, Heart Catheterization, Heart Catheterization With Stent


Additional Past Surgical History / Comment(s): bilat carotid


Past Anesthesia/Blood Transfusion Reactions: No Reported Reaction


Date of Last Stent Placement:: 


Past Psychological History: No Psychological Hx Reported


Smoking Status: Never smoker


Past Alcohol Use History: None Reported


Past Drug Use History: None Reported





- Past Family History


  ** Father


History Unknown: Yes





  ** Mother


Family Medical History: Diabetes Mellitus, Hypertension





Medications and Allergies


                                Home Medications











 Medication  Instructions  Recorded  Confirmed  Type


 


Atorvastatin Calcium [Lipitor] 80 mg PO HS 22 History


 


Clopidogrel Bisulfate [Plavix] 75 mg PO DAILY 22 History


 


Nitroglycerin Sl Tabs [Nitrostat] 0.4 mg SL Q5M PRN 22 History


 


Ibuprofen/Diphenhydramine Cit 1 tab PO HS PRN 08/10/23 09/16/23 History





[Ibuprofen Pm Caplet]    


 


Isosorbide Mononitrate ER [Imdur] 30 mg PO DAILY 08/10/23 09/16/23 History


 


Lacosamide [Vimpat] 50 mg PO DAILY 08/10/23 09/16/23 History


 


Metoprolol Tartrate [Lopressor] 25 mg PO BID 08/10/23 09/16/23 History


 


Ranolazine [Ranexa] 1,000 mg PO BID 08/10/23 09/16/23 History


 


Levothyroxine Sodium [Synthroid] 125 mcg PO DAILY 23 History








                                    Allergies











Allergy/AdvReac Type Severity Reaction Status Date / Time


 


codeine AdvReac  Unknown Verified 23 20:53














Surgical - Exam


                                   Vital Signs











Temp Pulse Resp BP Pulse Ox


 


 98.4 F   73   20   93/49   96 


 


 23 18:36  23 18:36  23 18:36  23 18:36  23 18:36














General appearance: The patient is alert, oriented, appears in no acute 

distress.


HET: Head is normocephalic and atraumatic.  Pupils are equal and reactive.  


Neck: Supple.


Heart: Regular.


Lungs: Equal expansion, normal respiratory effort.


Abdomen: Soft, nontender, nondistended.


Extremities: Normal skin color and turgor.  


Neurological: No focal deficits.  Strength and sensation are grossly intact.





Results





- Labs





                                 23 10:09





                                 23 05:56


                  Abnormal Lab Results - Last 24 Hours (Table)











  23 Range/Units





  05:56 


 


BUN/Creatinine Ratio  25.12 H  (12.00-20.00)  Ratio








                                 Diabetes panel











  23 Range/Units





  05:56 


 


Sodium  142  (135-145)  mmol/L


 


Potassium  4.9  (3.5-5.5)  mmol/L


 


Chloride  107  ()  mmol/L


 


Carbon Dioxide  28.0  (21.6-31.8)  mmol/L


 


BUN  20.1  (9.0-27.0)  mg/dL


 


Creatinine  0.8  (0.6-1.5)  mg/dL


 


Glucose  93  ()  mg/dL


 


Calcium  9.2  (8.7-10.3)  mg/dL








                                  Calcium panel











  23 Range/Units





  05:56 


 


Calcium  9.2  (8.7-10.3)  mg/dL








                                 Pituitary panel











  23 Range/Units





  05:56 


 


Sodium  142  (135-145)  mmol/L


 


Potassium  4.9  (3.5-5.5)  mmol/L


 


Chloride  107  ()  mmol/L


 


Carbon Dioxide  28.0  (21.6-31.8)  mmol/L


 


BUN  20.1  (9.0-27.0)  mg/dL


 


Creatinine  0.8  (0.6-1.5)  mg/dL


 


Glucose  93  ()  mg/dL


 


Calcium  9.2  (8.7-10.3)  mg/dL








                                  Adrenal panel











  23 Range/Units





  05:56 


 


Sodium  142  (135-145)  mmol/L


 


Potassium  4.9  (3.5-5.5)  mmol/L


 


Chloride  107  ()  mmol/L


 


Carbon Dioxide  28.0  (21.6-31.8)  mmol/L


 


BUN  20.1  (9.0-27.0)  mg/dL


 


Creatinine  0.8  (0.6-1.5)  mg/dL


 


Glucose  93  ()  mg/dL


 


Calcium  9.2  (8.7-10.3)  mg/dL














- Imaging


Comments: 





Chest CT angiogram reports no focal infiltrate, pleural effusion or 

pneumothorax.  No acute pulmonary embolism.  Aneurysm of the aortic arch 

measuring 3.4 cm





Assessment and Plan


Assessment: 





1.  Chest pain


2.  History of coronary artery disease status post CABG


3.  Carotid stenosis, right ICA stenosis 95%, with no hemodynamically 

significant left ICA stenosis.  95% left common carotid artery stenosis


4.  History of bilateral carotid endarterectomies


5.  Tobacco abuse


6.  Hypertension


7.  Hyperlipidemia


8.  Hypothyroid


Plan: 





1.  Continue medical therapy


2.  No plans on vascular surgical intervention


3.  Patient is following with Dr. Garcia, recommend outpatient follow-up with Dr. Garcia.


Thank you for this consultation, we will sign off at this time.





The impression and plan of care has been dictated as directed.








I performed a history and examination of this patient,  discussed the same with 

the dictator.  I agree with the dictator's note ,documented as a scribe.  Any 

additional findings or plans will be noted.

## 2023-09-18 NOTE — P.PN
Subjective


Progress Note Date: 09/18/23





HISTORY OF PRESENTING ILLNESS


68-year-old female with past medical history of CAD status post CABG, carotid 

artery disease with bilateral endarterectomy, hypertension, dyslipidemia, 

hypothyroidism, chronic smoker. 


He was admitted to the hospital in August 2023 with complaints of chest pain and

recurrent syncope and lightheadedness.  She had a complete workup done that 

time. 


This time she presents to the hospital with complains of feeling lightheaded all

the time and complaining of substernal chest heaviness and chest pain.  She is 

not able to give a detailed history but she reports that she has this chest pain

almost throughout the day.  Due to this she has been very limited in her 

physical capacity.


He is also complaining of loss of vision in her left eye.  MRI brain showed 

remote injury to her right occipital lobe and small vessel disease.  She has 95%

stenosis of right ICA and left CCA, right external carotid artery.


On exam she has a cold right upper extremity and vomiting left upper extremity. 

Her left radial pulses weak as compared to right.


DIAGNOSTICS


EKG reveals sinus rhythm, T-wave inversions in inferolateral leads which appears

to be chronic.


Chest xray no acute findings.


Laboratory reviewed, hemoglobin 13.9, macrocytosis BUN 24, creatinine 1.02, 

troponin 2 negative and 2 proBNP 522.


Home cardiac medications include Plavix, atorvastatin, Ranexa, metoprolol. 


PRIOR CARDIAC TESTING


Echo from August 2023 shows EF of 40-45%, apical and inferior wall hypokinesia 

suggestive of scar.  She also has a prior history of chronic calcified LV 

thrombus.





9/18


Patient denies having any chest pain at this time.  She does have intermittent 

chest heaviness last time was last evening.  Telemetry is a sinus rhythm.  Heart

rate is in the 60s and 70s, afebrile, blood pressure 126/70, pulse ox 98% on 

room air.  Minimal discrepancy between right and left blood pressures.  No 

repeat blood work today.  Patient apparently had recent stress testing done and 

we will attempt to obtain report.








PHYSICAL EXAMINATION


Vital signs reviewed.


Head: Normocephalic.


Eyes: Sclerae nonicteric.  Reports loss of vision in her left eye


Neck: Bilateral carotid bruit, surgical scar IN PLACE B/L, no jugular venous 

distention.


Lungs: Clear to auscultation.


Heart: Regular rate and rhythm, S1-S2, no S3, no murmur or rub.


Abdomen: Soft nontender, positive bowel sounds no organomegaly.


Extremities: No edema, intact distal pulses.  








ASSESSMENT


Substernal chest pain, constant.  Likely angina from residual CAD.  Rule out of 

ACS ECG changes are chronic and no elevation of troponins


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 

of calcific LV thrombus


CAD status post CABG


Lightheadedness and dizziness


Loss of vision in her left eye, MRI findings suggestive of right occipital 

remote injury with small vessel disease


Persistent lightheadedness and postauricular dizziness


Severe right ICA left CCA stenosis more than 95% 


Essential hypertension 


dyslipidemia


Continues to smoke





PLAN


Patient's symptoms are likely angina but she doesn't have ACS.  We will continue

her home medications includes Ranexa metoprolol Plavix and atorvastatin


She has differential pulses in her right upper extremity with poor pulse  left 

radial, and cold left upper extremity.  Her CTagio showed significant calcific 

disease and carotid distribution.  I feel that she has disease in her left 

subclavian which might be causing subclavian steal, poor left radial pulse, and 

may be angina in her LIMA distribution


Obtain orthostatic vital signs


Obtain blood pressure in bilateral upper extremities.  Note for any d

ifferentials


Consult vascular surgery regarding carotid artery disease now seems to be 

symptomatic.


Obtain stress test results done recently


Smoking cessation recs given


Further recommendations as patient progresses.





Nurse practitioner note has been reviewed, I agree with the documented findings 

and plan of care.  Patient was seen and examined. 





Objective





- Vital Signs


Vital signs: 


                                   Vital Signs











Temp  97.9 F   09/18/23 02:19


 


Pulse  70   09/18/23 02:19


 


Resp  15   09/18/23 02:19


 


BP  105/65   09/18/23 02:19


 


Pulse Ox  97   09/18/23 02:19


 


FiO2      








                                 Intake & Output











 09/17/23 09/18/23 09/18/23





 18:59 06:59 18:59


 


Weight 47.627 kg  


 


Other:   


 


  Voiding Method Toilet Toilet 


 


  # Voids 1 2 














- Labs


CBC & Chem 7: 


                                 09/17/23 10:09





                                 09/17/23 10:09


Labs: 


                  Abnormal Lab Results - Last 24 Hours (Table)











  09/17/23 09/17/23 Range/Units





  10:09 10:09 


 


MCV  102.4 H   (80.0-100.0)  fL


 


Potassium   5.3 H  (3.5-5.1)  mmol/L


 


Chloride   110 H  ()  mmol/L


 


BUN   21 H  (7-17)  mg/dL


 


Total Protein   6.2 L  (6.3-8.2)  g/dL

## 2023-09-19 LAB
ANION GAP SERPL CALC-SCNC: 8.6 MMOL/L (ref 4–12)
BASOPHILS # BLD AUTO: 0.03 X 10*3/UL (ref 0–0.1)
BASOPHILS NFR BLD AUTO: 0.4 %
BUN SERPL-SCNC: 15.3 MG/DL (ref 9–27)
BUN/CREAT SERPL: 21.86 RATIO (ref 12–20)
CALCIUM SPEC-MCNC: 9.8 MG/DL (ref 8.7–10.3)
CHLORIDE SERPL-SCNC: 104 MMOL/L (ref 96–109)
CO2 SERPL-SCNC: 27.4 MMOL/L (ref 21.6–31.8)
EOSINOPHIL # BLD AUTO: 0.14 X 10*3/UL (ref 0.04–0.35)
EOSINOPHIL NFR BLD AUTO: 1.9 %
ERYTHROCYTE [DISTWIDTH] IN BLOOD BY AUTOMATED COUNT: 4.39 X 10*6/UL (ref 4.1–5.2)
ERYTHROCYTE [DISTWIDTH] IN BLOOD: 13.6 % (ref 11.5–14.5)
GLUCOSE SERPL-MCNC: 96 MG/DL (ref 70–110)
HCT VFR BLD AUTO: 43.1 % (ref 37.2–46.3)
HGB BLD-MCNC: 14.2 D/DL (ref 12–15)
IMM GRANULOCYTES BLD QL AUTO: 0.8 %
LYMPHOCYTES # SPEC AUTO: 1.64 X 10*3/UL (ref 0.9–5)
LYMPHOCYTES NFR SPEC AUTO: 22.7 %
MCH RBC QN AUTO: 32.3 PG (ref 27–32)
MCHC RBC AUTO-ENTMCNC: 32.9 D/DL (ref 32–37)
MCV RBC AUTO: 98.2 FL (ref 80–97)
MONOCYTES # BLD AUTO: 0.45 X 10*3/UL (ref 0.2–1)
MONOCYTES NFR BLD AUTO: 6.2 %
NEUTROPHILS # BLD AUTO: 4.91 X 10*3/UL (ref 1.8–7.7)
NEUTROPHILS NFR BLD AUTO: 68 %
NRBC BLD AUTO-RTO: 0 X 10*3/UL (ref 0–0.01)
PLATELET # BLD AUTO: 235 X 10*3/UL (ref 140–440)
POTASSIUM SERPL-SCNC: 4.8 MMOL/L (ref 3.5–5.5)
SODIUM SERPL-SCNC: 140 MMOL/L (ref 135–145)
WBC # BLD AUTO: 7.23 X 10*3/UL (ref 4.5–10)

## 2023-09-19 RX ADMIN — PANTOPRAZOLE SODIUM SCH MG: 40 TABLET, DELAYED RELEASE ORAL at 06:52

## 2023-09-19 RX ADMIN — CLOPIDOGREL BISULFATE SCH MG: 75 TABLET ORAL at 08:47

## 2023-09-19 RX ADMIN — ATORVASTATIN CALCIUM SCH MG: 80 TABLET, FILM COATED ORAL at 20:43

## 2023-09-19 RX ADMIN — SODIUM BICARBONATE SCH MLS/HR: 84 INJECTION, SOLUTION INTRAVENOUS at 17:17

## 2023-09-19 RX ADMIN — METOPROLOL TARTRATE SCH MG: 25 TABLET, FILM COATED ORAL at 08:47

## 2023-09-19 RX ADMIN — METOPROLOL TARTRATE SCH MG: 25 TABLET, FILM COATED ORAL at 20:43

## 2023-09-19 RX ADMIN — ISOSORBIDE MONONITRATE SCH MG: 30 TABLET, EXTENDED RELEASE ORAL at 08:46

## 2023-09-19 RX ADMIN — LEVOTHYROXINE SODIUM SCH MCG: 0.12 TABLET ORAL at 06:52

## 2023-09-19 RX ADMIN — LACOSAMIDE SCH MG: 50 TABLET, FILM COATED ORAL at 08:46

## 2023-09-19 RX ADMIN — SODIUM BICARBONATE SCH: 84 INJECTION, SOLUTION INTRAVENOUS at 06:09

## 2023-09-19 RX ADMIN — RANOLAZINE SCH MG: 500 TABLET, FILM COATED, EXTENDED RELEASE ORAL at 08:47

## 2023-09-19 RX ADMIN — Medication SCH MG: at 20:43

## 2023-09-19 RX ADMIN — HEPARIN SODIUM SCH: 5000 INJECTION, SOLUTION INTRAVENOUS; SUBCUTANEOUS at 20:39

## 2023-09-19 RX ADMIN — PANTOPRAZOLE SODIUM SCH MG: 40 TABLET, DELAYED RELEASE ORAL at 17:15

## 2023-09-19 RX ADMIN — HEPARIN SODIUM SCH UNIT: 5000 INJECTION, SOLUTION INTRAVENOUS; SUBCUTANEOUS at 15:42

## 2023-09-19 RX ADMIN — RANOLAZINE SCH MG: 500 TABLET, FILM COATED, EXTENDED RELEASE ORAL at 20:43

## 2023-09-19 NOTE — PN
PROGRESS NOTE



DATE OF SERVICE:  09/18/2023



SUBJECTIVE:

This is a 68-year-old woman, who was admitted with chest pain, also had elevated WBC.

Cardiology following the patient closely.  The patient also has symptomatic carotid

stenosis.



PHYSICAL EXAMINATION:

VITAL SIGNS:  Pulse is 63, blood pressure 120/70, respirations 16.

CHEST:  Clear to auscultation.

ABDOMEN:  Soft, nontender.

NERVOUS SYSTEM:  Nonfocal.



LABORATORY DATA:

Reviewed.



ASSESSMENT:

1. Chest pain, possible unstable angina.

2. Gastroesophageal reflux disease.

3. Carotid stenosis.

4. Leukocytosis.

5. Multiple medical issues.



RECOMMENDATIONS:

Recommend to continue current management and treatment, otherwise recommend repeat labs

in the morning and closely with Cardiology, Protonix.  Guarded prognosis.  Further

recommendations to follow.





MMODL / IJN: [id number] / Job#: [id number]

## 2023-09-19 NOTE — PN
PROGRESS NOTE



DATE OF SERVICE:  09/19/2023



SUBJECTIVE:

This is a 68-year-old woman who was admitted with chest pain, possible unstable angina,

also had carotid stenosis.  Cardiology is following the patient closely.  The patient

has ischemic cardiomyopathy, planning for possible carotid stenting by Dr. Garcia.  No

chest pain, no palpitation.



OBJECTIVE:

VITAL SIGNS:  Pulse is 67, blood pressure 130/62, and respirations 18.

CHEST:  Clear to auscultation.

CARDIOVASCULAR:  S1, S2.

ABDOMEN:  Soft nervous System focal.



LABORATORY DATA:

Reviewed.



ASSESSMENT:

1. Chest pain, possible unstable angina.

2. Gastroesophageal reflux disease.

3. Carotid stenosis for carotid stent.

4. Leukocytosis.

5. Multiple medical issues.



RECOMMENDATIONS:

Recommended to continue current management, continue symptomatic treatment, follow

closely with Cardiology.  Possible carotid stenting tomorrow.  Repeat labs.  Further

recommendations to follow.





MMODL / BREANNAN: [id number] / Job#: [id number]

## 2023-09-19 NOTE — P.PN
Subjective


Progress Note Date: 09/19/23





HISTORY OF PRESENTING ILLNESS


68-year-old female with past medical history of CAD status post CABG, carotid 

artery disease with bilateral endarterectomy, hypertension, dyslipidemia, 

hypothyroidism, chronic smoker. 


He was admitted to the hospital in August 2023 with complaints of chest pain and

recurrent syncope and lightheadedness.  She had a complete workup done that 

time. 


This time she presents to the hospital with complains of feeling lightheaded all

the time and complaining of substernal chest heaviness and chest pain.  She is 

not able to give a detailed history but she reports that she has this chest pain

almost throughout the day.  Due to this she has been very limited in her 

physical capacity.


He is also complaining of loss of vision in her left eye.  MRI brain showed 

remote injury to her right occipital lobe and small vessel disease.  She has 95%

stenosis of right ICA and left CCA, right external carotid artery.


On exam she has a cold right upper extremity and vomiting left upper extremity. 

Her left radial pulses weak as compared to right.


DIAGNOSTICS


EKG reveals sinus rhythm, T-wave inversions in inferolateral leads which appears

to be chronic.


Chest xray no acute findings.


Laboratory reviewed, hemoglobin 13.9, macrocytosis BUN 24, creatinine 1.02, 

troponin 2 negative and 2 proBNP 522.


Home cardiac medications include Plavix, atorvastatin, Ranexa, metoprolol. 


PRIOR CARDIAC TESTING


Echo from August 2023 shows EF of 40-45%, apical and inferior wall hypokinesia 

suggestive of scar.  She also has a prior history of chronic calcified LV 

thrombus.





9/18


Patient denies having any chest pain at this time.  She does have intermittent 

chest heaviness last time was last evening.  Telemetry is a sinus rhythm.  Heart

rate is in the 60s and 70s, afebrile, blood pressure 126/70, pulse ox 98% on 

room air.  Minimal discrepancy between right and left blood pressures.  No 

repeat blood work today.  Patient apparently had recent stress testing done and 

we will attempt to obtain report.





9/19


Patient states she had an episode of chest pain in the midsternal area last 

evening and slowly went away on its own.  Chest pain is reproducible and tender 

in the sternal area.  She states she did not sleep last night and was up until 4

in the morning very tired today.  Case has been discussed with Dr. Garcia is he 

has patient scheduled on October 4 for stent of the carotid artery.  Heart rate 

in this 60s, blood pressure 116/62, pulse ox 96% on room air.  Repeat blood work

reveals hemoglobin 14.2, electrolytes and renal function normal.


Reviewed documents from [facility], Dr. Noel. Cardiac catheterization 

12/2022 revealed patent stent in the proximal left cicumflex with 30% in-stent 

restenosis. Vein graft to the obtuse marginal artery is patent with 30% proximal

stenosis. LIMA to LAD is open. One occluded vein graft presumably to the distal 

RCA. Echocardiogram 12/2022 revealed limited study for LV throbus. EF 50%. Near 

complete resolution of previously identified thrombus, very small laminar 

residual thrombus at LV apex.





PHYSICAL EXAMINATION


Vital signs reviewed.


Head: Normocephalic.


Eyes: Sclerae nonicteric.  Reports loss of vision in her left eye


Neck: Bilateral carotid bruit, surgical scar IN PLACE B/L, no jugular venous 

distention.


Lungs: Clear to auscultation.


Heart: Regular rate and rhythm, S1-S2, no S3, no murmur or rub.


Abdomen: Soft nontender, positive bowel sounds no organomegaly.


Extremities: No edema, intact distal pulses.  








ASSESSMENT


Substernal chest pain, constant.  Likely angina from residual CAD.  Rule out of 

ACS ECG changes are chronic and no elevation of troponins


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 

of calcific LV thrombus


CAD status post CABG


Lightheadedness and dizziness


Loss of vision in her left eye, MRI findings suggestive of right occipital 

remote injury with small vessel disease


Persistent lightheadedness and postauricular dizziness


Severe right ICA left CCA stenosis more than 95% 


Essential hypertension 


dyslipidemia


Continues to smoke





PLAN


Patient's symptoms are likely angina but she doesn't have ACS.  We will continue

her home medications includes Ranexa metoprolol Plavix and atorvastatin


She has differential pulses in her right upper extremity with poor pulse  left 

radial, and cold left upper extremity.  Her CTagio showed significant calcific 

disease and carotid distribution.  I feel that she has disease in her left 

subclavian which might be causing subclavian steal, poor left radial pulse, and 

may be angina in her LIMA distribution


Possible carotid stenting tomorrow with Dr. Garcia


Smoking cessation recs given


Further recommendations as patient progresses.





Nurse practitioner note has been reviewed, I agree with the documented findings 

and plan of care.  Patient was seen and examined. 





Objective





- Vital Signs


Vital signs: 


                                   Vital Signs











Temp  97.9 F   09/19/23 02:35


 


Pulse  57 L  09/19/23 02:35


 


Resp  13   09/19/23 02:35


 


BP  127/68   09/19/23 03:15


 


Pulse Ox  93 L  09/19/23 02:35


 


FiO2      








                                 Intake & Output











 09/18/23 09/19/23 09/19/23





 18:59 06:59 18:59


 


Intake Total 720  


 


Balance 720  


 


Intake:   


 


  Oral 720  


 


Other:   


 


  Voiding Method Toilet Toilet 


 


  # Voids 2 1 














- Labs


CBC & Chem 7: 


                                 09/19/23 05:51





                                 09/19/23 05:51


Labs: 


                  Abnormal Lab Results - Last 24 Hours (Table)











  09/18/23 Range/Units





  05:56 


 


BUN/Creatinine Ratio  25.12 H  (12.00-20.00)  Ratio

## 2023-09-20 LAB
ANION GAP SERPL CALC-SCNC: 7.9 MMOL/L (ref 4–12)
BASOPHILS # BLD AUTO: 0.04 X 10*3/UL (ref 0–0.1)
BASOPHILS NFR BLD AUTO: 0.5 %
BUN SERPL-SCNC: 18.6 MG/DL (ref 9–27)
BUN/CREAT SERPL: 20.67 RATIO (ref 12–20)
CALCIUM SPEC-MCNC: 9.6 MG/DL (ref 8.7–10.3)
CHLORIDE SERPL-SCNC: 103 MMOL/L (ref 96–109)
CO2 SERPL-SCNC: 27.1 MMOL/L (ref 21.6–31.8)
EOSINOPHIL # BLD AUTO: 0.14 X 10*3/UL (ref 0.04–0.35)
EOSINOPHIL NFR BLD AUTO: 1.7 %
ERYTHROCYTE [DISTWIDTH] IN BLOOD BY AUTOMATED COUNT: 3.9 X 10*6/UL (ref 4.1–5.2)
ERYTHROCYTE [DISTWIDTH] IN BLOOD: 13.6 % (ref 11.5–14.5)
GLUCOSE BLD-MCNC: 100 MG/DL (ref 70–110)
GLUCOSE SERPL-MCNC: 96 MG/DL (ref 70–110)
HCT VFR BLD AUTO: 39 % (ref 37.2–46.3)
HGB BLD-MCNC: 12.7 D/DL (ref 12–15)
IMM GRANULOCYTES BLD QL AUTO: 0.4 %
LYMPHOCYTES # SPEC AUTO: 2.3 X 10*3/UL (ref 0.9–5)
LYMPHOCYTES NFR SPEC AUTO: 28 %
MCH RBC QN AUTO: 32.6 PG (ref 27–32)
MCHC RBC AUTO-ENTMCNC: 32.6 D/DL (ref 32–37)
MCV RBC AUTO: 100 FL (ref 80–97)
MONOCYTES # BLD AUTO: 0.53 X 10*3/UL (ref 0.2–1)
MONOCYTES NFR BLD AUTO: 6.5 %
NEUTROPHILS # BLD AUTO: 5.17 X 10*3/UL (ref 1.8–7.7)
NEUTROPHILS NFR BLD AUTO: 62.9 %
NRBC BLD AUTO-RTO: 0 X 10*3/UL (ref 0–0.01)
PLATELET # BLD AUTO: 236 X 10*3/UL (ref 140–440)
POTASSIUM SERPL-SCNC: 4.1 MMOL/L (ref 3.5–5.5)
SODIUM SERPL-SCNC: 138 MMOL/L (ref 135–145)
WBC # BLD AUTO: 8.21 X 10*3/UL (ref 4.5–10)

## 2023-09-20 PROCEDURE — B41F1ZZ FLUOROSCOPY OF RIGHT LOWER EXTREMITY ARTERIES USING LOW OSMOLAR CONTRAST: ICD-10-PCS

## 2023-09-20 PROCEDURE — 3E043XZ INTRODUCTION OF VASOPRESSOR INTO CENTRAL VEIN, PERCUTANEOUS APPROACH: ICD-10-PCS

## 2023-09-20 RX ADMIN — MIDODRINE HYDROCHLORIDE SCH MG: 5 TABLET ORAL at 17:05

## 2023-09-20 RX ADMIN — Medication SCH MG: at 20:39

## 2023-09-20 RX ADMIN — PANTOPRAZOLE SODIUM SCH MG: 40 TABLET, DELAYED RELEASE ORAL at 05:52

## 2023-09-20 RX ADMIN — RANOLAZINE SCH MG: 500 TABLET, FILM COATED, EXTENDED RELEASE ORAL at 09:07

## 2023-09-20 RX ADMIN — ATORVASTATIN CALCIUM SCH MG: 80 TABLET, FILM COATED ORAL at 20:38

## 2023-09-20 RX ADMIN — METOPROLOL TARTRATE SCH MG: 25 TABLET, FILM COATED ORAL at 09:07

## 2023-09-20 RX ADMIN — SODIUM BICARBONATE SCH MLS/HR: 84 INJECTION, SOLUTION INTRAVENOUS at 04:50

## 2023-09-20 RX ADMIN — PANTOPRAZOLE SODIUM SCH MG: 40 TABLET, DELAYED RELEASE ORAL at 17:05

## 2023-09-20 RX ADMIN — HEPARIN SODIUM SCH: 5000 INJECTION, SOLUTION INTRAVENOUS; SUBCUTANEOUS at 08:37

## 2023-09-20 RX ADMIN — ISOSORBIDE MONONITRATE SCH MG: 30 TABLET, EXTENDED RELEASE ORAL at 09:07

## 2023-09-20 RX ADMIN — CLOPIDOGREL BISULFATE SCH: 75 TABLET ORAL at 08:37

## 2023-09-20 RX ADMIN — LACOSAMIDE SCH MG: 50 TABLET, FILM COATED ORAL at 09:16

## 2023-09-20 RX ADMIN — LEVOTHYROXINE SODIUM SCH MCG: 0.12 TABLET ORAL at 05:52

## 2023-09-20 RX ADMIN — HEPARIN SODIUM SCH UNIT: 5000 INJECTION, SOLUTION INTRAVENOUS; SUBCUTANEOUS at 20:39

## 2023-09-20 RX ADMIN — RANOLAZINE SCH MG: 500 TABLET, FILM COATED, EXTENDED RELEASE ORAL at 20:39

## 2023-09-20 NOTE — P.PN
Subjective


Progress Note Date: 09/20/23








This is a pleasant 68-year-old female who was admitted with chest pain possible 

unstable angina with significant cardiac history and cardiology following.  

Patient noted to have carotid stenosis with history of coronary artery disease 

and ischemic cardiomyopathy with an EF of 40-45%.  Patient also with history of 

severe right ICA and left CCA stenosis more than 95% was scheduled in October 

for intervention although patient continues to be symptomatic with dizziness and

syncopal episodes.  Patient evaluated by cardiology and the case was discussed 

with Dr. Garcia who is scheduled to undergo carotid stenting today.  Will await 

surgical report. 





Review of systems:


Constitutional: No reports of fatigue, fever, or chills, reports increased 

anxiety


Cardiovascular:  reports of intermittent chest pain and dizziness with 

lightheadedness when getting up


Respiratory: No reports of shortness of breath or cough


GI: No reports of nausea, no reports of vomiting, no diarrhea


: No reports of dysuria or retention


Neurovascular:  reports of dizziness





All medications have been reviewed





PHYSICAL EXAMINATION: 





GENERAL: The patient is alert and oriented x4, thin built, elderly appearing 

female,  Well developed, anxious


HEENT: Pupils are round and equally reacting to light. EOMI. no scleral icterus.

No conjunctival pallor. Normocephalic, atraumatic. No pharyngeal erythema. No 

thyromegaly.  


CARDIOVASCULAR: S1 and S2  muffled 


PULMONARY: diminished breath sounds bilaterally with no wheezing or rhonchi 

noted. 


ABDOMEN: soft.  Nontender on exam.  obese. non-distended, normoactive bowel 

sounds. No palpable organomegaly. 


MUSCULOSKELETAL: No joint swelling or deformity.


EXTREMITIES: No cyanosis, clubbing, or pedal edema.  


NEUROLOGICAL: Gross neurological examination did not reveal any focal deficits. 

Diffuse weakness


SKIN: No rashes. 





Assessment:





Chest pain, possible unstable angina, ACS ruled out


Gastroesophageal reflux disease


Acute kidney injury secondary to dehydration, present on admission, resolved


Carotid stenosis scheduled for carotid stenting with Dr. Garcia today


History of ischemic cardiomyopathy with an EF of 40-45% 


history of coronary artery disease status post CABG


Severe right ICA stenosis and left common carotid artery stenosis more than 95%


Hypertension history


Hyperlipidemia


Continued ongoing nicotine dependence


Anxiety


GI prophylaxis


DVT prophylaxis


Full code





Plan:





Patient continued to have multiple episodes of syncope per  at home and 

continues to be symptomatic with dizziness and lightheadedness and was scheduled

to undergo carotid stenting today with Dr. Garcia.  Per report patient was in the 

Cath Lab and experienced severe hypotension and the intervention was aborted and

patient was brought to the ICU on pressor support.  Patient will be monitored 

overnight in the ICU and per nursing staff currently weaning pressors 

maintaining blood pressure.  Sheath is still present and will be attempting 

carotid stenting in 24 hours


Patient continues to report anxiety and does have low-dose anxiety meds as 

needed


Will follow-up on repeat labs


Due to multiple complex medical issues, prognosis is guarded





The impression and plan of care has been dictated by Rose Mary Flannery, nurse 

practitioner as directed.





Dr. Bob MD


I have performed a history and examination and MDM of this patient, discussed 

the same with the dictator, and  agree with the dictator's assessment and plan 

as written ,documented as a scribe. Based on total visit time,  I have performed

more than 50% of the visit. Any additional findings or plans will be noted. 





Objective





- Vital Signs


Vital signs: 


                                   Vital Signs











Temp  98 F   09/20/23 13:05


 


Pulse  64   09/20/23 13:05


 


Resp  19   09/20/23 13:05


 


BP  117/56   09/20/23 13:05


 


Pulse Ox  97   09/20/23 13:05


 


FiO2      








                                 Intake & Output











 09/19/23 09/20/23 09/20/23





 18:59 06:59 18:59


 


Intake Total 402  283


 


Output Total   0


 


Balance 402  283


 


Intake:   


 


  IV   283


 


    Sodium Chloride 0.9% 1,   75





    000 ml @ 75 mls/hr IV .   





    [id number] KT Rx#:[id number]   


 


  Oral 402  


 


Output:   


 


  Urine   0


 


Other:   


 


  Voiding Method  Toilet 


 


  # Voids 1 1 














- Labs


CBC & Chem 7: 


                                 09/20/23 04:39





                                 09/20/23 04:39


Labs: 


                  Abnormal Lab Results - Last 24 Hours (Table)











  09/20/23 09/20/23 Range/Units





  04:39 04:39 


 


RBC  3.90 L   (4.10-5.20)  X 10*6/uL


 


MCV  100.0 H   (80.0-97.0)  FL


 


MCH  32.6 H   (27.0-32.0)  pg


 


BUN/Creatinine Ratio   20.67 H  (12.00-20.00)  Ratio

## 2023-09-20 NOTE — P.PCN
Date of Procedure: 09/20/23


Indications for Procedure: 








Procedure report





Performing physician


Rogers Garcia M.D.





Procedure performed


1.  Selective right common femoral artery angiogram


2.  Aborted stenting of the left common carotid artery





Indication


This is a 68-year-old female patient with CAD status post CABG as well as 

carotid atherosclerosis status post bilateral carotid endarterectomy as well as 

hypertension and dyslipidemia and multiple comorbid conditions was admitted to 

the hospital with syncope.  She was seen by a vascular surgeon in the past and 

she was deemed too high risk for carotid endarterectomy again.  I saw the 

patient in the office and the patient was scheduled to undergo carotid stenting.

 Unfortunately she ended in the hospital with syncope again.  She was seen by 

Dr. James recommended proceeding with carotid stenting.





Approach


Right common femoral artery





Complication


None





Level of sedation


The procedure was performed was no conscious sedation





Procedure description


After obtaining an informed consent the patient was brought to the cardiac cath 

lab.  The right common femoral artery was cannulated using micropuncture 

technique under ultrasound guidance and the micropuncture wire passed easily 

then I placed 6-Georgian sheath and 11 cm at the right common femoral artery and 

subsequently the 11 cm 6-Georgian sheath was exchanged over 035 wire into a 90 cm 

shuttle sheath.  That was done under fluoroscopy guidance.  The sheath was 

advanced all the way to the proximal portion of the descending aorta.  Upon 

advancing a JB2 catheter and before even engaging the carotid arteries the 

patient pressure was in the 50s.  That was from the cuff blood pressure.  We did

check the arterial blood pressure from the central aorta and that consistently 

was low and not briefly low.  We had to start the patient on norepinephrine and 

we aborted the carotid stenting and the patient and meanwhile the patient will 

be admitted to the intensive care unit





Postprocedure management


The patient to be monitored in the intensive care unit


Continue supporting the blood pressure was norepinephrine


Follow-up with the patient

## 2023-09-20 NOTE — P.PN
Subjective


Progress Note Date: 09/20/23





HISTORY OF PRESENTING ILLNESS


68-year-old female with past medical history of CAD status post CABG, carotid 

artery disease with bilateral endarterectomy, hypertension, dyslipidemia, 

hypothyroidism, chronic smoker. 


He was admitted to the hospital in August 2023 with complaints of chest pain and

recurrent syncope and lightheadedness.  She had a complete workup done that 

time. 


This time she presents to the hospital with complains of feeling lightheaded all

the time and complaining of substernal chest heaviness and chest pain.  She is 

not able to give a detailed history but she reports that she has this chest pain

almost throughout the day.  Due to this she has been very limited in her 

physical capacity.


He is also complaining of loss of vision in her left eye.  MRI brain showed 

remote injury to her right occipital lobe and small vessel disease.  She has 95%

stenosis of right ICA and left CCA, right external carotid artery.


On exam she has a cold right upper extremity and vomiting left upper extremity. 

Her left radial pulses weak as compared to right.


DIAGNOSTICS


EKG reveals sinus rhythm, T-wave inversions in inferolateral leads which appears

to be chronic.


Chest xray no acute findings.


Laboratory reviewed, hemoglobin 13.9, macrocytosis BUN 24, creatinine 1.02, 

troponin 2 negative and 2 proBNP 522.


Home cardiac medications include Plavix, atorvastatin, Ranexa, metoprolol. 


PRIOR CARDIAC TESTING


Echo from August 2023 shows EF of 40-45%, apical and inferior wall hypokinesia 

suggestive of scar.  She also has a prior history of chronic calcified LV 

thrombus.





9/18


Patient denies having any chest pain at this time.  She does have intermittent 

chest heaviness last time was last evening.  Telemetry is a sinus rhythm.  Heart

rate is in the 60s and 70s, afebrile, blood pressure 126/70, pulse ox 98% on 

room air.  Minimal discrepancy between right and left blood pressures.  No 

repeat blood work today.  Patient apparently had recent stress testing done and 

we will attempt to obtain report.





9/19


Patient states she had an episode of chest pain in the midsternal area last 

evening and slowly went away on its own.  Chest pain is reproducible and tender 

in the sternal area.  She states she did not sleep last night and was up until 4

in the morning very tired today.  Case has been discussed with Dr. Garcia is he 

has patient scheduled on October 4 for stent of the carotid artery.  Heart rate 

in this 60s, blood pressure 116/62, pulse ox 96% on room air.  Repeat blood work

reveals hemoglobin 14.2, electrolytes and renal function normal.


Reviewed documents from [facility], Dr. Noel. Cardiac catheterization 

12/2022 revealed patent stent in the proximal left cicumflex with 30% in-stent 

restenosis. Vein graft to the obtuse marginal artery is patent with 30% proximal

stenosis. LIMA to LAD is open. One occluded vein graft presumably to the distal 

RCA. Echocardiogram 12/2022 revealed limited study for LV throbus. EF 50%. Near 

complete resolution of previously identified thrombus, very small laminar 

residual thrombus at LV apex.





9/20


Patient states she is still having chest pressure on and off.  EKG reviewed and 

she does have changes that are unchanged.  Blood pressure 100/60, heart rate in 

the 60s and 70s.  Repeat blood work today reveals WBC 8.2, hemoglobin 12.7.  

Electrolytes and renal function are normal with creatinine 0.9.  Troponin was 

obtained this morning was negative 1.  Case has been discussed with Dr. Garcia 

and he will do a carotid stenting today.





PYSICAL EXAMINATION


Vital signs reviewed.


Head: Normocephalic.


Eyes: Sclerae nonicteric.  Reports loss of vision in her left eye


Neck: Bilateral carotid bruit, surgical scar IN PLACE B/L, no jugular venous 

distention.


Lungs: Clear to auscultation.


Heart: Regular rate and rhythm, S1-S2, no S3, no murmur or rub.


Abdomen: Soft nontender, positive bowel sounds no organomegaly.


Extremities: No edema, intact distal pulses.  








ASSESSMENT


Substernal chest pain, constant.  Likely angina from residual CAD.  Rule out of 

ACS ECG changes are chronic and no elevation of troponins


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 

of calcific LV thrombus


CAD status post CABG


Lightheadedness and dizziness


Loss of vision in her left eye, MRI findings suggestive of right occipital 

remote injury with small vessel disease


Persistent lightheadedness and postauricular dizziness


Severe right ICA left CCA stenosis more than 95% 


Essential hypertension 


dyslipidemia


Continues to smoke





PLAN


Patient's symptoms are likely angina but she doesn't have ACS.  We will continue

her home medications includes Ranexa metoprolol Plavix and atorvastatin


She has differential pulses in her right upper extremity with poor pulse  left 

radial, and cold left upper extremity.  Her CTagio showed significant calcific 

disease and carotid distribution.  I feel that she has disease in her left 

subclavian which might be causing subclavian steal, poor left radial pulse, and 

may be angina in her LIMA distribution


Patient is scheduled for carotid stenting with Dr. Garcia


Smoking cessation recs given


Further recommendations as patient progresses.





Nurse practitioner note has been reviewed, I agree with the documented findings 

and plan of care.  Patient was seen and examined. 





Objective





- Vital Signs


Vital signs: 


                                   Vital Signs











Temp  97.8 F   09/20/23 02:59


 


Pulse  60   09/20/23 02:59


 


Resp  17   09/20/23 02:59


 


BP  100/60   09/20/23 02:59


 


Pulse Ox  99   09/20/23 02:59


 


FiO2      








                                 Intake & Output











 09/19/23 09/20/23 09/20/23





 18:59 06:59 18:59


 


Intake Total 402  


 


Balance 402  


 


Intake:   


 


  Oral 402  


 


Other:   


 


  Voiding Method  Toilet 


 


  # Voids 1 1 














- Labs


CBC & Chem 7: 


                                 09/20/23 04:39





                                 09/20/23 04:39


Labs: 


                  Abnormal Lab Results - Last 24 Hours (Table)











  09/19/23 09/19/23 Range/Units





  05:51 05:51 


 


MCV  98.2 H   (80.0-97.0)  FL


 


MCH  32.3 H   (27.0-32.0)  pg


 


BUN/Creatinine Ratio   21.86 H  (12.00-20.00)  Ratio

## 2023-09-20 NOTE — IR
EXAMINATION TYPE: IR angio carotid cerv BILAT

 

DATE OF EXAM: 9/20/2023

 

COMPARISON: NONE

 

HISTORY: Fluoroscopy time.

 

Fluoroscopy was provided to the referring clinician.

## 2023-09-20 NOTE — CDI
Documentation Clarification Form



Date: 09/20/2023 09:47:47 AM

From: Shari Joseph RN, CCDS

Phone: [phone]

MRN: [id number]

Admit Date: 09/16/2023 09:32:00 PM

Patient Name: Jaime Holguin

Visit Number: [id number]

Discharge Date:  





ATTENTION: The Clinical Documentation Specialists (CDI) and [facility] Coding Staff 
appreciate your assistance in clarifying documentation. Please respond to the 
clarification below the line at the bottom and electronically sign. The CDI & 
[facility] Coding staff will review the response and follow-up if needed. Please note: 
Queries are made part of the Legal Health Record. If you have any questions, 
please contact the author of this message via ITS.



Dr. Kelly Tyson





Your patient has documentation of mild MIRLANDE in your H/P.   Based on this 
information and the findings below, is there an additional diagnosis that is 
clinically appropriate for this patient?



Patient history/risk factors: Coronary Artery Disease (CAD), Hypertension, 
Myocardial Infarction (MI), Thyroid Disorder



Clinical Indicators: 

9/16 BUN 24, Cr 1.02 GFR 57

9/17 BUN 21 Cr 0.71 GFR 87 

 BUN/Creatinine Ratio 25.12, 21.86, 20.67



Treatment:

.9  ML Bolus 9/16

.45% NS@75 MLS/HR9/17-9/20

Monitor: strict I/O's, daily weights, renal function and electrolytes; avoid 
nephrotoxins and hypotension



Is there an additional diagnosis that is clinically appropriate for this 
patient?

[ *** ] Acute Kidney Injury

[  ] Acute Renal Failure

[  ] Acute on Chronic Renal Failure (please stage_________

[  ] No additional diagnosis/Not clinically significant

[  ] Unable to determine

[  ] Other, please specify _______









Reference: KDIGO MIRLANDE Criteria

   An increase in serum creatinine by greater than or equal to 0.3 mg/dL within 
48 hours;

   An increase in serum creatinine by greater than or equal to 1.5 times 
baseline, which is known or presumed to have occurred within the prior 7 days; 

   A urine volume less than 0.5 ml/kg/h for 6 hours.



When the baseline is unknown the lowest creatinine during admission assumed to 
be baseline





(Template Last Revised: February 2023)

___________________________________________________________________________

MTDD

## 2023-09-21 LAB
ANION GAP SERPL CALC-SCNC: 5 MMOL/L
BUN SERPL-SCNC: 16 MG/DL (ref 7–17)
CALCIUM SPEC-MCNC: 9.5 MG/DL (ref 8.4–10.2)
CHLORIDE SERPL-SCNC: 105 MMOL/L (ref 98–107)
CO2 SERPL-SCNC: 27 MMOL/L (ref 22–30)
GLUCOSE SERPL-MCNC: 88 MG/DL (ref 74–99)
POTASSIUM SERPL-SCNC: 4 MMOL/L (ref 3.5–5.1)
SODIUM SERPL-SCNC: 137 MMOL/L (ref 137–145)

## 2023-09-21 RX ADMIN — HEPARIN SODIUM SCH UNIT: 5000 INJECTION, SOLUTION INTRAVENOUS; SUBCUTANEOUS at 08:23

## 2023-09-21 RX ADMIN — LACOSAMIDE SCH MG: 50 TABLET, FILM COATED ORAL at 08:23

## 2023-09-21 RX ADMIN — PANTOPRAZOLE SODIUM SCH MG: 40 TABLET, DELAYED RELEASE ORAL at 06:43

## 2023-09-21 RX ADMIN — MIDODRINE HYDROCHLORIDE SCH MG: 5 TABLET ORAL at 06:44

## 2023-09-21 RX ADMIN — MIDODRINE HYDROCHLORIDE SCH MG: 5 TABLET ORAL at 17:23

## 2023-09-21 RX ADMIN — RANOLAZINE SCH MG: 500 TABLET, FILM COATED, EXTENDED RELEASE ORAL at 08:23

## 2023-09-21 RX ADMIN — CLOPIDOGREL BISULFATE SCH MG: 75 TABLET ORAL at 08:23

## 2023-09-21 RX ADMIN — Medication SCH MG: at 20:46

## 2023-09-21 RX ADMIN — LEVOTHYROXINE SODIUM SCH MCG: 0.12 TABLET ORAL at 06:43

## 2023-09-21 RX ADMIN — ATORVASTATIN CALCIUM SCH MG: 80 TABLET, FILM COATED ORAL at 20:46

## 2023-09-21 RX ADMIN — HEPARIN SODIUM SCH UNIT: 5000 INJECTION, SOLUTION INTRAVENOUS; SUBCUTANEOUS at 20:43

## 2023-09-21 RX ADMIN — PANTOPRAZOLE SODIUM SCH MG: 40 TABLET, DELAYED RELEASE ORAL at 17:23

## 2023-09-21 RX ADMIN — RANOLAZINE SCH MG: 500 TABLET, FILM COATED, EXTENDED RELEASE ORAL at 20:57

## 2023-09-21 NOTE — PN
PROGRESS NOTE



SUBJECTIVE:

A 68-year-old lady who is admitted to hospital with syncope and has bilateral severe

carotid stenosis and was to undergo carotid stenting by Dr. Garcia.  When she was taken

to the cath lab, she was found to be profoundly hypotensive.  Procedure was canceled

and her medications were held, and the patient is in the ICU with a femoral sheath.

She is currently free of symptoms and the blood pressures have improved.  She is on

aspirin, Lipitor, Plavix, Synthroid, midodrine that was started yesterday, and her

Imdur and metoprolol have been stopped.



OBJECTIVE:

GENERAL:  She is comfortable at rest.

VITAL SIGNS:  Stable.

CHEST:  Reveals good air entry bilaterally.

HEART:  Reveals first and second heart sounds, an ejection systolic murmur in the

aortic area.

ABDOMEN:  Soft.

EXTREMITIES:  Did not reveal any edema.  Peripheral pulses are felt.



ASSESSMENT AND PLAN:

1. Severe bilateral carotid stenosis.

2. Syncope.



We will take the femoral sheath out today and the patient will go to carotid stenting

tomorrow if the blood pressures remain stable.





MMODL / IJN: [id number] / Job#: [id number]

## 2023-09-21 NOTE — P.PN
Subjective


Progress Note Date: 09/21/23








This is a pleasant 68-year-old female who was admitted with chest pain possible 

unstable angina with significant cardiac history and cardiology following.  

Patient noted to have carotid stenosis with history of coronary artery disease 

and ischemic cardiomyopathy with an EF of 40-45%.  Patient also with history of 

severe right ICA and left CCA stenosis more than 95% was scheduled in October 

for intervention although patient continues to be symptomatic with dizziness and

syncopal episodes.  Patient evaluated by cardiology and the case was discussed 

with Dr. Garcia who is scheduled to undergo carotid stenting today.  Will await 

surgical report. 





09/21/2023


Patient is seen and evaluated in follow-up continues in the ICU being closely 

monitored.  Cardiology following and monitoring blood pressure and is currently 

stable and cardiac medications currently on hold and patient is receiving low-

dose midodrine not currently on pressor support.  Plan is for possible 

intervention of carotid stenting with cardiology in the a.m.  Patient continues 

to report some anxiety and does have as needed Xanax.  Patient currently denying

any chest pain or palpitations.  Patient has been afebrile with no reported 

shortness of breath.  Patient is tolerating diet and has had no nausea or 

vomiting.





Review of systems:


Constitutional: No reports of fatigue, fever, or chills, reports continued 

anxiety


Cardiovascular: No reports of  chest pain or dizziness currently


Respiratory: No reports of shortness of breath or cough


GI: No reports of nausea, no reports of vomiting, no diarrhea


: No reports of dysuria or retention


Neurovascular:  reports of dizziness





All medications have been reviewed





PHYSICAL EXAMINATION: 





GENERAL: The patient is alert and oriented x4, thin built, elderly appearing 

female,  Well developed, slightly anxious


HEENT: Pupils are round and equally reacting to light. EOMI. no scleral icterus.

No conjunctival pallor. Normocephalic, atraumatic. No pharyngeal erythema. No 

thyromegaly.  


CARDIOVASCULAR: S1 and S2  muffled 


PULMONARY: diminished breath sounds bilaterally with no wheezing or rhonchi 

noted. 


ABDOMEN: soft.  Nontender on exam.  non-distended, normoactive bowel sounds. No 

palpable organomegaly. 


MUSCULOSKELETAL: No joint swelling or deformity.


EXTREMITIES: No cyanosis, clubbing, or pedal edema.  


NEUROLOGICAL: Gross neurological examination did not reveal any focal deficits. 

Diffuse weakness


SKIN: No rashes. 





Assessment:





Chest pain, possible unstable angina, ACS ruled out


Gastroesophageal reflux disease


Acute kidney injury secondary to dehydration, present on admission, resolved


Carotid stenosis and planning on carotid stenting


History of ischemic cardiomyopathy with an EF of 40-45% 


history of coronary artery disease status post CABG


Severe right ICA stenosis and left common carotid artery stenosis more than 95%


Hypertension history, with episodes of hypotension


Hyperlipidemia


Continued ongoing nicotine dependence


Anxiety


GI prophylaxis


DVT prophylaxis


Full code





Plan:





Patient continued to have multiple episodes of syncope per  at home and 

continues to be symptomatic with dizziness and lightheadedness and was scheduled

to undergo carotid stenting with Dr. Garcia yesterday although experienced severe 

hypotension and the intervention was aborted and patient was brought to the ICU 

for close monitoring and pressor support.  Patient is off pressors and 

maintained on midodrine maintaining blood pressures with plans for stenting 

tomorrow 09/22/2023


Patient continues to report anxiety and does have low-dose anxiety meds as 

needed


Will follow-up on repeat labs


Due to multiple complex medical issues, prognosis is guarded





The impression and plan of care has been dictated by Rose Mary Flannery, nurse 

practitioner as directed.





Dr. Bob MD


I have performed a history and examination and MDM of this patient, discussed 

the same with the dictator, and  agree with the dictator's assessment and plan 

as written ,documented as a scribe. Based on total visit time,  I have performed

more than 50% of the visit. Any additional findings or plans will be noted. 





Objective





- Vital Signs


Vital signs: 


                                   Vital Signs











Temp  98.1 F   09/21/23 16:00


 


Pulse  80   09/21/23 12:00


 


Resp  18   09/21/23 16:00


 


BP  130/49   09/21/23 16:00


 


Pulse Ox  94 L  09/21/23 12:00


 


FiO2      








                                 Intake & Output











 09/21/23 09/21/23 09/22/23





 06:59 18:59 06:59


 


Intake Total 0 250 


 


Output Total 1250 300 


 


Balance -1250 -50 


 


Weight 51 kg 51 kg 


 


Intake:   


 


  IV 0  


 


    Sodium Chloride 0.9% 1, 0  





    000 ml @ 75 mls/hr IV .   





    [id number] Rx#:[id number]   


 


  Oral 0 250 


 


Output:   


 


  Urine 1250 300 


 


Other:   


 


  Voiding Method Bedpan Bedpan 


 


  # Voids  1 














- Labs


CBC & Chem 7: 


                                 09/20/23 04:39





                                 09/21/23 04:04

## 2023-09-22 LAB
ANION GAP SERPL CALC-SCNC: 3 MMOL/L
ANION GAP SERPL CALC-SCNC: 5 MMOL/L
BASOPHILS # BLD AUTO: 0 K/UL (ref 0–0.2)
BASOPHILS NFR BLD AUTO: 0 %
BUN SERPL-SCNC: 16 MG/DL (ref 7–17)
BUN SERPL-SCNC: 20 MG/DL (ref 7–17)
CALCIUM SPEC-MCNC: 8.5 MG/DL (ref 8.4–10.2)
CALCIUM SPEC-MCNC: 9.4 MG/DL (ref 8.4–10.2)
CHLORIDE SERPL-SCNC: 106 MMOL/L (ref 98–107)
CHLORIDE SERPL-SCNC: 107 MMOL/L (ref 98–107)
CO2 SERPL-SCNC: 25 MMOL/L (ref 22–30)
CO2 SERPL-SCNC: 30 MMOL/L (ref 22–30)
EOSINOPHIL # BLD AUTO: 0.1 K/UL (ref 0–0.7)
EOSINOPHIL NFR BLD AUTO: 2 %
ERYTHROCYTE [DISTWIDTH] IN BLOOD BY AUTOMATED COUNT: 3.31 M/UL (ref 3.8–5.4)
ERYTHROCYTE [DISTWIDTH] IN BLOOD BY AUTOMATED COUNT: 4 M/UL (ref 3.8–5.4)
ERYTHROCYTE [DISTWIDTH] IN BLOOD: 13.5 % (ref 11.5–15.5)
ERYTHROCYTE [DISTWIDTH] IN BLOOD: 13.5 % (ref 11.5–15.5)
GLUCOSE BLD-MCNC: 87 MG/DL (ref 70–110)
GLUCOSE SERPL-MCNC: 100 MG/DL (ref 74–99)
GLUCOSE SERPL-MCNC: 93 MG/DL (ref 74–99)
HCT VFR BLD AUTO: 33.3 % (ref 34–46)
HCT VFR BLD AUTO: 40.1 % (ref 34–46)
HGB BLD-MCNC: 11 GM/DL (ref 11.4–16)
HGB BLD-MCNC: 13.1 GM/DL (ref 11.4–16)
INR PPP: 0.9 (ref ?–1.2)
LYMPHOCYTES # SPEC AUTO: 1.8 K/UL (ref 1–4.8)
LYMPHOCYTES NFR SPEC AUTO: 22 %
MCH RBC QN AUTO: 32.7 PG (ref 25–35)
MCH RBC QN AUTO: 33 PG (ref 25–35)
MCHC RBC AUTO-ENTMCNC: 32.5 G/DL (ref 31–37)
MCHC RBC AUTO-ENTMCNC: 32.9 G/DL (ref 31–37)
MCV RBC AUTO: 100.3 FL (ref 80–100)
MCV RBC AUTO: 100.6 FL (ref 80–100)
MONOCYTES # BLD AUTO: 0.4 K/UL (ref 0–1)
MONOCYTES NFR BLD AUTO: 5 %
NEUTROPHILS # BLD AUTO: 5.6 K/UL (ref 1.3–7.7)
NEUTROPHILS NFR BLD AUTO: 70 %
PLATELET # BLD AUTO: 222 K/UL (ref 150–450)
PLATELET # BLD AUTO: 225 K/UL (ref 150–450)
POTASSIUM SERPL-SCNC: 3.5 MMOL/L (ref 3.5–5.1)
POTASSIUM SERPL-SCNC: 5.2 MMOL/L (ref 3.5–5.1)
PT BLD: 9.9 SEC (ref 9–12)
SODIUM SERPL-SCNC: 136 MMOL/L (ref 137–145)
SODIUM SERPL-SCNC: 140 MMOL/L (ref 137–145)
WBC # BLD AUTO: 8 K/UL (ref 3.8–10.6)
WBC # BLD AUTO: 8.6 K/UL (ref 3.8–10.6)

## 2023-09-22 PROCEDURE — B3171ZZ FLUOROSCOPY OF LEFT INTERNAL CAROTID ARTERY USING LOW OSMOLAR CONTRAST: ICD-10-PCS

## 2023-09-22 PROCEDURE — 4A03X5D MEASUREMENT OF ARTERIAL FLOW, INTRACRANIAL, EXTERNAL APPROACH: ICD-10-PCS

## 2023-09-22 PROCEDURE — B4101ZZ FLUOROSCOPY OF ABDOMINAL AORTA USING LOW OSMOLAR CONTRAST: ICD-10-PCS

## 2023-09-22 PROCEDURE — 037J35Z DILATION OF LEFT COMMON CAROTID ARTERY WITH TWO DRUG-ELUTING INTRALUMINAL DEVICES, PERCUTANEOUS APPROACH: ICD-10-PCS

## 2023-09-22 RX ADMIN — MIDODRINE HYDROCHLORIDE SCH MG: 5 TABLET ORAL at 17:28

## 2023-09-22 RX ADMIN — HEPARIN SODIUM SCH UNIT: 5000 INJECTION, SOLUTION INTRAVENOUS; SUBCUTANEOUS at 20:24

## 2023-09-22 RX ADMIN — HEPARIN SODIUM SCH UNIT: 5000 INJECTION, SOLUTION INTRAVENOUS; SUBCUTANEOUS at 08:29

## 2023-09-22 RX ADMIN — PANTOPRAZOLE SODIUM SCH MG: 40 TABLET, DELAYED RELEASE ORAL at 17:28

## 2023-09-22 RX ADMIN — LEVOTHYROXINE SODIUM SCH MCG: 0.12 TABLET ORAL at 06:33

## 2023-09-22 RX ADMIN — HEPARIN SODIUM ONE UNIT: 1000 INJECTION, SOLUTION INTRAVENOUS; SUBCUTANEOUS at 14:38

## 2023-09-22 RX ADMIN — Medication SCH MG: at 20:23

## 2023-09-22 RX ADMIN — ATORVASTATIN CALCIUM SCH MG: 80 TABLET, FILM COATED ORAL at 20:24

## 2023-09-22 RX ADMIN — HEPARIN SODIUM ONE UNIT: 1000 INJECTION, SOLUTION INTRAVENOUS; SUBCUTANEOUS at 13:17

## 2023-09-22 RX ADMIN — LACOSAMIDE SCH MG: 50 TABLET, FILM COATED ORAL at 08:30

## 2023-09-22 RX ADMIN — CEFAZOLIN SCH MLS/HR: 330 INJECTION, POWDER, FOR SOLUTION INTRAMUSCULAR; INTRAVENOUS at 15:00

## 2023-09-22 RX ADMIN — NITROGLYCERIN PRN MG: 0.4 TABLET SUBLINGUAL at 22:09

## 2023-09-22 RX ADMIN — RANOLAZINE SCH MG: 500 TABLET, FILM COATED, EXTENDED RELEASE ORAL at 08:30

## 2023-09-22 RX ADMIN — RANOLAZINE SCH MG: 500 TABLET, FILM COATED, EXTENDED RELEASE ORAL at 20:23

## 2023-09-22 RX ADMIN — CLOPIDOGREL BISULFATE SCH MG: 75 TABLET ORAL at 08:30

## 2023-09-22 RX ADMIN — PANTOPRAZOLE SODIUM SCH MG: 40 TABLET, DELAYED RELEASE ORAL at 06:33

## 2023-09-22 RX ADMIN — MIDODRINE HYDROCHLORIDE SCH MG: 5 TABLET ORAL at 06:33

## 2023-09-22 NOTE — P.PCN
Date of Procedure: 09/22/23


Operative Findings: 








Carotid stenting procedure





Performing physician


Rogers Garcia MD





Procedure performed


1.  Successful stenting of the distal left common carotid artery using XACT 9-9 

x 30 mm carotid stent with adjunctive use of filter wire


2.  Successful stenting of the ostium left common carotid artery using 8 mm x 29

mm Omnilinl balloon expandable stent with an excellent angiographic results


3.  Gradient measurement across the ostial left common carotid artery


4.  Selective left common and left internal carotid angiogram


5.  An aortic arch angiogram


6.  Selective right common femoral artery angiogram and ultrasound-guided access

of the right common femoral artery





Indication


This is a 68-year-old female patient with known severe carotid atherosclerosis 

status post bilateral carotid endarterectomy who was admitted to the hospital 

with recurrent syncope.  She underwent further investigation including carotid 

CTA and that revealed critical bilateral carotid disease.  She was brought today

to undergo an intervention on the left carotid system.





Approach


Right common femoral artery





Complications


None





Level of sedation


The procedure was performed with no sedation.  The procedure length was 68 

minutes





Procedure description


After obtaining an informed consent the patient was brought to the cardiac cath 

lab.  The right common femoral artery was cannulated using micropuncture 

technique under ultrasound guidance and micropuncture wire passed easily then I 

placed 11 cm 6 [place] sheath at the right common femoral artery which was 

subsequently exchanged into a 90 cm 6 [place] shuttle sheath over all 3 5 stiff 

Glidewire.  At that point anticoagulation was initiated using heparin with co

ntinuous ACT monitoring.  The patient initially was given 4000 units of heparin 

intravenous with additional 1000 units of heparin was given throughout the 

procedure.  Sedation was not given to continue monitor the patient neurologic 

status.  Subsequently I did an aortic root and aortic arch angiogram and that 

revealed type II aortic arch with what it seems to be intermediate disease 

involving the ostial of the left common carotid artery.  At that point 

attempting engaging the left common carotid artery using JB2 catheter was 

unsuccessful because the catheter to keep going into the innominate artery and 

then to the left subclavian artery.  At that point I decided to engage the left 

common carotid artery using a Sai right catheter. Because the Jeffrey 

Wells catheter was only short I did have to put back 11 cm 6 [place] sheath and 

take out the 90 cm 6 [place] sheath. With that I was able to advance Jeffrey 

Wells catheter over all 3 5 stiff Glidewire all the way to the aortic root and 

subsequently the catheter was pulled out then I was able to engage the ostial of

the left common carotid artery.  Subsequently I did advance  035 stiff Glidewire

all the way to the distal portion.  Then the [place] Wells catheter was 

withdrawn out and the wire was kept in place.  After that I did exchange my 11 

cm short sheath into a 90 cm 6 [place] shuttle sheath over all 3 5 stiff 

Glidewire and then the sheath was advanced gently all the way to the proximal 

portion of the left common carotid artery. Subsequently selective left common 

and left internal carotid angiogram was performed and showed a critical lesion 

involving the left common carotid artery in the distal portion. After doing our 

measurements I decided to place a carotid stent in the distal portion of the 

left common carotid artery.  At that point the left common carotid artery was 

wired using a filter wire and then the filter was deployed under fluoroscopy 

guidance and the filter system was withdrawn out.  Predilation was performed 

using 4 mm balloon which was inflated under 8 jaycob quickly for a few seconds 

only.  Subsequently I deployed XACT carotid stent and that was 99 x 13 mm self 

expandable stent where the stent was positioned under fluoroscopy guidance and 

subsequently deployed under fluoroscopy guidance and then post dilated using a 5

mm balloon.  The balloon was inflated under 8 jaycob quickly for a few seconds 

only.  Angiogram was performed and showed excellent angiographic results with 

reduction of stenosis from 99% to 0%.  





Because we have intermediate lesion involving the ostial of the left common ca

rotid artery I decided to measure the gradient across it.  I did pull back the 

sheath across the ostial of the left common carotid artery and the gradient came

in to be more than 20 mmHg.  At that point I decided to stent that lesion.  

Again at that point I tried to engage the ostial of the left common carotid 

artery with a long sheath in place but I was unable to.  So I pulled the sheath 

out and I placed 6 [place] sheath and again I was able to engage the ostium of 

the left common carotid artery using the Sai right catheter.  Then the 

short sheath was exchanged over all 3 5 stiff Glidewire into long shuttle 90 cm 

sheath again.  Subsequently I decided to do direct stenting of the ostial left 

common carotid artery so I advanced the 8 mm x 29 mm balloon expandable stent 

for the stent was positioned under fluoroscopy guidance and deployed under 

fluoroscopy guidance.  The following angiogram showed good angiographic results 

because I did again an aortic arch angiogram and that showed good flow in the 

left common and left internal carotid artery and at that point the carotid 

stenting procedure was completed was no complication.  The patient tolerated the

procedure very well.





After that I did exchange my long sheath into short sheath using a 035 stiff 

Glidewire before I did selective right common femoral artery angiogram





Postprocedure management


Dual antiplatelet therapy


Aggressive cholesterol control


Support the blood pressure


IV fluid


Standard groin care

## 2023-09-22 NOTE — P.PN
Subjective


Progress Note Date: 09/22/23








This is a pleasant 68-year-old female who was admitted with chest pain possible 

unstable angina with significant cardiac history and cardiology following.  

Patient noted to have carotid stenosis with history of coronary artery disease 

and ischemic cardiomyopathy with an EF of 40-45%.  Patient also with history of 

severe right ICA and left CCA stenosis more than 95% was scheduled in October 

for intervention although patient continues to be symptomatic with dizziness and

syncopal episodes.  Patient evaluated by cardiology and the case was discussed 

with Dr. Garcia who is scheduled to undergo carotid stenting today.  Will await 

surgical report. 





09/21/2023


Patient is seen and evaluated in follow-up continues in the ICU being closely 

monitored.  Cardiology following and monitoring blood pressure and is currently 

stable and cardiac medications currently on hold and patient is receiving low-

dose midodrine not currently on pressor support.  Plan is for possible 

intervention of carotid stenting with cardiology in the a.m.  Patient continues 

to report some anxiety and does have as needed Xanax.  Patient currently denying

any chest pain or palpitations.  Patient has been afebrile with no reported 

shortness of breath.  Patient is tolerating diet and has had no nausea or 

vomiting.





09/22/2023


Patient is seen in follow-up this morning continues to be in the ICU with 

cardiology following with plans on carotid stenting.  Potassium found to be 

slightly elevated at 5.2 and ordered a repeat stat potassium which came back as 

4.3 and awaiting to undergo stenting procedure with Dr. Garcia today.  Will await 

surgical report.  Patient blood pressure is stable at this time and is continued

on telemetry monitoring.  Patient is afebrile with no reported chest pain or 

shortness of breath.  Patient currently nothing by mouth for the procedure with 

no reported nausea or vomiting.  Patient does have some mild anxiety although 

currently controlled.  Patient does have as needed medications for this.





Review of systems:


Constitutional: No reports of fatigue, fever, or chills, reports continued 

anxiety


Cardiovascular: No reports of  chest pain or dizziness currently


Respiratory: No reports of shortness of breath or cough


GI: No reports of nausea, no reports of vomiting, no diarrhea


: No reports of dysuria or retention


Neurovascular:  reports of dizziness





All medications have been reviewed





PHYSICAL EXAMINATION: 





GENERAL: The patient is alert and oriented x4, thin built, elderly appearing 

female,  Well developed, slightly anxious


HEENT: Pupils are round and equally reacting to light. EOMI. no scleral icterus.

No conjunctival pallor. Normocephalic, atraumatic. No pharyngeal erythema. No 

thyromegaly.  


CARDIOVASCULAR: S1 and S2  muffled 


PULMONARY: diminished breath sounds bilaterally with no wheezing or rhonchi 

noted. 


ABDOMEN: soft.  Nontender on exam.  non-distended, normoactive bowel sounds. No 

palpable organomegaly. 


MUSCULOSKELETAL: No joint swelling or deformity.


EXTREMITIES: No cyanosis, clubbing, or pedal edema.  


NEUROLOGICAL: Gross neurological examination did not reveal any focal deficits. 

Diffuse weakness


SKIN: No rashes. 





Assessment:





Chest pain, possible unstable angina, ACS ruled out


Gastroesophageal reflux disease


Acute kidney injury secondary to dehydration, present on admission, resolved


Carotid stenosis and planning on carotid stenting today


History of ischemic cardiomyopathy with an EF of 40-45% 


history of coronary artery disease status post CABG


Severe right ICA stenosis and left common carotid artery stenosis more than 95%


Hypertension history, with episodes of hypotension


Hyperlipidemia


Continued ongoing nicotine dependence


Anxiety


GI prophylaxis


DVT prophylaxis


Full code





Plan:





Patient continued to have multiple episodes of syncope per  at home and 

continues to be symptomatic with dizziness and lightheadedness and was scheduled

to undergo carotid stenting with Dr. Garcia yesterday although experienced severe 

hypotension and the intervention was aborted and patient was brought to the ICU 

for close monitoring and pressor support.  Patient is off pressors and 

maintained on midodrine maintaining blood pressures with plans for stenting 

today


Initial potassium was slightly elevated above 5 and repeat potassium is 4.3 and 

is scheduled to undergo carotid stenting with Dr. Garcia.  Will await surgical 

report.


Patient continues to report anxiety and does have low-dose anxiety meds as 

needed.  Holding for the procedure


Will follow-up on repeat labs in the a.m.


Encouraged to increase activity as tolerated once cleared by Dr. Garcia post-

procedure


Due to multiple complex medical issues, prognosis is guarded


Will need to discuss further with cardiology for clearance on discharge home


Possible discharge in the next 24-48 hours





The impression and plan of care has been dictated by Rose Mary Flannery, nurse 

practitioner as directed.





Dr. Bob MD


I have performed a history and examination and MDM of this patient, discussed t

he same with the dictator, and  agree with the dictator's assessment and plan as

written ,documented as a scribe. Based on total visit time,  I have performed 

more than 50% of the visit. Any additional findings or plans will be noted. 





Objective





- Vital Signs


Vital signs: 


                                   Vital Signs











Temp  98.9 F   09/22/23 12:00


 


Pulse  75   09/22/23 12:00


 


Resp  14   09/22/23 12:00


 


BP  139/70   09/22/23 12:00


 


Pulse Ox  97   09/22/23 12:00


 


FiO2      








                                 Intake & Output











 09/21/23 09/22/23 09/22/23





 18:59 06:59 18:59


 


Intake Total 250 300 


 


Output Total 300  800


 


Balance -50 300 -800


 


Weight 51 kg 50.2 kg 


 


Intake:   


 


  IV  300 


 


    Sodium Chloride 0.9% 1,  300 





    000 ml In Empty Bag 1 bag   





    @ 75 mls/hr IV .[id number]   





    ONE Rx#:[id number]   


 


  Oral 250  


 


Output:   


 


  Urine 300  800


 


Other:   


 


  Voiding Method Bedpan Toilet Toilet


 


  # Voids 1 1 














- Labs


CBC & Chem 7: 


                                 09/22/23 04:25





                                 09/22/23 11:51


Labs: 


                  Abnormal Lab Results - Last 24 Hours (Table)











  09/22/23 09/22/23 Range/Units





  04:25 04:25 


 


MCV  100.3 H   (80.0-100.0)  fL


 


Potassium   5.2 H  (3.5-5.1)  mmol/L


 


BUN   20 H  (7-17)  mg/dL

## 2023-09-23 VITALS
SYSTOLIC BLOOD PRESSURE: 105 MMHG | RESPIRATION RATE: 12 BRPM | HEART RATE: 72 BPM | DIASTOLIC BLOOD PRESSURE: 49 MMHG | TEMPERATURE: 98.6 F

## 2023-09-23 LAB
ANION GAP SERPL CALC-SCNC: 6 MMOL/L
BASOPHILS # BLD AUTO: 0 K/UL (ref 0–0.2)
BASOPHILS NFR BLD AUTO: 0 %
BUN SERPL-SCNC: 14 MG/DL (ref 7–17)
CALCIUM SPEC-MCNC: 8.9 MG/DL (ref 8.4–10.2)
CHLORIDE SERPL-SCNC: 107 MMOL/L (ref 98–107)
CO2 SERPL-SCNC: 25 MMOL/L (ref 22–30)
EOSINOPHIL # BLD AUTO: 0.1 K/UL (ref 0–0.7)
EOSINOPHIL NFR BLD AUTO: 1 %
ERYTHROCYTE [DISTWIDTH] IN BLOOD BY AUTOMATED COUNT: 3.48 M/UL (ref 3.8–5.4)
ERYTHROCYTE [DISTWIDTH] IN BLOOD: 13.4 % (ref 11.5–15.5)
GLUCOSE SERPL-MCNC: 85 MG/DL (ref 74–99)
HCT VFR BLD AUTO: 35 % (ref 34–46)
HGB BLD-MCNC: 11.5 GM/DL (ref 11.4–16)
LYMPHOCYTES # SPEC AUTO: 1.4 K/UL (ref 1–4.8)
LYMPHOCYTES NFR SPEC AUTO: 17 %
MCH RBC QN AUTO: 32.9 PG (ref 25–35)
MCHC RBC AUTO-ENTMCNC: 32.8 G/DL (ref 31–37)
MCV RBC AUTO: 100.4 FL (ref 80–100)
MONOCYTES # BLD AUTO: 0.5 K/UL (ref 0–1)
MONOCYTES NFR BLD AUTO: 6 %
NEUTROPHILS # BLD AUTO: 6.3 K/UL (ref 1.3–7.7)
NEUTROPHILS NFR BLD AUTO: 74 %
PLATELET # BLD AUTO: 245 K/UL (ref 150–450)
POTASSIUM SERPL-SCNC: 4.1 MMOL/L (ref 3.5–5.1)
SODIUM SERPL-SCNC: 138 MMOL/L (ref 137–145)
WBC # BLD AUTO: 8.4 K/UL (ref 3.8–10.6)

## 2023-09-23 RX ADMIN — RANOLAZINE SCH MG: 500 TABLET, FILM COATED, EXTENDED RELEASE ORAL at 10:53

## 2023-09-23 RX ADMIN — CEFAZOLIN SCH MLS/HR: 330 INJECTION, POWDER, FOR SOLUTION INTRAMUSCULAR; INTRAVENOUS at 01:26

## 2023-09-23 RX ADMIN — MIDODRINE HYDROCHLORIDE SCH MG: 5 TABLET ORAL at 06:08

## 2023-09-23 RX ADMIN — HEPARIN SODIUM SCH UNIT: 5000 INJECTION, SOLUTION INTRAVENOUS; SUBCUTANEOUS at 10:54

## 2023-09-23 RX ADMIN — LEVOTHYROXINE SODIUM SCH MCG: 0.12 TABLET ORAL at 06:08

## 2023-09-23 RX ADMIN — POTASSIUM CHLORIDE SCH MEQ: 20 TABLET, EXTENDED RELEASE ORAL at 02:59

## 2023-09-23 RX ADMIN — PANTOPRAZOLE SODIUM SCH MG: 40 TABLET, DELAYED RELEASE ORAL at 06:08

## 2023-09-23 RX ADMIN — LACOSAMIDE SCH MG: 50 TABLET, FILM COATED ORAL at 10:54

## 2023-09-23 RX ADMIN — POTASSIUM CHLORIDE SCH MEQ: 20 TABLET, EXTENDED RELEASE ORAL at 04:35

## 2023-09-23 NOTE — PN
PROGRESS NOTE



SUBJECTIVE:

A 68-year-old lady with history of coronary artery disease status post CABG, history of

bilateral carotid stenosis status post carotid endarterectomy, who presented with

recurrent episodes of syncope and underwent carotid stenting by Dr. Garcia yesterday.

Last night, she had an episode of chest discomfort and received sublingual

nitroglycerin; following which, she became hypotensive and became transiently confused.

Her symptoms have resolved once the blood pressure improves.  We have consulted

Neurology who has not seen her yet today.  I am going to continue the patient on

aspirin, Plavix, resume the nitrates, hold the beta blockers because of bradycardia and

hypotension and increase her activity.  This morning, she seems awake, alert, oriented

x3.



CURRENT MEDICATIONS:

Include:

1. Aspirin.

2. Lipitor.

3. Plavix.

4. Synthroid.

5. Imdur.

6. Ranexa.



PHYSICAL EXAMINATION:

GENERAL:  On exam, comfortable at rest.

VITAL SIGNS:  Stable.

CHEST:  Reveals good air entry bilaterally.

HEART:  Reveals first and second heart sounds.  Ejection systolic murmur in the aortic

area.

ABDOMEN:  Soft.

EXTREMITIES:  Did not reveal any edema.  Peripheral pulses are felt.  She has right-

sided carotid bruit.



ASSESSMENT:

1. Carotid stenosis status post bilateral carotid stenting.

2. Coronary artery disease status post coronary artery bypass graft.

3. Episodes of angina.



PLAN:

We will continue to treat her with optimal medical therapy.  Hopefully, home later this

afternoon if neurologist evaluates her.





MMODL / IJN: [id number] / Job#: [id number]

## 2023-09-23 NOTE — P.PN
Subjective


Progress Note Date: 09/23/23








This is a pleasant 68-year-old female who was admitted with chest pain possible 

unstable angina with significant cardiac history and cardiology following.  

Patient noted to have carotid stenosis with history of coronary artery disease 

and ischemic cardiomyopathy with an EF of 40-45%.  Patient also with history of 

severe right ICA and left CCA stenosis more than 95% was scheduled in October 

for intervention although patient continues to be symptomatic with dizziness and

syncopal episodes.  Patient evaluated by cardiology and the case was discussed 

with Dr. Garcia who is scheduled to undergo carotid stenting today.  Will await 

surgical report. 





09/21/2023


Patient is seen and evaluated in follow-up continues in the ICU being closely 

monitored.  Cardiology following and monitoring blood pressure and is currently 

stable and cardiac medications currently on hold and patient is receiving low-

dose midodrine not currently on pressor support.  Plan is for possible 

intervention of carotid stenting with cardiology in the a.m.  Patient continues 

to report some anxiety and does have as needed Xanax.  Patient currently denying

any chest pain or palpitations.  Patient has been afebrile with no reported 

shortness of breath.  Patient is tolerating diet and has had no nausea or 

vomiting.





09/22/2023


Patient is seen in follow-up this morning continues to be in the ICU with 

cardiology following with plans on carotid stenting.  Potassium found to be 

slightly elevated at 5.2 and ordered a repeat stat potassium which came back as 

4.3 and awaiting to undergo stenting procedure with Dr. Garcia today.  Will await 

surgical report.  Patient blood pressure is stable at this time and is continued

on telemetry monitoring.  Patient is afebrile with no reported chest pain or 

shortness of breath.  Patient currently nothing by mouth for the procedure with 

no reported nausea or vomiting.  Patient does have some mild anxiety although 

currently controlled.  Patient does have as needed medications for this.





09/23/2023





Patient is status post surgical intervention with Dr. Garcia yesterday.  Patient 

had successful stenting of the distal left common carotid artery along with the 

ostium left common carotid artery is patient is known with severe carotid 

atherosclerosis with history of bilateral carotid endarterectomy and continued 

having syncopal events and symptomatic dizziness and lightheadedness.  Per nursi

ng staff patient had an episode of severe chest pain requiring nitro and became 

hypotensive requiring pressor support and ultimately developed transient 

confusion.  Concerns for CVA and neurology was consulted and pending. 











Review of systems:


Constitutional: No reports of fatigue, fever, or chills


Cardiovascular: No reports of  chest pain or dizziness currently


Respiratory: No reports of shortness of breath or cough


GI: No reports of nausea, no reports of vomiting, no diarrhea


: No reports of dysuria or retention


Neurovascular: No reports of dizziness





All medications have been reviewed





Active Medications





Al Hydroxide/Mg Hydroxide (Mag Hydrox/Al Hydrox/Simeth 30 Ml Cup)  30 ml PO Q4HR

PRN


   PRN Reason: Heartburn


Alprazolam (Alprazolam 0.25 Mg Tab)  0.25 mg PO Q6HR PRN


   PRN Reason: Mild Anxiety


   Last Admin: 09/22/23 19:01 Dose:  0.25 mg


   


Alprazolam (Alprazolam 0.5 Mg Tab)  0.5 mg PO Q6HR PRN


   PRN Reason: Moderate Anxiety


Atorvastatin Calcium (Atorvastatin 40 Mg Tab)  40 mg PO HS [facility]


   Last Admin: 09/22/23 20:40 Dose:  Not Given


   


Atropine Sulfate (Atropine Sulfate 0.1 Mg/Ml 10ml Syringe)  0.5 mg IV ONCE PRN


   PRN Reason: Symptomatic Bradycardia


Clopidogrel Bisulfate (Clopidogrel 75 Mg Tab)  75 mg PO DAILY [facility]


   Last Admin: 09/23/23 10:54 Dose:  75 mg


   


Heparin Sodium (Porcine) (Heparin Sodium,Porcine 5,000 Unit/Ml 1 Ml Vial)  5,000

unit SQ Q12HR [facility]


   Last Admin: 09/23/23 10:54 Dose:  5,000 unit


   


Isosorbide Mononitrate (Isosorbide Mononitrate Er 30 Mg Tab.Er.24h)  30 mg PO 

DAILY [facility]


   Last Admin: 09/23/23 10:38 Dose:  30 mg


   


Lacosamide (Lacosamide 50 Mg Tablet)  50 mg PO DAILY [facility]


   Last Admin: 09/23/23 10:54 Dose:  50 mg


   


Levothyroxine Sodium (Levothyroxine 125 Mcg Tab)  125 mcg PO DAILY@0630 [facility]


   Last Admin: 09/23/23 06:08 Dose:  125 mcg


   


Melatonin (Melatonin 3 Mg Tablet)  3 mg PO HS [facility]


   Last Admin: 09/22/23 20:23 Dose:  3 mg


   


Midodrine (Midodrine 5 Mg Tab)  5 mg PO AC-BID [facility]


   Last Admin: 09/23/23 06:08 Dose:  5 mg


   


Miscellaneous Information (Rx Info: Iv Contrast Was Given 1 Each Misc)  1 each 

MISCELLANE DAILY PRN


   PRN Reason: Per Protocol


   Stop: 09/24/23 14:37


Miscellaneous Information (Potassium Replacement Protocol 1 Each Misc)  1 each 

MISCELLANE DAILY PRN; Protocol


   PRN Reason: Per Protocol


Naloxone HCl (Naloxone 0.4 Mg/Ml 1 Ml Vial)  0.2 mg IV Q2M PRN


   PRN Reason: Opioid Reversal


Nitroglycerin (Nitroglycerin Sl Tabs 0.4 Mg Tab)  0.4 mg SUBLINGUAL Q5M PRN


   PRN Reason: Chest Pain


   Last Admin: 09/22/23 22:09 Dose:  0.4 mg


   


Pantoprazole Sodium (Pantoprazole 40 Mg Tablet)  40 mg PO AC-BID [facility]


   Last Admin: 09/23/23 06:08 Dose:  40 mg


   


Ranolazine (Ranolazine 500 Mg Tab.Er.12h)  1,000 mg PO BID [facility]


   Last Admin: 09/23/23 10:53 Dose:  1,000 mg


   











PHYSICAL EXAMINATION: 





GENERAL: The patient is alert and oriented x3, thin built, elderly appearing 

female,  Well developed, slightly anxious


HEENT: Pupils are round and equally reacting to light. EOMI. no scleral icterus.

No conjunctival pallor. Normocephalic, atraumatic. No pharyngeal erythema. No 

thyromegaly.  


CARDIOVASCULAR: S1 and S2  muffled 


PULMONARY: diminished breath sounds bilaterally with no wheezing or rhonchi 

noted. 


ABDOMEN: soft.  Nontender on exam.  non-distended, normoactive bowel sounds. No 

palpable organomegaly. 


MUSCULOSKELETAL: No joint swelling or deformity.


EXTREMITIES: No cyanosis, clubbing, or pedal edema.  


NEUROLOGICAL: Gross neurological examination did not reveal any focal deficits. 




SKIN: No rashes. 





Assessment:





Chest pain, possible unstable angina, ACS ruled out


episode of chest pain 10/10 post carotid stenting with severe hypotension 

requiring pressor support, improving and off pressors


Acute confusion secondary to severe hypotension, possible CVA versus TIA, 

pending neurology workup


Gastroesophageal reflux disease


Acute kidney injury secondary to dehydration, present on admission, resolved


Status post successful stenting of the distal left common carotid artery along 

with the ostium left common carotid artery


severe carotid atherosclerosis


History of ischemic cardiomyopathy with an EF of 40-45% 


history of coronary artery disease status post CABG


Severe right ICA stenosis and left common carotid artery stenosis more than 95%


Hypertension history, with episodes of hypotension


Hyperlipidemia


Continued ongoing nicotine dependence


Anxiety


GI prophylaxis


DVT prophylaxis


Full code





Plan:





Patient will be continued in the ICU with cardiology following.  Patient is 

status post successful stenting of the distal left carotid carotid artery as 

well as ostium left common carotid artery with Dr. Garcia yesterday.


Per nursing staff patient experienced an episode of severe chest pain followed 

by hypotension and transient confusion and neurology has been consulted


Patient's mentation is baseline and alert and oriented 3 today the patient is 

wishing to go home.  Cardiology has cleared the patient for discharge once 

evaluated by neurology for close outpatient follow-up.


Patient to get up and walk around and monitor for any signs of lightheadedness, 

dizziness, or syncope and will await neurology consultation


Patient to continue on antiplatelets and strongly encouraged cholesterol 

control.


Complete smoking cessation is encouraged. 


Given patient's significant cardiac history and complex medical comorbidities 

will monitor and await neurology evaluation


Possible discharge in the next 24-48 hours





The impression and plan of care has been dictated as a scribe by Rose Mary Flannery,

nurse practitioner as directed.





Dr. Zofia MD


I have performed a history and examination and MDM of this patient, discussed 

the same with the dictator, and will be documented as a scribe. Based on total 

visit time,  I have performed more than 50% of the visit. Any additional 

findings or plans will be noted. 





Objective





- Vital Signs


Vital signs: 


                                   Vital Signs











Temp  98.5 F   09/23/23 04:00


 


Pulse  78   09/23/23 04:00


 


Resp  14   09/23/23 04:00


 


BP  109/49   09/23/23 04:00


 


Pulse Ox  92 L  09/23/23 04:00


 


FiO2      








                                 Intake & Output











 09/22/23 09/23/23 09/23/23





 18:59 06:59 18:59


 


Intake Total 200 600 


 


Output Total 800  


 


Balance -600 600 


 


Weight  51 kg 


 


Intake:   


 


   600 


 


    Sodium Chloride 0.9% 1,  600 





    000 ml @ 100 mls/hr IV .   





    Q10H KT Rx#:[id number]   


 


  Intake, IV Titration 100  





  Amount   


 


    Sodium Chloride 0.9% 1, 100  





    000 ml @ 100 mls/hr IV .   





    Q10H KT Rx#:[id number]   


 


Output:   


 


  Urine 800  


 


Other:   


 


  Voiding Method Bedpan Toilet 


 


  # Voids 1 1 








                       ABP, PAP, CO, CI - Last Documented











Arterial Blood Pressure        172/64

















- Labs


CBC & Chem 7: 


                                 09/23/23 04:35





                                 09/23/23 04:35


Labs: 


                  Abnormal Lab Results - Last 24 Hours (Table)











  09/22/23 09/22/23 09/23/23 Range/Units





  22:43 22:43 04:35 


 


RBC  3.31 L   3.48 L  (3.80-5.40)  m/uL


 


Hgb  11.0 L    (11.4-16.0)  gm/dL


 


Hct  33.3 L    (34.0-46.0)  %


 


MCV  100.6 H   100.4 H  (80.0-100.0)  fL


 


Sodium   136 L   (137-145)  mmol/L


 


Glucose   100 H   (74-99)  mg/dL

## 2023-09-23 NOTE — CT
EXAMINATION TYPE: CT brain wo con

CT DLP: 1173.4 mGycm, Automated exposure control for dose reduction was used.

 

DATE OF EXAM: 9/23/2023 3:24 PM

 

COMPARISON: 8/10/2023.

 

CLINICAL INDICATION:Female, 68 years old with history of AMS, recent left Carotid Stenting, recent le
ft carotid stenting

 

TECHNIQUE: 

Brain: Axial CT images of the brain were obtained with coronal and sagittal reformats created and rev
iewed.

Contrast used: None.

Oral contrast used: None.

 

FINDINGS:

 

Brain:

Extra-axial spaces: No abnormal extra-axial fluid collections.

Ventricular system: Within normal limits

Cerebral parenchyma: Encephalomalacia of the right occipital lobe/temporal lobe. Low density foci are
 seen in the caudate nuclei bilaterally. No acute intraparenchymal hemorrhage or mass effect.  The gr
ay-white junction is well differentiated.     

Cerebellum: Unremarkable.

Mass effect: No evidence of midline shift.

Intracranial vasculature: Atherosclerotic calcifications of the intracranial vessels.

Soft tissues: Normal.

Calvarium/osseous structures: No depressed skull fracture.

Paranasal sinuses and mastoid air cells: Clear

Visualized orbits: Orbital contents are intact.

 

 

IMPRESSION:

1.  No acute intracranial process. Consider further evaluation MRI.

2.  Encephalomalacia in the right occipital and temporal lobe not significantly changed from May 10, 
2023.

## 2023-09-23 NOTE — IR
EXAMINATION TYPE: IR stent intravas non coronary

 

DATE OF EXAM: 9/22/2023

 

CLINICAL HISTORY: Left-sided Carotid stenosis

 

TECHNIQUE: Fluoroscopy.

 

COMPARISON:  None.

 

FINDINGS:  Fluoroscopic guidance was provided during carotid angiogram with stent insertion procedure
 performed by Dr. Garcia.  A total of 34.2 minutes of fluoroscopic time was utilized during the procedu
re and  380   spot images was acquired.

 

IMPRESSION:  As Above.

 

Total DAP = 14.1 Gy x cm2

## 2023-09-23 NOTE — P.DS
Providers


Date of admission: 


09/16/23 21:32





Expected date of discharge: 09/23/23


Attending physician: 


Kelly Tyson MD





Consults: 





                                        





09/17/23 08:25


Consult Physician Routine 


   Consulting Provider: Bereket Malin


   Consult Reason/Comments: CP


   Do you want consulting provider notified?: Yes





09/22/23 22:45


Consult Physician Stat 


   Consulting Provider: Dale Montero


   Consult Reason/Comments: New onset confusion


   Do you want consulting provider notified?: Yes











Primary care physician: 


[facility] Course: 











Final diagnosis





Chest pain, possible unstable angina, ACS ruled out


episode of chest pain 10/10 post carotid stenting with severe hypotension 

requiring pressor support, improving and off pressors


Acute confusion secondary to severe hypotension or possible CVA versus TIA, CVA 

ruled out, CT brain negative


Gastroesophageal reflux disease


Acute kidney injury secondary to dehydration, present on admission, resolved


Status post successful stenting of the distal left common carotid artery along 

with the ostium left common carotid artery


severe carotid atherosclerosis


History of ischemic cardiomyopathy with an EF of 40-45% 


history of coronary artery disease status post CABG


Severe right ICA stenosis and left common carotid artery stenosis more than 95%


Hypertension history, with episodes of hypotension


Hyperlipidemia


Continued ongoing nicotine dependence


Anxiety


GI prophylaxis


DVT prophylaxis


Full code











Discharge disposition


Patient is being discharged in a stable condition with guarded prognosis to 

home.  Patient will follow-up with Dr. Goyal  in the outpatient setting 

upon discharge.  Patient is to continue with dual antiplatelet therapy and close

outpatient follow-up with cardiology and neurology outpatient as scheduled.  

Total time taken is greater than 35 minutes.





Hospital course


This is a 68-year-old female who was recently admitted with multiple episodes of

syncope with extensive cardiac history including severe carotid atherosclerosis 

underwent successful stenting of the distal left CCA and ostium left common 

carotid artery with Dr. Garcia.  Patient will continue on dual antiplatelet and 

statin therapy along with diet and lifestyle modifications with close outpatient

follow-up.  Patient has been cleared by consultations for discharge.  Patient 

did have a brief episode of confusion post chest pain with hypotension post 

nitro and was evaluated by neurology and CVA ruled out on negative brain CT.  

Patient mentation and sensorium is alert and oriented 3 and patient would like 

to go home.  Patient has been cleared for discharge.  Please refer to other 

consultation notes for further HPI.  Patient also provided resources on 

discharge to follow-up with primary care provider.  Strongly encourage complete 

smoking cessation as well.  Patient will need neurology follow-up outpatient as 

well.  Currently no reports of chest pain, shortness of breath, or palpitations.

 Patient is afebrile.  No reports of nausea or vomiting and patient is 

tolerating diet.  Patient will be discharged home today.  Guarded prognosis.





Physical exam:








Gen: This is a 68-year-old female who is awake, alert and oriented 3, thin 

built, elderly appearing female


HEENT: Head is atraumatic, normocephalic. Pupils equal, round. Sclerae is 

anicteric. 


NECK: Supple. No JVD. No lymphadenopathy. No thyromegaly. 


LUNGS: Clear to auscultation. No wheezes or rhonchi.  No intercostal 

retractions.


HEART: Regular rate and rhythm. No murmur. 


ABDOMEN: Soft. Bowel sounds are present. No masses.  No tenderness.


EXTREMITIES: No pedal edema.  No calf tenderness.


NEUROLOGICAL: Patient is awake, alert and oriented x3. Cranial nerves 2 through 

12 are grossly intact. 





Please refer to medication reconciliation sheet for a list of medications.





The impression and plan of care has been dictated as a scribe by Rose Mary Flannery,

nurse practitioner as directed.





Dr. Zofia MD


I have performed a history and examination and MDM of this patient, discussed t

he same with the dictator, and will be documented as a scribe. Based on total 

visit time,  I have performed more than 50% of the visit. Any additional 

findings or plans will be noted. 


Patient Condition at Discharge: Stable





Plan - Discharge Summary


Discharge Rx Participant: No


New Discharge Prescriptions: 


New


   Aspirin 81 mg PO DAILY #30 tab


   Midodrine [ProAmatine] 5 mg PO AC-BID #60 tab


   Lacosamide [Vimpat] 100 mg PO BID #0 tab





Continue


   Clopidogrel Bisulfate [Plavix] 75 mg PO DAILY


   Ranolazine [Ranexa] 1,000 mg PO BID


   Atorvastatin Calcium [Lipitor] 80 mg PO HS


   Nitroglycerin Sl Tabs [Nitrostat] 0.4 mg SL Q5M PRN


     PRN Reason: Chest Pain


   Ibuprofen/Diphenhydramine Cit [Ibuprofen Pm Caplet] 1 tab PO HS PRN


     PRN Reason: SLEEP/ALLERGIES


   Levothyroxine Sodium [Synthroid] 125 mcg PO DAILY





Discontinued


   Isosorbide Mononitrate ER [Imdur] 30 mg PO DAILY


   Metoprolol Tartrate [Lopressor] 25 mg PO BID


   Lacosamide [Vimpat] 50 mg PO DAILY


Discharge Medication List





Atorvastatin Calcium [Lipitor] 80 mg PO HS 04/29/22 [History]


Clopidogrel Bisulfate [Plavix] 75 mg PO DAILY 04/29/22 [History]


Nitroglycerin Sl Tabs [Nitrostat] 0.4 mg SL Q5M PRN 09/09/22 [History]


Ibuprofen/Diphenhydramine Cit [Ibuprofen Pm Caplet] 1 tab PO HS PRN 08/10/23 

[History]


Ranolazine [Ranexa] 1,000 mg PO BID 08/10/23 [History]


Levothyroxine Sodium [Synthroid] 125 mcg PO DAILY 09/16/23 [History]


Aspirin 81 mg PO DAILY #30 tab 09/23/23 [Rx]


Lacosamide [Vimpat] 100 mg PO BID #0 tab 09/23/23 [Rx]


Midodrine [ProAmatine] 5 mg PO AC-BID #60 tab 09/23/23 [Rx]








Follow up Appointment(s)/Referral(s): 


Rogers Garcia MD [STAFF PHYSICIAN] - 1 Week


Valeria Turner MD [Medical Doctor] - 1 Week


Aubrie Goyal MD [STAFF PHYSICIAN] - 1 Week


Patient Instructions/Handouts:  How to Stop Smoking (DC)


Activity/Diet/Wound Care/Special Instructions: 


Activity Limited until follow-up


Follow-up with cardiology in one week


Continue taking medications as prescribed


Follow-up neurology outpatient


Continue on aspirin and Plavix


Continue heart healthy low-cholesterol diet





Discharge Disposition: HOME SELF-CARE

## 2023-09-25 NOTE — P.CNNES
History of Present Illness


Consult date: 23


Requesting physician: Shuan Galeana


Reason for Consult: New onset confusion


History of Present Illness: 





Patient is a 68-year-old female with history of seizure disorder, bilateral ICA 

stenosis, history of CVA with left homonymous hemianopia, well known to 

neurology service, came to the hospital on 2023 for confusion, couldn't 

think straight almost for 2 months.  She was feeling very tired and weak.  

Patient was recently admitted to the hospital on 08/10/2023 for a syncopal 

spell.  Carotid ultrasound performed at that time showed severe high-grade 

stenosis proximal right ICA.  The right vertebral artery may BE occluded.  High 

velocities within the sampled left common carotid artery suggesting a significan

t proximal left CCA stenosis.  Patient had an MRI of the brain performed 

previously on 2023, which revealed remote injury to the right occipital 

lobe with encephalomalacia.  No evidence of intracranial mass, acute/subacute 

infarct or abnormal enhancement.  Patient was seen by vascular surgery, who 

recommended outpatient CTA.  Patient apparently had undergone CT angiogram of 

head and neck on 2023, for syncopal spell, which revealed right ICA 

stenosis >95%.  Also showed high-grade stenosis distal left common carotid 

artery with estimated at greater than 95%.  Patient was sent home.  However 

patient was readmitted on 2023 for the reasons mentioned above.





Patient was seen by vascular surgery, and patient underwent successful stenting 

of the distal left common carotid artery yesterday on 2023.  

Postprocedure, patient was recommended dual antiplatelet therapy, aggressive 

cholesterol control, supportive the blood pressure IV fluids.





Per nursing report, patient was noted to be more confused yesterday, more 

spacey, therefore neurology was consulted.  Patient at present offers no 

complaints.  She is laying comfortably in the bed, wants to go home.





Patient's most recent blood test shows normal CBC with elevated .4.  

PT/INR normal, Chem-7 normal.  Troponin negative.  Patient's last hemoglobin A1c

5.8 on 08/15/2023.  Cholesterol is 224, , HDL 42 and triglycerides 228.  

BUN normal 81, vitamin B12 541.  Folate was 7.6 on 2022.





Patient has been seen by myself previously on 2022 for abnormal EEG with 

periodic lateralized epileptiform discharges involving the left temporal region 

now seems to have resolved.  Encephalopathy with visual disturbance, possibly 

related to previous CVA in the right occipital region or ictal or postictal phe

nomenon versus related to ?PRES.  Rule out paraneoplastic syndrome or NMDA 

encephalitis versus limbic encephalitis.  Prior history of right occipital lobe 

infarct with left homonymous hemianopia, likely occurred 1-1/2 months ago (prior

to 2022).  Abnormal MRI brain with evidence of old right occipital 

infarct, small vessel disease and evidence of small 5 mm cystic lesion without 

enhancement involving the left hippocampus.  Exact cause is uncertain.  Patient 

at that time was on Vimpat 150 mg twice a day.





Patient has history of bilateral carotid endarterectomy performed multiple years

ago at [facility].  Patient has not been following up with 

vascular surgeon.








Patient currently only on Vimpat 50 mg daily, Plavix 75 mg, Lipitor 80 mg, 

indoor, metoprolol and levothyroxine.





Apparently patient has been seen by neurology team in 2022 for visual 

hallucinations, abnormal EEG and syncopal spells.  Patient had an abnormal EEG 

as mentioned below.








* Initial EEG on 2022 reviewed by Dr. Montero: Is abnormal.  The background 

  slowing is suggestive of moderate to severe encephalopathy.  The epileptiform 

  dischargs over the left frontotemporal region increases risk for seizure.  

  There is no seizure noted during this study.





* Repeat EEG 2022 was abnormal due to background slowing, disorganization 

  of mild-to-moderate degree, suggestive of encephalopathy.  Focal slowing with 

  focal epileptiform activity seen frequently involving the left temporal region

  suggestive of focal cortical neuronal dysfunction with underlying cortical 

  irritability and tendency for seizures.  No electrographic seizure was 

  recorded.  When compared to the EEG from 2022, the epileptiform activity

  has much improved, although still present.  Clinical correlation and a pr

  olonged EEG recommended if clinically indicated.





* Prolonged EEG 2.5 hours performed 2022 was limited study, abnormal EEG 

  predominantly sleep EEG.  During the rare periods of increased arousal, 

  diffuse facet are range slowing was seen.  These findings indicate mild to 

  moderate diffuse cerebral dysfunction as may be seen in a toxic metabolic 

  encephalopathy. 








Patient had negative NMDA antibodies on 2022.








Review of Systems


Constitutional: Reports weight loss, Denies chills, Denies fever


Eyes: left loss of peripheral vision, denies diplopia, denies pain


Ears: deny: decreased hearing, ear discharge


Ears, nose, mouth and throat: Denies headache, Denies sore throat


Cardiovascular: Denies chest pain, Denies shortness of breath


Respiratory: Denies cough, Denies excessive sputum


Gastrointestinal: Denies abdominal pain, Denies diarrhea, Denies nausea, Denies 

vomiting


Genitourinary: Denies dysuria, Denies hematuria, Denies urinary frequency


Musculoskeletal: Denies low back pain, Denies neck pain


Integumentary: Denies pruritus, Denies rash


Neurological: Reports as per HPI


Psychiatric: Denies anxiety, Denies depression


Endocrine: Reports weight change, Denies fatigue


Hematologic/Lymphatic: Reports easy bleeding, Reports easy bruising





Past Medical History


Past Medical History: Coronary Artery Disease (CAD), Hypertension, Myocardial 

Infarction (MI), Thyroid Disorder


Last Myocardial Infarction Date:: 


History of Any Multi-Drug Resistant Organisms: None Reported


Past Surgical History: Appendectomy,  Section, Cholecystectomy, Coronary

Bypass/CABG, Heart Catheterization, Heart Catheterization With Stent


Additional Past Surgical History / Comment(s): bilat carotid


Past Anesthesia/Blood Transfusion Reactions: No Reported Reaction


Date of Last Stent Placement:: 


Past Psychological History: No Psychological Hx Reported


Smoking Status: Never smoker


Past Alcohol Use History: None Reported


Past Drug Use History: None Reported





- Past Family History


  ** Father


History Unknown: Yes





  ** Mother


Family Medical History: Diabetes Mellitus, Hypertension





Medications and Allergies


                                Home Medications











 Medication  Instructions  Recorded  Confirmed  Type


 


Atorvastatin Calcium [Lipitor] 80 mg PO HS 22 History


 


Clopidogrel Bisulfate [Plavix] 75 mg PO DAILY 22 History


 


Nitroglycerin Sl Tabs [Nitrostat] 0.4 mg SL Q5M PRN 22 History


 


Ibuprofen/Diphenhydramine Cit 1 tab PO HS PRN 08/10/23 09/16/23 History





[Ibuprofen Pm Caplet]    


 


Ranolazine [Ranexa] 1,000 mg PO BID 08/10/23 09/16/23 History


 


Levothyroxine Sodium [Synthroid] 125 mcg PO DAILY 23 History


 


Aspirin 81 mg PO DAILY #30 tab 23  Rx


 


Lacosamide [Vimpat] 100 mg PO BID #0 tab 23  Rx


 


Midodrine [ProAmatine] 5 mg PO AC-BID #60 tab 23  Rx








                                    Allergies











Allergy/AdvReac Type Severity Reaction Status Date / Time


 


codeine AdvReac  Unknown Verified 23 20:53














Physical Examination





- Vital Signs


Vital Signs: 


                                   Vital Signs











  Temp Pulse Pulse Resp BP BP BP


 


 23 12:00  98.6 F  72   12  105/49  


 


 23 08:00  98.0 F  67   14  109/49  


 


 23 04:00  98.5 F  78   14  109/49  


 


 23 00:00  98.2 F  64   14  138/65  


 


 23 23:06  98.1 F   66  14   141/61 


 


 23 23:00   63   12  133/66  


 


 23 22:00   75   14  123/48  


 


 23 21:12    77  12   140/68 


 


 23 21:00   77   12  140/68  


 


 23 20:12  98.3 F   76  12   140/68 


 


 23 20:00   82   14  144/61  


 


 23 19:00   72   12  132/78  


 


 23 18:00   80  78  11 L  137/87  132/78 


 


 23 17:00   85  85  16  154/62  137/87 


 


 23 16:30    78  16    167/64


 


 23 16:00   81   15  146/60  


 


 23 15:45    80  16    182/67


 


 23 15:30    80  16    178/65


 


 23 15:15    78  16    181/67


 


 23 15:00  98.8 F   78  12  139/70  


 


 23 14:00      139/70  














  Pulse Ox


 


 23 12:00  93 L


 


 23 08:00  91 L


 


 23 04:00  92 L


 


 23 00:00  100


 


 23 23:06  96


 


 23 23:00 


 


 23 22:00  94 L


 


 23 21:12  94 L


 


 23 21:00  94 L


 


 23 20:12  95


 


 23 20:00  94 L


 


 23 19:00  94 L


 


 23 18:00  95


 


 23 17:00  96


 


 23 16:30  96


 


 23 16:00  91 L


 


 23 15:45  93 L


 


 23 15:30  92 L


 


 23 15:15  95


 


 23 15:00  95


 


 23 14:00 








                                Intake and Output











 23





 22:59 06:59 14:59


 


Intake Total 100 600 490


 


Balance 100 600 490


 


Intake:   


 


  IV  600 


 


    Sodium Chloride 0.9% 1,  600 





    000 ml @ 100 mls/hr IV .   





    Q10H KT Rx#:[id number]   


 


  Intake, IV Titration 100  





  Amount   


 


    Sodium Chloride 0.9% 1, 100  





    000 ml @ 100 mls/hr IV .   





    Q10H KT Rx#:[id number]   


 


  Oral   490


 


Other:   


 


  Voiding Method Bedpan Toilet 


 


  # Voids 1 1 3


 


  Weight  51 kg 














Patient is an elderly  female, very pleasant in no acute distress. 


Patient is alert awake oriented to time place and person.  Patient knows it is 

2023 and that she is in [facility] in [place] and name of 

the current president.  Speech and language functions are normal.  Patient can 

name and repeat very well.  No aphasia or dysarthria.  Attention, concentration 

and fund of knowledge is adequate. 





On cranial nerve examination, pupils are equal, round and reacting to light, 

visual fields again revealed complete left homonymous hemianopia which is 

chronic.  Extraocular muscles are intact with no nystagmus.  Face is symmetric, 

tongue protrudes to the midline.  Palatal elevation and sensation normal, 

hearing and shoulder shrug normal, facial sensation normal.  





On muscle strength testing, there is no pronator drift and the strength is 

normal in arms and legs distally and proximally.





Deep tendon reflexes are (right/left) biceps 1/1, brachioradialis 0/0, knees 

1/2, ankles 1/1 plantars flat bilaterally.





Sensory to touch is equal with no neglect on double simultaneous stimulation.





Cerebellar function showed no ataxia for finger-to-nose testing.  No 

dysdiadochokinesia.  No ataxia for heel-to-shin testing on either side.  Tone 

and bulk of muscles normal.





Gait deferred..





On general examination, there is no carotid bruit or murmur, S1-S2 audible.  

Chest is clear on consultation.  Abdomen is soft nontender.  No organomegaly, 

bowel sounds present.   Peripheral pulses are present.  No edema.





Results





- Laboratory Findings


CBC and BMP: 


                                 23 04:35





                                 23 04:35


Abnormal Lab Findings: 


                                  Abnormal Labs











  23





  19:25 19:25 19:25


 


WBC  10.7 H  


 


RBC   


 


Hgb   


 


Hct   


 


MCV  101.2 H  


 


MCH   


 


Neutrophils #  8.9 H  


 


D-Dimer   1.07 H 


 


Sodium   


 


Potassium   


 


Chloride   


 


BUN    24 H


 


BUN/Creatinine Ratio   


 


Glucose    105 H


 


Calcium    10.3 H


 


Total Protein   














  23





  10:09 10:09 05:56


 


WBC   


 


RBC   


 


Hgb   


 


Hct   


 


MCV  102.4 H  


 


MCH   


 


Neutrophils #   


 


D-Dimer   


 


Sodium   


 


Potassium   5.3 H 


 


Chloride   110 H 


 


BUN   21 H 


 


BUN/Creatinine Ratio    25.12 H


 


Glucose   


 


Calcium   


 


Total Protein   6.2 L 














  23





  05:51 05:51 04:39


 


WBC   


 


RBC    3.90 L


 


Hgb   


 


Hct   


 


MCV  98.2 H   100.0 H


 


MCH  32.3 H   32.6 H


 


Neutrophils #   


 


D-Dimer   


 


Sodium   


 


Potassium   


 


Chloride   


 


BUN   


 


BUN/Creatinine Ratio   21.86 H 


 


Glucose   


 


Calcium   


 


Total Protein   














  23





  04:39 04:25 04:25


 


WBC   


 


RBC   


 


Hgb   


 


Hct   


 


MCV   100.3 H 


 


MCH   


 


Neutrophils #   


 


D-Dimer   


 


Sodium   


 


Potassium    5.2 H


 


Chloride   


 


BUN    20 H


 


BUN/Creatinine Ratio  20.67 H  


 


Glucose   


 


Calcium   


 


Total Protein   














  23





  22:43 22:43 04:35


 


WBC   


 


RBC  3.31 L   3.48 L


 


Hgb  11.0 L  


 


Hct  33.3 L  


 


MCV  100.6 H   100.4 H


 


MCH   


 


Neutrophils #   


 


D-Dimer   


 


Sodium   136 L 


 


Potassium   


 


Chloride   


 


BUN   


 


BUN/Creatinine Ratio   


 


Glucose   100 H 


 


Calcium   


 


Total Protein   














Assessment and Plan


Assessment: 





* Bilateral ICA restenosis, status post left ICA stenting 2023.


* Transient confusion postoperatively, likely due to postoperative delirium or 

  medication effect.  Current examination is stable, unchanged.





* History of bilateral CEA performed multiple years ago at [facility]. 


* History of seizures, with abnormal EEG with periodic lateralized epileptiform 

  discharges involving the left temporal region in 2022 that had 

  resolved. 


* History of old stroke involving the right occipital lobe with left homonymous 

  hemianopia


* Hypertension


* CAD


* Hypothyroidism


* Depression


* Anxiety


* Folate deficiency


* Tobacco use.











Plan: 





* Patient will undergo computed tomography scan of the head to make sure there 

  is no stroke postoperatively.  This was done today and revealed no acute 

  intracranial process.  Encephalomalacia in the right occipital and temporal 

  lobe not significant change from 05/10/2023.  I reviewed CT head, agree with t

  ramana findings.


* Patient to be continued on aspirin 81 mg, Plavix 75 mg daily and Lipitor 80 mg

   daily.


* Patient has history of seizure disorder.  Patient currently taking only Vimpat

   50 mg daily.  She used to be on Vimpat 150 mg twice a day.  Patient is very 

  noncompliant with the medication, and follow through with the neurologist.  

  Patient's  is also not a good historian, does not know the details.


* The nurse checked with the pharmacy and they do not have much records.


* At this point, we will discharge patient on Vimpat 100 mg twice a day.  

  Patient need to follow-up with neurologist outpatient.


* Patient still has severe stenosis of the right ICA.  Patient will follow up 

  with Dr. Garcia in one week for possible stenting of the right ICA.


* Patient will follow up with Dr. Rosales as well.


* Neurologically clear for discharge.  Thank you for the consult.











Time with Patient: Greater than 30

## 2023-09-28 NOTE — CDI
Documentation Clarification Form



Date: 09/27/2023 09:36:00 AM

From: Shari Joseph RN,CCDS

Phone: [phone]

MRN: [id number]

Admit Date: 09/16/2023 09:32:00 PM

Patient Name: Jaime Holguin

Visit Number: [id number]

Discharge Date:  09/23/2023 05:02:00 PM





ATTENTION: The Clinical Documentation Specialists (CDI) and [facility] Coding Staff 
appreciate your assistance in clarifying documentation. Please respond to the 
clarification below the line at the bottom and electronically sign. The CDI & 
[facility] Coding staff will review the response and follow-up if needed. Please note: 
Queries are made part of the Legal Health Record. If you have any questions, 
please contact the author of this message via ITS.



Dr. Rogers Garcia



Aborted stenting of the left common carotid artery due to decreased blood 
pressure per procedure log on 09/20/2023 and patient had selective right common 
femoral arterial angiogram.  Additional clarification is requested regarding the
relationship, if any, that exists between the diagnosis and the procedure.



Patients Admitting Diagnosis: Bilateral severe carotid stenosis. 

Post-Operative Diagnosis: same 

Procedure performed: 9/20 Aborted stenting of the left common carotid artery.



11:31 Atrial access was obtained via the right femoral artery

11:41 HR 49 during, NIBP: 60/38 , RESP 15 

11:45 Vasopressor started due to decreased BP 

11:46 HR 52, BP:58/25 , NIPBP58/35 , RESP:14, SPO2:94 

11:52 Procedure aborted due to BP

11:52 Sheath-The arterial sheath was then sutered in place with a pressurized 
bag of heparinized normal saline 





History/Risk Factors:  Coronary Artery Disease (CAD) post CABG, Hypertension, 
Myocardial Infarction (MI), Thyroid Disorder, Current smoker



Clinical Indicators:  Patient presents to the ED with her  for evaluation
of intermittent chest heaviness for the past week. She was ruled in for Severe 
right ICA left CCA stenosis more than 95% ischemic cardiac myopathy with EF of 
40-45% with apical scarring prior history of calcific LV thrombus



9/20 Cardiology progress note: Patient states she is still having chest pressure
on and off.  EKG reviewed and she does have changes that are unchanged.  Blood 
pressure 100/60, heart rate in the 60s and 70s.  Repeat blood work today reveals
WBC 8.2, hemoglobin 12.7.  Electrolytes and renal function are normal with 
creatinine 0.9.  Troponin was obtained this morning was negative 1. 







Treatment:  

ICU/Telemetry monitoring

Norepinephrine 9/20-9/20 (8ML)

Heparin Sodium (1,000Unit/ML per orders(9/22-9/22

.9NS1,000 ML@47.627MLS/HR IV 9/20-9/21





What relationship, if any, exists between the diagnosis of hypotension and the 
procedure.

[  ] Hypotension is a complication of surgical procedure

[  ] Hypotension is an expected outcome of the surgical procedure

[  ] Hypotension is related to patients co-morbid condition(s) of [insert co-
morbid dxs] & not a complication of the procedure.

[  ] Other  please specify ____

[ x ] Unable to determine



(Template Last Revised: March 2021)

___________________________________________________________________________

MTDD

## 2023-11-15 ENCOUNTER — HOSPITAL ENCOUNTER (INPATIENT)
Dept: HOSPITAL 47 - 2ORMAIN | Age: 68
LOS: 1 days | Discharge: HOME HEALTH | DRG: 35 | End: 2023-11-16
Attending: INTERNAL MEDICINE | Admitting: INTERNAL MEDICINE
Payer: MEDICARE

## 2023-11-15 VITALS — BODY MASS INDEX: 20.6 KG/M2

## 2023-11-15 DIAGNOSIS — Z82.49: ICD-10-CM

## 2023-11-15 DIAGNOSIS — Z28.310: ICD-10-CM

## 2023-11-15 DIAGNOSIS — Z95.1: ICD-10-CM

## 2023-11-15 DIAGNOSIS — I65.21: Primary | ICD-10-CM

## 2023-11-15 DIAGNOSIS — I25.10: ICD-10-CM

## 2023-11-15 DIAGNOSIS — I42.9: ICD-10-CM

## 2023-11-15 DIAGNOSIS — E78.5: ICD-10-CM

## 2023-11-15 DIAGNOSIS — I10: ICD-10-CM

## 2023-11-15 DIAGNOSIS — Z28.21: ICD-10-CM

## 2023-11-15 DIAGNOSIS — Z95.5: ICD-10-CM

## 2023-11-15 PROCEDURE — 37215 TRANSCATH STENT CCA W/EPS: CPT

## 2023-11-15 PROCEDURE — B310110 FLUOROSCOPY OF THORACIC AORTA USING LOW OSMOLAR CONTRAST, LASER INTRAOPERATIVE: ICD-10-PCS

## 2023-11-15 PROCEDURE — 85025 COMPLETE CBC W/AUTO DIFF WBC: CPT

## 2023-11-15 PROCEDURE — 80048 BASIC METABOLIC PNL TOTAL CA: CPT

## 2023-11-15 PROCEDURE — 037H34Z DILATION OF RIGHT COMMON CAROTID ARTERY WITH DRUG-ELUTING INTRALUMINAL DEVICE, PERCUTANEOUS APPROACH: ICD-10-PCS

## 2023-11-15 PROCEDURE — 037K34Z DILATION OF RIGHT INTERNAL CAROTID ARTERY WITH DRUG-ELUTING INTRALUMINAL DEVICE, PERCUTANEOUS APPROACH: ICD-10-PCS

## 2023-11-15 RX ADMIN — HEPARIN SODIUM ONE UNIT: 1000 INJECTION, SOLUTION INTRAVENOUS; SUBCUTANEOUS at 09:37

## 2023-11-15 RX ADMIN — HEPARIN SODIUM ONE UNIT: 1000 INJECTION, SOLUTION INTRAVENOUS; SUBCUTANEOUS at 09:58

## 2023-11-15 RX ADMIN — GABAPENTIN SCH MG: 300 CAPSULE ORAL at 21:00

## 2023-11-15 RX ADMIN — RANOLAZINE SCH MG: 500 TABLET, FILM COATED, EXTENDED RELEASE ORAL at 21:00

## 2023-11-15 RX ADMIN — HEPARIN SODIUM ONE UNIT: 1000 INJECTION, SOLUTION INTRAVENOUS; SUBCUTANEOUS at 09:31

## 2023-11-15 RX ADMIN — METOPROLOL TARTRATE SCH MG: 25 TABLET, FILM COATED ORAL at 21:00

## 2023-11-15 NOTE — IR
EXAMINATION TYPE: IR stent intravas non coronary

 

DATE OF EXAM: 11/15/2023

 

COMPARISON: NONE

 

HISTORY: Fluoroscopy time.

 

Fluoroscopy was provided to the referring clinician.

## 2023-11-16 VITALS
HEART RATE: 66 BPM | TEMPERATURE: 98.3 F | SYSTOLIC BLOOD PRESSURE: 130 MMHG | DIASTOLIC BLOOD PRESSURE: 76 MMHG | RESPIRATION RATE: 16 BRPM

## 2023-11-16 LAB
ANION GAP SERPL CALC-SCNC: 10 MMOL/L
BASOPHILS # BLD AUTO: 0 K/UL (ref 0–0.2)
BASOPHILS NFR BLD AUTO: 0 %
BUN SERPL-SCNC: 11 MG/DL (ref 7–17)
CALCIUM SPEC-MCNC: 9.3 MG/DL (ref 8.4–10.2)
CHLORIDE SERPL-SCNC: 104 MMOL/L (ref 98–107)
CO2 SERPL-SCNC: 24 MMOL/L (ref 22–30)
EOSINOPHIL # BLD AUTO: 0.2 K/UL (ref 0–0.7)
EOSINOPHIL NFR BLD AUTO: 2 %
ERYTHROCYTE [DISTWIDTH] IN BLOOD BY AUTOMATED COUNT: 3.92 M/UL (ref 3.8–5.4)
ERYTHROCYTE [DISTWIDTH] IN BLOOD: 12.8 % (ref 11.5–15.5)
GLUCOSE SERPL-MCNC: 124 MG/DL (ref 74–99)
HCT VFR BLD AUTO: 38.8 % (ref 34–46)
HGB BLD-MCNC: 12.5 GM/DL (ref 11.4–16)
LYMPHOCYTES # SPEC AUTO: 1.5 K/UL (ref 1–4.8)
LYMPHOCYTES NFR SPEC AUTO: 19 %
MCH RBC QN AUTO: 32 PG (ref 25–35)
MCHC RBC AUTO-ENTMCNC: 32.3 G/DL (ref 31–37)
MCV RBC AUTO: 99 FL (ref 80–100)
MONOCYTES # BLD AUTO: 0.3 K/UL (ref 0–1)
MONOCYTES NFR BLD AUTO: 4 %
NEUTROPHILS # BLD AUTO: 5.9 K/UL (ref 1.3–7.7)
NEUTROPHILS NFR BLD AUTO: 75 %
PLATELET # BLD AUTO: 284 K/UL (ref 150–450)
POTASSIUM SERPL-SCNC: 4.4 MMOL/L (ref 3.5–5.1)
SODIUM SERPL-SCNC: 138 MMOL/L (ref 137–145)
WBC # BLD AUTO: 7.9 K/UL (ref 3.8–10.6)

## 2023-11-16 RX ADMIN — GABAPENTIN SCH MG: 300 CAPSULE ORAL at 08:00

## 2023-11-16 RX ADMIN — METOPROLOL TARTRATE SCH MG: 25 TABLET, FILM COATED ORAL at 08:00

## 2023-11-16 RX ADMIN — RANOLAZINE SCH MG: 500 TABLET, FILM COATED, EXTENDED RELEASE ORAL at 08:00

## 2024-01-29 NOTE — P.PCN
Date of Procedure: 11/15/23


Operative Findings: 








Carotid artery stenting procedure





Performing physician


Rogers Garcia MD





Procedure performed


Successful stenting of the right internal carotid artery using 7 mm x 30 mm Xact

and 7-10 x 40 mm Acculink stents with a good angiographic results


An aortic arch angiogram


Selective right common and right internal carotid angiogram


An intracranial angiogram


Selective right common femoral artery angiogram an ultrasound-guided access of 

the right common femoral artery





Indication


Severe right carotid artery disease documented by CTA on this 68-year-old female

patient who was symptomatic 1 underwent in the past carotid endarterectomy





Approach


Right common femoral artery





Complication


None





Procedure length


64 minutes 





Procedure description


After obtaining an informed consent the patient was brought to the cardiac cath 

lab.  The right common femoral artery was cannulated using micropuncture 

technique under ultrasound guidance the micro-puncture wire passed easily then I

placed a 6-Turkish 90 cm sheath at the right common femoral artery after I 

predilated using 6-Turkish dilator.  The sheath was advanced under fluoroscopy 

guidance and overall 35 stiff Glidewire all the way to the proximal descending 

aorta.  Subsequently under fluoroscopy guidance and did advance the pigtail 

catheter and I did an aortic arch angiogram using digital subsection and power 

injection.  After that we decided to move forward with intervention on the right

internal carotid artery.  Please note that the aortic arch angiogram showed the 

aortic arch with previous stenting in the left common and left internal carotid 

artery was a good flow there.  Subsequently I did exchange my pigtail catheter 

over 035 wire into a JB2 catheter.  I did engage the innominate artery using JB2

catheter and then the wire was advanced under fluoroscopy guidance to the distal

right common carotid artery.  Then the JB2 catheter was advanced over the wire 

and that the sheath was advanced over the wire and catheter into the mid right 

common carotid artery.  Selective right common and right internal carotid 

angiogram was performed and that also identified severe disease involving the 

right internal carotid artery by the takeoff of the right external carotid 

artery.  We decided to move forward with intervention after we did our 

measurements.  We did wire the right internal carotid artery using a filter wire

after the filter wire was prepped under saline injection and making sure no 

bubbles identified.  The carotid artery was wired under fluoroscopy guidance and

the wire was advanced to the right internal carotid artery also under 

fluoroscopy guidance.  Subsequently the filter was deployed under fluoroscopy 

guidance then the filter system was withdrawn out and the wire was left in 

place.  Predilatation was performed using 4 mm balloon which was inflated under 

16 jaycob and deflated quickly.  Subsequently we deployed the first stent which was

7.0 x 13 mm Xact stent where the stent was positioned again under fluoroscopy 

guidance and deployed under fluoroscopy guidance.  Postdilatation was performed 

using 5 mm balloon.  An injection/angiogram was performed and showed what it 

seems to be possible edge dissection involving either the proximal or distal 

portions of the stent.  We decided to cover that with other stent.  At that 

point we advanced 710 x 40 mm Acculink stent where the stent again was 

positioned under fluoroscopy guidance and deployed under fluoroscopy guidance.  

Postdilatation again of the proximal and distal edge of overlap between the 

first and second stents was performed using 5 mm balloon.  Final angiogram was 

performed and showed good angiographic results.  Also we did intracranial 

angiogram which showed also a good flow.  The procedure at that point was 

completed was no complication.  Please note that an angiogram was performed 

before the filter was achieved and after the filter was retrieved.





After that I did exchange my long sheath into short sheath using 035 stiff 

Glidewire before I did selective right common femoral artery angiogram.  The 

procedure was completed was no complication.





Postprocedure management


Continue dual antiplatelet therapy


Manual pullback of the sheath


Monitor the patient overnight in the ICU


Monitor the blood pressure and heart rate as well as


Follow-up with the patient

## 2024-01-29 NOTE — P.DS
Providers


Date of admission: 


11/15/23 06:52





Attending physician: 


Rogers Garcia





Primary care physician: 


[facility] Course: 





The patient is a pleasant 68-year-old female patient who underwent yesterday 

successful stenting of the right common and right internal carotid artery





She was seen and evaluated this morning.  The patient is asymptomatic and she is

also hemodynamically stable.  No symptoms consistent with TIA or stroke and no 

focal neurologic finding





The right groin is soft and nontender with no bruises.





The patient is going to be discharged home on dual antiplatelet therapy and I 

will follow-up with the patient next week in the office





Plan - Discharge Summary


Discharge Rx Participant: No


New Discharge Prescriptions: 


Continue


   Clopidogrel Bisulfate [Plavix] 75 mg PO DAILY


   Ranolazine [Ranexa] 1,000 mg PO BID


   Aspirin 81 mg PO DAILY #30 tab


   Isosorbide Mononitrate ER [Imdur] 30 mg PO DAILY


   hydrOXYzine HCL 25 mg PO HS PRN


     PRN Reason: Insomnia


   Metoprolol Tartrate 25 mg PO BID


   Atorvastatin Calcium [Lipitor] 80 mg PO HS


   Levothyroxine Sodium [Synthroid] 125 mcg PO DAILY


   Ergocalciferol [Vitamin D2 (1250 Mcg = 50373 Iu)] 1,250 mcg PO WEEKLY


   Gabapentin 600 mg PO BID


Discharge Medication List





Atorvastatin Calcium [Lipitor] 80 mg PO HS 04/29/22 [History]


Clopidogrel Bisulfate [Plavix] 75 mg PO DAILY 04/29/22 [History]


Ranolazine [Ranexa] 1,000 mg PO BID 08/10/23 [History]


Levothyroxine Sodium [Synthroid] 125 mcg PO DAILY 09/16/23 [History]


Aspirin 81 mg PO DAILY #30 tab 09/23/23 [Rx]


Ergocalciferol [Vitamin D2 (1250 Mcg = 43154 Iu)] 1,250 mcg PO WEEKLY 11/14/23 

[History]


Gabapentin 600 mg PO BID 11/14/23 [History]


Isosorbide Mononitrate ER [Imdur] 30 mg PO DAILY 11/14/23 [History]


Metoprolol Tartrate 25 mg PO BID 11/14/23 [History]


hydrOXYzine HCL 25 mg PO HS PRN 11/14/23 [History]








Follow up Appointment(s)/Referral(s): 


Rogers Garcia MD [STAFF PHYSICIAN] - 1 Week (APPOINTMENT MADE ON WEDNESDAY, NOVEMBER 22ND @ 4:45PM )


Patient Instructions/Handouts:  Carotid Artery Disease (DC), Moderate Sedation 

(DC), Carotid Artery Stent Placement (DC)

## 2025-03-28 ENCOUNTER — HOSPITAL ENCOUNTER (OUTPATIENT)
Dept: HOSPITAL 47 - EC | Age: 70
Setting detail: OBSERVATION
LOS: 2 days | Discharge: HOME | End: 2025-03-30
Attending: HOSPITALIST | Admitting: HOSPITALIST
Payer: MEDICARE

## 2025-03-28 DIAGNOSIS — J90: ICD-10-CM

## 2025-03-28 DIAGNOSIS — Z95.1: ICD-10-CM

## 2025-03-28 DIAGNOSIS — Z79.890: ICD-10-CM

## 2025-03-28 DIAGNOSIS — I25.110: Primary | ICD-10-CM

## 2025-03-28 DIAGNOSIS — Z79.02: ICD-10-CM

## 2025-03-28 DIAGNOSIS — I77.89: ICD-10-CM

## 2025-03-28 DIAGNOSIS — Z79.899: ICD-10-CM

## 2025-03-28 DIAGNOSIS — Z91.199: ICD-10-CM

## 2025-03-28 DIAGNOSIS — Z88.5: ICD-10-CM

## 2025-03-28 DIAGNOSIS — Z95.828: ICD-10-CM

## 2025-03-28 DIAGNOSIS — E78.5: ICD-10-CM

## 2025-03-28 DIAGNOSIS — I65.29: ICD-10-CM

## 2025-03-28 DIAGNOSIS — R79.89: ICD-10-CM

## 2025-03-28 DIAGNOSIS — I10: ICD-10-CM

## 2025-03-28 LAB
ALBUMIN SERPL-MCNC: 4.4 G/DL (ref 3.5–5)
ALP SERPL-CCNC: 63 U/L (ref 38–126)
ALT SERPL-CCNC: 21 U/L (ref 4–34)
ANION GAP SERPL CALC-SCNC: 8 MMOL/L
APTT BLD: 22.9 SEC (ref 22–30)
AST SERPL-CCNC: 34 U/L (ref 14–36)
BASOPHILS # BLD AUTO: 0 K/UL (ref 0–0.2)
BASOPHILS NFR BLD AUTO: 0 %
BUN SERPL-SCNC: 19 MG/DL (ref 7–17)
CALCIUM SPEC-MCNC: 9.8 MG/DL (ref 8.4–10.2)
CHLORIDE SERPL-SCNC: 102 MMOL/L (ref 98–107)
CO2 SERPL-SCNC: 27 MMOL/L (ref 22–30)
EOSINOPHIL # BLD AUTO: 0.2 K/UL (ref 0–0.7)
EOSINOPHIL NFR BLD AUTO: 2 %
ERYTHROCYTE [DISTWIDTH] IN BLOOD BY AUTOMATED COUNT: 4.07 M/UL (ref 3.8–5.4)
ERYTHROCYTE [DISTWIDTH] IN BLOOD: 14.3 % (ref 11.5–15.5)
GLUCOSE SERPL-MCNC: 85 MG/DL (ref 74–99)
HCT VFR BLD AUTO: 41.3 % (ref 34–46)
HGB BLD-MCNC: 13.3 GM/DL (ref 11.4–16)
INR PPP: 0.9 (ref ?–1.2)
LYMPHOCYTES # SPEC AUTO: 2.1 K/UL (ref 1–4.8)
LYMPHOCYTES NFR SPEC AUTO: 28 %
MAGNESIUM SPEC-SCNC: 2 MG/DL (ref 1.6–2.3)
MCH RBC QN AUTO: 32.8 PG (ref 25–35)
MCHC RBC AUTO-ENTMCNC: 32.3 G/DL (ref 31–37)
MCV RBC AUTO: 101.5 FL (ref 80–100)
MONOCYTES # BLD AUTO: 0.2 K/UL (ref 0–1)
MONOCYTES NFR BLD AUTO: 2 %
NEUTROPHILS # BLD AUTO: 4.9 K/UL (ref 1.3–7.7)
NEUTROPHILS NFR BLD AUTO: 66 %
PLATELET # BLD AUTO: 267 K/UL (ref 150–450)
POTASSIUM SERPL-SCNC: 4.8 MMOL/L (ref 3.5–5.1)
PROT SERPL-MCNC: 7 G/DL (ref 6.3–8.2)
PT BLD: 9.9 SEC (ref 10–12.5)
SODIUM SERPL-SCNC: 137 MMOL/L (ref 137–145)
WBC # BLD AUTO: 7.4 K/UL (ref 3.8–10.6)

## 2025-03-28 PROCEDURE — 93005 ELECTROCARDIOGRAM TRACING: CPT

## 2025-03-28 PROCEDURE — 80061 LIPID PANEL: CPT

## 2025-03-28 PROCEDURE — 71046 X-RAY EXAM CHEST 2 VIEWS: CPT

## 2025-03-28 PROCEDURE — 99291 CRITICAL CARE FIRST HOUR: CPT

## 2025-03-28 PROCEDURE — 96376 TX/PRO/DX INJ SAME DRUG ADON: CPT

## 2025-03-28 PROCEDURE — 36415 COLL VENOUS BLD VENIPUNCTURE: CPT

## 2025-03-28 PROCEDURE — 80053 COMPREHEN METABOLIC PANEL: CPT

## 2025-03-28 PROCEDURE — 83735 ASSAY OF MAGNESIUM: CPT

## 2025-03-28 PROCEDURE — 85379 FIBRIN DEGRADATION QUANT: CPT

## 2025-03-28 PROCEDURE — 94760 N-INVAS EAR/PLS OXIMETRY 1: CPT

## 2025-03-28 PROCEDURE — 96365 THER/PROPH/DIAG IV INF INIT: CPT

## 2025-03-28 PROCEDURE — 85610 PROTHROMBIN TIME: CPT

## 2025-03-28 PROCEDURE — 96375 TX/PRO/DX INJ NEW DRUG ADDON: CPT

## 2025-03-28 PROCEDURE — 85730 THROMBOPLASTIN TIME PARTIAL: CPT

## 2025-03-28 PROCEDURE — 85049 AUTOMATED PLATELET COUNT: CPT

## 2025-03-28 PROCEDURE — 84484 ASSAY OF TROPONIN QUANT: CPT

## 2025-03-28 PROCEDURE — 71275 CT ANGIOGRAPHY CHEST: CPT

## 2025-03-28 PROCEDURE — 85025 COMPLETE CBC W/AUTO DIFF WBC: CPT

## 2025-03-28 PROCEDURE — 96366 THER/PROPH/DIAG IV INF ADDON: CPT

## 2025-03-28 RX ADMIN — MORPHINE SULFATE PRN MG: 4 INJECTION, SOLUTION INTRAMUSCULAR; INTRAVENOUS at 21:42

## 2025-03-28 RX ADMIN — ASPIRIN 81 MG CHEWABLE TABLET STA MG: 81 TABLET CHEWABLE at 21:40

## 2025-03-28 RX ADMIN — HEPARIN SODIUM SCH MLS/HR: 10000 INJECTION, SOLUTION INTRAVENOUS at 21:45

## 2025-03-28 RX ADMIN — HEPARIN SODIUM ONE UNIT: 1000 INJECTION, SOLUTION INTRAVENOUS; SUBCUTANEOUS at 21:43

## 2025-03-28 NOTE — ED
Chest Pain HPI





- General


Source: patient, family, RN notes reviewed


Mode of arrival: wheelchair


Limitations: no limitations





<Ivana Loyd - Last Filed: 25 14:37>





- General


Source: patient, family, RN notes reviewed, old records reviewed


Mode of arrival: wheelchair


Limitations: no limitations





- History of Present Illness


MD Complaint: chest pain


-: week(s)


Onset: during rest


Pain Location: substernal, left chest


Pain Radiation: none


Severity: moderate


Severity scale (1-10): 4


Quality: tightness


Consistency: constant


Improves With: nothing


Worsens With: nothing


Anginal Symptoms: dyspnea, sense of impending doom


Other Symptoms: palpitations


Treatments Prior to Arrival: none





<Chris Mann - Last Filed: 25 13:40>





- General


Stated Complaint: chest pain


Time Seen by Provider: 25 14:33





- History of Present Illness


Initial Comments: 





Quick note: 70-year-old female presented the ER for evaluation of chest pain.  

For the past week or 2 patient has been having left-sided chest discomfort that 

has progressively worsened.  Patient does admit to hx triple bypass.  Patient 

also admits to dizziness and shortness of breath. (Ivana Loyd)


This is a 70-year-old female to the ER for evaluation of chest pain.  Patient 

has severe ACS history of ACS and CABG.  Patient with persistent chest pain no 

shortness of breath here in the ER patient has pain for a week persistent pain 

for a week with no diaphoresis currently (Chris Mann)





- Related Data


                                Home Medications











 Medication  Instructions  Recorded  Confirmed


 


Atorvastatin Calcium [Lipitor] 80 mg PO HS 22


 


Clopidogrel Bisulfate [Plavix] 75 mg PO DAILY 22


 


Ranolazine [Ranexa] 1,000 mg PO BID 08/10/23 03/28/25


 


Escitalopram [Lexapro] 5 mg PO DAILY 25


 


Gabapentin 800 mg PO BID 25


 


Isosorbide Mononitrate ER [Imdur] 60 mg PO DAILY 25


 


Levothyroxine Sodium [Synthroid] 200 mcg PO DAILY 03/28/25 03/28/25


 


Metoprolol Tartrate [Lopressor] 12.5 mg PO BID 25











                                    Allergies











Allergy/AdvReac Type Severity Reaction Status Date / Time


 


codeine AdvReac  Unknown Verified 25 19:39














Review of Systems


ROS Other: All systems not noted in ROS Statement are negative.





<Ivana Loyd - Last Filed: 25 14:37>


ROS Other: All systems not noted in ROS Statement are negative.





<Chris Mann - Last Filed: 25 13:40>


ROS Statement: 


Those systems with pertinent positive or pertinent negative responses have been 

documented in the HPI.








Past Medical History


Past Medical History: Coronary Artery Disease (CAD), Hypertension, Myocardial 

Infarction (MI), Thyroid Disorder


Last Myocardial Infarction Date:: 


History of Any Multi-Drug Resistant Organisms: None Reported


Past Surgical History: Appendectomy,  Section, Cholecystectomy, Coronary

 Bypass/CABG, Heart Catheterization, Heart Catheterization With Stent


Additional Past Surgical History / Comment(s): bilat carotid


Past Anesthesia/Blood Transfusion Reactions: No Reported Reaction


Date of Last Stent Placement:: 


Smoking Status: Never smoker





- Past Family History


  ** Father


History Unknown: Yes





  ** Mother


Family Medical History: Diabetes Mellitus, Hypertension





<Ivana Loyd - Last Filed: 25 14:37>





General Exam





<Ivana Loyd - Last Filed: 25 14:37>


General appearance: alert, in no apparent distress


Head exam: Present: atraumatic, normocephalic, normal inspection


Eye exam: Present: normal appearance, PERRL, EOMI.  Absent: scleral icterus, 

conjunctival injection, periorbital swelling


ENT exam: Present: normal exam, mucous membranes moist


Neck exam: Present: normal inspection.  Absent: tenderness, meningismus, 

lymphadenopathy


Respiratory exam: Present: normal lung sounds bilaterally.  Absent: respiratory 

distress, wheezes, rales, rhonchi, stridor


Cardiovascular Exam: Present: regular rate, normal rhythm, normal heart sounds. 

 Absent: systolic murmur, diastolic murmur, rubs, gallop, clicks


GI/Abdominal exam: Present: soft, normal bowel sounds.  Absent: distended, 

tenderness, guarding, rebound, rigid


Extremities exam: Present: normal inspection, full ROM, normal capillary refill.

  Absent: tenderness, pedal edema, joint swelling, calf tenderness


Back exam: Present: normal inspection


Neurological exam: Present: alert, oriented X3, CN II-XII intact


Psychiatric exam: Present: normal affect, normal mood


Skin exam: Present: warm, dry, intact, normal color.  Absent: rash





<Chris Mann - Last Filed: 25 13:40>





- General Exam Comments


Initial Comments: 





Visual Physical Exam





Vital signs reviewed





General: Well-appearing, nontoxic, no acute distress.


Head: Normocephalic, atraumatic


Eyes: PERRLA, EOMI


ENT: Airway patent


Chest: Nonlabored breathing


Skin: No visual rash, normal skin tone


Neuro: Alert and oriented 3


Musculoskeletal: No gross abnormalities


 (Ivana Loyd)





Course





<Chris Mann - Last Filed: 25 13:40>


                                   Vital Signs











  25





  14:29 17:33 18:00


 


Temperature 98.2 F  


 


Pulse Rate 73 67 76


 


Pulse Rate [   





Cardiac Monitor   





]   


 


Respiratory 20 16 16





Rate   


 


Blood Pressure 129/78 129/71 111/52


 


Blood Pressure   





[Left Arm]   


 


Blood Pressure   





[Right Arm]   


 


O2 Sat by Pulse 95 96 93 L





Oximetry   














  25





  21:41 01:16 01:50


 


Temperature   


 


Pulse Rate 60 60 60


 


Pulse Rate [   





Cardiac Monitor   





]   


 


Respiratory 18  16





Rate   


 


Blood Pressure 113/60 87/46 73/51


 


Blood Pressure   





[Left Arm]   


 


Blood Pressure   





[Right Arm]   


 


O2 Sat by Pulse 96  97





Oximetry   














  25





  01:59 04:00 06:33


 


Temperature   


 


Pulse Rate 64 59 L 57 L


 


Pulse Rate [   





Cardiac Monitor   





]   


 


Respiratory 15 16 15





Rate   


 


Blood Pressure 103/45 97/50 98/51


 


Blood Pressure   





[Left Arm]   


 


Blood Pressure   





[Right Arm]   


 


O2 Sat by Pulse 100 100 100





Oximetry   














  25





  07:46 15:07 16:24


 


Temperature 98.3 F 98.3 F 98.3 F


 


Pulse Rate   


 


Pulse Rate [ 60 61 63





Cardiac Monitor   





]   


 


Respiratory 16 16 16





Rate   


 


Blood Pressure   


 


Blood Pressure   93/56





[Left Arm]   


 


Blood Pressure 102/49 95/50 





[Right Arm]   


 


O2 Sat by Pulse 100 99 99





Oximetry   














- Reevaluation(s)


Reevaluation #1: 





25 21:09


Medical records reviewed (Chris Mann)


Reevaluation #2: 





25 21:09


Patient chest pain is improved


Patient still with chest pain here in the ER (Chris Mann)


Reevaluation #3: 





25 21:09


Patient informed of results and questions answered (Chris Mann)


Reevaluation #4: 





25 21:09


Was pt. sent in by a medical professional or institution (, PA, NP, urgent 

care, hospital, or nursing home...) When possible be specific


@  -no


Did you speak to anyone other than the patient for history (EMS, parent, family,

police, friend...)? What history was obtained from this source 


@  -no


Did you review nursing and triage notes (agree or disagree)?  Why? 


@  -agree


Are old charts reviewed (outside hosp., previous admission, EMS record, old EKG,

old radiological studies, urgent care reports/EKG's, nursing home records)? 

Report findings 


@  -yes


Differential Diagnosis (chest pain, altered mental status, abdominal pain women,

abdominal pain men, vaginal bleeding, weakness, fever, dyspnea, syncope, 

headache, dizziness, GI bleed, back pain, seizure, CVA, palpatations, mental 

health, musculoskeletal)? 


@  -prior


EKG interpreted by me (3pts min.).


@  -yes


X-rays interpreted by me (1pt min.).


@  -yes negative for acute disease


CT interpreted by me (1pt min.).


@  -no


U/S interpreted by me (1pt. min.).


@  -no


What testing was considered but not performed or refused? (CT, X-rays, U/S, 

labs)? Why?


@  -none


What meds were considered but not given or refused? Why?


@  -none


Did you discuss the management of the patient with other professionals 

(professionals i.e. AYLIN Tsia, NP, lab, RT, psych nurse, , , 

teacher, , )? Give summary


@  -no


Was smoking cessation discussed for >3mins.?


@  -no


Was critical care preformed (if so, how long)?


@  -yes31


Were there social determinants of health that impacted care today? How? 

(Homelessness, low income, unemployed, alcoholism, drug addiction, trans

portation, low edu. Level, literacy, decrease access to med. care, shelter, rehab)?


@  -none


Was there de-escalation of care discussed even if they declined (Discuss DNR or 

withdrawal of care, Hospice)? DNR status


@  -no


What co-morbidities impacted this encounter? (DM, HTN, Smoking, COPD, CAD, 

Cancer, CVA, ARF, Chemo, Hep., AIDS, mental health diagnosis, sleep apnea, 

morbid obesity)?


@  -none


Was patient admitted / discharged? Hospital course, mention meds given and 

route, prescriptions, significant lab abnormalities, going to OR and other 

pertinent info.


@  - 70 Female to the ER unstable angina chest pain will admit for chest pain 

observation


Admitted 


Undiagnosed new problem with uncertain prognosis?


@  -no


Drug Therapy requiring intensive monitoring for toxicity (Heparin, Nitro, 

Insulin, Cardizem)?


@  -no


Were any procedures done?


@  -no


Diagnosis/symptom?


@  -chest pain rule out ACS


Acute, or Chronic, or Acute on Chronic?


@  -Acute


Uncomplicated (without systemic symptoms) or Complicated (systemic symptoms)?


@  -Complicated


Side effects of treatment?


@  -no


Exacerbation, Progression, or Severe Exacerbation?


@  -exacerbation


Poses a threat to life or bodily function? How? (Chest pain, USA, MI, pneumonia,

PE, COPD, DKA, ARF, appy, cholecystitis, CVA, Diverticulitis, Homicidal, 

Suicidal, threat to staff... and all critical care pts)


@  -yes yes with chest pain (Chris Mann)


Reevaluation #5: 





Differential Chest Pain:


Stable Angina, Unstable Angina, STEMI, NSTEMI Aortic Dissection, Pneumothorax, 

Musculoskeletal, Esophageal Spasm GERD, Cholecystitis, Pancreatitis, Zoster, 

this is not meant to be an all-inclusive list. 


 (Chris Mann)





- Consultations


Consultation #1: 





Spoke with [facility] who admits this patient (Chris Mann)





Chest Pain MDM





<Ivana Loyd - Last Filed: 25 14:37>





<Chris Mann - Last Filed: 25 13:40>





- MDM





I performed the quick note portion of this chart.  Electronically signed by  

Ivana Loyd PA-C (Ivana Loyd)


70 Female to the ER unstable angina chest pain will admit for chest pain 

observation (Chris Mann)





Critical Care Time


Critical Care Time: Yes


Total Critical Care Time: 31





<Chris Mann - Last Filed: 25 13:40>





Disposition





<Ivana Loyd - Last Filed: 25 14:37>


Is patient prescribed a controlled substance at d/c from ED?: No


Time of Disposition: 21:00





<Chris Mann - Last Filed: 25 13:40>


Clinical Impression: 


 Chest pain, Acute non-ST elevation myocardial infarction (NSTEMI)





Disposition: ADMITTED AS IP TO THIS HOSP


Condition: Fair

## 2025-03-28 NOTE — XR
EXAMINATION TYPE: XR chest 2V

 

DATE OF EXAM: 3/28/2025

 

CLINICAL INDICATION: Female, 70 years old with history of Chest Pain, 

 

TECHNIQUE:  Frontal and lateral views of the chest are obtained.

 

COMPARISON: Chest x-ray September 16, 2023

 

FINDINGS:  Overlying sternal wires and mediastinal clips are redemonstrated. There is no focal air sp
ace opacity or pneumothorax seen. Tiny bilateral pleural effusions are seen on lateral chest x-ray. T
he cardiac silhouette size is stable and within normal limits.   The osseous structures are intact. S
urgical changes in the bilateral neck are partially imaged similar to prior.

 

IMPRESSION: Tiny bilateral pleural effusions.

 

[facility], Workstation: [id number], 3/28/2025 3:38 PM Abdominal Pain, N/V/D

## 2025-03-29 LAB
APTT BLD: 36 SEC (ref 22–30)
CHOLEST SERPL-MCNC: 287 MG/DL (ref 0–200)
HDLC SERPL-MCNC: 41.2 MG/DL (ref 40–60)
LDLC SERPL CALC-MCNC: 209.6 MG/DL (ref 0–131)
PLATELET # BLD AUTO: 213 K/UL (ref 150–450)
TRIGL SERPL-MCNC: 181 MG/DL (ref 0–149)
VLDLC SERPL CALC-MCNC: 36.2 MG/DL (ref 5–40)

## 2025-03-29 RX ADMIN — ATORVASTATIN CALCIUM SCH MG: 80 TABLET, FILM COATED ORAL at 10:06

## 2025-03-29 RX ADMIN — ISOSORBIDE MONONITRATE SCH MG: 60 TABLET, EXTENDED RELEASE ORAL at 10:16

## 2025-03-29 RX ADMIN — RANOLAZINE SCH MG: 500 TABLET, FILM COATED, EXTENDED RELEASE ORAL at 21:25

## 2025-03-29 RX ADMIN — METOPROLOL TARTRATE SCH: 25 TABLET, FILM COATED ORAL at 13:28

## 2025-03-29 RX ADMIN — GABAPENTIN SCH MG: 400 CAPSULE ORAL at 21:25

## 2025-03-29 RX ADMIN — LEVOTHYROXINE SODIUM SCH MCG: 100 TABLET ORAL at 10:06

## 2025-03-29 RX ADMIN — ASPIRIN 325 MG ORAL TABLET SCH MG: 325 PILL ORAL at 10:06

## 2025-03-29 NOTE — P.CRDCN
History of Present Illness


Consult date: 25


History of present illness: 





The patient is a 70-year-old female patient who is known to our service from 

before with a past medical history significant for CAD status post CABG with 

unknown details as well as carotid atherosclerosis status post bilateral carotid

endarterectomy and subsequently stenting of the right internal carotid artery as

well as hypertension and dyslipidemia and noncompliance and multiple comorbid 

conditions who was admitted to the hospital with chest discomfort.  Patient 

somewhat is a poor historian which she reports discomfort in the chest for the 

last several weeks.  The discomfort is a dull kind of feeling radiating to the 

back with no associated symptoms of shortness of breath or sweating or dizziness

or lightheadedness or any feeling of heart racing or fluttering or presyncope or

syncope further evaluation was performed including an EKG showing sinus 

mechanism with nonspecific ST and T wave abnormalities.  Troponin with 3 sets ca

me in to be unremarkable.  D-dimer was checked and came to be abnormal but no 

CTA was performed and no VQ scan was performed.  Hemodynamically she is stable 

with currently she is chest pain-free.  She is on heparin IV.  An echo was 

ordered and still pending.  The physical examination is remarkable for regular 

rhythm with a soft systolic murmur and clear breathing sounds bilaterally and no

edema was noted in the lower extremities





Assessment


Chest discomfort associated with abnormal D-dimer


Coronary artery disease with prior revascularization by CABG


Carotid atherosclerosis


Multiple comorbid conditions





Plan


Acute coronary event was ruled out


Rule out PE by obtaining a CTA


Continue aspirin and statin


Follow-up on the echocardiogram


Follow-up with the patient





Past Medical History


Past Medical History: Coronary Artery Disease (CAD), Hypertension, Myocardial 

Infarction (MI), Thyroid Disorder


Last Myocardial Infarction Date:: 


History of Any Multi-Drug Resistant Organisms: None Reported


Past Surgical History: Appendectomy,  Section, Cholecystectomy, Coronary

Bypass/CABG, Heart Catheterization, Heart Catheterization With Stent


Additional Past Surgical History / Comment(s): bilat carotid


Past Anesthesia/Blood Transfusion Reactions: No Reported Reaction


Date of Last Stent Placement:: 


Smoking Status: Never smoker





- Past Family History


  ** Father


History Unknown: Yes





  ** Mother


Family Medical History: Diabetes Mellitus, Hypertension





Medications and Allergies


                                Home Medications











 Medication  Instructions  Recorded  Confirmed  Type


 


Atorvastatin Calcium [Lipitor] 80 mg PO HS 22 History


 


Clopidogrel Bisulfate [Plavix] 75 mg PO DAILY 22 History


 


Ranolazine [Ranexa] 1,000 mg PO BID 08/10/23 03/28/25 History


 


Metoprolol Tartrate 25 mg PO BID 23 History


 


Escitalopram [Lexapro] 5 mg PO DAILY 25 History


 


Gabapentin 800 mg PO BID 25 History


 


Isosorbide Mononitrate ER [Imdur] 60 mg PO DAILY 25 History


 


Levothyroxine Sodium [Synthroid] 200 mcg PO DAILY 25 History








                                    Allergies











Allergy/AdvReac Type Severity Reaction Status Date / Time


 


codeine AdvReac  Unknown Verified 25 19:39














Physical Exam


Vitals: 


                                   Vital Signs











  Temp Pulse Pulse Resp BP BP Pulse Ox


 


 25 07:46  98.3 F   60  16   102/49  100


 


 25 06:33   57 L   15  98/51   100


 


 25 04:00   59 L   16  97/50   100


 


 25 01:59   64   15  103/45   100


 


 25 01:50   60   16  73/51   97


 


 25 01:16   60    87/46  


 


 25 21:41   60   18  113/60   96


 


 25 18:00   76   16  111/52   93 L


 


 25 17:33   67   16  129/71   96


 


 25 14:29  98.2 F  73   20  129/78   95














Results





                                 25 03:57





                                 25 14:48


                                 Cardiac Enzymes











  25 Range/Units





  14:48 14:48 21:14 


 


AST  34    (14-36)  U/L


 


Troponin I   0.021  0.018  (0.000-0.034)  ng/mL














  25 Range/Units





  03:57 


 


AST   (14-36)  U/L


 


Troponin I  0.019  (0.000-0.034)  ng/mL








                                   Coagulation











  25 Range/Units





  14:48 21:14 03:57 


 


PT  9.9 L    (10.0-12.5)  sec


 


APTT  22.9  24.2  45.5 H  (22.0-30.0)  sec














  25 Range/Units





  11:08 


 


PT   (10.0-12.5)  sec


 


APTT  36.0 H  (22.0-30.0)  sec








                                     Lipids











  25 Range/Units





  03:57 


 


Triglycerides  181.00 H  (0..00)  mg/dL


 


Cholesterol  287.00 H  (0..00)  mg/dL


 


HDL Cholesterol  41.20  (40.00-60.00)  mg/dL


 


Cholesterol/HDL Ratio  6.97  Ratio








                                       CBC











  25 Range/Units





  14:48 03:57 


 


WBC  7.4   (3.8-10.6)  k/uL


 


RBC  4.07   (3.80-5.40)  m/uL


 


Hgb  13.3   (11.4-16.0)  gm/dL


 


Hct  41.3   (34.0-46.0)  %


 


Plt Count  267  213  (150-450)  k/uL








                          Comprehensive Metabolic Panel











  25 Range/Units





  14:48 


 


Sodium  137  (137-145)  mmol/L


 


Potassium  4.8  (3.5-5.1)  mmol/L


 


Chloride  102  ()  mmol/L


 


Carbon Dioxide  27  (22-30)  mmol/L


 


BUN  19 H  (7-17)  mg/dL


 


Creatinine  0.96  (0.52-1.04)  mg/dL


 


Glucose  85  (74-99)  mg/dL


 


Calcium  9.8  (8.4-10.2)  mg/dL


 


AST  34  (14-36)  U/L


 


ALT  21  (4-34)  U/L


 


Alkaline Phosphatase  63  ()  U/L


 


Total Protein  7.0  (6.3-8.2)  g/dL


 


Albumin  4.4  (3.5-5.0)  g/dL








                               Current Medications











Generic Name Dose Route Start Last Admin





  Trade Name Freq  PRN Reason Stop Dose Admin


 


Aspirin  325 mg  25 09:00  25 10:06





  Aspirin 325 Mg Tab  PO   325 mg





  DAILY [facility]   Administration


 


Atorvastatin Calcium  80 mg  25 09:00  25 10:06





  Atorvastatin 80 Mg Tab  PO   80 mg





  DAILY [facility]   Administration


 


Clopidogrel Bisulfate  75 mg  25 09:00 





  Clopidogrel 75 Mg Tab  PO  





  DAILY [facility]  


 


Escitalopram Oxalate  5 mg  25 09:00 





  Escitalopram 5 Mg Tab  PO  





  DAILY [facility]  


 


Gabapentin  800 mg  25 21:00 





  Gabapentin 400 Mg Cap  PO  





  BID [facility]  


 


Heparin Sodium/Sodium Chloride  250 mls @ 6.151 mls/hr  25 21:15  25

 21:45





  25,000 unit/ Sodium Chloride  IV   12 units/kg/hr





  .Q24H KT   6.151 mls/hr





    Administration





  Protocol  





  12 UNITS/KG/HR  


 


Isosorbide Mononitrate  60 mg  25 09:15  25 10:16





  Isosorbide Mononitrate Er 60 Mg Tab.Er.24h  PO   60 mg





  DAILY [facility]   Administration


 


Levothyroxine Sodium  200 mcg  25 09:15  25 10:06





  Levothyroxine 100 Mcg Tab  PO   200 mcg





  DAILY@0630 [facility]   Administration


 


Metoprolol Tartrate  25 mg  25 09:00 





  Metoprolol Tartrate 25 Mg Tab  PO  





  BID [facility]  


 


Morphine Sulfate  4 mg  25 21:05  25 10:15





  Morphine Sulfate 4 Mg/Ml Syringe  IV   4 mg





  Q4HR PRN   Administration





  Chest Pain  


 


Nitroglycerin  0.4 mg  25 21:05 





  Nitroglycerin Sl Tabs 0.4 Mg Tab  SUBLINGUAL  





  Q5M PRN  





  Chest Pain  


 


Ranolazine  1,000 mg  25 21:00 





  Ranolazine 500 Mg Tab.Er.12h  PO  





  BID [facility]  








                                        





                                 25 03:57 





                                 25 14:48

## 2025-03-29 NOTE — CT
EXAMINATION TYPE: CT angio chest

 

DATE OF EXAM: 3/29/2025

 

COMPARISON: 9/12/2022

 

CLINICAL INDICATION: Female, 70 years old with history of elevated D-dimer, chest pain; PHH, Elevated
 dimer, CP

 

TECHNIQUE:

CTA scan of the thorax is performed with IV Contrast, patient injected with 100 mL of Isovue 370, pul
monary embolism protocol.  MIP images are created and reviewed.  

CT DLP: 216.10 mGycm

CT CTDI: mGy

Automated exposure control for dose reduction was used.

 

FINDINGS:

 

There is mild bibasilar atelectasis. There is no airspace consolidation.

 

There is no suspicious lung mass or nodule.

 

There is mild cardiomegaly. There is 3.5 cm dilatation of the main pulmonary artery suggesting pulmon
will hypertension.

 

There is no mediastinal, hilar or axillary adenopathy.

 

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

 

There are no filling defects within the pulmonary arterial circulation to suggest pulmonary embolism.


 

There is a stent in the origin of the left common carotid artery which appears patent.

 

IMPRESSION:

1. No pulmonary embolism.

2. Ulcer of the aortic arch.

3. Mild bibasilar atelectasis. 

4. 3.5 cm dilatation of the main pulmonary artery suggesting the presence of pulmonary hypertension.

 

[facility], Workstation: [id number], 3/29/2025 2:08 PM

## 2025-03-29 NOTE — P.HPIM
History of Present Illness


H&P Date: 25





History of present illness; patient is a 70-year-old lady with past medical 

history significant for coronary artery disease, CABG, hypertension who presents

to the ER because of chest pain that is going on the last 2 weeks.chest pain is 

left sided, pressure-like, nonradiating, no aggravating or relieving factor 

associated with this chest pain, chest pain has gradually worsened in intensity.

 There was no complaint of orthopnea or PND.  There was no complaint of 

shortness of breath at that time.  There was no episode of diaphoresis during 

this episode of chest pain.  Patient denies any fever or chills.  There is no 

complaint of nausea, vomiting abdominal pain.  Because of worsening chest pain, 

patient came to the ER


initial lab work done in the ER showed WBC 7.4, hemoglobin 13.3, platelet count 

267, sodium 137, potassium 4.8, BUN 19, creatinine 0.96, troponin 0.021


EKG done in the ER showed heart rate of 54, no ST segment elevation or 

depression seen, no T-wave inversions seen.


Chest x-ray done in the ER showed bilateral small pleural effusions


Patient admitted to internal medicine service





REVIEW OF SYSTEMS: 


CONSTITUTIONAL: No fever, no malaise, no fatigue. 


HEENT: No recent visual problems or hearing problems. Denied any sore throat. 


CARDIOVASCULAR: As mentioned above 


PULMONARY: As mentioned above


GASTROINTESTINAL: No diarrhea, no nausea, no vomiting, no abdominal pain. 


NEUROLOGICAL: No headaches, no weakness, no numbness. 


HEMATOLOGICAL: Denies any bleeding or petechiae. 


GENITOURINARY: Denies any burning micturition, frequency, or urgency. 


MUSCULOSKELETAL/RHEUMATOLOGICAL: Denies any joint pain, swelling, or any muscle 

pain. 


ENDOCRINE: Denies any polyuria or polydipsia. 





The rest of the 14-point review of systems is negative.











PHYSICAL EXAMINATION: 





GENERAL: The patient is alert and oriented x3, in distress


HEENT: Pupils are round and equally reacting to light. EOMI. No scleral icterus.

No conjunctival pallor. Normocephalic, atraumatic. No pharyngeal erythema. No 

thyromegaly. 


CARDIOVASCULAR: S1 and S2 present. No murmurs, rubs, or gallops. 


PULMONARY: Chest is clear to auscultation, no wheezing or crackles. 


ABDOMEN: Soft, nontender, nondistended, normoactive bowel sounds. No palpable 

organomegaly. 


MUSCULOSKELETAL: No joint swelling or deformity.


EXTREMITIES: No cyanosis, clubbing, or pedal edema. 


NEUROLOGICAL: Gross neurological examination did not reveal any focal deficits. 


SKIN: No rashes. 





Assessment and plan


Unstable angina


Hypertension


History of coronary artery disease








Monitor CBC


Monitor CMP


Continue telemetry monitoring


Trend troponins


Start pharmacy dose heparin


Ordered  2D echo


Ordered lipid panel


Ordered HbA1c level


Resume aspirin, Plavix


Resume Imdur, Ranexa


Resume Synthroid


Resume Lopressor


Cardiology consult


Labs and medication were reviewed..  Continue same treatment.  Continue with 

symptomatic treatment.  Resume home medication.  Monitor labs and vitals.  DVT 

and GI prophylaxis.  Further recommendations as per clinical course of the 

patient





Dictation was produced using dragon dictation software. please excuse any 

grammatical, word or spelling errors.





Past Medical History


Past Medical History: Coronary Artery Disease (CAD), Hypertension, Myocardial 

Infarction (MI), Thyroid Disorder


Last Myocardial Infarction Date:: 


History of Any Multi-Drug Resistant Organisms: None Reported


Past Surgical History: Appendectomy,  Section, Cholecystectomy, Coronary

Bypass/CABG, Heart Catheterization, Heart Catheterization With Stent


Additional Past Surgical History / Comment(s): bilat carotid


Past Anesthesia/Blood Transfusion Reactions: No Reported Reaction


Date of Last Stent Placement:: 


Smoking Status: Never smoker





- Past Family History


  ** Father


History Unknown: Yes





  ** Mother


Family Medical History: Diabetes Mellitus, Hypertension





Medications and Allergies


                                Home Medications











 Medication  Instructions  Recorded  Confirmed  Type


 


Atorvastatin Calcium [Lipitor] 80 mg PO HS 22 History


 


Clopidogrel Bisulfate [Plavix] 75 mg PO DAILY 22 History


 


Ranolazine [Ranexa] 1,000 mg PO BID 08/10/23 03/28/25 History


 


Metoprolol Tartrate 25 mg PO BID 23 History


 


Escitalopram [Lexapro] 5 mg PO DAILY 25 History


 


Gabapentin 800 mg PO BID 25 History


 


Isosorbide Mononitrate ER [Imdur] 60 mg PO DAILY 25 History


 


Levothyroxine Sodium [Synthroid] 200 mcg PO DAILY 25 History








                                    Allergies











Allergy/AdvReac Type Severity Reaction Status Date / Time


 


codeine AdvReac  Unknown Verified 25 19:39














Physical Exam


Vitals: 


                                   Vital Signs











  Temp Pulse Pulse Resp BP BP Pulse Ox


 


 25 07:46  98.3 F   60  16   102/49  100


 


 25 06:33   57 L   15  98/51   100


 


 25 04:00   59 L   16  97/50   100


 


 25 01:59   64   15  103/45   100


 


 25 01:50   60   16  73/51   97


 


 25 01:16   60    87/46  


 


 25 21:41   60   18  113/60   96


 


 25 18:00   76   16  111/52   93 L


 


 25 17:33   67   16  129/71   96


 


 25 14:29  98.2 F  73   20  129/78   95














Results


CBC & Chem 7: 


                                 25 03:57





                                 25 14:48


Labs: 


                  Abnormal Lab Results - Last 24 Hours (Table)











  25 Range/Units





  14:48 14:48 14:48 


 


MCV  101.5 H    (80.0-100.0)  fL


 


PT   9.9 L   (10.0-12.5)  sec


 


APTT     (22.0-30.0)  sec


 


BUN    19 H  (7-17)  mg/dL














  25 Range/Units





  03:57 


 


MCV   (80.0-100.0)  fL


 


PT   (10.0-12.5)  sec


 


APTT  45.5 H  (22.0-30.0)  sec


 


BUN   (7-17)  mg/dL

## 2025-03-30 VITALS — TEMPERATURE: 97.9 F | SYSTOLIC BLOOD PRESSURE: 94 MMHG | DIASTOLIC BLOOD PRESSURE: 50 MMHG | HEART RATE: 76 BPM

## 2025-03-30 VITALS — RESPIRATION RATE: 16 BRPM

## 2025-03-30 LAB — PLATELET # BLD AUTO: 209 X 10*3/UL (ref 140–440)

## 2025-03-30 RX ADMIN — ESCITALOPRAM SCH MG: 5 TABLET, FILM COATED ORAL at 10:53

## 2025-03-30 RX ADMIN — CLOPIDOGREL BISULFATE SCH MG: 75 TABLET ORAL at 10:53

## 2025-03-30 RX ADMIN — ACETAMINOPHEN PRN MG: 325 TABLET, FILM COATED ORAL at 10:53

## 2025-03-30 NOTE — P.PN
Subjective


Progress Note Date: 03/30/25





The patient is a 70-year-old female patient who is known to our service from 

before with a past medical history significant for CAD status post CABG with 

unknown details as well as carotid atherosclerosis status post bilateral carotid

endarterectomy and subsequently stenting of the right internal carotid artery as

well as hypertension and dyslipidemia and noncompliance and multiple comorbid 

conditions who was admitted to the hospital with chest discomfort.  Patient 

somewhat is a poor historian which she reports discomfort in the chest for the 

last several weeks.  The discomfort is a dull kind of feeling radiating to the 

back with no associated symptoms of shortness of breath or sweating or dizziness

or lightheadedness or any feeling of heart racing or fluttering or presyncope or

syncope further evaluation was performed including an EKG showing sinus 

mechanism with nonspecific ST and T wave abnormalities.  Troponin with 3 sets 

came in to be unremarkable.  D-dimer was checked and came to be abnormal but no 

CTA was performed and no VQ scan was performed.  Hemodynamically she is stable 

with currently she is chest pain-free.  She is on heparin IV.  An echo was 

ordered and still pending.  The physical examination is remarkable for regular 

rhythm with a soft systolic murmur and clear breathing sounds bilaterally and no

edema was noted in the lower extremities





March 30, 2025


The patient was seen and evaluated this morning which she is asymptomatic.  The 

pressure has been marginally low and I am going to decrease the dose of 

metoprolol.  The echo still pending but the patient can be discharged home and 

will follow-up with the patient as an outpatient to undergo the echo as an 

outpatient.  The physical examination appears to be unchanged compared to 

yesterday with a regular rhythm with a soft systolic murmur and clear beating 

sounds bilaterally and no edema was noted





Assessment


Chest discomfort associated with abnormal D-dimer


Coronary artery disease with prior revascularization by CABG


Carotid atherosclerosis


Multiple comorbid conditions





Plan


Acute coronary event was ruled out


Rule out PE by obtaining a CTA


Decrease the dose of beta-blocker


The patient can be discharged home





Objective





- Vital Signs


Vital signs: 


                                   Vital Signs











Temp  97.9 F   03/30/25 07:00


 


Pulse  76   03/30/25 07:00


 


Resp  16   03/30/25 07:00


 


BP  94/50   03/30/25 07:00


 


Pulse Ox  98   03/30/25 07:47


 


FiO2      








                                 Intake & Output











 03/29/25 03/30/25 03/30/25





 18:59 06:59 18:59


 


Intake Total 890  


 


Balance 890  


 


Weight 51.256 kg  


 


Intake:   


 


  Oral 890  


 


Other:   


 


  Voiding Method  Toilet 


 


  # Voids 1 1 


 


  # Bowel Movements 0  














- Labs


CBC & Chem 7: 


                                 03/30/25 05:32





                                 03/28/25 14:48


Labs: 


                  Abnormal Lab Results - Last 24 Hours (Table)











  03/29/25 03/29/25 Range/Units





  11:08 15:19 


 


APTT  36.0 H  44.4 H  (22.0-30.0)  sec


 


D-Dimer  1.42 H   (<0.60)  mg/L FEU

## 2025-03-30 NOTE — P.PN
Subjective


Progress Note Date: 03/30/25





patient is a 70-year-old lady with past medical history significant for coronary

artery disease, CABG, hypertension who presents to the ER because of chest pain 

that is going on the last 2 weeks.chest pain is left sided, pressure-like, 

nonradiating, no aggravating or relieving factor associated with this chest 

pain, chest pain has gradually worsened in intensity.  There was no complaint of

orthopnea or PND.  There was no complaint of shortness of breath at that time.  

There was no episode of diaphoresis during this episode of chest pain.  Patient 

denies any fever or chills.  There is no complaint of nausea, vomiting abdominal

pain.  Because of worsening chest pain, patient came to the ER


initial lab work done in the ER showed WBC 7.4, hemoglobin 13.3, platelet count 

267, sodium 137, potassium 4.8, BUN 19, creatinine 0.96, troponin 0.021


EKG done in the ER showed heart rate of 54, no ST segment elevation or 

depression seen, no T-wave inversions seen.


Chest x-ray done in the ER showed bilateral small pleural effusions


Patient admitted to internal medicine service





3/30.  Patient seen and examined.  Denies any chest pain, CTA chest done for 

elevated D-dimer showed no PE, did show an ulcer of the aortic arch, 3.5 cm 

dilatation of the main pulmonary artery suggesting the presence of pulm hy

pertension.  2D echo pending





REVIEW OF SYSTEMS: 


CONSTITUTIONAL: No fever, no malaise,. 


CARDIOVASCULAR: No chest pain, no palpitations, no syncope. 


PULMONARY: No shortness of breath, no cough, 


GASTROINTESTINAL: No diarrhea, no nausea, no vomiting, no abdominal pain. 


NEUROLOGICAL: No headaches, no weakness, 





PHYSICAL EXAMINATION: 





GENERAL: The patient is alert and oriented x3, not in any acute distress. Well 

developed, well nourished. 


HEENT: Pupils are round and equally reacting to light. EOMI. No scleral icterus.

No conjunctival pallor. Normocephalic, atraumatic. No pharyngeal erythema. No 

thyromegaly. 


CARDIOVASCULAR: S1 and S2 present. No murmurs, rubs, or gallops. 


PULMONARY: Chest is clear to auscultation, no wheezing or crackles. 


ABDOMEN: Soft, nontender, nondistended, normoactive bowel sounds. No palpable 

organomegaly. 


MUSCULOSKELETAL: No joint swelling or deformity.


EXTREMITIES: No cyanosis, clubbing, or pedal edema. 


NEUROLOGICAL: Gross neurological examination did not reveal any focal deficits. 


SKIN: No rashes. 





Assessment and plan


Unstable angina


Hypertension


History of coronary artery disease








Monitor vital signs


Monitor CBC


Monitor CMP


Continue telemetry monitoring


Continue aspirin, Plavix, Imdur, 


Continue Synthroid


CTA chest done for elevated D-dimer showed no PE, did show an ulcer of the 

aortic arch, 3.5 cm dilatation of the main pulmonary artery suggesting the 

presence of pulm hypertension.  


2D echo pending


Cardiology following


Labs and medication were reviewed..  Continue same treatment.  Continue with 

symptomatic treatment.  Resume home medication.  Monitor labs and vitals.  DVT 

and GI prophylaxis.  Further recommendations as per clinical course of the 

patient








Dictation was produced using dragon dictation software. please excuse any gra

mmatical, word or spelling errors. 











Objective





- Vital Signs


Vital signs: 


                                   Vital Signs











Temp  97.9 F   03/30/25 07:00


 


Pulse  76   03/30/25 07:00


 


Resp  16   03/30/25 07:00


 


BP  94/50   03/30/25 07:00


 


Pulse Ox  98   03/30/25 07:47


 


FiO2      








                                 Intake & Output











 03/29/25 03/30/25 03/30/25





 18:59 06:59 18:59


 


Intake Total 890  


 


Balance 890  


 


Weight 51.256 kg  


 


Intake:   


 


  Oral 890  


 


Other:   


 


  Voiding Method  Toilet 


 


  # Voids 1 1 


 


  # Bowel Movements 0  














- Labs


CBC & Chem 7: 


                                 03/29/25 03:57





                                 03/28/25 14:48


Labs: 


                  Abnormal Lab Results - Last 24 Hours (Table)











  03/29/25 03/29/25 03/29/25 Range/Units





  03:57 11:08 15:19 


 


APTT   36.0 H  44.4 H  (22.0-30.0)  sec


 


D-Dimer   1.42 H   (<0.60)  mg/L FEU


 


Triglycerides  181.00 H    (0..00)  mg/dL


 


Cholesterol  287.00 H    (0..00)  mg/dL


 


LDL Cholesterol, Calc  209.6 H    (0.0-131.0)  mg/dL

## 2025-07-10 ENCOUNTER — HOSPITAL ENCOUNTER (INPATIENT)
Dept: HOSPITAL 47 - EC | Age: 70
LOS: 13 days | Discharge: HOME | DRG: 233 | End: 2025-07-23
Attending: INTERNAL MEDICINE | Admitting: INTERNAL MEDICINE
Payer: MEDICARE

## 2025-07-10 VITALS — BODY MASS INDEX: 20.5 KG/M2

## 2025-07-10 DIAGNOSIS — I25.5: ICD-10-CM

## 2025-07-10 DIAGNOSIS — I50.21: Primary | ICD-10-CM

## 2025-07-10 DIAGNOSIS — J96.02: ICD-10-CM

## 2025-07-10 DIAGNOSIS — I95.1: ICD-10-CM

## 2025-07-10 DIAGNOSIS — Z79.82: ICD-10-CM

## 2025-07-10 DIAGNOSIS — Z79.899: ICD-10-CM

## 2025-07-10 DIAGNOSIS — Z86.73: ICD-10-CM

## 2025-07-10 DIAGNOSIS — Z11.52: ICD-10-CM

## 2025-07-10 DIAGNOSIS — Z71.6: ICD-10-CM

## 2025-07-10 DIAGNOSIS — Z79.52: ICD-10-CM

## 2025-07-10 DIAGNOSIS — I25.2: ICD-10-CM

## 2025-07-10 DIAGNOSIS — Z82.49: ICD-10-CM

## 2025-07-10 DIAGNOSIS — F05: ICD-10-CM

## 2025-07-10 DIAGNOSIS — I25.10: ICD-10-CM

## 2025-07-10 DIAGNOSIS — F17.210: ICD-10-CM

## 2025-07-10 DIAGNOSIS — E03.9: ICD-10-CM

## 2025-07-10 DIAGNOSIS — I71.22: ICD-10-CM

## 2025-07-10 DIAGNOSIS — Z79.890: ICD-10-CM

## 2025-07-10 DIAGNOSIS — I11.0: ICD-10-CM

## 2025-07-10 DIAGNOSIS — E78.5: ICD-10-CM

## 2025-07-10 DIAGNOSIS — Z91.199: ICD-10-CM

## 2025-07-10 DIAGNOSIS — I63.29: ICD-10-CM

## 2025-07-10 DIAGNOSIS — G93.89: ICD-10-CM

## 2025-07-10 DIAGNOSIS — Z79.02: ICD-10-CM

## 2025-07-10 DIAGNOSIS — J96.01: ICD-10-CM

## 2025-07-10 DIAGNOSIS — I74.11: ICD-10-CM

## 2025-07-10 LAB
ALBUMIN SERPL-MCNC: 3.8 G/DL (ref 3.5–5)
ALP SERPL-CCNC: 179 U/L (ref 38–126)
ALT SERPL-CCNC: 18 U/L (ref 4–34)
ANION GAP SERPL CALC-SCNC: 7 MMOL/L
ANISOCYTOSIS BLD QL SMEAR: (no result)
ANISOCYTOSIS BLD QL SMEAR: (no result)
APTT BLD: 21.7 SEC (ref 22–30)
AST SERPL-CCNC: 29 U/L (ref 14–36)
BASO STIPL BLD QL SMEAR: (no result)
BASO STIPL BLD QL SMEAR: (no result)
BASOPHILS # BLD AUTO: 0.06 10*3/UL (ref 0–0.1)
BASOPHILS NFR BLD AUTO: 0.8 %
BILIRUB BLD-MCNC: 1 MG/DL (ref 0.2–1.3)
BUN SERPL-SCNC: 18 MG/DL (ref 7–17)
BURR CELLS BLD QL SMEAR: (no result)
BURR CELLS BLD QL SMEAR: (no result)
CALCIUM SPEC-MCNC: 9.4 MG/DL (ref 8.4–10.2)
CHLORIDE SERPL-SCNC: 105 MMOL/L (ref 98–107)
CO2 SERPL-SCNC: 27 MMOL/L (ref 22–30)
CREATININE: 0.7 MG/DL (ref 0.52–1.04)
DACRYOCYTES BLD QL SMEAR: (no result)
DACRYOCYTES BLD QL SMEAR: (no result)
DOHLE BOD BLD QL SMEAR: (no result)
DOHLE BOD BLD QL SMEAR: (no result)
EOSINOPHIL # BLD AUTO: 0.06 10*3/UL (ref 0.04–0.35)
EOSINOPHIL NFR BLD AUTO: 0.8 %
ERYTHROCYTE [DISTWIDTH] IN BLOOD BY AUTOMATED COUNT: 4.68 10*6/UL (ref 4.1–5.2)
ERYTHROCYTE [DISTWIDTH] IN BLOOD: 14 % (ref 11.5–14.5)
GLUCOSE SERPL-MCNC: 79 MG/DL (ref 74–99)
HCT VFR BLD AUTO: 47.2 % (ref 37.2–46.3)
HGB BLD-MCNC: 15 G/DL (ref 12–15)
HOWELL-JOLLY BOD BLD QL SMEAR: (no result)
HOWELL-JOLLY BOD BLD QL SMEAR: (no result)
HYPOCHROMIA BLD QL SMEAR: (no result)
HYPOCHROMIA BLD QL SMEAR: (no result)
IMM GRANULOCYTES # BLD: 0.02 10*3/UL (ref 0–0.04)
INR PPP: 1 (ref ?–1.2)
LG PLATELETS BLD QL SMEAR: (no result)
LG PLATELETS BLD QL SMEAR: (no result)
LYMPHOCYTES # SPEC AUTO: 2.18 10*3/UL (ref 0.9–5)
LYMPHOCYTES NFR SPEC AUTO: 28.8 %
Lab: (no result)
Lab: (no result)
MAGNESIUM SPEC-SCNC: 1.8 MG/DL (ref 1.6–2.3)
MCH RBC QN AUTO: 32.1 PG (ref 27–32)
MCHC RBC AUTO-ENTMCNC: 31.8 G/DL (ref 32–37)
MCV RBC AUTO: 100.9 FL (ref 80–97)
MONOCYTES # BLD AUTO: 0.52 10*3/UL (ref 0.2–1)
MONOCYTES NFR BLD AUTO: 6.9 %
NEUTROPHILS # BLD AUTO: 4.74 10*3/UL (ref 1.8–7.7)
NEUTROPHILS NFR BLD AUTO: 62.4 %
NEUTS HYPERSEG # BLD: (no result) 10*3/UL
NEUTS HYPERSEG # BLD: (no result) 10*3/UL
NRBC BLD AUTO-RTO: (no result) %
NRBC BLD AUTO-RTO: (no result) %
OVALOCYTES BLD QL SMEAR: (no result)
OVALOCYTES BLD QL SMEAR: (no result)
PELGER HUET CELLS BLD QL SMEAR: (no result)
PELGER HUET CELLS BLD QL SMEAR: (no result)
PLATELET # BLD AUTO: 232 10*3/UL (ref 140–440)
PLATELETS.RETICULATED NFR BLD AUTO: (no result) %
PLATELETS.RETICULATED NFR BLD AUTO: (no result) %
PMV BLD AUTO: 9.9 FL (ref 9.5–12.2)
POIKILOCYTOSIS BLD QL SMEAR: (no result)
POLYCHROMASIA BLD QL SMEAR: (no result)
POLYCHROMASIA BLD QL SMEAR: (no result)
POTASSIUM SERPL-SCNC: 5.1 MMOL/L (ref 3.5–5.1)
PROT SERPL-MCNC: 6.1 G/DL (ref 6.3–8.2)
PT BLD: 11.3 SEC (ref 10–12.5)
RBC MORPH BLD: (no result)
RBC MORPH BLD: (no result)
ROULEAUX BLD QL SMEAR: (no result)
ROULEAUX BLD QL SMEAR: (no result)
SCHISTOCYTES BLD QL SMEAR: (no result)
SCHISTOCYTES BLD QL SMEAR: (no result)
SICKLE CELLS BLD QL SMEAR: (no result)
SICKLE CELLS BLD QL SMEAR: (no result)
SODIUM SERPL-SCNC: 139 MMOL/L (ref 137–145)
SPHEROCYTES BLD QL SMEAR: (no result)
SPHEROCYTES BLD QL SMEAR: (no result)
STOMATOCYTES BLD QL SMEAR: (no result)
STOMATOCYTES BLD QL SMEAR: (no result)
TARGETS BLD QL SMEAR: (no result)
TARGETS BLD QL SMEAR: (no result)
TOXIC GRANULES BLD QL SMEAR: (no result)
TOXIC GRANULES BLD QL SMEAR: (no result)
VARIANT LYMPHS BLD QL SMEAR: (no result)
VARIANT LYMPHS BLD QL SMEAR: (no result)
WBC # BLD AUTO: 7.58 10*3/UL (ref 4.5–10)
WBC NRBC COR # BLD: (no result) K/UL
WBC NRBC COR # BLD: (no result) K/UL
WBC TOXIC VACUOLES BLD QL SMEAR: (no result)
WBC TOXIC VACUOLES BLD QL SMEAR: (no result)

## 2025-07-10 PROCEDURE — 83735 ASSAY OF MAGNESIUM: CPT

## 2025-07-10 PROCEDURE — 84439 ASSAY OF FREE THYROXINE: CPT

## 2025-07-10 PROCEDURE — 85379 FIBRIN DEGRADATION QUANT: CPT

## 2025-07-10 PROCEDURE — 74174 CTA ABD&PLVS W/CONTRAST: CPT

## 2025-07-10 PROCEDURE — 82746 ASSAY OF FOLIC ACID SERUM: CPT

## 2025-07-10 PROCEDURE — 84443 ASSAY THYROID STIM HORMONE: CPT

## 2025-07-10 PROCEDURE — 85027 COMPLETE CBC AUTOMATED: CPT

## 2025-07-10 PROCEDURE — 82607 VITAMIN B-12: CPT

## 2025-07-10 PROCEDURE — 36415 COLL VENOUS BLD VENIPUNCTURE: CPT

## 2025-07-10 PROCEDURE — 87636 SARSCOV2 & INF A&B AMP PRB: CPT

## 2025-07-10 PROCEDURE — 80048 BASIC METABOLIC PNL TOTAL CA: CPT

## 2025-07-10 PROCEDURE — 80061 LIPID PANEL: CPT

## 2025-07-10 PROCEDURE — 94760 N-INVAS EAR/PLS OXIMETRY 1: CPT

## 2025-07-10 PROCEDURE — 70450 CT HEAD/BRAIN W/O DYE: CPT

## 2025-07-10 PROCEDURE — 70551 MRI BRAIN STEM W/O DYE: CPT

## 2025-07-10 PROCEDURE — 71275 CT ANGIOGRAPHY CHEST: CPT

## 2025-07-10 PROCEDURE — 96368 THER/DIAG CONCURRENT INF: CPT

## 2025-07-10 PROCEDURE — 93799 UNLISTED CV SVC/PROCEDURE: CPT

## 2025-07-10 PROCEDURE — 82140 ASSAY OF AMMONIA: CPT

## 2025-07-10 PROCEDURE — 96366 THER/PROPH/DIAG IV INF ADDON: CPT

## 2025-07-10 PROCEDURE — 83036 HEMOGLOBIN GLYCOSYLATED A1C: CPT

## 2025-07-10 PROCEDURE — 83605 ASSAY OF LACTIC ACID: CPT

## 2025-07-10 PROCEDURE — 94640 AIRWAY INHALATION TREATMENT: CPT

## 2025-07-10 PROCEDURE — 96365 THER/PROPH/DIAG IV INF INIT: CPT

## 2025-07-10 PROCEDURE — 87324 CLOSTRIDIUM AG IA: CPT

## 2025-07-10 PROCEDURE — 95816 EEG AWAKE AND DROWSY: CPT

## 2025-07-10 PROCEDURE — 93459 L HRT ART/GRFT ANGIO: CPT

## 2025-07-10 PROCEDURE — 83880 ASSAY OF NATRIURETIC PEPTIDE: CPT

## 2025-07-10 PROCEDURE — 93306 TTE W/DOPPLER COMPLETE: CPT

## 2025-07-10 PROCEDURE — 80053 COMPREHEN METABOLIC PANEL: CPT

## 2025-07-10 PROCEDURE — 84484 ASSAY OF TROPONIN QUANT: CPT

## 2025-07-10 PROCEDURE — 99291 CRITICAL CARE FIRST HOUR: CPT

## 2025-07-10 PROCEDURE — 71046 X-RAY EXAM CHEST 2 VIEWS: CPT

## 2025-07-10 PROCEDURE — 85730 THROMBOPLASTIN TIME PARTIAL: CPT

## 2025-07-10 PROCEDURE — 93005 ELECTROCARDIOGRAM TRACING: CPT

## 2025-07-10 PROCEDURE — 85610 PROTHROMBIN TIME: CPT

## 2025-07-10 PROCEDURE — 93880 EXTRACRANIAL BILAT STUDY: CPT

## 2025-07-10 PROCEDURE — 85025 COMPLETE CBC W/AUTO DIFF WBC: CPT

## 2025-07-10 RX ADMIN — HEPARIN SODIUM ONE UNIT: 1000 INJECTION, SOLUTION INTRAVENOUS; SUBCUTANEOUS at 21:30

## 2025-07-10 RX ADMIN — NITROGLYCERIN STA INCH: 20 OINTMENT TOPICAL at 21:31

## 2025-07-10 RX ADMIN — HEPARIN SODIUM SCH MLS/HR: 10000 INJECTION, SOLUTION INTRAVENOUS at 21:29

## 2025-07-11 LAB
ANISOCYTOSIS BLD QL SMEAR: (no result)
BASO STIPL BLD QL SMEAR: (no result)
BASOPHILS # BLD AUTO: 0.05 10*3/UL (ref 0–0.1)
BASOPHILS NFR BLD AUTO: 0.5 %
BURR CELLS BLD QL SMEAR: (no result)
DACRYOCYTES BLD QL SMEAR: (no result)
DOHLE BOD BLD QL SMEAR: (no result)
EOSINOPHIL # BLD AUTO: 0.18 10*3/UL (ref 0.04–0.35)
EOSINOPHIL NFR BLD AUTO: 1.8 %
ERYTHROCYTE [DISTWIDTH] IN BLOOD BY AUTOMATED COUNT: 4.12 10*6/UL (ref 4.1–5.2)
ERYTHROCYTE [DISTWIDTH] IN BLOOD: 14.1 % (ref 11.5–14.5)
HCT VFR BLD AUTO: 41.9 % (ref 37.2–46.3)
HGB BLD-MCNC: 13.1 G/DL (ref 12–15)
HOWELL-JOLLY BOD BLD QL SMEAR: (no result)
HYPOCHROMIA BLD QL SMEAR: (no result)
IMM GRANULOCYTES # BLD: 0.04 10*3/UL (ref 0–0.04)
INR PPP: 1 (ref ?–1.2)
LDLC SERPL DIRECT ASSAY-MCNC: (no result) MG/DL
LG PLATELETS BLD QL SMEAR: (no result)
LYMPHOCYTES # SPEC AUTO: 2.66 10*3/UL (ref 0.9–5)
LYMPHOCYTES NFR SPEC AUTO: 26.3 %
Lab: (no result)
MAGNESIUM SPEC-SCNC: 1.8 MG/DL (ref 1.6–2.3)
MCH RBC QN AUTO: 31.8 PG (ref 27–32)
MCHC RBC AUTO-ENTMCNC: 31.3 G/DL (ref 32–37)
MCV RBC AUTO: 101.7 FL (ref 80–97)
MONOCYTES # BLD AUTO: 0.79 10*3/UL (ref 0.2–1)
MONOCYTES NFR BLD AUTO: 7.8 %
NEUTROPHILS # BLD AUTO: 6.41 10*3/UL (ref 1.8–7.7)
NEUTROPHILS NFR BLD AUTO: 63.2 %
NEUTS HYPERSEG # BLD: (no result) 10*3/UL
NRBC BLD AUTO-RTO: (no result) %
NT-PROBNP SERPL-MCNC: (no result) PG/ML
OVALOCYTES BLD QL SMEAR: (no result)
PELGER HUET CELLS BLD QL SMEAR: (no result)
PLATELET # BLD AUTO: 229 10*3/UL (ref 140–440)
PLATELETS.RETICULATED NFR BLD AUTO: (no result) %
PMV BLD AUTO: 10.4 FL (ref 9.5–12.2)
POIKILOCYTOSIS BLD QL SMEAR: (no result)
POIKILOCYTOSIS BLD QL SMEAR: (no result)
POLYCHROMASIA BLD QL SMEAR: (no result)
PT BLD: 11.4 SEC (ref 10–12.5)
RBC MORPH BLD: (no result)
ROULEAUX BLD QL SMEAR: (no result)
SCHISTOCYTES BLD QL SMEAR: (no result)
SICKLE CELLS BLD QL SMEAR: (no result)
SPHEROCYTES BLD QL SMEAR: (no result)
STOMATOCYTES BLD QL SMEAR: (no result)
T4 FREE SERPL-MCNC: 1.14 NG/DL (ref 0.78–2.19)
TARGETS BLD QL SMEAR: (no result)
TOXIC GRANULES BLD QL SMEAR: (no result)
TSH SERPL DL<=0.005 MIU/L-ACNC: 19 MIU/L (ref 0.47–4.68)
VARIANT LYMPHS BLD QL SMEAR: (no result)
WBC # BLD AUTO: 10.13 10*3/UL (ref 4.5–10)
WBC NRBC COR # BLD: (no result) K/UL
WBC TOXIC VACUOLES BLD QL SMEAR: (no result)

## 2025-07-11 RX ADMIN — CEFAZOLIN SCH MLS/HR: 330 INJECTION, POWDER, FOR SOLUTION INTRAMUSCULAR; INTRAVENOUS at 13:05

## 2025-07-11 RX ADMIN — HEPARIN SODIUM PRN UNIT: 1000 INJECTION, SOLUTION INTRAVENOUS; SUBCUTANEOUS at 03:38

## 2025-07-11 RX ADMIN — MORPHINE SULFATE PRN MG: 4 INJECTION, SOLUTION INTRAMUSCULAR; INTRAVENOUS at 00:13

## 2025-07-11 RX ADMIN — RANOLAZINE SCH MG: 500 TABLET, FILM COATED, EXTENDED RELEASE ORAL at 20:19

## 2025-07-11 RX ADMIN — CLOPIDOGREL BISULFATE SCH MG: 75 TABLET ORAL at 20:19

## 2025-07-11 RX ADMIN — ESCITALOPRAM SCH MG: 5 TABLET, FILM COATED ORAL at 09:09

## 2025-07-11 RX ADMIN — ATORVASTATIN CALCIUM SCH MG: 80 TABLET, FILM COATED ORAL at 09:09

## 2025-07-11 RX ADMIN — ASPIRIN 81 MG CHEWABLE TABLET SCH MG: 81 TABLET CHEWABLE at 10:15

## 2025-07-11 RX ADMIN — POTASSIUM CHLORIDE ONE MLS: 14.9 INJECTION, SOLUTION INTRAVENOUS at 08:50

## 2025-07-11 RX ADMIN — RANOLAZINE SCH MG: 500 TABLET, FILM COATED, EXTENDED RELEASE ORAL at 09:09

## 2025-07-11 RX ADMIN — METOPROLOL TARTRATE SCH MG: 25 TABLET, FILM COATED ORAL at 09:09

## 2025-07-11 RX ADMIN — GABAPENTIN SCH MG: 400 CAPSULE ORAL at 09:09

## 2025-07-11 RX ADMIN — LEVOTHYROXINE SODIUM SCH MCG: 100 TABLET ORAL at 06:15

## 2025-07-11 RX ADMIN — ISOSORBIDE MONONITRATE SCH MG: 60 TABLET, EXTENDED RELEASE ORAL at 09:09

## 2025-07-12 LAB
ALBUMIN SERPL-MCNC: 3 G/DL (ref 3.5–5)
ALP SERPL-CCNC: 146 U/L (ref 38–126)
ALT SERPL-CCNC: 12 U/L (ref 4–34)
ANION GAP SERPL CALC-SCNC: 7 MMOL/L
AST SERPL-CCNC: 20 U/L (ref 14–36)
BASOPHILS # BLD AUTO: 0.03 10*3/UL (ref 0–0.1)
BASOPHILS NFR BLD AUTO: 0.4 %
BILIRUB BLD-MCNC: 0.8 MG/DL (ref 0.2–1.3)
BUN SERPL-SCNC: 14 MG/DL (ref 7–17)
CALCIUM SPEC-MCNC: 8.7 MG/DL (ref 8.4–10.2)
CHLORIDE SERPL-SCNC: 102 MMOL/L (ref 98–107)
CHOLEST SERPL-MCNC: 150 MG/DL (ref 0–200)
CHOLEST/HDLC SERPL: 4.45 RATIO
CO2 SERPL-SCNC: 28 MMOL/L (ref 22–30)
CREATININE: 0.71 MG/DL (ref 0.52–1.04)
EOSINOPHIL # BLD AUTO: 0.05 10*3/UL (ref 0.04–0.35)
EOSINOPHIL NFR BLD AUTO: 0.6 %
ERYTHROCYTE [DISTWIDTH] IN BLOOD BY AUTOMATED COUNT: 3.94 10*6/UL (ref 4.1–5.2)
ERYTHROCYTE [DISTWIDTH] IN BLOOD: 14.2 % (ref 11.5–14.5)
EST. AVERAGE GLUCOSE BLD GHB EST-MCNC: 117 MG/DL
GLUCOSE SERPL-MCNC: 78 MG/DL (ref 74–99)
HCT VFR BLD AUTO: 40.2 % (ref 37.2–46.3)
HDLC SERPL-MCNC: 33.7 MG/DL (ref 40–60)
HGB BLD-MCNC: 12.4 G/DL (ref 12–15)
IMM GRANULOCYTES # BLD: 0.03 10*3/UL (ref 0–0.04)
LDLC SERPL CALC-MCNC: 91.7 MG/DL (ref 0–131)
LYMPHOCYTES # SPEC AUTO: 0.84 10*3/UL (ref 0.9–5)
LYMPHOCYTES NFR SPEC AUTO: 10.3 %
MCH RBC QN AUTO: 31.5 PG (ref 27–32)
MCHC RBC AUTO-ENTMCNC: 30.8 G/DL (ref 32–37)
MCV RBC AUTO: 102 FL (ref 80–97)
MONOCYTES # BLD AUTO: 0.54 10*3/UL (ref 0.2–1)
MONOCYTES NFR BLD AUTO: 6.6 %
NEUTROPHILS # BLD AUTO: 6.66 10*3/UL (ref 1.8–7.7)
NEUTROPHILS NFR BLD AUTO: 81.7 %
PLATELET # BLD AUTO: 214 10*3/UL (ref 140–440)
PMV BLD AUTO: 9.9 FL (ref 9.5–12.2)
POTASSIUM SERPL-SCNC: 4.5 MMOL/L (ref 3.5–5.1)
PROT SERPL-MCNC: 5.1 G/DL (ref 6.3–8.2)
SODIUM SERPL-SCNC: 137 MMOL/L (ref 137–145)
TRIGL SERPL-MCNC: 123 MG/DL (ref 0–149)
VLDLC SERPL CALC-MCNC: 24.6 MG/DL (ref 5–40)
WBC # BLD AUTO: 8.15 10*3/UL (ref 4.5–10)

## 2025-07-12 PROCEDURE — B2111ZZ FLUOROSCOPY OF MULTIPLE CORONARY ARTERIES USING LOW OSMOLAR CONTRAST: ICD-10-PCS

## 2025-07-12 PROCEDURE — 021009W BYPASS CORONARY ARTERY, ONE ARTERY FROM AORTA WITH AUTOLOGOUS VENOUS TISSUE, OPEN APPROACH: ICD-10-PCS

## 2025-07-12 PROCEDURE — 5A1221Z PERFORMANCE OF CARDIAC OUTPUT, CONTINUOUS: ICD-10-PCS

## 2025-07-12 PROCEDURE — 02100Z9 BYPASS CORONARY ARTERY, ONE ARTERY FROM LEFT INTERNAL MAMMARY, OPEN APPROACH: ICD-10-PCS

## 2025-07-12 PROCEDURE — 4A023N7 MEASUREMENT OF CARDIAC SAMPLING AND PRESSURE, LEFT HEART, PERCUTANEOUS APPROACH: ICD-10-PCS

## 2025-07-12 PROCEDURE — 4A033BC MEASUREMENT OF ARTERIAL PRESSURE, CORONARY, PERCUTANEOUS APPROACH: ICD-10-PCS

## 2025-07-12 RX ADMIN — MIDAZOLAM ONE MG: 1 INJECTION INTRAMUSCULAR; INTRAVENOUS at 08:49

## 2025-07-12 RX ADMIN — CEFAZOLIN SCH MLS/HR: 330 INJECTION, POWDER, FOR SOLUTION INTRAMUSCULAR; INTRAVENOUS at 10:30

## 2025-07-12 RX ADMIN — NICOTINE SCH PATCH: 14 PATCH, EXTENDED RELEASE TRANSDERMAL at 13:23

## 2025-07-12 RX ADMIN — POTASSIUM CHLORIDE ONE MLS: 14.9 INJECTION, SOLUTION INTRAVENOUS at 09:58

## 2025-07-12 RX ADMIN — MORPHINE SULFATE ONE MG: 4 INJECTION, SOLUTION INTRAMUSCULAR; INTRAVENOUS at 09:18

## 2025-07-12 RX ADMIN — HEPARIN SODIUM ONE UNIT: 1000 INJECTION, SOLUTION INTRAVENOUS; SUBCUTANEOUS at 09:16

## 2025-07-12 RX ADMIN — MORPHINE SULFATE ONE MG: 4 INJECTION, SOLUTION INTRAMUSCULAR; INTRAVENOUS at 09:35

## 2025-07-12 RX ADMIN — SACUBITRIL AND VALSARTAN SCH EACH: 24; 26 TABLET, FILM COATED ORAL at 16:44

## 2025-07-12 RX ADMIN — PANTOPRAZOLE SODIUM SCH MG: 40 TABLET, DELAYED RELEASE ORAL at 06:21

## 2025-07-12 RX ADMIN — FENTANYL CITRATE ONE MCG: 0.05 INJECTION, SOLUTION INTRAMUSCULAR; INTRAVENOUS at 08:55

## 2025-07-12 RX ADMIN — FENTANYL CITRATE ONE MCG: 0.05 INJECTION, SOLUTION INTRAMUSCULAR; INTRAVENOUS at 08:50

## 2025-07-12 RX ADMIN — LIDOCAINE HYDROCHLORIDE ONE ML: 10 INJECTION, SOLUTION INFILTRATION; PERINEURAL at 08:52

## 2025-07-12 RX ADMIN — DAPAGLIFLOZIN SCH MG: 10 TABLET, FILM COATED ORAL at 13:23

## 2025-07-12 RX ADMIN — IOPAMIDOL ONE ML: 755 INJECTION, SOLUTION INTRAVENOUS at 09:35

## 2025-07-13 LAB
ANISOCYTOSIS BLD QL SMEAR: (no result)
BASO STIPL BLD QL SMEAR: (no result)
BURR CELLS BLD QL SMEAR: (no result)
DACRYOCYTES BLD QL SMEAR: (no result)
DOHLE BOD BLD QL SMEAR: (no result)
HOWELL-JOLLY BOD BLD QL SMEAR: (no result)
HYPOCHROMIA BLD QL SMEAR: (no result)
LG PLATELETS BLD QL SMEAR: (no result)
Lab: (no result)
NEUTS HYPERSEG # BLD: (no result) 10*3/UL
NRBC BLD AUTO-RTO: (no result) %
OVALOCYTES BLD QL SMEAR: (no result)
PELGER HUET CELLS BLD QL SMEAR: (no result)
PLATELETS.RETICULATED NFR BLD AUTO: (no result) %
POIKILOCYTOSIS BLD QL SMEAR: (no result)
POIKILOCYTOSIS BLD QL SMEAR: (no result)
POLYCHROMASIA BLD QL SMEAR: (no result)
RBC MORPH BLD: (no result)
ROULEAUX BLD QL SMEAR: (no result)
SCHISTOCYTES BLD QL SMEAR: (no result)
SICKLE CELLS BLD QL SMEAR: (no result)
SPHEROCYTES BLD QL SMEAR: (no result)
STOMATOCYTES BLD QL SMEAR: (no result)
TARGETS BLD QL SMEAR: (no result)
TOXIC GRANULES BLD QL SMEAR: (no result)
VARIANT LYMPHS BLD QL SMEAR: (no result)
WBC NRBC COR # BLD: (no result) K/UL
WBC TOXIC VACUOLES BLD QL SMEAR: (no result)

## 2025-07-13 RX ADMIN — ALPRAZOLAM PRN MG: 0.25 TABLET ORAL at 00:05

## 2025-07-14 LAB
ALBUMIN SERPL-MCNC: 2.7 G/DL (ref 3.5–5)
ALP SERPL-CCNC: 116 U/L (ref 38–126)
ALT SERPL-CCNC: 12 U/L (ref 4–34)
ANION GAP SERPL CALC-SCNC: 5 MMOL/L
AST SERPL-CCNC: 22 U/L (ref 14–36)
BASOPHILS # BLD AUTO: 0.03 10*3/UL (ref 0–0.1)
BASOPHILS NFR BLD AUTO: 0.4 %
BILIRUB BLD-MCNC: 0.5 MG/DL (ref 0.2–1.3)
BUN SERPL-SCNC: 8 MG/DL (ref 7–17)
CALCIUM SPEC-MCNC: 8.9 MG/DL (ref 8.4–10.2)
CHLORIDE SERPL-SCNC: 98 MMOL/L (ref 98–107)
CO2 SERPL-SCNC: 34 MMOL/L (ref 22–30)
CREATININE: 0.63 MG/DL (ref 0.52–1.04)
EOSINOPHIL # BLD AUTO: 0.18 10*3/UL (ref 0.04–0.35)
EOSINOPHIL NFR BLD AUTO: 2.3 %
ERYTHROCYTE [DISTWIDTH] IN BLOOD BY AUTOMATED COUNT: 3.92 10*6/UL (ref 4.1–5.2)
ERYTHROCYTE [DISTWIDTH] IN BLOOD: 14.1 % (ref 11.5–14.5)
FLUAV RNA SPEC QL NAA+PROBE: NOT DETECTED
FLUBV RNA SPEC QL NAA+PROBE: NOT DETECTED
GLUCOSE SERPL-MCNC: 82 MG/DL (ref 74–99)
HCT VFR BLD AUTO: 39.3 % (ref 37.2–46.3)
HGB BLD-MCNC: 12.5 G/DL (ref 12–15)
IMM GRANULOCYTES # BLD: 0.03 10*3/UL (ref 0–0.04)
LYMPHOCYTES # SPEC AUTO: 1.46 10*3/UL (ref 0.9–5)
LYMPHOCYTES NFR SPEC AUTO: 18.6 %
MCH RBC QN AUTO: 31.9 PG (ref 27–32)
MCHC RBC AUTO-ENTMCNC: 31.8 G/DL (ref 32–37)
MCV RBC AUTO: 100.3 FL (ref 80–97)
MONOCYTES # BLD AUTO: 0.81 10*3/UL (ref 0.2–1)
MONOCYTES NFR BLD AUTO: 10.3 %
NEUTROPHILS # BLD AUTO: 5.33 10*3/UL (ref 1.8–7.7)
NEUTROPHILS NFR BLD AUTO: 68 %
PLATELET # BLD AUTO: 220 10*3/UL (ref 140–440)
PMV BLD AUTO: 9.8 FL (ref 9.5–12.2)
POTASSIUM SERPL-SCNC: 3.9 MMOL/L (ref 3.5–5.1)
PROT SERPL-MCNC: 4.7 G/DL (ref 6.3–8.2)
RSV RNA SPEC QL NAA+PROBE: NOT DETECTED
SARS-COV-2 RNA RESP QL NAA+PROBE: NOT DETECTED
SODIUM SERPL-SCNC: 137 MMOL/L (ref 137–145)
WBC # BLD AUTO: 7.84 10*3/UL (ref 4.5–10)

## 2025-07-14 RX ADMIN — IPRATROPIUM BROMIDE AND ALBUTEROL SULFATE SCH: .5; 3 SOLUTION RESPIRATORY (INHALATION) at 08:59

## 2025-07-14 RX ADMIN — GUAIFENESIN AND DEXTROMETHORPHAN SCH ML: 100; 10 SYRUP ORAL at 04:41

## 2025-07-14 RX ADMIN — PREDNISONE SCH MG: 50 TABLET ORAL at 08:39

## 2025-07-16 RX ADMIN — FUROSEMIDE SCH MG: 10 INJECTION, SOLUTION INTRAMUSCULAR; INTRAVENOUS at 14:43

## 2025-07-16 RX ADMIN — METHYLPREDNISOLONE SODIUM SUCCINATE SCH MG: 40 INJECTION, POWDER, FOR SOLUTION INTRAMUSCULAR; INTRAVENOUS at 12:23

## 2025-07-17 LAB
ANION GAP SERPL CALC-SCNC: 11 MMOL/L
ANISOCYTOSIS BLD QL SMEAR: (no result)
BASO STIPL BLD QL SMEAR: (no result)
BUN SERPL-SCNC: 22 MG/DL (ref 7–17)
BURR CELLS BLD QL SMEAR: (no result)
CALCIUM SPEC-MCNC: 10.1 MG/DL (ref 8.4–10.2)
CHLORIDE SERPL-SCNC: 94 MMOL/L (ref 98–107)
CO2 SERPL-SCNC: 37 MMOL/L (ref 22–30)
CREATININE: 0.7 MG/DL (ref 0.52–1.04)
DACRYOCYTES BLD QL SMEAR: (no result)
DOHLE BOD BLD QL SMEAR: (no result)
ERYTHROCYTE [DISTWIDTH] IN BLOOD BY AUTOMATED COUNT: 4.39 10*6/UL (ref 4.1–5.2)
ERYTHROCYTE [DISTWIDTH] IN BLOOD: 14.1 % (ref 11.5–14.5)
GLUCOSE SERPL-MCNC: 141 MG/DL (ref 74–99)
HCT VFR BLD AUTO: 44.2 % (ref 37.2–46.3)
HGB BLD-MCNC: 13.8 G/DL (ref 12–15)
HOWELL-JOLLY BOD BLD QL SMEAR: (no result)
HYPOCHROMIA BLD QL SMEAR: (no result)
LG PLATELETS BLD QL SMEAR: (no result)
Lab: (no result)
MCH RBC QN AUTO: 31.4 PG (ref 27–32)
MCHC RBC AUTO-ENTMCNC: 31.2 G/DL (ref 32–37)
MCV RBC AUTO: 100.7 FL (ref 80–97)
NEUTS HYPERSEG # BLD: (no result) 10*3/UL
NRBC BLD AUTO-RTO: (no result) %
OVALOCYTES BLD QL SMEAR: (no result)
PELGER HUET CELLS BLD QL SMEAR: (no result)
PLATELET # BLD AUTO: 265 10*3/UL (ref 140–440)
PLATELETS.RETICULATED NFR BLD AUTO: (no result) %
PLATELETS.RETICULATED NFR BLD AUTO: (no result) %
PMV BLD AUTO: 9.7 FL (ref 9.5–12.2)
POIKILOCYTOSIS BLD QL SMEAR: (no result)
POIKILOCYTOSIS BLD QL SMEAR: (no result)
POLYCHROMASIA BLD QL SMEAR: (no result)
POTASSIUM SERPL-SCNC: 4 MMOL/L (ref 3.5–5.1)
RBC MORPH BLD: (no result)
ROULEAUX BLD QL SMEAR: (no result)
SCHISTOCYTES BLD QL SMEAR: (no result)
SICKLE CELLS BLD QL SMEAR: (no result)
SODIUM SERPL-SCNC: 142 MMOL/L (ref 137–145)
SPHEROCYTES BLD QL SMEAR: (no result)
STOMATOCYTES BLD QL SMEAR: (no result)
TARGETS BLD QL SMEAR: (no result)
TOXIC GRANULES BLD QL SMEAR: (no result)
VARIANT LYMPHS BLD QL SMEAR: (no result)
WBC # BLD AUTO: 13.85 10*3/UL (ref 4.5–10)
WBC NRBC COR # BLD: (no result) K/UL
WBC TOXIC VACUOLES BLD QL SMEAR: (no result)

## 2025-07-18 LAB
ALBUMIN SERPL-MCNC: 3.9 G/DL (ref 3.5–5)
ALP SERPL-CCNC: 120 U/L (ref 38–126)
ALT SERPL-CCNC: 18 U/L (ref 4–34)
ANION GAP SERPL CALC-SCNC: 9 MMOL/L
AST SERPL-CCNC: 22 U/L (ref 14–36)
BASOPHILS # BLD AUTO: 0.02 10*3/UL (ref 0–0.1)
BASOPHILS NFR BLD AUTO: 0.1 %
BILIRUB BLD-MCNC: 0.6 MG/DL (ref 0.2–1.3)
BUN SERPL-SCNC: 26 MG/DL (ref 7–17)
CALCIUM SPEC-MCNC: 10.2 MG/DL (ref 8.4–10.2)
CHLORIDE SERPL-SCNC: 93 MMOL/L (ref 98–107)
CO2 SERPL-SCNC: 40 MMOL/L (ref 22–30)
CREATININE: 0.66 MG/DL (ref 0.52–1.04)
EOSINOPHIL # BLD AUTO: 0 10*3/UL (ref 0.04–0.35)
EOSINOPHIL NFR BLD AUTO: 0 %
ERYTHROCYTE [DISTWIDTH] IN BLOOD BY AUTOMATED COUNT: 4.54 10*6/UL (ref 4.1–5.2)
ERYTHROCYTE [DISTWIDTH] IN BLOOD: 13.9 % (ref 11.5–14.5)
GLUCOSE SERPL-MCNC: 125 MG/DL (ref 74–99)
HCT VFR BLD AUTO: 45.8 % (ref 37.2–46.3)
HGB BLD-MCNC: 14.2 G/DL (ref 12–15)
IMM GRANULOCYTES # BLD: 0.09 10*3/UL (ref 0–0.04)
LYMPHOCYTES # SPEC AUTO: 0.64 10*3/UL (ref 0.9–5)
LYMPHOCYTES NFR SPEC AUTO: 4.8 %
MCH RBC QN AUTO: 31.3 PG (ref 27–32)
MCHC RBC AUTO-ENTMCNC: 31 G/DL (ref 32–37)
MCV RBC AUTO: 100.9 FL (ref 80–97)
MONOCYTES # BLD AUTO: 0.38 10*3/UL (ref 0.2–1)
MONOCYTES NFR BLD AUTO: 2.8 %
NEUTROPHILS # BLD AUTO: 12.24 10*3/UL (ref 1.8–7.7)
NEUTROPHILS NFR BLD AUTO: 91.6 %
PLATELET # BLD AUTO: 297 10*3/UL (ref 140–440)
PMV BLD AUTO: 10.4 FL (ref 9.5–12.2)
POTASSIUM SERPL-SCNC: 5.3 MMOL/L (ref 3.5–5.1)
PROT SERPL-MCNC: 6.3 G/DL (ref 6.3–8.2)
SODIUM SERPL-SCNC: 142 MMOL/L (ref 137–145)
WBC # BLD AUTO: 13.37 10*3/UL (ref 4.5–10)

## 2025-07-18 RX ADMIN — ENOXAPARIN SODIUM SCH MG: 40 INJECTION SUBCUTANEOUS at 15:43

## 2025-07-18 RX ADMIN — ACETAZOLAMIDE SCH MG: 250 TABLET ORAL at 20:58

## 2025-07-19 LAB
ALBUMIN SERPL-MCNC: 3.9 G/DL (ref 3.5–5)
ALP SERPL-CCNC: 130 U/L (ref 38–126)
ALT SERPL-CCNC: 20 U/L (ref 4–34)
ANION GAP SERPL CALC-SCNC: 11 MMOL/L
ANISOCYTOSIS BLD QL SMEAR: (no result)
ANISOCYTOSIS BLD QL SMEAR: (no result)
AST SERPL-CCNC: 24 U/L (ref 14–36)
BASO STIPL BLD QL SMEAR: (no result)
BASO STIPL BLD QL SMEAR: (no result)
BASOPHILS # BLD AUTO: 0.03 10*3/UL (ref 0–0.1)
BASOPHILS NFR BLD AUTO: 0.2 %
BILIRUB BLD-MCNC: 0.6 MG/DL (ref 0.2–1.3)
BUN SERPL-SCNC: 29 MG/DL (ref 7–17)
BURR CELLS BLD QL SMEAR: (no result)
BURR CELLS BLD QL SMEAR: (no result)
CALCIUM SPEC-MCNC: 10.1 MG/DL (ref 8.4–10.2)
CHLORIDE SERPL-SCNC: 91 MMOL/L (ref 98–107)
CO2 SERPL-SCNC: 36 MMOL/L (ref 22–30)
CREATININE: 0.78 MG/DL (ref 0.52–1.04)
DACRYOCYTES BLD QL SMEAR: (no result)
DACRYOCYTES BLD QL SMEAR: (no result)
DOHLE BOD BLD QL SMEAR: (no result)
DOHLE BOD BLD QL SMEAR: (no result)
EOSINOPHIL # BLD AUTO: 0 10*3/UL (ref 0.04–0.35)
EOSINOPHIL NFR BLD AUTO: 0 %
ERYTHROCYTE [DISTWIDTH] IN BLOOD BY AUTOMATED COUNT: 5.06 10*6/UL (ref 4.1–5.2)
ERYTHROCYTE [DISTWIDTH] IN BLOOD: 13.7 % (ref 11.5–14.5)
GLUCOSE SERPL-MCNC: 110 MG/DL (ref 74–99)
HCT VFR BLD AUTO: 50.6 % (ref 37.2–46.3)
HGB BLD-MCNC: 15.6 G/DL (ref 12–15)
HOWELL-JOLLY BOD BLD QL SMEAR: (no result)
HOWELL-JOLLY BOD BLD QL SMEAR: (no result)
HYPOCHROMIA BLD QL SMEAR: (no result)
HYPOCHROMIA BLD QL SMEAR: (no result)
IMM GRANULOCYTES # BLD: 0.09 10*3/UL (ref 0–0.04)
LG PLATELETS BLD QL SMEAR: (no result)
LG PLATELETS BLD QL SMEAR: (no result)
LYMPHOCYTES # SPEC AUTO: 0.95 10*3/UL (ref 0.9–5)
LYMPHOCYTES NFR SPEC AUTO: 6.3 %
Lab: (no result)
Lab: (no result)
MAGNESIUM SPEC-SCNC: 2.1 MG/DL (ref 1.6–2.3)
MCH RBC QN AUTO: 30.8 PG (ref 27–32)
MCHC RBC AUTO-ENTMCNC: 30.8 G/DL (ref 32–37)
MCV RBC AUTO: 100 FL (ref 80–97)
MONOCYTES # BLD AUTO: 0.81 10*3/UL (ref 0.2–1)
MONOCYTES NFR BLD AUTO: 5.4 %
NEUTROPHILS # BLD AUTO: 13.24 10*3/UL (ref 1.8–7.7)
NEUTROPHILS NFR BLD AUTO: 87.5 %
NEUTS HYPERSEG # BLD: (no result) 10*3/UL
NEUTS HYPERSEG # BLD: (no result) 10*3/UL
NRBC BLD AUTO-RTO: (no result) %
NRBC BLD AUTO-RTO: (no result) %
OVALOCYTES BLD QL SMEAR: (no result)
OVALOCYTES BLD QL SMEAR: (no result)
PELGER HUET CELLS BLD QL SMEAR: (no result)
PELGER HUET CELLS BLD QL SMEAR: (no result)
PLATELET # BLD AUTO: 307 10*3/UL (ref 140–440)
PLATELETS.RETICULATED NFR BLD AUTO: (no result) %
PLATELETS.RETICULATED NFR BLD AUTO: (no result) %
PMV BLD AUTO: 10.2 FL (ref 9.5–12.2)
POIKILOCYTOSIS BLD QL SMEAR: (no result)
POLYCHROMASIA BLD QL SMEAR: (no result)
POLYCHROMASIA BLD QL SMEAR: (no result)
POTASSIUM SERPL-SCNC: 3.6 MMOL/L (ref 3.5–5.1)
PROT SERPL-MCNC: 6.4 G/DL (ref 6.3–8.2)
RBC MORPH BLD: (no result)
RBC MORPH BLD: (no result)
ROULEAUX BLD QL SMEAR: (no result)
ROULEAUX BLD QL SMEAR: (no result)
SCHISTOCYTES BLD QL SMEAR: (no result)
SCHISTOCYTES BLD QL SMEAR: (no result)
SICKLE CELLS BLD QL SMEAR: (no result)
SICKLE CELLS BLD QL SMEAR: (no result)
SODIUM SERPL-SCNC: 138 MMOL/L (ref 137–145)
SPHEROCYTES BLD QL SMEAR: (no result)
SPHEROCYTES BLD QL SMEAR: (no result)
STOMATOCYTES BLD QL SMEAR: (no result)
STOMATOCYTES BLD QL SMEAR: (no result)
TARGETS BLD QL SMEAR: (no result)
TARGETS BLD QL SMEAR: (no result)
TOXIC GRANULES BLD QL SMEAR: (no result)
TOXIC GRANULES BLD QL SMEAR: (no result)
VARIANT LYMPHS BLD QL SMEAR: (no result)
VARIANT LYMPHS BLD QL SMEAR: (no result)
WBC # BLD AUTO: 15.12 10*3/UL (ref 4.5–10)
WBC NRBC COR # BLD: (no result) K/UL
WBC NRBC COR # BLD: (no result) K/UL
WBC TOXIC VACUOLES BLD QL SMEAR: (no result)
WBC TOXIC VACUOLES BLD QL SMEAR: (no result)

## 2025-07-19 RX ADMIN — PREDNISONE STA MG: 10 TABLET ORAL at 18:19

## 2025-07-19 RX ADMIN — ACETAMINOPHEN PRN MG: 500 TABLET ORAL at 23:24

## 2025-07-19 RX ADMIN — METOPROLOL TARTRATE SCH MG: 25 TABLET, FILM COATED ORAL at 21:22

## 2025-07-19 RX ADMIN — FOLIC ACID SCH MG: 1 TABLET ORAL at 09:59

## 2025-07-20 LAB
ALBUMIN SERPL-MCNC: 3.6 G/DL (ref 3.5–5)
ALP SERPL-CCNC: 100 U/L (ref 38–126)
ALT SERPL-CCNC: 18 U/L (ref 4–34)
ANION GAP SERPL CALC-SCNC: 6 MMOL/L
ANISOCYTOSIS BLD QL SMEAR: (no result)
AST SERPL-CCNC: 20 U/L (ref 14–36)
BASO STIPL BLD QL SMEAR: (no result)
BASOPHILS # BLD AUTO: 0.01 10*3/UL (ref 0–0.1)
BASOPHILS NFR BLD AUTO: 0.1 %
BILIRUB BLD-MCNC: 0.6 MG/DL (ref 0.2–1.3)
BUN SERPL-SCNC: 33 MG/DL (ref 7–17)
BURR CELLS BLD QL SMEAR: (no result)
CALCIUM SPEC-MCNC: 9.8 MG/DL (ref 8.4–10.2)
CHLORIDE SERPL-SCNC: 100 MMOL/L (ref 98–107)
CO2 SERPL-SCNC: 31 MMOL/L (ref 22–30)
CREATININE: 0.77 MG/DL (ref 0.52–1.04)
DACRYOCYTES BLD QL SMEAR: (no result)
DOHLE BOD BLD QL SMEAR: (no result)
EOSINOPHIL # BLD AUTO: 0 10*3/UL (ref 0.04–0.35)
EOSINOPHIL NFR BLD AUTO: 0 %
ERYTHROCYTE [DISTWIDTH] IN BLOOD BY AUTOMATED COUNT: 4.77 10*6/UL (ref 4.1–5.2)
ERYTHROCYTE [DISTWIDTH] IN BLOOD: 13.6 % (ref 11.5–14.5)
GLUCOSE SERPL-MCNC: 119 MG/DL (ref 74–99)
HCT VFR BLD AUTO: 46 % (ref 37.2–46.3)
HGB BLD-MCNC: 14.5 G/DL (ref 12–15)
HOWELL-JOLLY BOD BLD QL SMEAR: (no result)
HYPOCHROMIA BLD QL SMEAR: (no result)
IMM GRANULOCYTES # BLD: 0.08 10*3/UL (ref 0–0.04)
LDLC SERPL DIRECT ASSAY-MCNC: (no result) MG/DL
LG PLATELETS BLD QL SMEAR: (no result)
LYMPHOCYTES # SPEC AUTO: 0.7 10*3/UL (ref 0.9–5)
LYMPHOCYTES NFR SPEC AUTO: 5.6 %
Lab: (no result)
MCH RBC QN AUTO: 30.4 PG (ref 27–32)
MCHC RBC AUTO-ENTMCNC: 31.5 G/DL (ref 32–37)
MCV RBC AUTO: 96.4 FL (ref 80–97)
MONOCYTES # BLD AUTO: 0.53 10*3/UL (ref 0.2–1)
MONOCYTES NFR BLD AUTO: 4.3 %
NEUTROPHILS # BLD AUTO: 11.15 10*3/UL (ref 1.8–7.7)
NEUTROPHILS NFR BLD AUTO: 89.4 %
NEUTS HYPERSEG # BLD: (no result) 10*3/UL
NRBC BLD AUTO-RTO: (no result) %
OVALOCYTES BLD QL SMEAR: (no result)
PELGER HUET CELLS BLD QL SMEAR: (no result)
PLATELET # BLD AUTO: 331 10*3/UL (ref 140–440)
PLATELETS.RETICULATED NFR BLD AUTO: (no result) %
PMV BLD AUTO: 10.2 FL (ref 9.5–12.2)
POIKILOCYTOSIS BLD QL SMEAR: (no result)
POIKILOCYTOSIS BLD QL SMEAR: (no result)
POLYCHROMASIA BLD QL SMEAR: (no result)
POTASSIUM SERPL-SCNC: 3.5 MMOL/L (ref 3.5–5.1)
PROT SERPL-MCNC: 5.8 G/DL (ref 6.3–8.2)
RBC MORPH BLD: (no result)
ROULEAUX BLD QL SMEAR: (no result)
SCHISTOCYTES BLD QL SMEAR: (no result)
SICKLE CELLS BLD QL SMEAR: (no result)
SODIUM SERPL-SCNC: 137 MMOL/L (ref 137–145)
SPHEROCYTES BLD QL SMEAR: (no result)
STOMATOCYTES BLD QL SMEAR: (no result)
TARGETS BLD QL SMEAR: (no result)
TOXIC GRANULES BLD QL SMEAR: (no result)
VARIANT LYMPHS BLD QL SMEAR: (no result)
WBC # BLD AUTO: 12.47 10*3/UL (ref 4.5–10)
WBC NRBC COR # BLD: (no result) K/UL
WBC TOXIC VACUOLES BLD QL SMEAR: (no result)

## 2025-07-20 RX ADMIN — ISOSORBIDE MONONITRATE SCH: 30 TABLET, EXTENDED RELEASE ORAL at 09:02

## 2025-07-21 LAB
ALBUMIN SERPL-MCNC: 3.4 G/DL (ref 3.5–5)
ALP SERPL-CCNC: 100 U/L (ref 38–126)
ALT SERPL-CCNC: 20 U/L (ref 4–34)
ANION GAP SERPL CALC-SCNC: 7 MMOL/L
AST SERPL-CCNC: 20 U/L (ref 14–36)
BASOPHILS # BLD AUTO: 0.02 10*3/UL (ref 0–0.1)
BASOPHILS NFR BLD AUTO: 0.2 %
BILIRUB BLD-MCNC: 0.7 MG/DL (ref 0.2–1.3)
BUN SERPL-SCNC: 35 MG/DL (ref 7–17)
CALCIUM SPEC-MCNC: 9.6 MG/DL (ref 8.4–10.2)
CHLORIDE SERPL-SCNC: 100 MMOL/L (ref 98–107)
CHOLEST SERPL-MCNC: 174 MG/DL (ref 0–200)
CHOLEST/HDLC SERPL: 3.22 RATIO
CO2 SERPL-SCNC: 32 MMOL/L (ref 22–30)
CREATININE: 0.99 MG/DL (ref 0.52–1.04)
EOSINOPHIL # BLD AUTO: 0.07 10*3/UL (ref 0.04–0.35)
EOSINOPHIL NFR BLD AUTO: 0.5 %
ERYTHROCYTE [DISTWIDTH] IN BLOOD BY AUTOMATED COUNT: 4.8 10*6/UL (ref 4.1–5.2)
ERYTHROCYTE [DISTWIDTH] IN BLOOD: 13.6 % (ref 11.5–14.5)
GLUCOSE SERPL-MCNC: 75 MG/DL (ref 74–99)
HCT VFR BLD AUTO: 46.3 % (ref 37.2–46.3)
HDLC SERPL-MCNC: 54.1 MG/DL (ref 40–60)
HGB BLD-MCNC: 15 G/DL (ref 12–15)
IMM GRANULOCYTES # BLD: 0.09 10*3/UL (ref 0–0.04)
LDLC SERPL CALC-MCNC: 79.3 MG/DL (ref 0–131)
LYMPHOCYTES # SPEC AUTO: 2.62 10*3/UL (ref 0.9–5)
LYMPHOCYTES NFR SPEC AUTO: 20 %
MCH RBC QN AUTO: 31.3 PG (ref 27–32)
MCHC RBC AUTO-ENTMCNC: 32.4 G/DL (ref 32–37)
MCV RBC AUTO: 96.5 FL (ref 80–97)
MONOCYTES # BLD AUTO: 1.09 10*3/UL (ref 0.2–1)
MONOCYTES NFR BLD AUTO: 8.3 %
NEUTROPHILS # BLD AUTO: 9.22 10*3/UL (ref 1.8–7.7)
NEUTROPHILS NFR BLD AUTO: 70.3 %
PLATELET # BLD AUTO: 337 10*3/UL (ref 140–440)
PMV BLD AUTO: 10.7 FL (ref 9.5–12.2)
POTASSIUM SERPL-SCNC: 3.4 MMOL/L (ref 3.5–5.1)
PROT SERPL-MCNC: 5.6 G/DL (ref 6.3–8.2)
SODIUM SERPL-SCNC: 139 MMOL/L (ref 137–145)
TRIGL SERPL-MCNC: 203 MG/DL (ref 0–149)
VLDLC SERPL CALC-MCNC: 40.6 MG/DL (ref 5–40)
WBC # BLD AUTO: 13.11 10*3/UL (ref 4.5–10)

## 2025-07-21 PROCEDURE — 4A10X4Z MONITORING OF CENTRAL NERVOUS ELECTRICAL ACTIVITY, EXTERNAL APPROACH: ICD-10-PCS

## 2025-07-21 RX ADMIN — PREDNISONE SCH MG: 10 TABLET ORAL at 09:28

## 2025-07-22 RX ADMIN — CEFAZOLIN SCH MLS/HR: 330 INJECTION, POWDER, FOR SOLUTION INTRAMUSCULAR; INTRAVENOUS at 08:31

## 2025-07-23 VITALS — RESPIRATION RATE: 18 BRPM

## 2025-07-23 VITALS — SYSTOLIC BLOOD PRESSURE: 106 MMHG | HEART RATE: 66 BPM | TEMPERATURE: 97.7 F | DIASTOLIC BLOOD PRESSURE: 58 MMHG

## 2025-07-23 LAB
ALBUMIN SERPL-MCNC: 3 G/DL (ref 3.5–5)
ALP SERPL-CCNC: 93 U/L (ref 38–126)
ALT SERPL-CCNC: 16 U/L (ref 4–34)
ANION GAP SERPL CALC-SCNC: 8 MMOL/L
AST SERPL-CCNC: 18 U/L (ref 14–36)
BASOPHILS # BLD AUTO: 0.01 10*3/UL (ref 0–0.1)
BASOPHILS NFR BLD AUTO: 0.1 %
BILIRUB BLD-MCNC: 0.9 MG/DL (ref 0.2–1.3)
BUN SERPL-SCNC: 25 MG/DL (ref 7–17)
CALCIUM SPEC-MCNC: 9.5 MG/DL (ref 8.4–10.2)
CHLORIDE SERPL-SCNC: 108 MMOL/L (ref 98–107)
CO2 SERPL-SCNC: 24 MMOL/L (ref 22–30)
CREATININE: 0.72 MG/DL (ref 0.52–1.04)
EOSINOPHIL # BLD AUTO: 0.1 10*3/UL (ref 0.04–0.35)
EOSINOPHIL NFR BLD AUTO: 0.8 %
ERYTHROCYTE [DISTWIDTH] IN BLOOD BY AUTOMATED COUNT: 4.65 10*6/UL (ref 4.1–5.2)
ERYTHROCYTE [DISTWIDTH] IN BLOOD: 13.7 % (ref 11.5–14.5)
GLUCOSE SERPL-MCNC: 86 MG/DL (ref 74–99)
HCT VFR BLD AUTO: 44.7 % (ref 37.2–46.3)
HGB BLD-MCNC: 14 G/DL (ref 12–15)
IMM GRANULOCYTES # BLD: 0.08 10*3/UL (ref 0–0.04)
LYMPHOCYTES # SPEC AUTO: 1.63 10*3/UL (ref 0.9–5)
LYMPHOCYTES NFR SPEC AUTO: 13.7 %
MCH RBC QN AUTO: 30.1 PG (ref 27–32)
MCHC RBC AUTO-ENTMCNC: 31.3 G/DL (ref 32–37)
MCV RBC AUTO: 96.1 FL (ref 80–97)
MONOCYTES # BLD AUTO: 1.15 10*3/UL (ref 0.2–1)
MONOCYTES NFR BLD AUTO: 9.6 %
NEUTROPHILS # BLD AUTO: 8.95 10*3/UL (ref 1.8–7.7)
NEUTROPHILS NFR BLD AUTO: 75.1 %
PLATELET # BLD AUTO: 274 10*3/UL (ref 140–440)
PMV BLD AUTO: 10.4 FL (ref 9.5–12.2)
POTASSIUM SERPL-SCNC: 3.2 MMOL/L (ref 3.5–5.1)
PROT SERPL-MCNC: 5.3 G/DL (ref 6.3–8.2)
SODIUM SERPL-SCNC: 140 MMOL/L (ref 137–145)
WBC # BLD AUTO: 11.92 10*3/UL (ref 4.5–10)

## 2025-07-23 RX ADMIN — POTASSIUM CHLORIDE SCH MEQ: 20 TABLET, EXTENDED RELEASE ORAL at 09:36
